# Patient Record
Sex: FEMALE | Race: BLACK OR AFRICAN AMERICAN | NOT HISPANIC OR LATINO | Employment: FULL TIME | URBAN - METROPOLITAN AREA
[De-identification: names, ages, dates, MRNs, and addresses within clinical notes are randomized per-mention and may not be internally consistent; named-entity substitution may affect disease eponyms.]

---

## 2017-01-18 ENCOUNTER — OFFICE VISIT (OUTPATIENT)
Dept: INTERNAL MEDICINE | Facility: CLINIC | Age: 67
End: 2017-01-18
Attending: FAMILY MEDICINE
Payer: COMMERCIAL

## 2017-01-18 VITALS
BODY MASS INDEX: 42.48 KG/M2 | HEART RATE: 70 BPM | WEIGHT: 264.31 LBS | SYSTOLIC BLOOD PRESSURE: 128 MMHG | HEIGHT: 66 IN | DIASTOLIC BLOOD PRESSURE: 84 MMHG

## 2017-01-18 DIAGNOSIS — I42.0 DILATED CARDIOMYOPATHY: ICD-10-CM

## 2017-01-18 DIAGNOSIS — Z13.820 SCREENING FOR OSTEOPOROSIS: ICD-10-CM

## 2017-01-18 DIAGNOSIS — Z00.00 ANNUAL PHYSICAL EXAM: Primary | ICD-10-CM

## 2017-01-18 DIAGNOSIS — I10 ESSENTIAL HYPERTENSION: ICD-10-CM

## 2017-01-18 PROCEDURE — 99999 PR PBB SHADOW E&M-EST. PATIENT-LVL III: CPT | Mod: PBBFAC,,, | Performed by: FAMILY MEDICINE

## 2017-01-18 PROCEDURE — 99397 PER PM REEVAL EST PAT 65+ YR: CPT | Mod: 25,S$GLB,, | Performed by: FAMILY MEDICINE

## 2017-01-18 PROCEDURE — 3079F DIAST BP 80-89 MM HG: CPT | Mod: S$GLB,,, | Performed by: FAMILY MEDICINE

## 2017-01-18 PROCEDURE — 90471 IMMUNIZATION ADMIN: CPT | Mod: S$GLB,,, | Performed by: FAMILY MEDICINE

## 2017-01-18 PROCEDURE — 3074F SYST BP LT 130 MM HG: CPT | Mod: S$GLB,,, | Performed by: FAMILY MEDICINE

## 2017-01-18 PROCEDURE — 90662 IIV NO PRSV INCREASED AG IM: CPT | Mod: S$GLB,,, | Performed by: FAMILY MEDICINE

## 2017-01-18 RX ORDER — LISINOPRIL AND HYDROCHLOROTHIAZIDE 12.5; 2 MG/1; MG/1
TABLET ORAL
Qty: 180 TABLET | Refills: 3 | Status: SHIPPED | OUTPATIENT
Start: 2017-01-18 | End: 2017-12-19 | Stop reason: DRUGHIGH

## 2017-01-18 NOTE — MR AVS SNAPSHOT
[facility]  [address]  [place] [address]  Phone: [phone]  Fax: [phone]                  Charlotte Crowder   2017 9:40 AM   Office Visit    Description:  Female : 1950   Provider:  Wendy Thompson MD   Department:  [facility]           Reason for Visit     Medication Problem     Hypertension     Immunizations           Diagnoses this Visit        Comments    Annual physical exam    -  Primary     Essential hypertension         Screening for osteoporosis         Dilated cardiomyopathy                To Do List           Future Appointments        Provider Department Dept Phone    2017 3:20 PM Yokasta Mathias NP [facility] [phone]    2017 10:00 AM LAB, [facility] [phone]    2017 10:40 AM [facility] DEXA1 [facility] [phone]      Goals (5 Years of Data)     None       These Medications        Disp Refills Start End    lisinopril-hydrochlorothiazide (PRINZIDE,ZESTORETIC) 20-12.5 mg per tablet 180 tablet 3 2017     Take two tabs po daily    Pharmacy: [facility]/pharmacy #[id number] - [place], [place] - [address]  #: [phone]         [facility]     [facility] Nurse Care Line -  Assistance  Registered nurses in the [facility] provide clinical advisement, health education, appointment booking, and other advisory services.  Call for this free service at [phone].             Medications           Message regarding Medications     Verify the changes and/or additions to your medication regime listed below are the same as discussed with your clinician today.  If any of these changes or additions are incorrect, please notify your healthcare provider.        STOP taking these medications     metoprolol succinate (TOPROL-XL) 50 MG 24 hr tablet TAKE 1 TABLET BY MOUTH TWICE A DAY    diclofenac sodium (VOLTAREN) 1 % Gel Apply 2 g  "topically 2 (two) times daily as needed. Apply to affected areas as needed           Verify that the below list of medications is an accurate representation of the medications you are currently taking.  If none reported, the list may be blank. If incorrect, please contact your healthcare provider. Carry this list with you in case of emergency.           Current Medications     aspirin 81 MG Chew Take 81 mg by mouth once daily.    atorvastatin (LIPITOR) 20 MG tablet TAKE 1 TABLET BY MOUTH EVERY DAY    carvedilol (COREG) 25 MG tablet Take 1 tablet (25 mg total) by mouth 2 (two) times daily with meals.    cyclobenzaprine (FLEXERIL) 5 MG tablet Take 5 mg by mouth 3 (three) times daily as needed for Muscle spasms.    fluticasone (FLONASE) 50 mcg/actuation nasal spray 2 sprays by Each Nare route once daily.    ibuprofen (ADVIL,MOTRIN) 800 MG tablet TAKE 1 TABLET (800 MG TOTAL) BY MOUTH AFTER MEALS AS NEEDED FOR PAIN.    lisinopril-hydrochlorothiazide (PRINZIDE,ZESTORETIC) 20-12.5 mg per tablet Take two tabs po daily    MULTIVIT-IRON-MIN-FOLIC ACID 3,500-18-0.4 UNIT-MG-MG ORAL CHEW Take 1 tablet by mouth once daily.           Clinical Reference Information           Vital Signs - Last Recorded  Most recent update: 1/18/2017 10:03 AM by Juanita Graff MA    BP Pulse Ht Wt BMI    128/84 (BP Location: Left arm, Patient Position: Sitting, BP Method: Manual) 70 5' 6" (1.676 m) 119.9 kg (264 lb 5.3 oz) 42.66 kg/m2      Blood Pressure          Most Recent Value    BP  128/84      Allergies as of 1/18/2017     No Known Allergies      Immunizations Administered on Date of Encounter - 1/18/2017     Name Date Dose VIS Date Route    Influenza - High Dose 1/18/2017 0.5 mL 8/7/2015 Intramuscular      Orders Placed During Today's Visit      Normal Orders This Visit    Influenza - High Dose (65+) (PF) (IM)     Future Labs/Procedures Expected by Expires    CBC auto differential  1/18/2017 1/18/2018    Comprehensive metabolic panel  " 1/18/2017 1/18/2018    DXA Bone Density Spine And Hip  1/18/2017 1/18/2018    Hemoglobin A1c  1/18/2017 1/18/2018    Lipid panel  1/18/2017 1/18/2018    TSH  1/18/2017 1/18/2018      [facility] Sign-Up     Activating your [facility] account is as easy as 1-2-3!     1) Visit [email or web address], select Sign Up Now, enter this activation code and your date of birth, then select Next.  [email or web address]  Expires: 3/4/2017 10:36 AM      2) Create a username and password to use when you visit [facility] in the future and select a security question in case you lose your password and select Next.    3) Enter your e-mail address and click Sign Up!    Additional Information  If you have questions, please e-mail [email or web address] or call [phone] to talk to our [facility] staff. Remember, [facility] is NOT to be used for urgent needs. For medical emergencies, dial 911.

## 2017-01-18 NOTE — PROGRESS NOTES
Patient given HD Influenza IM in the RD. Patient tolerated well and Band-Aid was applied. Lot#[id number] Exp:04/25/2017. Patient advised to wait in the lobby for 15 min to make sure no adverse reactions occur.Patient given VIS information sheet.Patient states verbal understanding and has no further questions.

## 2017-01-19 ENCOUNTER — OFFICE VISIT (OUTPATIENT)
Dept: SLEEP MEDICINE | Facility: CLINIC | Age: 67
End: 2017-01-19
Payer: COMMERCIAL

## 2017-01-19 VITALS
WEIGHT: 264.13 LBS | HEART RATE: 82 BPM | HEIGHT: 66 IN | SYSTOLIC BLOOD PRESSURE: 132 MMHG | DIASTOLIC BLOOD PRESSURE: 94 MMHG | BODY MASS INDEX: 42.45 KG/M2

## 2017-01-19 DIAGNOSIS — G47.33 OBSTRUCTIVE SLEEP APNEA: Primary | ICD-10-CM

## 2017-01-19 PROCEDURE — 1159F MED LIST DOCD IN RCRD: CPT | Mod: S$GLB,,, | Performed by: NURSE PRACTITIONER

## 2017-01-19 PROCEDURE — 3075F SYST BP GE 130 - 139MM HG: CPT | Mod: S$GLB,,, | Performed by: NURSE PRACTITIONER

## 2017-01-19 PROCEDURE — 99999 PR PBB SHADOW E&M-EST. PATIENT-LVL III: CPT | Mod: PBBFAC,,, | Performed by: NURSE PRACTITIONER

## 2017-01-19 PROCEDURE — 99213 OFFICE O/P EST LOW 20 MIN: CPT | Mod: S$GLB,,, | Performed by: NURSE PRACTITIONER

## 2017-01-19 PROCEDURE — 3080F DIAST BP >= 90 MM HG: CPT | Mod: S$GLB,,, | Performed by: NURSE PRACTITIONER

## 2017-01-19 NOTE — PROGRESS NOTES
"Charlotte Crowder returns today for mgt of KIMMIE. Last seen 9/23/16.     Since last seen, she resumed using apap. Doesn't sleep w/o it.  makes sure. Loves stringer fx Massiel mask. Denies mouth drying. No chin strap use. Mask does not fall off. Continued resolution of snoring, apneic pauses and un-refreshing sleep. Needs new supplies. ESS=2    Interrogation- 30davg 5.8h/n. 7d 6.6hrs/n. 27/30d>4h. 90% tile 10.3-10.5cm. AHI 3.9, heat at 2. % mask fit.     HISTORY:  4/8/16:  HISTORY OF PRESENT ILLNESS: Charlotte Crowedr is a 66 y.o. female returning today for the management of KIMMIE. She last saw Dr. Khan 1/19/16, this is my initial visit with her. She has since undergone a sleep study, which we reviewed together in detail today. She continues to report loud, disruptive snoring, un-refreshing sleep, and excessive daytime fatigue. She is a sitter. She feels less short of breath. Sleep is disrupted. +witnessed apneic pauses. She does not drive. No cataplexy. ESS=4    +progressive sob since 6/2015.  Patient presented to cardiologist and ett revealed ef 40%.  Holter revealed nightime AV block.      9/23/16: Her titration study was denied in April '16 so she was setup with apap 6-20cm. She used it the first 3-mos ~ 4-6h/night but then stopped because she was told only to use it for 3mos. She reports resolution of snoring and apneic pauses when using therapy. She found her mask uncomfortable. Using chin strap. She did feel more rested when adherent also, just stopped ~ 30d ago. She slept soundly. Denies pressure intolerance. Kept heat at 2 and denies nasal drying. ESS=2. "I don't see why  I have to keep using it, I feel fine and my doctor told me my heart was fine". States she will resume using it tonight.     Interrogation: machine fairly new condition, 1/30d>4h. 0.2h/n 90% tile 7.3cm AHI 11.6, 79% fit. reviewed ENCORE data 4+hr/ first 3-mos with ahi 10-13cm. GHOSH 0.2    PSG (262#) 3/16/116: AHI was 40.3 with an oxygen " "yenny of 85.0%. The supine AHI was 60.7 and the REM AHI was 77.6. Due to poor sleep efficiency, the patient did not qualify for a split night study in the first half of the study.       REVIEW OF SYSTEMS: Sleep related symptoms as per HPI. 6# gain.  Chronic back pain, occasional acid reflex. Denies am headaches, otherwise a balance review of 10-systems is negative.     Stress Echo:  1 - Technically difficult study.   2 - Mildly to moderately depressed left ventricular systolic function (EF 40-45%).   3 - Low normal to mildly depressed right ventricular systolic function .   4 - Left ventricular diastolic dysfunction.      [facility] July normal        PHYSICAL EXAM:   Visit Vitals    BP (!) 132/94    Pulse 82    Ht 5' 6" (1.676 m)    Wt 119.8 kg (264 lb 1.8 oz)    BMI 42.63 kg/m2   GENERAL: morbid obese body habitus, well groomed     ASSESSMENT:   1. Obstructive sleep apnea, severe significant, adherent with PAP therapy after setup with reported improvement of symptoms (residual mild elevated AHI upon encore review). Used it 3-mos then stopped because she didn't know otherwise, reports "they" told her to use if 2-3mos then could stop, use it 4h /night. 1/19/17: She resumed APAP therapy after last seen. Continued nightly use. Symptoms are improved. AHI <5 indicating effective therapy. Needs new supplies .  Medical comorbidities include: morbid obesity, dilated cardiomyopathy, HTN    PLAN:   1. continue apap, adjust today 6-14cm. Continue nightly use. [facility] supplies  Discussed purpose of PAP therapy, health benefits of CPAP, as well as the potential ramifications of untreated sleep apnea, which could include daytime sleepiness, hypertension, heart disease and/or stroke. An AHI of 15 is associated with increased risk CVD.   2. She does not drive  3. RTC 1 yrs, SOONER if needed  "

## 2017-01-24 NOTE — PROGRESS NOTES
Subjective:       Patient ID: Charlotte Crowder is a 66 y.o. female.    Chief Complaint: Medication Problem (cough , sob); Hypertension; and Immunizations    HPI Comments: Pt presents today for HTN f/u and annual exam. BP well controlled today. No complaints needs med refilled.  Also wants flu shot, labs ordered and bone density    Hypertension   Pertinent negatives include no chest pain, headaches, neck pain, palpitations or shortness of breath.     Review of Systems   Constitutional: Negative.  Negative for activity change, appetite change, chills, fatigue and fever.   HENT: Negative for congestion, ear pain, sinus pressure, sore throat and trouble swallowing.    Eyes: Negative.  Negative for photophobia, pain and visual disturbance.   Respiratory: Negative for apnea, cough, chest tightness, shortness of breath and wheezing.    Cardiovascular: Negative for chest pain, palpitations and leg swelling.   Gastrointestinal: Negative.  Negative for abdominal distention, abdominal pain, constipation, diarrhea, nausea and vomiting.   Genitourinary: Negative.    Musculoskeletal: Negative.  Negative for back pain, joint swelling and neck pain.   Skin: Negative.    Neurological: Negative for dizziness, tremors, weakness, light-headedness, numbness and headaches.   Psychiatric/Behavioral: Negative for behavioral problems, decreased concentration and sleep disturbance. The patient is not nervous/anxious.        Objective:      Physical Exam   Constitutional: She is oriented to person, place, and time. She appears well-developed and well-nourished.   HENT:   Head: Normocephalic and atraumatic.   Right Ear: External ear normal.   Left Ear: External ear normal.   Nose: Nose normal.   Mouth/Throat: Oropharynx is clear and moist. No oropharyngeal exudate.   Eyes: Conjunctivae and EOM are normal. Pupils are equal, round, and reactive to light.   Neck: Normal range of motion. Neck supple. No thyromegaly present.   Cardiovascular:  Normal rate, regular rhythm, normal heart sounds and intact distal pulses.    No murmur heard.  Pulmonary/Chest: Effort normal and breath sounds normal. No respiratory distress. She has no wheezes. She has no rales. She exhibits no tenderness.   Abdominal: Soft. Bowel sounds are normal. She exhibits no distension and no mass. There is no tenderness. There is no rebound and no guarding.   Musculoskeletal: Normal range of motion. She exhibits no edema or tenderness.   Lymphadenopathy:     She has no cervical adenopathy.   Neurological: She is alert and oriented to person, place, and time. She has normal reflexes. She displays normal reflexes. No cranial nerve deficit. She exhibits normal muscle tone. Coordination normal.   Skin: Skin is warm and dry. No erythema.   Psychiatric: She has a normal mood and affect. Her behavior is normal. Judgment and thought content normal.       Assessment:       1. Annual physical exam    2. Essential hypertension    3. Screening for osteoporosis    4. Dilated cardiomyopathy        Plan:       Annual physical exam  -     CBC auto differential; Future; Expected date: 1/18/17  -     Comprehensive metabolic panel; Future; Expected date: 1/18/17  -     Hemoglobin A1c; Future; Expected date: 1/18/17  -     TSH; Future; Expected date: 1/18/17  -     Lipid panel; Future; Expected date: 1/18/17    Essential hypertension  -     lisinopril-hydrochlorothiazide (PRINZIDE,ZESTORETIC) 20-12.5 mg per tablet; Take two tabs po daily (Patient taking differently: as needed. Take two tabs po daily)  Dispense: 180 tablet; Refill: 3    Screening for osteoporosis  -     DXA Bone Density Spine And Hip; Future; Expected date: 1/18/17    Dilated cardiomyopathy    Other orders  -     Influenza - High Dose (65+) (PF) (IM)      PE wnl  Labs ordered  meds refilled  HTN controlled  Flu shot

## 2017-01-26 ENCOUNTER — HOSPITAL ENCOUNTER (OUTPATIENT)
Dept: RADIOLOGY | Facility: OTHER | Age: 67
Discharge: HOME | End: 2017-01-26
Attending: FAMILY MEDICINE
Payer: COMMERCIAL

## 2017-01-26 ENCOUNTER — LAB VISIT (OUTPATIENT)
Dept: LAB | Facility: OTHER | Age: 67
End: 2017-01-26
Attending: FAMILY MEDICINE
Payer: COMMERCIAL

## 2017-01-26 DIAGNOSIS — Z13.820 SCREENING FOR OSTEOPOROSIS: ICD-10-CM

## 2017-01-26 DIAGNOSIS — Z00.00 ANNUAL PHYSICAL EXAM: ICD-10-CM

## 2017-01-26 DIAGNOSIS — D72.819 LEUKOPENIA, UNSPECIFIED TYPE: Primary | ICD-10-CM

## 2017-01-26 DIAGNOSIS — Z11.59 NEED FOR HEPATITIS C SCREENING TEST: ICD-10-CM

## 2017-01-26 LAB
ALBUMIN SERPL BCP-MCNC: 4 G/DL
ALP SERPL-CCNC: 97 U/L
ALT SERPL W/O P-5'-P-CCNC: 16 U/L
ANION GAP SERPL CALC-SCNC: 8 MMOL/L
AST SERPL-CCNC: 20 U/L
BASOPHILS # BLD AUTO: 0.01 K/UL
BASOPHILS NFR BLD: 0.4 %
BILIRUB SERPL-MCNC: 0.7 MG/DL
BUN SERPL-MCNC: 17 MG/DL
CALCIUM SERPL-MCNC: 10.9 MG/DL
CHLORIDE SERPL-SCNC: 107 MMOL/L
CHOLEST/HDLC SERPL: 4.3 {RATIO}
CO2 SERPL-SCNC: 27 MMOL/L
CREAT SERPL-MCNC: 1 MG/DL
DIFFERENTIAL METHOD: ABNORMAL
EOSINOPHIL # BLD AUTO: 0.1 K/UL
EOSINOPHIL NFR BLD: 4.7 %
ERYTHROCYTE [DISTWIDTH] IN BLOOD BY AUTOMATED COUNT: 13.9 %
EST. GFR  (AFRICAN AMERICAN): >60 ML/MIN/1.73 M^2
EST. GFR  (NON AFRICAN AMERICAN): 59.3 ML/MIN/1.73 M^2
GLUCOSE SERPL-MCNC: 106 MG/DL
HCT VFR BLD AUTO: 36 %
HDL/CHOLESTEROL RATIO: 23.5 %
HDLC SERPL-MCNC: 166 MG/DL
HDLC SERPL-MCNC: 39 MG/DL
HGB BLD-MCNC: 12.1 G/DL
LDLC SERPL CALC-MCNC: 93.6 MG/DL
LYMPHOCYTES # BLD AUTO: 1.3 K/UL
LYMPHOCYTES NFR BLD: 47 %
MCH RBC QN AUTO: 28.4 PG
MCHC RBC AUTO-ENTMCNC: 33.6 %
MCV RBC AUTO: 85 FL
MONOCYTES # BLD AUTO: 0.2 K/UL
MONOCYTES NFR BLD: 6.1 %
NEUTROPHILS # BLD AUTO: 1.2 K/UL
NEUTROPHILS NFR BLD: 41.8 %
NONHDLC SERPL-MCNC: 127 MG/DL
PLATELET # BLD AUTO: 191 K/UL
PMV BLD AUTO: 10.2 FL
POTASSIUM SERPL-SCNC: 4 MMOL/L
PROT SERPL-MCNC: 7.5 G/DL
RBC # BLD AUTO: 4.26 M/UL
SODIUM SERPL-SCNC: 142 MMOL/L
TRIGL SERPL-MCNC: 167 MG/DL
TSH SERPL DL<=0.005 MIU/L-ACNC: 1.09 UIU/ML
WBC # BLD AUTO: 2.79 K/UL

## 2017-01-26 PROCEDURE — 84443 ASSAY THYROID STIM HORMONE: CPT

## 2017-01-26 PROCEDURE — 85025 COMPLETE CBC W/AUTO DIFF WBC: CPT

## 2017-01-26 PROCEDURE — 80053 COMPREHEN METABOLIC PANEL: CPT

## 2017-01-26 PROCEDURE — 80061 LIPID PANEL: CPT

## 2017-01-26 PROCEDURE — 77080 DXA BONE DENSITY AXIAL: CPT | Mod: 26,,, | Performed by: RADIOLOGY

## 2017-01-26 PROCEDURE — 86803 HEPATITIS C AB TEST: CPT

## 2017-01-26 PROCEDURE — 36415 COLL VENOUS BLD VENIPUNCTURE: CPT

## 2017-01-26 PROCEDURE — 83036 HEMOGLOBIN GLYCOSYLATED A1C: CPT

## 2017-01-26 PROCEDURE — 77080 DXA BONE DENSITY AXIAL: CPT | Mod: TC

## 2017-01-27 LAB
ESTIMATED AVG GLUCOSE: 100 MG/DL
HBA1C MFR BLD HPLC: 5.1 %
HCV AB SERPL QL IA: NEGATIVE

## 2017-01-31 ENCOUNTER — TELEPHONE (OUTPATIENT)
Dept: INTERNAL MEDICINE | Facility: CLINIC | Age: 67
End: 2017-01-31

## 2017-01-31 NOTE — TELEPHONE ENCOUNTER
----- Message from Mckayla Callahan sent at 1/31/2017  9:54 AM CST -----  Contact: MADELEINE RAMSAY [[id number]]  _  1st Request  _  2nd Request  _  3rd Request        Who: MADELEINE RAMSAY [[id number]]    Why: Patient would like doctor to explain her results to her. Please give patient a call back at your earliest convenience. Thanks!    What Number to Call Back: [phone]     When to Expect a call back: (Before the end of the day)   -- if the call is after 12:00, the call back will be tomorrow.

## 2017-01-31 NOTE — PROGRESS NOTES
In depth voicemail left for pt regarding her calcium levels and ongoing leukopenia. Will suggest referral to heme at this time.  PV

## 2017-01-31 NOTE — TELEPHONE ENCOUNTER
Let a detailed message for the pt in regards to setting up an appointment with heme for further evaluate.

## 2017-02-09 ENCOUNTER — INITIAL CONSULT (OUTPATIENT)
Dept: HEMATOLOGY/ONCOLOGY | Facility: CLINIC | Age: 67
End: 2017-02-09
Attending: FAMILY MEDICINE
Payer: COMMERCIAL

## 2017-02-09 VITALS
RESPIRATION RATE: 16 BRPM | SYSTOLIC BLOOD PRESSURE: 135 MMHG | HEIGHT: 65 IN | HEART RATE: 77 BPM | DIASTOLIC BLOOD PRESSURE: 71 MMHG | BODY MASS INDEX: 43.79 KG/M2 | TEMPERATURE: 99 F | WEIGHT: 262.81 LBS

## 2017-02-09 DIAGNOSIS — D70.0 CONGENITAL NEUTROPENIA: ICD-10-CM

## 2017-02-09 PROCEDURE — 3075F SYST BP GE 130 - 139MM HG: CPT | Mod: S$GLB,,, | Performed by: INTERNAL MEDICINE

## 2017-02-09 PROCEDURE — 3078F DIAST BP <80 MM HG: CPT | Mod: S$GLB,,, | Performed by: INTERNAL MEDICINE

## 2017-02-09 PROCEDURE — 99999 PR PBB SHADOW E&M-EST. PATIENT-LVL III: CPT | Mod: PBBFAC,,, | Performed by: INTERNAL MEDICINE

## 2017-02-09 PROCEDURE — 99203 OFFICE O/P NEW LOW 30 MIN: CPT | Mod: S$GLB,,, | Performed by: INTERNAL MEDICINE

## 2017-02-09 NOTE — LETTER
February 9, 2017      Wendy Thompson MD  [address]  [facility] [address]           [facility]  [address]  [address]  [facility] [address]  Phone: [phone]          Patient: Charlotte Crowder   MR Number: [id number]   YOB: 1950   Date of Visit: 2/9/2017       Dear Dr. Wendy Thompson:    Thank you for referring Charlotte Crowder to me for evaluation. Attached you will find relevant portions of my assessment and plan of care.    If you have questions, please do not hesitate to call me. I look forward to following Charlotte Crowder along with you.    Sincerely,    Leonard Leo MD    Enclosure  CC:  No Recipients    If you would like to receive this communication electronically, please contact [email or web address] or [phone] to request more information on [employer] access.    For providers and/or their staff who would like to refer a patient to [facility], please contact us through our one-stop-shop provider referral line, [facility], at [phone].    If you feel you have received this communication in error or would no longer like to receive these types of communications, please e-mail [email or web address]

## 2017-02-09 NOTE — PROGRESS NOTES
Subjective:       Patient ID: Charlotte Crowder is a 66 y.o. female.    Chief Complaint: Advice Only    HPI Ms Crowder is a 66-year-old female referred for evaluation of leukopenia.    Review of her blood work shows that she's had mild leukopenia dating back at least 2013 with her neutrophil count typically ranging between 3040 500. neutrophil count has ranged between 1119 100.  Hemoglobin and platelet counts have been normal.  Vitamin B12 level was noted to be slightly decreased in 2013 at 199.  On recheck in 2014 it was 365.      She reports she was told many years ago that her white blood cell count was slightly low.  There is no history of recurrent infections.   Her weight has been stable.  She has no skin rashes.  She has some back and knee pain consistent with DJD.    Review of Systems   Constitutional: Negative for activity change, fever and unexpected weight change.   Respiratory: Negative for cough.    Gastrointestinal: Negative for abdominal pain.   Genitourinary: Negative for dysuria.   Musculoskeletal: Positive for arthralgias (knees) and back pain.   Neurological: Negative for headaches.   Psychiatric/Behavioral: Negative for dysphoric mood. The patient is not nervous/anxious.        Objective:      Physical Exam   Constitutional: She is oriented to person, place, and time. She appears well-developed and well-nourished. No distress.   Obese   HENT:   Mouth/Throat: No oropharyngeal exudate.   Eyes: No scleral icterus.   Cardiovascular: Regular rhythm, normal heart sounds and intact distal pulses.    Pulmonary/Chest: Effort normal and breath sounds normal. She has no wheezes. She has no rales.   Abdominal: Soft. She exhibits no mass. There is no tenderness.   Lymphadenopathy:     She has no cervical adenopathy.     She has no axillary adenopathy.        Right: No supraclavicular adenopathy present.        Left: No supraclavicular adenopathy present.   Neurological: She is alert and oriented to person,  place, and time.   Psychiatric: She has a normal mood and affect. Her behavior is normal. Thought content normal.   Vitals reviewed.      Assessment:       1. Congenital neutropenia      benign neutropenia seen in  Americans, no evidence of significant underlying bone marrow dysfunction or significant hematologic disorder.  Plan:       I told the patient that she needs no further evaluation for this finding.  It is unlikely that she will have any consequences of her mild chronic leukopenia.

## 2017-03-03 RX ORDER — IBUPROFEN 800 MG/1
TABLET ORAL
Qty: 90 TABLET | Refills: 0 | Status: SHIPPED | OUTPATIENT
Start: 2017-03-03 | End: 2017-07-11 | Stop reason: SDUPTHER

## 2017-03-03 NOTE — TELEPHONE ENCOUNTER
----- Message from Any Caba sent at 3/3/2017 10:52 AM CST -----  Contact: Franco with [facility]   X  1st Request  _  2nd Request  _  3rd Request    Please refill the medication(s) listed below. Please call the patient when the prescription(s) is ready for  at the phone number ([phone]) .    Medication #1 ibuprofen (ADVIL,MOTRIN) 800 MG tablet      Preferred Pharmacy:  [facility] #[id number] - [place] - [facility] [address]

## 2017-03-31 RX ORDER — ATORVASTATIN CALCIUM 20 MG/1
20 TABLET, FILM COATED ORAL DAILY
Qty: 90 TABLET | Refills: 1 | Status: SHIPPED | OUTPATIENT
Start: 2017-03-31 | End: 2017-09-27 | Stop reason: SDUPTHER

## 2017-06-22 ENCOUNTER — OFFICE VISIT (OUTPATIENT)
Dept: OPTOMETRY | Facility: CLINIC | Age: 67
End: 2017-06-22
Payer: COMMERCIAL

## 2017-06-22 DIAGNOSIS — I10 ESSENTIAL HYPERTENSION: ICD-10-CM

## 2017-06-22 DIAGNOSIS — H25.13 CATARACT, NUCLEAR SCLEROTIC SENILE, BILATERAL: ICD-10-CM

## 2017-06-22 DIAGNOSIS — H04.123 DRY EYE SYNDROME, BILATERAL: Primary | ICD-10-CM

## 2017-06-22 DIAGNOSIS — H02.889 MEIBOMIAN GLAND DYSFUNCTION: ICD-10-CM

## 2017-06-22 DIAGNOSIS — H52.4 PRESBYOPIA OU: ICD-10-CM

## 2017-06-22 PROCEDURE — 99999 PR PBB SHADOW E&M-EST. PATIENT-LVL I: CPT | Mod: PBBFAC,,, | Performed by: OPTOMETRIST

## 2017-06-22 PROCEDURE — 92014 COMPRE OPH EXAM EST PT 1/>: CPT | Mod: S$GLB,,, | Performed by: OPTOMETRIST

## 2017-06-22 NOTE — PROGRESS NOTES
HPI     Patient here for annual check. States eyes are dry, does not use any ATs.    Uses OTC readers +1.75-2.00 but says they do not work as well as they   used to   REGINA Allred Resident 2015    (-) Pain  (+) dryness, watering   (-) itching, irritation   (-) Flashes or Floaters     gtts None     Last edited by Mauricio Amador, OD on 6/22/2017  2:17 PM. (History)            Assessment /Plan     For exam results, see Encounter Report.    Dry eye syndrome, bilateral    Cataract, nuclear sclerotic senile, bilateral    Meibomian gland dysfunction    Essential hypertension    Presbyopia OU    1) Educated pt on presence of cataracts and effects on vision. No surgery at this time. Recheck in one year.    2-3) Recommend Systane Balance 3-4 times daily and warm compresses with digital massage, Monitor yearly     4) (-) hemes/CWS, (-) NVD/NVE, (-) H/B/T, No HTN Retinopathy, cont compliance with PCP fu and meds, Monitor yearly     5) Cont OTC readers +2.50,    RTC 1 year annual Exam

## 2017-07-11 ENCOUNTER — OFFICE VISIT (OUTPATIENT)
Dept: INTERNAL MEDICINE | Facility: CLINIC | Age: 67
End: 2017-07-11
Attending: FAMILY MEDICINE
Payer: COMMERCIAL

## 2017-07-11 VITALS
WEIGHT: 266.56 LBS | BODY MASS INDEX: 42.84 KG/M2 | HEART RATE: 79 BPM | DIASTOLIC BLOOD PRESSURE: 78 MMHG | SYSTOLIC BLOOD PRESSURE: 124 MMHG | HEIGHT: 66 IN

## 2017-07-11 DIAGNOSIS — R30.0 DYSURIA: ICD-10-CM

## 2017-07-11 DIAGNOSIS — G89.29 CHRONIC FLANK PAIN: Primary | ICD-10-CM

## 2017-07-11 DIAGNOSIS — R10.9 CHRONIC FLANK PAIN: Primary | ICD-10-CM

## 2017-07-11 LAB
BACTERIA #/AREA URNS HPF: NORMAL /HPF
BILIRUB UR QL STRIP: NEGATIVE
CLARITY UR: CLEAR
COLOR UR: YELLOW
GLUCOSE UR QL STRIP: NEGATIVE
HGB UR QL STRIP: NEGATIVE
KETONES UR QL STRIP: NEGATIVE
LEUKOCYTE ESTERASE UR QL STRIP: ABNORMAL
MICROSCOPIC COMMENT: NORMAL
NITRITE UR QL STRIP: NEGATIVE
PH UR STRIP: 6 [PH] (ref 5–8)
PROT UR QL STRIP: NEGATIVE
SP GR UR STRIP: <=1.005 (ref 1–1.03)
SQUAMOUS #/AREA URNS HPF: 2 /HPF
URN SPEC COLLECT METH UR: ABNORMAL
UROBILINOGEN UR STRIP-ACNC: 1 EU/DL
WBC #/AREA URNS HPF: 3 /HPF (ref 0–5)

## 2017-07-11 PROCEDURE — 81000 URINALYSIS NONAUTO W/SCOPE: CPT

## 2017-07-11 PROCEDURE — 1159F MED LIST DOCD IN RCRD: CPT | Mod: S$GLB,,, | Performed by: FAMILY MEDICINE

## 2017-07-11 PROCEDURE — 99999 PR PBB SHADOW E&M-EST. PATIENT-LVL III: CPT | Mod: PBBFAC,,, | Performed by: FAMILY MEDICINE

## 2017-07-11 PROCEDURE — 99213 OFFICE O/P EST LOW 20 MIN: CPT | Mod: S$GLB,,, | Performed by: FAMILY MEDICINE

## 2017-07-11 PROCEDURE — 1125F AMNT PAIN NOTED PAIN PRSNT: CPT | Mod: S$GLB,,, | Performed by: FAMILY MEDICINE

## 2017-07-11 PROCEDURE — 87086 URINE CULTURE/COLONY COUNT: CPT

## 2017-07-11 RX ORDER — SULFAMETHOXAZOLE AND TRIMETHOPRIM 800; 160 MG/1; MG/1
1 TABLET ORAL 2 TIMES DAILY
Qty: 6 TABLET | Refills: 0 | Status: SHIPPED | OUTPATIENT
Start: 2017-07-11 | End: 2017-07-14

## 2017-07-11 RX ORDER — CYCLOBENZAPRINE HCL 10 MG
10 TABLET ORAL 2 TIMES DAILY PRN
Qty: 30 TABLET | Refills: 1 | Status: SHIPPED | OUTPATIENT
Start: 2017-07-11 | End: 2017-09-18 | Stop reason: SDUPTHER

## 2017-07-11 RX ORDER — IBUPROFEN 800 MG/1
TABLET ORAL
Qty: 90 TABLET | Refills: 0 | Status: SHIPPED | OUTPATIENT
Start: 2017-07-11 | End: 2018-02-09 | Stop reason: SDUPTHER

## 2017-07-11 NOTE — MEDICAL/APP STUDENT
Subjective:       Patient ID: Charlotte Crowder is a 67 y.o. female.    Chief Complaint: Urinary Tract Infection (possible, burning, pressure x 4 days); Urine Odor; and Urinary Frequency    Patient presents to clinic today with complaints of chronic L sided lower back pain that is typically controlled on Ibuprofen 800 mg that she takes every couple of days. Patient reserves Ibuprofen for days when her pain is 7/10. Describes pain as sharp and it can present in her L lateral lower flank or L lower back above the posterior pelvis. Patient has had Xrays that showed DJD to lumbar and sacral area. Pain is aggravated by ADLs such as ironing or reaching.     Patient also notes that she has been having pressure in her bladder and burning with urination x 4 days. She also notes that her urine has changed in odor. No flank pain. Pos+ for urinary frequency x 2 days. Has increased amounts of water and cranberry juice since the symptoms began. Neg- for fever, chills, diaphoresis, confusion.       Review of Systems   Constitutional: Negative for activity change, chills, diaphoresis, fatigue and fever.   Respiratory: Negative for cough, shortness of breath and wheezing.    Cardiovascular: Negative for chest pain and palpitations.   Gastrointestinal: Negative for constipation, diarrhea, nausea and vomiting.   Genitourinary: Positive for difficulty urinating, dysuria and frequency. Negative for flank pain, hematuria and urgency.   Musculoskeletal: Positive for arthralgias (L hip and lower back) and back pain. Negative for joint swelling.   Neurological: Negative for dizziness and light-headedness.       Objective:      Physical Exam   Constitutional: She is oriented to person, place, and time. She appears well-developed and well-nourished.   Cardiovascular: Normal rate, regular rhythm, normal heart sounds and intact distal pulses.    Pulmonary/Chest: Effort normal and breath sounds normal.   Abdominal: Soft. Bowel sounds are normal.  She exhibits no distension. There is no tenderness.   Musculoskeletal: Normal range of motion. She exhibits tenderness (L lateral low flank).        Arms:  Neurological: She is alert and oriented to person, place, and time.   Skin: Skin is warm and dry. Capillary refill takes less than 2 seconds. No rash noted.   Psychiatric: She has a normal mood and affect. Her behavior is normal. Thought content normal.       Assessment:       1. Chronic flank pain    2. Dysuria           Plan:     Charlotte was seen today for urinary tract infection, urine odor and urinary frequency.    Diagnoses and all orders for this visit:    Chronic flank pain  -     cyclobenzaprine (FLEXERIL) 10 MG tablet; Take 1 tablet (10 mg total) by mouth 2 (two) times daily as needed for Muscle spasms.  -     ibuprofen (ADVIL,MOTRIN) 800 MG tablet; TAKE 1 TABLET (800 MG TOTAL) BY MOUTH AFTER MEALS AS NEEDED FOR PAIN.    Dysuria  -     Urinalysis  -     CULTURE, URINE  -     sulfamethoxazole-trimethoprim 800-160mg (BACTRIM DS) 800-160 mg Tab; Take 1 tablet by mouth 2 (two) times daily.      Urine in office was positive for leukocytes and patient started on 3 day course of bactrim. Urine sent for culture and UA. RTC as needed or if symptoms worsen. Notify PCP if develop fever, chills, or signs of infection.

## 2017-07-12 NOTE — PROGRESS NOTES
Patient ID: Charlotte Crowder is a 67 y.o. female.     Chief Complaint: Urinary Tract Infection (possible, burning, pressure x 4 days); Urine Odor; and Urinary Frequency     Patient presents to clinic today with complaints of chronic L sided lower back pain that is typically controlled on Ibuprofen 800 mg that she takes every couple of days. Patient reserves Ibuprofen for days when her pain is 7/10. Describes pain as sharp and it can present in her L lateral lower flank or L lower back above the posterior pelvis. Patient has had Xrays that showed DJD to lumbar and sacral area. Pain is aggravated by ADLs such as ironing or reaching.      Patient also notes that she has been having pressure in her bladder and burning with urination x 4 days. She also notes that her urine has changed in odor. No flank pain. Pos+ for urinary frequency x 2 days. Has increased amounts of water and cranberry juice since the symptoms began. Neg- for fever, chills, diaphoresis, confusion.      Review of Systems   Constitutional: Negative for activity change, chills, diaphoresis, fatigue and fever.   Respiratory: Negative for cough, shortness of breath and wheezing.    Cardiovascular: Negative for chest pain and palpitations.   Gastrointestinal: Negative for constipation, diarrhea, nausea and vomiting.   Genitourinary: Positive for difficulty urinating, dysuria and frequency. Negative for flank pain, hematuria and urgency.   Musculoskeletal: Positive for arthralgias (L hip and lower back) and back pain. Negative for joint swelling.   Neurological: Negative for dizziness and light-headedness.       Objective:      Physical Exam   Constitutional: She is oriented to person, place, and time. She appears well-developed and well-nourished.   Cardiovascular: Normal rate, regular rhythm, normal heart sounds and intact distal pulses.    Pulmonary/Chest: Effort normal and breath sounds normal.   Abdominal: Soft. Bowel sounds are normal. She exhibits no  distension. There is no tenderness.   Musculoskeletal: Normal range of motion. She exhibits tenderness (L lateral low flank).        Arms:  Neurological: She is alert and oriented to person, place, and time.   Skin: Skin is warm and dry. Capillary refill takes less than 2 seconds. No rash noted.   Psychiatric: She has a normal mood and affect. Her behavior is normal. Thought content normal.       Assessment:       1. Chronic flank pain    2. Dysuria              Plan:     Charlotte was seen today for urinary tract infection, urine odor and urinary frequency.     Diagnoses and all orders for this visit:     Chronic flank pain  -     cyclobenzaprine (FLEXERIL) 10 MG tablet; Take 1 tablet (10 mg total) by mouth 2 (two) times daily as needed for Muscle spasms.  -     ibuprofen (ADVIL,MOTRIN) 800 MG tablet; TAKE 1 TABLET (800 MG TOTAL) BY MOUTH AFTER MEALS AS NEEDED FOR PAIN.     Dysuria  -     Urinalysis  -     CULTURE, URINE  -     sulfamethoxazole-trimethoprim 800-160mg (BACTRIM DS) 800-160 mg Tab; Take 1 tablet by mouth 2 (two) times daily.        Urine in office was positive for leukocytes and patient started on 3 day course of bactrim. Urine sent for culture and UA. RTC as needed or if symptoms worsen. Notify PCP if develop fever, chills, or signs of infection.             Electronically signed by Sony Craft at 7/11/2017 12:23 PM      Pt seen today in clinic and examined by myself. Plan derived with pt in room

## 2017-07-13 LAB — BACTERIA UR CULT: NORMAL

## 2017-07-21 DIAGNOSIS — I10 ESSENTIAL HYPERTENSION: ICD-10-CM

## 2017-07-21 DIAGNOSIS — I42.0 DILATED CARDIOMYOPATHY: ICD-10-CM

## 2017-07-24 RX ORDER — CARVEDILOL 25 MG/1
TABLET ORAL
Qty: 180 TABLET | Refills: 0 | Status: SHIPPED | OUTPATIENT
Start: 2017-07-24 | End: 2017-10-25 | Stop reason: SDUPTHER

## 2017-09-18 DIAGNOSIS — G89.29 CHRONIC FLANK PAIN: ICD-10-CM

## 2017-09-18 DIAGNOSIS — R10.9 CHRONIC FLANK PAIN: ICD-10-CM

## 2017-09-18 RX ORDER — CYCLOBENZAPRINE HCL 10 MG
TABLET ORAL
Qty: 30 TABLET | Refills: 0 | Status: SHIPPED | OUTPATIENT
Start: 2017-09-18 | End: 2018-02-05 | Stop reason: SDUPTHER

## 2017-09-27 RX ORDER — ATORVASTATIN CALCIUM 20 MG/1
20 TABLET, FILM COATED ORAL DAILY
Qty: 90 TABLET | Refills: 1 | Status: SHIPPED | OUTPATIENT
Start: 2017-09-27 | End: 2017-12-19 | Stop reason: SDUPTHER

## 2017-10-12 ENCOUNTER — TELEPHONE (OUTPATIENT)
Dept: INTERNAL MEDICINE | Facility: CLINIC | Age: 67
End: 2017-10-12

## 2017-10-12 NOTE — TELEPHONE ENCOUNTER
Left a detailed informing the pt that Dr. Thompson isn't in the office on Thursday or Friday . Upon her return on Monday will this matter be addressed .

## 2017-10-12 NOTE — TELEPHONE ENCOUNTER
----- Message from tSar Block sent at 10/12/2017 10:57 AM CDT -----  Contact: Charlotte Crowder  X_  1st Request  _  2nd Request  _  3rd Request        Who: Charlotte Crowder    Why: Patient called needing an order for her mammogram and to schedule    What Number to Call Back: [phone]    When to Expect a call back: (Within 24 hours)    Please return the call at earliest convenience. Thanks!

## 2017-10-16 ENCOUNTER — TELEPHONE (OUTPATIENT)
Dept: INTERNAL MEDICINE | Facility: CLINIC | Age: 67
End: 2017-10-16

## 2017-10-16 DIAGNOSIS — Z12.31 VISIT FOR SCREENING MAMMOGRAM: Primary | ICD-10-CM

## 2017-10-16 NOTE — TELEPHONE ENCOUNTER
----- Message from Star Block sent at 10/12/2017 10:57 AM CDT -----  Contact: Charlotte Crowder  X_  1st Request  _  2nd Request  _  3rd Request        Who: Charlotte Crowder    Why: Patient called needing an order for her mammogram and to schedule    What Number to Call Back: [phone]    When to Expect a call back: (Within 24 hours)    Please return the call at earliest convenience. Thanks!

## 2017-10-19 ENCOUNTER — TELEPHONE (OUTPATIENT)
Dept: INTERNAL MEDICINE | Facility: CLINIC | Age: 67
End: 2017-10-19

## 2017-10-19 NOTE — TELEPHONE ENCOUNTER
Left a detailed message informing the pa the her mammogram order has been ordered and that she can call and spoke with any of the phone staff to scheduled or the number was given for imaging so that Ms Crowder can call there to schedule.

## 2017-10-25 DIAGNOSIS — I42.0 DILATED CARDIOMYOPATHY: ICD-10-CM

## 2017-10-25 DIAGNOSIS — I10 ESSENTIAL HYPERTENSION: ICD-10-CM

## 2017-10-25 RX ORDER — CARVEDILOL 25 MG/1
TABLET ORAL
Qty: 180 TABLET | Refills: 0 | Status: SHIPPED | OUTPATIENT
Start: 2017-10-25 | End: 2018-01-30 | Stop reason: SDUPTHER

## 2017-10-26 ENCOUNTER — HOSPITAL ENCOUNTER (OUTPATIENT)
Dept: RADIOLOGY | Facility: OTHER | Age: 67
Discharge: HOME | End: 2017-10-26
Attending: FAMILY MEDICINE
Payer: COMMERCIAL

## 2017-10-26 DIAGNOSIS — Z12.31 VISIT FOR SCREENING MAMMOGRAM: ICD-10-CM

## 2017-10-26 PROCEDURE — 77067 SCR MAMMO BI INCL CAD: CPT | Mod: 26,,, | Performed by: INTERNAL MEDICINE

## 2017-10-26 PROCEDURE — 77067 SCR MAMMO BI INCL CAD: CPT | Mod: TC

## 2017-11-02 ENCOUNTER — CLINICAL SUPPORT (OUTPATIENT)
Dept: INTERNAL MEDICINE | Facility: CLINIC | Age: 67
End: 2017-11-02
Payer: COMMERCIAL

## 2017-11-02 ENCOUNTER — TELEPHONE (OUTPATIENT)
Dept: INTERNAL MEDICINE | Facility: CLINIC | Age: 67
End: 2017-11-02

## 2017-11-02 PROCEDURE — 90662 IIV NO PRSV INCREASED AG IM: CPT | Mod: S$GLB,,, | Performed by: FAMILY MEDICINE

## 2017-11-02 PROCEDURE — 90732 PPSV23 VACC 2 YRS+ SUBQ/IM: CPT | Mod: S$GLB,,, | Performed by: FAMILY MEDICINE

## 2017-11-02 PROCEDURE — 90471 IMMUNIZATION ADMIN: CPT | Mod: S$GLB,,, | Performed by: FAMILY MEDICINE

## 2017-11-02 NOTE — PROGRESS NOTES
Patient given Pneumovax 23 Im in the LD. Patient tolerated well and Band-Aid was applied. Lot#[id number] Exp:3/1/2019. Patient advised to wait in the lobby for 15 min to make sure no adverse reactions occur. Patient states verbal understanding and has no further questions. Patient given vaccine information sheet.  Patient given HD Influenza IM in the RD. Patient tolerated well and Band-Aid was applied. Lot#[id number] Exp:5/17/2018. Patient advised to wait in the lobby for 15 min to make sure no adverse reactions occur. Patient states verbal understanding and has no further questions.

## 2017-11-02 NOTE — TELEPHONE ENCOUNTER
Pt on the nurse schedule for Pneumovax. No orders in place. Please advise/authorize?  Prevnar given 8/17/2016  No records of Pneumovax in [facility].

## 2017-12-19 ENCOUNTER — OFFICE VISIT (OUTPATIENT)
Dept: CARDIOLOGY | Facility: CLINIC | Age: 67
End: 2017-12-19
Payer: COMMERCIAL

## 2017-12-19 VITALS
SYSTOLIC BLOOD PRESSURE: 137 MMHG | DIASTOLIC BLOOD PRESSURE: 73 MMHG | HEART RATE: 72 BPM | HEIGHT: 67 IN | BODY MASS INDEX: 40.93 KG/M2 | WEIGHT: 260.81 LBS

## 2017-12-19 DIAGNOSIS — I42.0 DILATED CARDIOMYOPATHY: Primary | ICD-10-CM

## 2017-12-19 DIAGNOSIS — E66.01 MORBID OBESITY: ICD-10-CM

## 2017-12-19 DIAGNOSIS — I10 ESSENTIAL HYPERTENSION: ICD-10-CM

## 2017-12-19 DIAGNOSIS — E78.5 DYSLIPIDEMIA: Chronic | ICD-10-CM

## 2017-12-19 PROCEDURE — 99214 OFFICE O/P EST MOD 30 MIN: CPT | Mod: S$GLB,,, | Performed by: INTERNAL MEDICINE

## 2017-12-19 PROCEDURE — 99999 PR PBB SHADOW E&M-EST. PATIENT-LVL III: CPT | Mod: PBBFAC,,, | Performed by: INTERNAL MEDICINE

## 2017-12-19 RX ORDER — ATORVASTATIN CALCIUM 20 MG/1
20 TABLET, FILM COATED ORAL DAILY
Qty: 90 TABLET | Refills: 1 | Status: SHIPPED | OUTPATIENT
Start: 2017-12-19 | End: 2018-06-25 | Stop reason: SDUPTHER

## 2017-12-19 RX ORDER — LISINOPRIL AND HYDROCHLOROTHIAZIDE 12.5; 2 MG/1; MG/1
TABLET ORAL
Qty: 180 TABLET | Refills: 3 | Status: SHIPPED | OUTPATIENT
Start: 2017-12-19 | End: 2018-11-14

## 2017-12-19 NOTE — PROGRESS NOTES
Subjective:   Patient ID:  Cahrlotte Crowder is a 67 y.o. female who presents for follow-up of Cardiomyopathy (1 yr fu) and Knee Pain (x 4 days)      HPI:   Charlotte Crowder presents for follow up of non-ischemic cardiomyopathy.  Charlotte Crowder is not exercising.Charlotte Crowder denies chest pain, shortness of breath, palpitations, presyncope , or syncope. Charlotte Crowder has hypertension with adequate control. BP at home 120-130s/. Charlotte Crowder has dyslipidemia  on moderate intensity statin therapy with LDL less than 100..  Review of Systems   Constitution: Negative for weakness, malaise/fatigue, weight gain and weight loss.   Eyes: Negative for blurred vision.   Cardiovascular: Negative for chest pain, claudication, cyanosis, dyspnea on exertion, irregular heartbeat, leg swelling, near-syncope, orthopnea, palpitations, paroxysmal nocturnal dyspnea and syncope.   Respiratory: Negative for cough, shortness of breath and wheezing.    Musculoskeletal: Positive for joint pain. Negative for falls and myalgias.   Gastrointestinal: Negative for abdominal pain, heartburn, nausea and vomiting.   Genitourinary: Negative for nocturia.   Neurological: Negative for brief paralysis, dizziness, focal weakness, headaches, numbness and paresthesias.   Psychiatric/Behavioral: Negative for altered mental status.       Current Outpatient Prescriptions   Medication Sig    atorvastatin (LIPITOR) 20 MG tablet Take 1 tablet (20 mg total) by mouth once daily.    carvedilol (COREG) 25 MG tablet TAKE 1 TABLET BY MOUTH TWICE A DAY WITH A MEAL    cyclobenzaprine (FLEXERIL) 10 MG tablet TAKE 1 TABLET BY MOUTH 2 TIMES DAILY AS NEEDED FOR MUSCLE SPASMS.    fluticasone (FLONASE) 50 mcg/actuation nasal spray 2 sprays by Each Nare route once daily.    ibuprofen (ADVIL,MOTRIN) 800 MG tablet TAKE 1 TABLET (800 MG TOTAL) BY MOUTH AFTER MEALS AS NEEDED FOR PAIN.    lisinopril-hydrochlorothiazide (PRINZIDE,ZESTORETIC) 20-12.5 mg per  "tablet Take two tabs po daily    MULTIVIT-IRON-MIN-FOLIC ACID 3,500-18-0.4 UNIT-MG-MG ORAL CHEW Take 1 tablet by mouth once daily.     No current facility-administered medications for this visit.      Objective:   Physical Exam   Constitutional: She is oriented to person, place, and time. She appears well-developed. No distress.   /73 (BP Location: Left arm, Patient Position: Sitting, BP Method: X-Large (Automatic))   Pulse 72   Ht 5' 7" (1.702 m)   Wt 118.3 kg (260 lb 12.9 oz)   BMI 40.85 kg/m²    HENT:   Head: Normocephalic.   Eyes: Conjunctivae are normal. Pupils are equal, round, and reactive to light. No scleral icterus.   Neck: Neck supple. No JVD present. No thyromegaly present.   Cardiovascular: Normal rate, regular rhythm, normal heart sounds and intact distal pulses.  PMI is not displaced.  Exam reveals no gallop and no friction rub.    No murmur heard.  Pulmonary/Chest: Effort normal and breath sounds normal. No respiratory distress. She has no wheezes. She has no rales.   Abdominal: Soft. She exhibits no distension. There is no splenomegaly or hepatomegaly. There is no tenderness.   Musculoskeletal: She exhibits no edema or tenderness.   Gait normal   Neurological: She is alert and oriented to person, place, and time.   Skin: Skin is warm and dry. She is not diaphoretic.   Psychiatric: She has a normal mood and affect. Her behavior is normal.       Lab Results   Component Value Date     01/26/2017    K 4.0 01/26/2017     01/26/2017    CO2 27 01/26/2017    BUN 17 01/26/2017    CREATININE 1.0 01/26/2017     01/26/2017    HGBA1C 5.1 01/26/2017    AST 20 01/26/2017    ALT 16 01/26/2017    ALBUMIN 4.0 01/26/2017    PROT 7.5 01/26/2017    BILITOT 0.7 01/26/2017    WBC 2.79 (L) 01/26/2017    HGB 12.1 01/26/2017    HCT 36.0 (L) 01/26/2017    MCV 85 01/26/2017     01/26/2017    TSH 1.092 01/26/2017    CHOL 166 01/26/2017    HDL 39 (L) 01/26/2017    LDLCALC 93.6 01/26/2017    " TRIG 167 (H) 01/26/2017       Assessment:     1. Dilated cardiomyopathy : EF 40-45%   2. Essential hypertension : Adequate control   3. Morbid obesity : Slight worsening   4.      Dyslipidemia:  on moderate intensity statin therapy.    Plan:     Charlotte was seen today for cardiomyopathy and knee pain.    Diagnoses and all orders for this visit:    Dilated cardiomyopathy  -     2D Echo w/ Color Flow Doppler; Future to see effect of GBT    Essential hypertension  -     lisinopril-hydrochlorothiazide (PRINZIDE,ZESTORETIC) 20-12.5 mg per tablet; Take two tabs po daily    Morbid obesity  Discussed weight reduction.  Dyslipidemia  -     atorvastatin (LIPITOR) 20 MG tablet; Take 1 tablet (20 mg total) by mouth once daily.

## 2018-01-30 ENCOUNTER — TELEPHONE (OUTPATIENT)
Dept: INTERNAL MEDICINE | Facility: CLINIC | Age: 68
End: 2018-01-30

## 2018-01-30 DIAGNOSIS — I10 ESSENTIAL HYPERTENSION: ICD-10-CM

## 2018-01-30 DIAGNOSIS — I42.0 DILATED CARDIOMYOPATHY: ICD-10-CM

## 2018-01-30 RX ORDER — CARVEDILOL 25 MG/1
TABLET ORAL
Qty: 60 TABLET | Refills: 0 | Status: SHIPPED | OUTPATIENT
Start: 2018-01-30 | End: 2018-02-05 | Stop reason: SDUPTHER

## 2018-01-30 NOTE — TELEPHONE ENCOUNTER
Spoke with Ms Crowder and informed her that her rx will be faxed into her pharm. Also advised per Dr. Thompson that she needs to scheduled her annual exam to continue to received any additional refills last seen 2017. Scheduled annual 2/5/18 will mail reminder.

## 2018-02-05 ENCOUNTER — OFFICE VISIT (OUTPATIENT)
Dept: INTERNAL MEDICINE | Facility: CLINIC | Age: 68
End: 2018-02-05
Attending: FAMILY MEDICINE
Payer: COMMERCIAL

## 2018-02-05 VITALS
SYSTOLIC BLOOD PRESSURE: 102 MMHG | BODY MASS INDEX: 41.34 KG/M2 | WEIGHT: 257.25 LBS | HEART RATE: 78 BPM | DIASTOLIC BLOOD PRESSURE: 70 MMHG | HEIGHT: 66 IN

## 2018-02-05 DIAGNOSIS — R10.9 CHRONIC FLANK PAIN: ICD-10-CM

## 2018-02-05 DIAGNOSIS — I42.0 DILATED CARDIOMYOPATHY: ICD-10-CM

## 2018-02-05 DIAGNOSIS — M19.90 OSTEOARTHRITIS, UNSPECIFIED OSTEOARTHRITIS TYPE, UNSPECIFIED SITE: ICD-10-CM

## 2018-02-05 DIAGNOSIS — Z00.00 ANNUAL PHYSICAL EXAM: Primary | ICD-10-CM

## 2018-02-05 DIAGNOSIS — E55.9 VITAMIN D DEFICIENCY DISEASE: ICD-10-CM

## 2018-02-05 DIAGNOSIS — G89.29 CHRONIC FLANK PAIN: ICD-10-CM

## 2018-02-05 DIAGNOSIS — I10 ESSENTIAL HYPERTENSION: ICD-10-CM

## 2018-02-05 PROCEDURE — 99999 PR PBB SHADOW E&M-EST. PATIENT-LVL III: CPT | Mod: PBBFAC,,, | Performed by: FAMILY MEDICINE

## 2018-02-05 PROCEDURE — 99397 PER PM REEVAL EST PAT 65+ YR: CPT | Mod: S$GLB,,, | Performed by: FAMILY MEDICINE

## 2018-02-05 RX ORDER — CARVEDILOL 25 MG/1
TABLET ORAL
Qty: 180 TABLET | Refills: 1 | Status: SHIPPED | OUTPATIENT
Start: 2018-02-05 | End: 2018-08-14 | Stop reason: SDUPTHER

## 2018-02-05 RX ORDER — CARVEDILOL 25 MG/1
TABLET ORAL
Qty: 60 TABLET | Refills: 0 | Status: SHIPPED | OUTPATIENT
Start: 2018-02-05 | End: 2018-02-05 | Stop reason: SDUPTHER

## 2018-02-05 RX ORDER — CYCLOBENZAPRINE HCL 10 MG
TABLET ORAL
Qty: 90 TABLET | Refills: 0 | Status: SHIPPED | OUTPATIENT
Start: 2018-02-05 | End: 2018-03-26 | Stop reason: SDUPTHER

## 2018-02-08 ENCOUNTER — LAB VISIT (OUTPATIENT)
Dept: LAB | Facility: OTHER | Age: 68
End: 2018-02-08
Attending: FAMILY MEDICINE
Payer: COMMERCIAL

## 2018-02-08 DIAGNOSIS — Z00.00 ANNUAL PHYSICAL EXAM: ICD-10-CM

## 2018-02-08 DIAGNOSIS — R79.89 ELEVATED SERUM CREATININE: Primary | ICD-10-CM

## 2018-02-08 LAB
25(OH)D3+25(OH)D2 SERPL-MCNC: 28 NG/ML
ALBUMIN SERPL BCP-MCNC: 4 G/DL
ALP SERPL-CCNC: 93 U/L
ALT SERPL W/O P-5'-P-CCNC: 15 U/L
ANION GAP SERPL CALC-SCNC: 8 MMOL/L
AST SERPL-CCNC: 13 U/L
BASOPHILS # BLD AUTO: 0.02 K/UL
BASOPHILS NFR BLD: 0.6 %
BILIRUB SERPL-MCNC: 0.6 MG/DL
BUN SERPL-MCNC: 24 MG/DL
CALCIUM SERPL-MCNC: 11 MG/DL
CHLORIDE SERPL-SCNC: 107 MMOL/L
CHOLEST SERPL-MCNC: 260 MG/DL
CHOLEST/HDLC SERPL: 6.8 {RATIO}
CO2 SERPL-SCNC: 27 MMOL/L
CREAT SERPL-MCNC: 1.4 MG/DL
DIFFERENTIAL METHOD: ABNORMAL
EOSINOPHIL # BLD AUTO: 0.1 K/UL
EOSINOPHIL NFR BLD: 3.9 %
ERYTHROCYTE [DISTWIDTH] IN BLOOD BY AUTOMATED COUNT: 13.3 %
EST. GFR  (AFRICAN AMERICAN): 45 ML/MIN/1.73 M^2
EST. GFR  (NON AFRICAN AMERICAN): 39 ML/MIN/1.73 M^2
ESTIMATED AVG GLUCOSE: 100 MG/DL
GLUCOSE SERPL-MCNC: 103 MG/DL
HBA1C MFR BLD HPLC: 5.1 %
HCT VFR BLD AUTO: 36.2 %
HDLC SERPL-MCNC: 38 MG/DL
HDLC SERPL: 14.6 %
HGB BLD-MCNC: 11.7 G/DL
LDLC SERPL CALC-MCNC: 182.6 MG/DL
LYMPHOCYTES # BLD AUTO: 1.6 K/UL
LYMPHOCYTES NFR BLD: 44.5 %
MCH RBC QN AUTO: 28.4 PG
MCHC RBC AUTO-ENTMCNC: 32.3 G/DL
MCV RBC AUTO: 88 FL
MONOCYTES # BLD AUTO: 0.3 K/UL
MONOCYTES NFR BLD: 8.3 %
NEUTROPHILS # BLD AUTO: 1.5 K/UL
NEUTROPHILS NFR BLD: 42.4 %
NONHDLC SERPL-MCNC: 222 MG/DL
PLATELET # BLD AUTO: 219 K/UL
PMV BLD AUTO: 9.9 FL
POTASSIUM SERPL-SCNC: 3.8 MMOL/L
PROT SERPL-MCNC: 7.3 G/DL
RBC # BLD AUTO: 4.12 M/UL
SODIUM SERPL-SCNC: 142 MMOL/L
TRIGL SERPL-MCNC: 197 MG/DL
TSH SERPL DL<=0.005 MIU/L-ACNC: 1 UIU/ML
WBC # BLD AUTO: 3.62 K/UL

## 2018-02-08 PROCEDURE — 85025 COMPLETE CBC W/AUTO DIFF WBC: CPT

## 2018-02-08 PROCEDURE — 36415 COLL VENOUS BLD VENIPUNCTURE: CPT

## 2018-02-08 PROCEDURE — 83036 HEMOGLOBIN GLYCOSYLATED A1C: CPT

## 2018-02-08 PROCEDURE — 80053 COMPREHEN METABOLIC PANEL: CPT

## 2018-02-08 PROCEDURE — 82306 VITAMIN D 25 HYDROXY: CPT

## 2018-02-08 PROCEDURE — 84443 ASSAY THYROID STIM HORMONE: CPT

## 2018-02-08 PROCEDURE — 80061 LIPID PANEL: CPT

## 2018-02-09 RX ORDER — IBUPROFEN 800 MG/1
TABLET ORAL
Qty: 90 TABLET | Refills: 0 | Status: SHIPPED | OUTPATIENT
Start: 2018-02-09 | End: 2018-02-20 | Stop reason: SDUPTHER

## 2018-02-13 RX ORDER — IBUPROFEN 800 MG/1
TABLET ORAL
Qty: 90 TABLET | Refills: 0 | Status: SHIPPED | OUTPATIENT
Start: 2018-02-13 | End: 2019-03-30 | Stop reason: SDUPTHER

## 2018-02-14 NOTE — PROGRESS NOTES
Subjective:       Patient ID: Charlotte Crowder is a 67 y.o. female.    Chief Complaint: No chief complaint on file.    Pt presents today for HTN f/u and annual exam. BP well controlled today. No complaints needs med refilled.  Also wants flu shot, labs ordered \UTD GYN    Pt also c/o right flank pain. But pain only after she works all day and has been lifitng per pt. Most noted when she moves around      Hypertension   Pertinent negatives include no chest pain, headaches, neck pain, palpitations or shortness of breath.     Review of Systems   Constitutional: Negative.  Negative for activity change, appetite change, chills, fatigue and fever.   HENT: Negative for congestion, ear pain, sinus pressure, sore throat and trouble swallowing.    Eyes: Negative.  Negative for photophobia, pain and visual disturbance.   Respiratory: Negative for apnea, cough, chest tightness, shortness of breath and wheezing.    Cardiovascular: Negative for chest pain, palpitations and leg swelling.   Gastrointestinal: Negative.  Negative for abdominal distention, abdominal pain, constipation, diarrhea, nausea and vomiting.   Genitourinary: Negative.    Musculoskeletal: Negative.  Negative for back pain, joint swelling and neck pain.   Skin: Negative.    Neurological: Negative for dizziness, tremors, weakness, light-headedness, numbness and headaches.   Psychiatric/Behavioral: Negative for behavioral problems, decreased concentration and sleep disturbance. The patient is not nervous/anxious.        Objective:      Physical Exam   Constitutional: She is oriented to person, place, and time. She appears well-developed and well-nourished.   HENT:   Head: Normocephalic and atraumatic.   Right Ear: External ear normal.   Left Ear: External ear normal.   Nose: Nose normal.   Mouth/Throat: Oropharynx is clear and moist. No oropharyngeal exudate.   Eyes: Conjunctivae and EOM are normal. Pupils are equal, round, and reactive to light.   Neck: Normal  range of motion. Neck supple. No thyromegaly present.   Cardiovascular: Normal rate, regular rhythm, normal heart sounds and intact distal pulses.    No murmur heard.  Pulmonary/Chest: Effort normal and breath sounds normal. No respiratory distress. She has no wheezes. She has no rales. She exhibits no tenderness.   Abdominal: Soft. Bowel sounds are normal. She exhibits no distension and no mass. There is no tenderness. There is no rebound and no guarding.   Musculoskeletal: Normal range of motion. She exhibits tenderness (pos TTP woth flexion of spine torso to 45 degrees as well as ext rotation ). She exhibits no edema.   Lymphadenopathy:     She has no cervical adenopathy.   Neurological: She is alert and oriented to person, place, and time. She has normal reflexes. She displays normal reflexes. No cranial nerve deficit. She exhibits normal muscle tone. Coordination normal.   Skin: Skin is warm and dry. No erythema.   Psychiatric: She has a normal mood and affect. Her behavior is normal. Judgment and thought content normal.       Assessment:       1. Osteoarthritis, unspecified osteoarthritis type, unspecified site    2. Annual physical exam    3. Dilated cardiomyopathy    4. Essential hypertension    5. Chronic flank pain    6. Vitamin D deficiency disease        Plan:       Osteoarthritis, unspecified osteoarthritis type, unspecified site  -     ibuprofen (ADVIL,MOTRIN) 800 MG tablet; TAKE 1 TABLET (800 MG TOTAL) BY MOUTH AFTER MEALS AS NEEDED FOR PAIN.  Dispense: 90 tablet; Refill: 0    Annual physical exam  -     CBC auto differential; Future; Expected date: 02/05/2018  -     Comprehensive metabolic panel; Future; Expected date: 02/05/2018  -     Hemoglobin A1c; Future; Expected date: 02/05/2018  -     Lipid panel; Future; Expected date: 02/05/2018  -     TSH; Future; Expected date: 02/05/2018  -     VITAMIN D; Future; Expected date: 02/05/2018    Dilated cardiomyopathy  -     carvedilol (COREG) 25 MG tablet;  TAKE 1 TABLET BY MOUTH TWICE A DAY WITH A MEAL  Dispense: 180 tablet; Refill: 1    Essential hypertension  -     carvedilol (COREG) 25 MG tablet; TAKE 1 TABLET BY MOUTH TWICE A DAY WITH A MEAL  Dispense: 180 tablet; Refill: 1    Chronic flank pain  -     cyclobenzaprine (FLEXERIL) 10 MG tablet; TAKE 1 TABLET BY MOUTH 2 TIMES DAILY AS NEEDED FOR MUSCLE SPASMS.  Dispense: 90 tablet; Refill: 0    Vitamin D deficiency disease      PE wnl  Chronic flank pain most likely musculoskeletal. Ice, heat rest, NSAIDS and muscle relaxants sparingly    Labs ordered  meds refilled  HTN controlled q 6 mos prn HTN  Flu shot

## 2018-02-19 ENCOUNTER — TELEPHONE (OUTPATIENT)
Dept: INTERNAL MEDICINE | Facility: CLINIC | Age: 68
End: 2018-02-19

## 2018-02-19 NOTE — TELEPHONE ENCOUNTER
Spoke with Ms. Crowder and scheduled repeat lab 2/20 states that she have a gyn appointment tomorrow for 10:30. Pt was also advised of her test results per Dr. Thompson's notes below:  Please call the patient regarding her abnormal result.  Patient's creatinine slightly elevated compared with her last.  Could be related to her blood pressure medications.  Please see if she is taking in an adequate amount of fluids.  If not, would encourage gentle hydration and repeat the labs next week.  Thank you.     Pt needs to be taking her lipitor daily. Please ensure that she is since chol panel is higher than before. Needs to watch diet. Increase cardio and drink more fluids.  Please inform.  Thanks,  PV  Conversation was understood and it ended.

## 2018-02-19 NOTE — TELEPHONE ENCOUNTER
----- Message from Ky Metcalf MD sent at 2/8/2018  1:31 PM CST -----  Please call the patient regarding her abnormal result.  Patient's creatinine slightly elevated compared with her last.  Could be related to her blood pressure medications.  Please see if she is taking in an adequate amount of fluids.  If not, would encourage gentle hydration and repeat the labs next week.  Thank you.

## 2018-02-20 ENCOUNTER — LAB VISIT (OUTPATIENT)
Dept: LAB | Facility: OTHER | Age: 68
End: 2018-02-20
Attending: INTERNAL MEDICINE
Payer: COMMERCIAL

## 2018-02-20 ENCOUNTER — OFFICE VISIT (OUTPATIENT)
Dept: OBSTETRICS AND GYNECOLOGY | Facility: CLINIC | Age: 68
End: 2018-02-20
Attending: OBSTETRICS & GYNECOLOGY
Payer: COMMERCIAL

## 2018-02-20 VITALS
WEIGHT: 255.06 LBS | BODY MASS INDEX: 40.99 KG/M2 | DIASTOLIC BLOOD PRESSURE: 76 MMHG | HEIGHT: 66 IN | SYSTOLIC BLOOD PRESSURE: 106 MMHG

## 2018-02-20 DIAGNOSIS — R79.89 ELEVATED SERUM CREATININE: ICD-10-CM

## 2018-02-20 DIAGNOSIS — Z01.419 WELL WOMAN EXAM: Primary | ICD-10-CM

## 2018-02-20 LAB
ANION GAP SERPL CALC-SCNC: 9 MMOL/L
BUN SERPL-MCNC: 28 MG/DL
CALCIUM SERPL-MCNC: 10.8 MG/DL
CHLORIDE SERPL-SCNC: 106 MMOL/L
CO2 SERPL-SCNC: 27 MMOL/L
CREAT SERPL-MCNC: 1.8 MG/DL
EST. GFR  (AFRICAN AMERICAN): 33 ML/MIN/1.73 M^2
EST. GFR  (NON AFRICAN AMERICAN): 29 ML/MIN/1.73 M^2
GLUCOSE SERPL-MCNC: 105 MG/DL
POTASSIUM SERPL-SCNC: 3.8 MMOL/L
SODIUM SERPL-SCNC: 142 MMOL/L

## 2018-02-20 PROCEDURE — 99999 PR PBB SHADOW E&M-EST. PATIENT-LVL III: CPT | Mod: PBBFAC,,, | Performed by: OBSTETRICS & GYNECOLOGY

## 2018-02-20 PROCEDURE — 88175 CYTOPATH C/V AUTO FLUID REDO: CPT

## 2018-02-20 PROCEDURE — 36415 COLL VENOUS BLD VENIPUNCTURE: CPT

## 2018-02-20 PROCEDURE — 80048 BASIC METABOLIC PNL TOTAL CA: CPT

## 2018-02-20 PROCEDURE — 99387 INIT PM E/M NEW PAT 65+ YRS: CPT | Mod: S$GLB,,, | Performed by: OBSTETRICS & GYNECOLOGY

## 2018-02-20 NOTE — LETTER
February 20, 2018      Wendy Thompson MD  [address]  [facility] [address]           [facility] [address]  [address]  [facility] [address]  Phone: [phone]  Fax: [phone]          Patient: Charlotte Crowder   MR Number: [id number]   YOB: 1950   Date of Visit: 2/20/2018       Dear Dr. Wendy Thompson:    Thank you for referring Charlotte Crowder to me for evaluation. Attached you will find relevant portions of my assessment and plan of care.    If you have questions, please do not hesitate to call me. I look forward to following Charlotte Crowder along with you.    Sincerely,    Zoey Dillon MD    Enclosure  CC:  No Recipients    If you would like to receive this communication electronically, please contact [email or web address] or [phone] to request more information on [email or web address] Link access.    For providers and/or their staff who would like to refer a patient to [facility], please contact us through our one-stop-shop provider referral line, [facility], at [phone].    If you feel you have received this communication in error or would no longer like to receive these types of communications, please e-mail [email or web address]

## 2018-02-20 NOTE — PROGRESS NOTES
"CC: Well woman exam    Charlotte Crowder is a 67 y.o. female  presents for a well woman exam.  LMP: No LMP recorded. Patient is postmenopausal..  No issues, problems, or complaints.  Last gyn exam about 5 years ago.  Abnormal pap about 15 years ago;  Normal since per patient.      Past Medical History:   Diagnosis Date    Abnormal Pap smear of cervix     Allergy     Hyperlipidemia     Hypertension     Morbid obesity     OA (osteoarthritis)      Past Surgical History:   Procedure Laterality Date    none      SKIN BIOPSY       Social History     Social History    Marital status:      Spouse name: N/A    Number of children: N/A    Years of education: N/A     Occupational History    care giver [employer]     Social History Main Topics    Smoking status: Never Smoker    Smokeless tobacco: Never Used    Alcohol use 0.6 oz/week     1 Glasses of wine per week      Comment: rarely     Drug use: No    Sexual activity: Yes     Partners: Male     Other Topics Concern    None     Social History Narrative    None     Family History   Problem Relation Age of Onset    Glaucoma Father     No Known Problems      Heart attack Neg Hx     Heart disease Neg Hx     Hypertension Neg Hx     Breast cancer Neg Hx     Colon cancer Neg Hx     Ovarian cancer Neg Hx      OB History      Para Term  AB Living    5 5 5          SAB TAB Ectopic Multiple Live Births                       /76 (BP Location: Right arm, Patient Position: Sitting, BP Method: Large (Manual))   Ht 5' 6" (1.676 m)   Wt 115.7 kg (255 lb 1.2 oz)   BMI 41.17 kg/m²       ROS:  GENERAL: Denies weight gain or weight loss. Feeling well overall.   SKIN: Denies rash or lesions.   HEAD: Denies head injury or headache.   NODES: Denies enlarged lymph nodes.   CHEST: Denies chest pain or shortness of breath.   CARDIOVASCULAR: Denies palpitations or left sided chest pain.   ABDOMEN: No abdominal pain, constipation, " diarrhea, nausea, vomiting or rectal bleeding.   URINARY: No frequency, dysuria, hematuria, or burning on urination.  REPRODUCTIVE: See HPI.   BREASTS: The patient performs breast self-examination and denies pain, lumps, or nipple discharge.   HEMATOLOGIC: No easy bruisability or excessive bleeding.   MUSCULOSKELETAL: Denies joint pain or swelling.   NEUROLOGIC: Denies syncope or weakness.   PSYCHIATRIC: Denies depression, anxiety or mood swings.    PHYSICAL EXAM:    APPEARANCE: Well nourished, well developed, in no acute distress.  AFFECT: WNL, alert and oriented x 3  SKIN: No acne or hirsutism  NECK: Neck symmetric without masses or thyromegaly  NODES: No inguinal, cervical, axillary, or femoral lymph node enlargement  CHEST: Good respiratory effect  ABDOMEN: Soft.  No tenderness or masses.  No hepatosplenomegaly.  No hernias.  BREASTS: Symmetrical, no skin changes or visible lesions.  No palpable masses, nipple discharge bilaterally.  PELVIC: Normal external genitalia without lesions.  Normal hair distribution.  Adequate perineal body, normal urethral meatus.  Vagina moist and well rugated without lesions or discharge.  Cervix pink, without lesions, discharge or tenderness.  No significant cystocele or rectocele.  Bimanual exam shows uterus to be normal size, regular, mobile and nontender.  Adnexa without masses or tenderness.    EXTREMITIES: No edema.    ASSESSMENT & PLAN      ICD-10-CM ICD-9-CM    1. Well woman exam Z01.419 V72.31 Liquid-based pap smear, screening       Patient was counseled today on A.C.S. Pap guidelines and recommendations for yearly pelvic exams, mammograms and monthly self breast exams; to see her PCP for other health maintenance.

## 2018-02-21 ENCOUNTER — TELEPHONE (OUTPATIENT)
Dept: INTERNAL MEDICINE | Facility: CLINIC | Age: 68
End: 2018-02-21

## 2018-02-21 DIAGNOSIS — N17.9 AKI (ACUTE KIDNEY INJURY): Primary | ICD-10-CM

## 2018-02-21 NOTE — TELEPHONE ENCOUNTER
"Message in regards to I received lab results notification with message "This is for Jay's patient" with attached lab results. Had to route as pt calls and lab results are not attached. Please advise   "

## 2018-02-21 NOTE — TELEPHONE ENCOUNTER
Patient with acute kidney injury, Patient's creatinine slightly elevated compared with her last.  Could be related to her blood pressure medications.  Please see if she is taking in an adequate amount of fluids.   encourage gentle hydration    Please schedule for repeat this week

## 2018-02-22 NOTE — TELEPHONE ENCOUNTER
was informed of her test results per the physician note below:  Patient with acute kidney injury, Patient's creatinine slightly elevated compared with her last.  Could be related to her blood pressure medications.  Please see if she is taking in an adequate amount of fluids.   encourage gentle hydration     Asked if she been hydrating pt states that she haven't been but will stated   Scheduled lab appointment will mail reminder.

## 2018-02-22 NOTE — TELEPHONE ENCOUNTER
----- Message from Jayleen Dalal sent at 2/22/2018  2:59 PM CST -----  Contact: pt  _x  1st Request  _  2nd Request  _  3rd Request      Who:pt    Why: returning call back in regards to her results     What Number to Call Back: [phone]    When to Expect a call back: (Before the end of the day)   -- if call after 3:00 call back will be tomorrow.

## 2018-02-27 ENCOUNTER — OFFICE VISIT (OUTPATIENT)
Dept: SLEEP MEDICINE | Facility: CLINIC | Age: 68
End: 2018-02-27
Payer: COMMERCIAL

## 2018-02-27 VITALS
HEART RATE: 78 BPM | WEIGHT: 253.75 LBS | DIASTOLIC BLOOD PRESSURE: 80 MMHG | SYSTOLIC BLOOD PRESSURE: 110 MMHG | HEIGHT: 66 IN | BODY MASS INDEX: 40.78 KG/M2

## 2018-02-27 DIAGNOSIS — E66.01 MORBID OBESITY: ICD-10-CM

## 2018-02-27 DIAGNOSIS — I10 ESSENTIAL HYPERTENSION: ICD-10-CM

## 2018-02-27 DIAGNOSIS — I42.0 DILATED CARDIOMYOPATHY: ICD-10-CM

## 2018-02-27 DIAGNOSIS — G47.33 OBSTRUCTIVE SLEEP APNEA: Primary | ICD-10-CM

## 2018-02-27 PROCEDURE — 3008F BODY MASS INDEX DOCD: CPT | Mod: S$GLB,,, | Performed by: NURSE PRACTITIONER

## 2018-02-27 PROCEDURE — 1159F MED LIST DOCD IN RCRD: CPT | Mod: S$GLB,,, | Performed by: NURSE PRACTITIONER

## 2018-02-27 PROCEDURE — 1126F AMNT PAIN NOTED NONE PRSNT: CPT | Mod: S$GLB,,, | Performed by: NURSE PRACTITIONER

## 2018-02-27 PROCEDURE — 99999 PR PBB SHADOW E&M-EST. PATIENT-LVL III: CPT | Mod: PBBFAC,,, | Performed by: NURSE PRACTITIONER

## 2018-02-27 PROCEDURE — 99214 OFFICE O/P EST MOD 30 MIN: CPT | Mod: SA,S$GLB,, | Performed by: NURSE PRACTITIONER

## 2018-02-27 NOTE — PROGRESS NOTES
"Charlotte Crowder returns today for mgt of KIMMIE. Last seen 1/19/17    Since last seen continued use, but not nightly. About every other night. ESS=1. Not sleepy. BP is good. Saw cards in Dec '17/not had repeat echo yet. Needs new filters. When using, sleep is better consolidated and no breakthrough snoring. Has lost 11#  Interrogation/encore- 30dav 2.1h/n. Ahi 3.5, encore 7+hrs=ahi 3.8=90% tile 10cm.     HISTORY:  4/8/16:  HISTORY OF PRESENT ILLNESS: Charlotte Crowder is a 68 y.o. female returning today for the management of KIMMIE. She last saw Dr. Khan 1/19/16, this is my initial visit with her. She has since undergone a sleep study, which we reviewed together in detail today. She continues to report loud, disruptive snoring, un-refreshing sleep, and excessive daytime fatigue. She is a sitter. She feels less short of breath. Sleep is disrupted. +witnessed apneic pauses. She does not drive. No cataplexy. ESS=4    +progressive sob since 6/2015.  Patient presented to cardiologist and ett revealed ef 40%.  Holter revealed nightime AV block.      9/23/16: Her titration study was denied in April '16 so she was setup with apap 6-20cm. She used it the first 3-mos ~ 4-6h/night but then stopped because she was told only to use it for 3mos. She reports resolution of snoring and apneic pauses when using therapy. She found her mask uncomfortable. Using chin strap. She did feel more rested when adherent also, just stopped ~ 30d ago. She slept soundly. Denies pressure intolerance. Kept heat at 2 and denies nasal drying. ESS=2. "I don't see why  I have to keep using it, I feel fine and my doctor told me my heart was fine". States she will resume using it tonight.     Interrogation: machine fairly new condition, 1/30d>4h. 0.2h/n 90% tile 7.3cm AHI 11.6, 79% fit. reviewed ENCORE data 4+hr/ first 3-mos with ahi 10-13cm. GHOSH 0.2    1/19/17:  Since last seen, she resumed using apap. Doesn't sleep w/o it.  makes sure. Loves stringer fx " "Massiel mask. Denies mouth drying. No chin strap use. Mask does not fall off. Continued resolution of snoring, apneic pauses and un-refreshing sleep. Needs new supplies. ESS=2    Interrogation- 30davg 5.8h/n. 7d 6.6hrs/n. 27/30d>4h. 90% tile 10.3-10.5cm. AHI 3.9, heat at 2. % mask fit.       PSG (262#) 3/16/116: AHI was 40.3 with an oxygen yenny of 85.0%. The supine AHI was 60.7 and the REM AHI was 77.6. Due to poor sleep efficiency, the patient did not qualify for a split night study in the first half of the study.       REVIEW OF SYSTEMS: Sleep related symptoms as per HPI.  Chronic back pain, otherwise a balance review of 10-systems is negative.     Stress Echo '15:  1 - Technically difficult study.   2 - Mildly to moderately depressed left ventricular systolic function (EF 40-45%).   3 - Low normal to mildly depressed right ventricular systolic function .   4 - Left ventricular diastolic dysfunction.      [facility] July normal        PHYSICAL EXAM:   /80   Pulse 78   Ht 5' 6" (1.676 m)   Wt 115.1 kg (253 lb 12 oz)   BMI 40.96 kg/m² GENERAL: morbid obese body habitus, well groomed     ASSESSMENT:   1. Obstructive sleep apnea, severe significant, adherent with PAP therapy after setup with reported improvement of symptoms (residual mild elevated AHI upon encore review). Used it 3-mos then stopped because she didn't know otherwise, reports "they" told her to use if 2-3mos then could stop, use it 4h /night. 1/19/17: She resumed APAP therapy after last seen. Continued nightly use. Symptoms are improved. AHI <5 indicating effective therapy. Needs new supplies . 2/27/18: continued adherence, symptoms improved when adherent, and AHI<5  Medical comorbidities include: morbid obesity, dilated cardiomyopathy, HTN    PLAN:   1. continue apap 6-14cm. Continue nightly use. [facility] DME supplies  Discussed purpose of PAP therapy, health benefits of CPAP, as well as the potential ramifications of untreated sleep apnea, which " could include daytime sleepiness, hypertension, heart disease and/or stroke. An AHI of 15 is associated with increased risk CVD. NIGHLTY use recommended. Schedule echo and see cards as advisedre: cardiomyopathy.Encouraged weight loss efforts for potential improvement of KIMMIE and overall health benefits  Plan requal study maybe next year with wgt loss  2. She does not drive  3. RTC 1 yr, SOONER if needed

## 2018-02-28 ENCOUNTER — TELEPHONE (OUTPATIENT)
Dept: INTERNAL MEDICINE | Facility: CLINIC | Age: 68
End: 2018-02-28

## 2018-02-28 DIAGNOSIS — N18.30 CHRONIC KIDNEY DISEASE, STAGE 3, MOD DECREASED GFR: Primary | ICD-10-CM

## 2018-02-28 NOTE — TELEPHONE ENCOUNTER
----- Message from Fabiola Abdi sent at 2/28/2018  3:59 PM CST -----  Contact: self  _x  1st Request  _  2nd Request  _  3rd Request    Who: pt    Why: pt returning call..please advise..    What Number to Call Back: [phone]    When to Expect a call back: (Before the end of the day)   -- if call after 3:00 call back will be tomorrow.

## 2018-02-28 NOTE — TELEPHONE ENCOUNTER
Ref to nephro. Will call and discuss with pt plan. Please help schedule this appt for her.  Thanks,  PV

## 2018-03-01 ENCOUNTER — LAB VISIT (OUTPATIENT)
Dept: LAB | Facility: OTHER | Age: 68
End: 2018-03-01
Attending: FAMILY MEDICINE
Payer: COMMERCIAL

## 2018-03-01 DIAGNOSIS — N17.9 AKI (ACUTE KIDNEY INJURY): ICD-10-CM

## 2018-03-01 LAB
ANION GAP SERPL CALC-SCNC: 9 MMOL/L
BUN SERPL-MCNC: 26 MG/DL
CALCIUM SERPL-MCNC: 10.7 MG/DL
CHLORIDE SERPL-SCNC: 106 MMOL/L
CO2 SERPL-SCNC: 26 MMOL/L
CREAT SERPL-MCNC: 1.4 MG/DL
EST. GFR  (AFRICAN AMERICAN): 45 ML/MIN/1.73 M^2
EST. GFR  (NON AFRICAN AMERICAN): 39 ML/MIN/1.73 M^2
GLUCOSE SERPL-MCNC: 106 MG/DL
POTASSIUM SERPL-SCNC: 3.6 MMOL/L
SODIUM SERPL-SCNC: 141 MMOL/L

## 2018-03-01 PROCEDURE — 36415 COLL VENOUS BLD VENIPUNCTURE: CPT

## 2018-03-01 PROCEDURE — 80048 BASIC METABOLIC PNL TOTAL CA: CPT

## 2018-03-08 ENCOUNTER — TELEPHONE (OUTPATIENT)
Dept: INTERNAL MEDICINE | Facility: CLINIC | Age: 68
End: 2018-03-08

## 2018-03-08 NOTE — TELEPHONE ENCOUNTER
----- Message from Wendy Thompson MD sent at 3/5/2018  1:55 PM CST -----  Please let pt know that her kidney functions have improved. Encourage gentle hydration. May wish to still follow up with kidney doctors.  Thanks,  PV

## 2018-03-08 NOTE — TELEPHONE ENCOUNTER
Spoke with Ms. Crowder in regards to her test results per Dr. Thompson's note below:   MD CASANDRA Thompson Staff             Please let pt know that her kidney functions have improved. Encourage gentle hydration. May wish to still follow up with kidney doctors.   Thanks,   PV      Also scheduled appointment with nephrologist will mail reminder.

## 2018-03-26 DIAGNOSIS — R10.9 CHRONIC FLANK PAIN: ICD-10-CM

## 2018-03-26 DIAGNOSIS — G89.29 CHRONIC FLANK PAIN: ICD-10-CM

## 2018-03-26 RX ORDER — CYCLOBENZAPRINE HCL 10 MG
TABLET ORAL
Qty: 90 TABLET | Refills: 0 | Status: SHIPPED | OUTPATIENT
Start: 2018-03-26 | End: 2018-05-14 | Stop reason: SDUPTHER

## 2018-04-11 ENCOUNTER — OFFICE VISIT (OUTPATIENT)
Dept: NEPHROLOGY | Facility: CLINIC | Age: 68
End: 2018-04-11
Payer: COMMERCIAL

## 2018-04-11 VITALS
SYSTOLIC BLOOD PRESSURE: 110 MMHG | BODY MASS INDEX: 41.06 KG/M2 | WEIGHT: 255.5 LBS | HEIGHT: 66 IN | DIASTOLIC BLOOD PRESSURE: 70 MMHG | OXYGEN SATURATION: 100 % | HEART RATE: 83 BPM

## 2018-04-11 DIAGNOSIS — E66.01 MORBID OBESITY: ICD-10-CM

## 2018-04-11 DIAGNOSIS — N18.30 CHRONIC KIDNEY DISEASE (CKD), STAGE III (MODERATE): Primary | ICD-10-CM

## 2018-04-11 DIAGNOSIS — G89.4 CHRONIC PAIN SYNDROME: ICD-10-CM

## 2018-04-11 DIAGNOSIS — I42.0 DILATED CARDIOMYOPATHY: ICD-10-CM

## 2018-04-11 DIAGNOSIS — I10 ESSENTIAL HYPERTENSION: ICD-10-CM

## 2018-04-11 PROCEDURE — 99203 OFFICE O/P NEW LOW 30 MIN: CPT | Mod: S$GLB,,, | Performed by: INTERNAL MEDICINE

## 2018-04-11 PROCEDURE — 3074F SYST BP LT 130 MM HG: CPT | Mod: CPTII,S$GLB,, | Performed by: INTERNAL MEDICINE

## 2018-04-11 PROCEDURE — 99999 PR PBB SHADOW E&M-EST. PATIENT-LVL III: CPT | Mod: PBBFAC,,, | Performed by: INTERNAL MEDICINE

## 2018-04-11 PROCEDURE — 3078F DIAST BP <80 MM HG: CPT | Mod: CPTII,S$GLB,, | Performed by: INTERNAL MEDICINE

## 2018-04-11 NOTE — LETTER
April 11, 2018      Wendy Thompson MD  [address]  [facility] [address]           [facility]  [address]  [facility] [address]  Phone: [phone]  Fax: [phone]          Patient: Charlotte Crowder   MR Number: [id number]   YOB: 1950   Date of Visit: 4/11/2018       Dear Dr. Wendy Thompson:    Thank you for referring Charlotte Crowder to me for evaluation. Attached you will find relevant portions of my assessment and plan of care.    If you have questions, please do not hesitate to call me. I look forward to following Charlotte Crowder along with you.    Sincerely,    Epifanio Calhoun MD    Enclosure  CC:  No Recipients    If you would like to receive this communication electronically, please contact [email or web address] or [phone] to request more information on [employer] access.    For providers and/or their staff who would like to refer a patient to [facility], please contact us through our one-stop-shop provider referral line, [facility] [place], at [phone].    If you feel you have received this communication in error or would no longer like to receive these types of communications, please e-mail [email or web address]

## 2018-04-29 DIAGNOSIS — J30.2 OTHER SEASONAL ALLERGIC RHINITIS: ICD-10-CM

## 2018-04-30 RX ORDER — FLUTICASONE PROPIONATE 50 MCG
SPRAY, SUSPENSION (ML) NASAL
Qty: 1 BOTTLE | Refills: 5 | Status: SHIPPED | OUTPATIENT
Start: 2018-04-30 | End: 2020-03-23

## 2018-05-10 DIAGNOSIS — R10.9 CHRONIC FLANK PAIN: ICD-10-CM

## 2018-05-10 DIAGNOSIS — G89.29 CHRONIC FLANK PAIN: ICD-10-CM

## 2018-05-10 RX ORDER — CYCLOBENZAPRINE HCL 10 MG
TABLET ORAL
Qty: 90 TABLET | Refills: 0 | Status: CANCELLED | OUTPATIENT
Start: 2018-05-10

## 2018-05-14 DIAGNOSIS — G89.29 CHRONIC FLANK PAIN: ICD-10-CM

## 2018-05-14 DIAGNOSIS — R10.9 CHRONIC FLANK PAIN: ICD-10-CM

## 2018-05-14 RX ORDER — CYCLOBENZAPRINE HCL 10 MG
TABLET ORAL
Qty: 90 TABLET | Refills: 0 | Status: SHIPPED | OUTPATIENT
Start: 2018-05-14 | End: 2018-10-16 | Stop reason: SDUPTHER

## 2018-05-14 NOTE — TELEPHONE ENCOUNTER
Good Afternoon,  Ms. Crowder Your prescription was approved and sent or called into your pharm.  Approved Medications   cyclobenzaprine (FLEXERIL) 10 MG tablet  TAKE 1 TABLET BY MOUTH 2 TIMES DAILY AS NEEDED FOR MUSCLE SPASMS.  Disp: 90 tablet Refills: 0    Class: Normal Start: 5/14/2018   For: Chronic flank pain  Approved by: Wendy Thompson MD  To be filled at: [facility]/pharmacy #[id number] [place], [place] - [address]Phone: [phone]  Conversation was understood and it ended.

## 2018-05-14 NOTE — TELEPHONE ENCOUNTER
----- Message from Arie Rivero sent at 5/14/2018 11:39 AM CDT -----  Contact: [facility]      Can the clinic reply in [facility]: no      Please refill the medication(s) listed below. Please call the patient when the prescription(s) is ready for  at this phone number   [phone]      Medication #1 cyclobenzaprine (FLEXERIL) 10 MG tablet     Medication #2 none      Preferred Pharmacy: [id number] [place] [facility] [address] [phone]

## 2018-06-19 ENCOUNTER — TELEPHONE (OUTPATIENT)
Dept: INTERNAL MEDICINE | Facility: CLINIC | Age: 68
End: 2018-06-19

## 2018-06-19 DIAGNOSIS — E78.5 HYPERLIPIDEMIA, UNSPECIFIED HYPERLIPIDEMIA TYPE: Primary | ICD-10-CM

## 2018-06-19 NOTE — TELEPHONE ENCOUNTER
----- Message from Fabiola Abdi sent at 6/19/2018  2:12 PM CDT -----  Contact: pt            Name of Who is Calling: MADELEINE RAMSAY [[id number]]      What is the request in detail: pt needs blood work ordered and scheduled.. Please afvise      Can the clinic reply by [facility]: no      What Number [facility] Back [facility]: [phone]

## 2018-06-25 ENCOUNTER — LAB VISIT (OUTPATIENT)
Dept: LAB | Facility: OTHER | Age: 68
End: 2018-06-25
Attending: FAMILY MEDICINE
Payer: COMMERCIAL

## 2018-06-25 ENCOUNTER — OFFICE VISIT (OUTPATIENT)
Dept: INTERNAL MEDICINE | Facility: CLINIC | Age: 68
End: 2018-06-25
Attending: FAMILY MEDICINE
Payer: COMMERCIAL

## 2018-06-25 VITALS
SYSTOLIC BLOOD PRESSURE: 108 MMHG | HEART RATE: 93 BPM | DIASTOLIC BLOOD PRESSURE: 78 MMHG | HEIGHT: 66 IN | BODY MASS INDEX: 40.64 KG/M2 | WEIGHT: 252.88 LBS

## 2018-06-25 DIAGNOSIS — E78.5 HYPERLIPIDEMIA, UNSPECIFIED HYPERLIPIDEMIA TYPE: ICD-10-CM

## 2018-06-25 DIAGNOSIS — I10 ESSENTIAL HYPERTENSION: Primary | ICD-10-CM

## 2018-06-25 LAB
ALBUMIN SERPL BCP-MCNC: 4.1 G/DL
ALP SERPL-CCNC: 95 U/L
ALT SERPL W/O P-5'-P-CCNC: 14 U/L
ANION GAP SERPL CALC-SCNC: 8 MMOL/L
AST SERPL-CCNC: 15 U/L
BILIRUB SERPL-MCNC: 0.7 MG/DL
BUN SERPL-MCNC: 19 MG/DL
CALCIUM SERPL-MCNC: 11 MG/DL
CHLORIDE SERPL-SCNC: 108 MMOL/L
CHOLEST SERPL-MCNC: 207 MG/DL
CHOLEST/HDLC SERPL: 5.8 {RATIO}
CO2 SERPL-SCNC: 27 MMOL/L
CREAT SERPL-MCNC: 1.3 MG/DL
EST. GFR  (AFRICAN AMERICAN): 49 ML/MIN/1.73 M^2
EST. GFR  (NON AFRICAN AMERICAN): 43 ML/MIN/1.73 M^2
GLUCOSE SERPL-MCNC: 107 MG/DL
HDLC SERPL-MCNC: 36 MG/DL
HDLC SERPL: 17.4 %
LDLC SERPL CALC-MCNC: 135.2 MG/DL
NONHDLC SERPL-MCNC: 171 MG/DL
POTASSIUM SERPL-SCNC: 3.9 MMOL/L
PROT SERPL-MCNC: 7.4 G/DL
SODIUM SERPL-SCNC: 143 MMOL/L
TRIGL SERPL-MCNC: 179 MG/DL

## 2018-06-25 PROCEDURE — 80053 COMPREHEN METABOLIC PANEL: CPT

## 2018-06-25 PROCEDURE — 36415 COLL VENOUS BLD VENIPUNCTURE: CPT

## 2018-06-25 PROCEDURE — 3074F SYST BP LT 130 MM HG: CPT | Mod: CPTII,S$GLB,, | Performed by: FAMILY MEDICINE

## 2018-06-25 PROCEDURE — 99213 OFFICE O/P EST LOW 20 MIN: CPT | Mod: S$GLB,,, | Performed by: FAMILY MEDICINE

## 2018-06-25 PROCEDURE — 99999 PR PBB SHADOW E&M-EST. PATIENT-LVL III: CPT | Mod: PBBFAC,,, | Performed by: FAMILY MEDICINE

## 2018-06-25 PROCEDURE — 80061 LIPID PANEL: CPT

## 2018-06-25 PROCEDURE — 3078F DIAST BP <80 MM HG: CPT | Mod: CPTII,S$GLB,, | Performed by: FAMILY MEDICINE

## 2018-06-25 RX ORDER — ATORVASTATIN CALCIUM 20 MG/1
20 TABLET, FILM COATED ORAL DAILY
Qty: 90 TABLET | Refills: 1 | Status: SHIPPED | OUTPATIENT
Start: 2018-06-25 | End: 2018-10-16 | Stop reason: SDUPTHER

## 2018-06-25 NOTE — PROGRESS NOTES
Subjective:       Patient ID: Charlotte Crowder is a 68 y.o. female.    Chief Complaint: Hypertension and Hyperlipidemia    Pt presents today for HTN f/u. BP well controlled today. No complaints needs med refilled.  Doing well otherwise    Does need repeat lab to follow lipids. Just did them prior to this visit      Hypertension   Pertinent negatives include no chest pain, headaches, neck pain, palpitations or shortness of breath.   Hyperlipidemia   Pertinent negatives include no chest pain or shortness of breath.     Review of Systems   Constitutional: Negative.  Negative for activity change, appetite change, chills, fatigue and fever.   HENT: Negative for congestion, ear pain, sinus pressure, sore throat and trouble swallowing.    Eyes: Negative.  Negative for photophobia, pain and visual disturbance.   Respiratory: Negative for apnea, cough, chest tightness, shortness of breath and wheezing.    Cardiovascular: Negative for chest pain, palpitations and leg swelling.   Gastrointestinal: Negative.  Negative for abdominal distention, abdominal pain, constipation, diarrhea, nausea and vomiting.   Genitourinary: Negative.    Musculoskeletal: Negative.  Negative for back pain, joint swelling and neck pain.   Skin: Negative.    Neurological: Negative for dizziness, tremors, weakness, light-headedness, numbness and headaches.   Psychiatric/Behavioral: Negative for behavioral problems, decreased concentration and sleep disturbance. The patient is not nervous/anxious.    All other systems reviewed and are negative.      Objective:      Physical Exam   Constitutional: She is oriented to person, place, and time. She appears well-developed and well-nourished.   HENT:   Head: Normocephalic and atraumatic.   Right Ear: External ear normal.   Left Ear: External ear normal.   Nose: Nose normal.   Mouth/Throat: Oropharynx is clear and moist. No oropharyngeal exudate.   Eyes: Conjunctivae and EOM are normal. Pupils are equal, round,  and reactive to light.   Neck: Normal range of motion. Neck supple. No thyromegaly present.   Cardiovascular: Normal rate, regular rhythm, normal heart sounds and intact distal pulses.    No murmur heard.  Pulmonary/Chest: Effort normal and breath sounds normal. No respiratory distress. She has no wheezes. She has no rales. She exhibits no tenderness.   Abdominal: Soft. Bowel sounds are normal. She exhibits no distension and no mass. There is no tenderness. There is no rebound and no guarding.   Musculoskeletal: Normal range of motion. She exhibits no edema or tenderness.   Lymphadenopathy:     She has no cervical adenopathy.   Neurological: She is alert and oriented to person, place, and time. She has normal reflexes. She displays normal reflexes. No cranial nerve deficit. She exhibits normal muscle tone. Coordination normal.   Skin: Skin is warm and dry. No erythema.   Psychiatric: She has a normal mood and affect. Her behavior is normal. Judgment and thought content normal.       Assessment:       1. Essential hypertension        Plan:       Essential hypertension    Other orders  -     atorvastatin (LIPITOR) 20 MG tablet; Take 1 tablet (20 mg total) by mouth once daily.  Dispense: 90 tablet; Refill: 1      PE wnl    HTN controlled q 6 mos prn HTN

## 2018-08-14 DIAGNOSIS — I42.0 DILATED CARDIOMYOPATHY: ICD-10-CM

## 2018-08-14 DIAGNOSIS — I10 ESSENTIAL HYPERTENSION: ICD-10-CM

## 2018-08-14 RX ORDER — CARVEDILOL 25 MG/1
TABLET ORAL
Qty: 180 TABLET | Refills: 1 | Status: SHIPPED | OUTPATIENT
Start: 2018-08-14 | End: 2019-03-10 | Stop reason: SDUPTHER

## 2018-10-16 ENCOUNTER — OFFICE VISIT (OUTPATIENT)
Dept: PRIMARY CARE CLINIC | Facility: CLINIC | Age: 68
End: 2018-10-16
Attending: FAMILY MEDICINE
Payer: COMMERCIAL

## 2018-10-16 VITALS
HEIGHT: 66 IN | HEART RATE: 84 BPM | BODY MASS INDEX: 40.37 KG/M2 | WEIGHT: 251.19 LBS | SYSTOLIC BLOOD PRESSURE: 118 MMHG | DIASTOLIC BLOOD PRESSURE: 86 MMHG

## 2018-10-16 DIAGNOSIS — J30.2 SEASONAL ALLERGIES: ICD-10-CM

## 2018-10-16 DIAGNOSIS — E78.5 DYSLIPIDEMIA: Chronic | ICD-10-CM

## 2018-10-16 DIAGNOSIS — R10.9 CHRONIC FLANK PAIN: ICD-10-CM

## 2018-10-16 DIAGNOSIS — G89.29 CHRONIC FLANK PAIN: ICD-10-CM

## 2018-10-16 DIAGNOSIS — I10 ESSENTIAL HYPERTENSION: Primary | ICD-10-CM

## 2018-10-16 PROCEDURE — 90471 IMMUNIZATION ADMIN: CPT | Mod: S$GLB,,, | Performed by: FAMILY MEDICINE

## 2018-10-16 PROCEDURE — 99214 OFFICE O/P EST MOD 30 MIN: CPT | Mod: 25,S$GLB,, | Performed by: FAMILY MEDICINE

## 2018-10-16 PROCEDURE — 3074F SYST BP LT 130 MM HG: CPT | Mod: CPTII,S$GLB,, | Performed by: FAMILY MEDICINE

## 2018-10-16 PROCEDURE — 99999 PR PBB SHADOW E&M-EST. PATIENT-LVL III: CPT | Mod: PBBFAC,,, | Performed by: FAMILY MEDICINE

## 2018-10-16 PROCEDURE — 90662 IIV NO PRSV INCREASED AG IM: CPT | Mod: S$GLB,,, | Performed by: FAMILY MEDICINE

## 2018-10-16 PROCEDURE — 3079F DIAST BP 80-89 MM HG: CPT | Mod: CPTII,S$GLB,, | Performed by: FAMILY MEDICINE

## 2018-10-16 PROCEDURE — 1101F PT FALLS ASSESS-DOCD LE1/YR: CPT | Mod: CPTII,S$GLB,, | Performed by: FAMILY MEDICINE

## 2018-10-16 RX ORDER — ATORVASTATIN CALCIUM 20 MG/1
20 TABLET, FILM COATED ORAL DAILY
Qty: 90 TABLET | Refills: 1 | Status: SHIPPED | OUTPATIENT
Start: 2018-10-16 | End: 2019-09-17 | Stop reason: SDUPTHER

## 2018-10-16 RX ORDER — CYCLOBENZAPRINE HCL 10 MG
TABLET ORAL
Qty: 90 TABLET | Refills: 0 | Status: SHIPPED | OUTPATIENT
Start: 2018-10-16 | End: 2019-07-15 | Stop reason: SDUPTHER

## 2018-10-16 NOTE — PROGRESS NOTES
"Patient was given vaccine information sheet for the Flu Vaccine. The area of injection was palpated using the acromion process as a landmark. This area was cleaned with alcohol. Using a 25g 1" safety needle, 0.5mL of the vaccine was placed into the Right Deltoid muscle. The injection site was dressed with a bandage. Patient experienced no complications and was discharged in stable condition. Fluzone High Dose vaccine Lot: [id number] Exp: 3/18/19    "

## 2018-10-16 NOTE — PROGRESS NOTES
Subjective:       Patient ID: Charlotte Crowder is a 68 y.o. female.    Chief Complaint: Cough and Immunizations    Pt presents today for HTN f/u. BP well controlled today. No complaints needs med refilled.  Doing well otherwise    UTD on labs    States that she is having some congestion and allergies. Not new. Denies any fevers, chills, n,v d,sob      Hypertension   Pertinent negatives include no chest pain, headaches, neck pain, palpitations or shortness of breath.   Hyperlipidemia   Pertinent negatives include no chest pain or shortness of breath.   Cough   Pertinent negatives include no chest pain, chills, ear pain, fever, headaches, sore throat, shortness of breath or wheezing.     Review of Systems   Constitutional: Negative.  Negative for activity change, appetite change, chills, fatigue and fever.   HENT: Negative for congestion, ear pain, sinus pressure, sore throat and trouble swallowing.    Eyes: Negative.  Negative for photophobia, pain and visual disturbance.   Respiratory: Positive for cough. Negative for apnea, chest tightness, shortness of breath and wheezing.    Cardiovascular: Negative for chest pain, palpitations and leg swelling.   Gastrointestinal: Negative.  Negative for abdominal distention, abdominal pain, constipation, diarrhea, nausea and vomiting.   Genitourinary: Negative.    Musculoskeletal: Negative.  Negative for back pain, joint swelling and neck pain.   Skin: Negative.    Neurological: Negative for dizziness, tremors, weakness, light-headedness, numbness and headaches.   Psychiatric/Behavioral: Negative for behavioral problems, decreased concentration and sleep disturbance. The patient is not nervous/anxious.    All other systems reviewed and are negative.      Objective:      Physical Exam   Constitutional: She is oriented to person, place, and time. She appears well-developed and well-nourished.   HENT:   Head: Normocephalic and atraumatic.   Right Ear: External ear normal.   Left  Ear: External ear normal.   Nose: Nose normal.   Mouth/Throat: Oropharynx is clear and moist. No oropharyngeal exudate.   Eyes: Conjunctivae and EOM are normal. Pupils are equal, round, and reactive to light.   Neck: Normal range of motion. Neck supple. No thyromegaly present.   Cardiovascular: Normal rate, regular rhythm, normal heart sounds and intact distal pulses.   No murmur heard.  Pulmonary/Chest: Effort normal and breath sounds normal. No respiratory distress. She has no wheezes. She has no rales. She exhibits no tenderness.   Abdominal: Soft. Bowel sounds are normal. She exhibits no distension and no mass. There is no tenderness. There is no rebound and no guarding.   Musculoskeletal: Normal range of motion. She exhibits no edema or tenderness.   Lymphadenopathy:     She has no cervical adenopathy.   Neurological: She is alert and oriented to person, place, and time. She has normal reflexes. She displays normal reflexes. No cranial nerve deficit. She exhibits normal muscle tone. Coordination normal.   Skin: Skin is warm and dry. No erythema.   Psychiatric: She has a normal mood and affect. Her behavior is normal. Judgment and thought content normal.       Assessment:     HTN controlled  Hyperlipidemia: stable      Plan:       Chronic flank pain  -     cyclobenzaprine (FLEXERIL) 10 MG tablet; TAKE 1 TABLET BY MOUTH 2 TIMES DAILY AS NEEDED FOR MUSCLE SPASMS.  Dispense: 90 tablet; Refill: 0    Other orders  -     Influenza - High Dose (65+) (PF) (IM)  -     atorvastatin (LIPITOR) 20 MG tablet; Take 1 tablet (20 mg total) by mouth once daily.  Dispense: 90 tablet; Refill: 1      PE wnl   Hyperlipidemia  HTN controlled q 6 mos prn HTN

## 2018-10-18 ENCOUNTER — TELEPHONE (OUTPATIENT)
Dept: OPTOMETRY | Facility: CLINIC | Age: 68
End: 2018-10-18

## 2018-10-18 ENCOUNTER — OFFICE VISIT (OUTPATIENT)
Dept: OPTOMETRY | Facility: CLINIC | Age: 68
End: 2018-10-18
Payer: COMMERCIAL

## 2018-10-18 DIAGNOSIS — H04.123 DRY EYE SYNDROME OF BOTH EYES: ICD-10-CM

## 2018-10-18 DIAGNOSIS — H52.02 HYPEROPIA OF LEFT EYE WITH ASTIGMATISM AND PRESBYOPIA: ICD-10-CM

## 2018-10-18 DIAGNOSIS — H52.4 MYOPIA OF RIGHT EYE WITH ASTIGMATISM AND PRESBYOPIA: ICD-10-CM

## 2018-10-18 DIAGNOSIS — H52.202 HYPEROPIA OF LEFT EYE WITH ASTIGMATISM AND PRESBYOPIA: ICD-10-CM

## 2018-10-18 DIAGNOSIS — H52.4 HYPEROPIA OF LEFT EYE WITH ASTIGMATISM AND PRESBYOPIA: ICD-10-CM

## 2018-10-18 DIAGNOSIS — H40.013 OAG (OPEN ANGLE GLAUCOMA) SUSPECT, LOW RISK, BILATERAL: Primary | ICD-10-CM

## 2018-10-18 DIAGNOSIS — H25.13 NUCLEAR SCLEROSIS OF BOTH EYES: ICD-10-CM

## 2018-10-18 DIAGNOSIS — H52.11 MYOPIA OF RIGHT EYE WITH ASTIGMATISM AND PRESBYOPIA: ICD-10-CM

## 2018-10-18 DIAGNOSIS — H52.201 MYOPIA OF RIGHT EYE WITH ASTIGMATISM AND PRESBYOPIA: ICD-10-CM

## 2018-10-18 PROCEDURE — 92014 COMPRE OPH EXAM EST PT 1/>: CPT | Mod: S$GLB,,, | Performed by: OPTOMETRIST

## 2018-10-18 PROCEDURE — 92015 DETERMINE REFRACTIVE STATE: CPT | Mod: S$GLB,,, | Performed by: OPTOMETRIST

## 2018-10-18 PROCEDURE — 99999 PR PBB SHADOW E&M-EST. PATIENT-LVL II: CPT | Mod: PBBFAC,,, | Performed by: OPTOMETRIST

## 2018-10-18 NOTE — PROGRESS NOTES
HPI     Pt states that she doesn't have any issues with overall va. Has to use   readers for near. Denies flashes of light/floaters OU. Pt uses OTC PRN. No   pain or irritation but OU is water but mainly OD>     Last edited by Linda Kraft on 10/18/2018  2:45 PM. (History)        ROS     Negative for: Constitutional, Gastrointestinal, Neurological, Skin,   Genitourinary, Musculoskeletal, HENT, Endocrine, Cardiovascular, Eyes,   Respiratory, Psychiatric, Allergic/Imm, Heme/Lymph    Last edited by Mac Rockwell, OD on 10/18/2018  3:04 PM. (History)        Assessment /Plan     For exam results, see Encounter Report.    OAG (open angle glaucoma) suspect, low risk, bilateral  -     Cancel: Posterior Segment OCT Optic Nerve- Both eyes; Future; Expected date: 11/01/2018  -     Cancel: Guzman Visual Field - OU - Extended - Both Eyes; Future; Expected date: 11/01/2018  -     Posterior Segment OCT Optic Nerve- Both eyes; Future; Expected date: 01/18/2019  -     Guzman Visual Field - OU - Extended - Both Eyes; Future; Expected date: 01/18/2019    Dry eye syndrome of both eyes    Nuclear sclerosis of both eyes    Myopia of right eye with astigmatism and presbyopia    Hyperopia of left eye with astigmatism and presbyopia      1. Pt reports prev testing being negative about 4 years ago and originally declined testing. Pt educated on potential for results to change. Pt has Positive fHx- dad.  Pt wants testing pushed out 2-3 mo. RTC 3 mo IOP/pachy/OCT/HVF.  2. Rec Systane Ultra BId-tid OU.   3. Mildly visually significant.   4-5. SRx updated. Pt wants to cont w/OTC readers of +3.00D OU.

## 2018-10-18 NOTE — PATIENT INSTRUCTIONS
What Are Dry Eyes?    Do your eyes ever sting, burn, or feel scratchy? To be comfortable, your eyes need to be bathed, or lubricated, with tears. Normally, there is always a film of tears on the surface of your eyes. But if your eyes dont produce enough tears, the surface gets irritated. This is known as dry eyes.  Not enough lubricating tears  When you cry, or get something in your eye, or have an infection, your eyes make reflex tears. Each time you blink, another kind of tears, called lubricating tears, spread over the surface of your eyes. These tears keep the eyes moist and comfortable. You arent aware of these tears because they stay on the surface of your eyes.  Without lubricating tears, your eyes get dry. Then they burn or sting and feel scratchy. They may also water. But this doesnt relieve the dryness. That's because the eyes water with reflex tears, not lubricating tears.  What causes dry eyes?  · Aging  · Heaters and air conditioners  · Wind, smoke, or dry weather  · Allergies such as hay fever  · Medicines  · Eyelid problems, injuries to the eye, or diseases like rheumatoid arthritis  How lubricating tears flow  Lubricating tears flow from glands in your upper eyelid over the surface of your eye. From your eye, the tears drain into puncta, which connect to drainage canals that lead to your nose.  Date Last Reviewed: 6/6/2015  © 4957-1921 [employer]. [address] [place] [address]. All rights reserved. This information is not intended as a substitute for professional medical care. Always follow your healthcare professional's instructions.

## 2018-11-14 ENCOUNTER — OFFICE VISIT (OUTPATIENT)
Dept: CARDIOLOGY | Facility: CLINIC | Age: 68
End: 2018-11-14
Payer: COMMERCIAL

## 2018-11-14 VITALS
HEIGHT: 67 IN | DIASTOLIC BLOOD PRESSURE: 86 MMHG | BODY MASS INDEX: 39.48 KG/M2 | SYSTOLIC BLOOD PRESSURE: 120 MMHG | WEIGHT: 251.56 LBS | HEART RATE: 89 BPM | OXYGEN SATURATION: 99 %

## 2018-11-14 DIAGNOSIS — E66.9 OBESITY (BMI 35.0-39.9 WITHOUT COMORBIDITY): ICD-10-CM

## 2018-11-14 DIAGNOSIS — E78.5 DYSLIPIDEMIA: Chronic | ICD-10-CM

## 2018-11-14 DIAGNOSIS — I10 ESSENTIAL HYPERTENSION: ICD-10-CM

## 2018-11-14 DIAGNOSIS — I42.0 DILATED CARDIOMYOPATHY: Primary | ICD-10-CM

## 2018-11-14 PROCEDURE — 99214 OFFICE O/P EST MOD 30 MIN: CPT | Mod: S$GLB,,, | Performed by: INTERNAL MEDICINE

## 2018-11-14 PROCEDURE — 99999 PR PBB SHADOW E&M-EST. PATIENT-LVL III: CPT | Mod: PBBFAC,,, | Performed by: INTERNAL MEDICINE

## 2018-11-14 PROCEDURE — 1101F PT FALLS ASSESS-DOCD LE1/YR: CPT | Mod: CPTII,S$GLB,, | Performed by: INTERNAL MEDICINE

## 2018-11-14 PROCEDURE — 3079F DIAST BP 80-89 MM HG: CPT | Mod: CPTII,S$GLB,, | Performed by: INTERNAL MEDICINE

## 2018-11-14 PROCEDURE — 3074F SYST BP LT 130 MM HG: CPT | Mod: CPTII,S$GLB,, | Performed by: INTERNAL MEDICINE

## 2018-11-14 RX ORDER — OLMESARTAN MEDOXOMIL AND HYDROCHLOROTHIAZIDE 40/25 40; 25 MG/1; MG/1
1 TABLET ORAL DAILY
Qty: 90 TABLET | Refills: 3 | Status: SHIPPED | OUTPATIENT
Start: 2018-11-14 | End: 2018-12-10 | Stop reason: ALTCHOICE

## 2018-11-14 NOTE — PROGRESS NOTES
Subjective:   Patient ID:  Charlotte Crowder is a 68 y.o. female who presents for evaluation of Cardiomyopathy      HPI:     ROS    Current Outpatient Medications   Medication Sig    atorvastatin (LIPITOR) 20 MG tablet Take 1 tablet (20 mg total) by mouth once daily.    carvedilol (COREG) 25 MG tablet TAKE 1 TABLET BY MOUTH TWICE A DAY WITH A MEAL    cyclobenzaprine (FLEXERIL) 10 MG tablet TAKE 1 TABLET BY MOUTH 2 TIMES DAILY AS NEEDED FOR MUSCLE SPASMS.    fluticasone (FLONASE) 50 mcg/actuation nasal spray INSTILL 2 SPRAYS INTO EACH NOSTRIL EVERY DAY    ibuprofen (ADVIL,MOTRIN) 800 MG tablet TAKE 1 TABLET (800 MG TOTAL) BY MOUTH AFTER MEALS AS NEEDED FOR PAIN.    lisinopril-hydrochlorothiazide (PRINZIDE,ZESTORETIC) 20-12.5 mg per tablet Take two tabs po daily    MULTIVIT-IRON-MIN-FOLIC ACID 3,500-18-0.4 UNIT-MG-MG ORAL CHEW Take 1 tablet by mouth once daily.     No current facility-administered medications for this visit.      Objective:   Physical Exam    Lab Results   Component Value Date     06/25/2018    K 3.9 06/25/2018     06/25/2018    CO2 27 06/25/2018    BUN 19 06/25/2018    CREATININE 1.3 06/25/2018     06/25/2018    HGBA1C 5.1 02/08/2018    AST 15 06/25/2018    ALT 14 06/25/2018    ALBUMIN 4.1 06/25/2018    PROT 7.4 06/25/2018    BILITOT 0.7 06/25/2018    WBC 3.62 (L) 02/08/2018    HGB 11.7 (L) 02/08/2018    HCT 36.2 (L) 02/08/2018    MCV 88 02/08/2018     02/08/2018    TSH 0.998 02/08/2018    CHOL 207 (H) 06/25/2018    HDL 36 (L) 06/25/2018    LDLCALC 135.2 06/25/2018    TRIG 179 (H) 06/25/2018       Assessment:     No diagnosis found.    Plan:     There are no diagnoses linked to this encounter.

## 2018-11-14 NOTE — PROGRESS NOTES
Subjective:   Patient ID:  Charlotte Crowder is a 68 y.o. female who presents for follow-up of Cardiomyopathy      HPI:   Charlotte Crowder presents for follow up of non-ischemic cardiomyopathy.She was unable to obtain the repeat echo but can now. Charlotte Crowder is not exercising due to knee pain but she has lost weight. Charlotte Crowder denies chest pain, shortness of breath, palpitations, presyncope , or syncope. Charlotte Crowder has hypertension with good control  but is on an ACE. Charlotte Crowder has dyslipidemia  on moderate intensity statin therapy with LDL above 100 but she has had normal coronary arteries in the recent past...    Review of Systems   Constitution: Negative for weakness, malaise/fatigue, weight gain and weight loss.   Eyes: Negative for blurred vision.   Cardiovascular: Negative for chest pain, claudication, cyanosis, dyspnea on exertion, irregular heartbeat, leg swelling, near-syncope, orthopnea, palpitations, paroxysmal nocturnal dyspnea and syncope.   Respiratory: Negative for cough, shortness of breath and wheezing.    Musculoskeletal: Positive for joint pain. Negative for falls and myalgias.   Gastrointestinal: Negative for abdominal pain, heartburn, nausea and vomiting.   Genitourinary: Negative for nocturia.   Neurological: Negative for brief paralysis, dizziness, focal weakness, headaches, numbness and paresthesias.   Psychiatric/Behavioral: Negative for altered mental status.       Current Outpatient Medications   Medication Sig    atorvastatin (LIPITOR) 20 MG tablet Take 1 tablet (20 mg total) by mouth once daily.    carvedilol (COREG) 25 MG tablet TAKE 1 TABLET BY MOUTH TWICE A DAY WITH A MEAL    cyclobenzaprine (FLEXERIL) 10 MG tablet TAKE 1 TABLET BY MOUTH 2 TIMES DAILY AS NEEDED FOR MUSCLE SPASMS.    fluticasone (FLONASE) 50 mcg/actuation nasal spray INSTILL 2 SPRAYS INTO EACH NOSTRIL EVERY DAY    ibuprofen (ADVIL,MOTRIN) 800 MG tablet TAKE 1 TABLET (800 MG TOTAL) BY  "MOUTH AFTER MEALS AS NEEDED FOR PAIN.    lisinopril-hydrochlorothiazide (PRINZIDE,ZESTORETIC) 20-12.5 mg per tablet Take two tabs po daily    MULTIVIT-IRON-MIN-FOLIC ACID 3,500-18-0.4 UNIT-MG-MG ORAL CHEW Take 1 tablet by mouth once daily.     No current facility-administered medications for this visit.      Objective:   Physical Exam   Constitutional: She is oriented to person, place, and time. She appears well-developed. No distress.   /86 (BP Location: Left arm, Patient Position: Sitting, BP Method: Large (Manual))   Pulse 89   Ht 5' 7" (1.702 m)   Wt 114.1 kg (251 lb 8.7 oz)   SpO2 99%   BMI 39.40 kg/m²    HENT:   Head: Normocephalic.   Eyes: Conjunctivae are normal. Pupils are equal, round, and reactive to light. No scleral icterus.   Neck: Neck supple. No JVD present. No thyromegaly present.   Cardiovascular: Normal rate, regular rhythm, normal heart sounds and intact distal pulses. PMI is not displaced. Exam reveals no gallop and no friction rub.   No murmur heard.  Pulmonary/Chest: Effort normal and breath sounds normal. No respiratory distress. She has no wheezes. She has no rales.   Abdominal: Soft. She exhibits no distension. There is no splenomegaly or hepatomegaly. There is no tenderness.   Musculoskeletal: She exhibits no edema or tenderness.   Gait normal   Neurological: She is alert and oriented to person, place, and time.   Skin: Skin is warm and dry. She is not diaphoretic.   Psychiatric: She has a normal mood and affect. Her behavior is normal.       Lab Results   Component Value Date     06/25/2018    K 3.9 06/25/2018     06/25/2018    CO2 27 06/25/2018    BUN 19 06/25/2018    CREATININE 1.3 06/25/2018     06/25/2018    HGBA1C 5.1 02/08/2018    AST 15 06/25/2018    ALT 14 06/25/2018    ALBUMIN 4.1 06/25/2018    PROT 7.4 06/25/2018    BILITOT 0.7 06/25/2018    WBC 3.62 (L) 02/08/2018    HGB 11.7 (L) 02/08/2018    HCT 36.2 (L) 02/08/2018    MCV 88 02/08/2018    PLT " "219 02/08/2018    TSH 0.998 02/08/2018    CHOL 207 (H) 06/25/2018    HDL 36 (L) 06/25/2018    LDLCALC 135.2 06/25/2018    TRIG 179 (H) 06/25/2018       Assessment:     1. Dilated cardiomyopathy : Stable with last EF 40-45%   2. Essential hypertension ; Good control.   3. Dyslipidemia :  on moderate intensity statin therapy but LDL > 100   4. Obesity (BMI 35.0-39.9 without comorbidity): improved        Plan:     Charlotte was seen today for cardiomyopathy.    Diagnoses and all orders for this visit:    Dilated cardiomyopathy  -     olmesartan-hydrochlorothiazide (BENICAR HCT) 40-25 mg per tablet; Take 1 tablet by mouth once daily ( in place of Lisinopril/HCT.  -     Transthoracic echo (TTE) complete (Cupid Only); Future    Essential hypertension  -     olmesartan-hydrochlorothiazide (BENICAR HCT) 40-25 mg per tablet; Take 1 tablet by mouth once daily ( "  "  " " ).  -     Basic metabolic panel; Future; Expected date: 12/26/2018    Dyslipidemia  Continue current regimen for the time being    Obesity (BMI 35.0-39.9 without comorbidity)  Encouraged continued weight loss.            "

## 2018-12-10 ENCOUNTER — HOSPITAL ENCOUNTER (OUTPATIENT)
Dept: CARDIOLOGY | Facility: CLINIC | Age: 68
Discharge: HOME | End: 2018-12-10
Attending: INTERNAL MEDICINE
Payer: COMMERCIAL

## 2018-12-10 ENCOUNTER — LAB VISIT (OUTPATIENT)
Dept: LAB | Facility: HOSPITAL | Age: 68
End: 2018-12-10
Attending: INTERNAL MEDICINE
Payer: COMMERCIAL

## 2018-12-10 VITALS
BODY MASS INDEX: 39.39 KG/M2 | DIASTOLIC BLOOD PRESSURE: 84 MMHG | HEIGHT: 67 IN | WEIGHT: 251 LBS | HEART RATE: 79 BPM | SYSTOLIC BLOOD PRESSURE: 126 MMHG

## 2018-12-10 DIAGNOSIS — I42.0 DILATED CARDIOMYOPATHY: ICD-10-CM

## 2018-12-10 DIAGNOSIS — I10 ESSENTIAL HYPERTENSION: Primary | ICD-10-CM

## 2018-12-10 DIAGNOSIS — I10 ESSENTIAL HYPERTENSION: ICD-10-CM

## 2018-12-10 LAB
ANION GAP SERPL CALC-SCNC: 7 MMOL/L
ASCENDING AORTA: 3.17 CM
AV INDEX (PROSTH): 0.79
AV MEAN GRADIENT: 2.06 MMHG
AV PEAK GRADIENT: 3.46 MMHG
AV VALVE AREA: 2.9 CM2
BSA FOR ECHO PROCEDURE: 2.32 M2
BUN SERPL-MCNC: 24 MG/DL
CALCIUM SERPL-MCNC: 10.8 MG/DL
CHLORIDE SERPL-SCNC: 106 MMOL/L
CO2 SERPL-SCNC: 29 MMOL/L
CREAT SERPL-MCNC: 1.5 MG/DL
CV ECHO LV RWT: 0.36 CM
DOP CALC AO PEAK VEL: 0.93 M/S
DOP CALC AO VTI: 17.58 CM
DOP CALC LVOT AREA: 3.66 CM2
DOP CALC LVOT DIAMETER: 2.16 CM
DOP CALC LVOT STROKE VOLUME: 50.91 CM3
DOP CALCLVOT PEAK VEL VTI: 13.9 CM
E WAVE DECELERATION TIME: 227.7 MSEC
E/A RATIO: 0.72
E/E' RATIO: 6
ECHO LV POSTERIOR WALL: 0.77 CM (ref 0.6–1.1)
EST. GFR  (AFRICAN AMERICAN): 41.3 ML/MIN/1.73 M^2
EST. GFR  (NON AFRICAN AMERICAN): 35.8 ML/MIN/1.73 M^2
FRACTIONAL SHORTENING: 19 % (ref 28–44)
GLUCOSE SERPL-MCNC: 86 MG/DL
INTERVENTRICULAR SEPTUM: 0.88 CM (ref 0.6–1.1)
LA MAJOR: 5.3 CM
LA MINOR: 5.4 CM
LA WIDTH: 4.2 CM
LEFT ATRIUM SIZE: 3.59 CM
LEFT ATRIUM VOLUME INDEX: 30.8 ML/M2
LEFT ATRIUM VOLUME: 68.56 CM3
LEFT INTERNAL DIMENSION IN SYSTOLE: 3.45 CM (ref 2.1–4)
LEFT VENTRICLE DIASTOLIC VOLUME INDEX: 36.63 ML/M2
LEFT VENTRICLE DIASTOLIC VOLUME: 81.57 ML
LEFT VENTRICLE MASS INDEX: 48.7 G/M2
LEFT VENTRICLE SYSTOLIC VOLUME INDEX: 22.1 ML/M2
LEFT VENTRICLE SYSTOLIC VOLUME: 49.19 ML
LEFT VENTRICULAR INTERNAL DIMENSION IN DIASTOLE: 4.27 CM (ref 3.5–6)
LEFT VENTRICULAR MASS: 108.44 G
LV LATERAL E/E' RATIO: 5.14
LV SEPTAL E/E' RATIO: 7.2
MV PEAK A VEL: 0.5 M/S
MV PEAK E VEL: 0.36 M/S
PISA TR MAX VEL: 1.87 M/S
POTASSIUM SERPL-SCNC: 3.8 MMOL/L
PULM VEIN S/D RATIO: 1.92
PV PEAK D VEL: 0.24 M/S
PV PEAK S VEL: 0.46 M/S
RA MAJOR: 4.68 CM
RA PRESSURE: 3 MMHG
RA WIDTH: 2.85 CM
RIGHT VENTRICULAR END-DIASTOLIC DIMENSION: 3.2 CM
RV TISSUE DOPPLER FREE WALL SYSTOLIC VELOCITY 1 (APICAL 4 CHAMBER VIEW): 7.19 M/S
SINUS: 3.21 CM
SODIUM SERPL-SCNC: 142 MMOL/L
STJ: 3.11 CM
TDI LATERAL: 0.07
TDI SEPTAL: 0.05
TDI: 0.06
TR MAX PG: 13.99 MMHG
TRICUSPID ANNULAR PLANE SYSTOLIC EXCURSION: 2.06 CM
TV REST PULMONARY ARTERY PRESSURE: 16.99 MMHG

## 2018-12-10 PROCEDURE — 93306 TTE W/DOPPLER COMPLETE: CPT | Mod: S$GLB,,, | Performed by: INTERNAL MEDICINE

## 2018-12-10 PROCEDURE — 36415 COLL VENOUS BLD VENIPUNCTURE: CPT

## 2018-12-10 PROCEDURE — 80048 BASIC METABOLIC PNL TOTAL CA: CPT

## 2018-12-10 RX ORDER — OLMESARTAN MEDOXOMIL 40 MG/1
40 TABLET ORAL DAILY
Qty: 90 TABLET | Refills: 3 | Status: SHIPPED | OUTPATIENT
Start: 2018-12-10 | End: 2020-03-20

## 2019-01-02 ENCOUNTER — LAB VISIT (OUTPATIENT)
Dept: LAB | Facility: HOSPITAL | Age: 69
End: 2019-01-02
Payer: COMMERCIAL

## 2019-01-02 ENCOUNTER — TELEPHONE (OUTPATIENT)
Dept: PRIMARY CARE CLINIC | Facility: CLINIC | Age: 69
End: 2019-01-02

## 2019-01-02 ENCOUNTER — OFFICE VISIT (OUTPATIENT)
Dept: PRIMARY CARE CLINIC | Facility: CLINIC | Age: 69
End: 2019-01-02
Attending: FAMILY MEDICINE
Payer: COMMERCIAL

## 2019-01-02 VITALS — HEIGHT: 67 IN | WEIGHT: 250.75 LBS | BODY MASS INDEX: 39.36 KG/M2

## 2019-01-02 DIAGNOSIS — Z00.00 ANNUAL PHYSICAL EXAM: ICD-10-CM

## 2019-01-02 DIAGNOSIS — J30.2 SEASONAL ALLERGIES: ICD-10-CM

## 2019-01-02 DIAGNOSIS — Z12.31 SCREENING MAMMOGRAM, ENCOUNTER FOR: ICD-10-CM

## 2019-01-02 DIAGNOSIS — I10 ESSENTIAL HYPERTENSION: Primary | ICD-10-CM

## 2019-01-02 DIAGNOSIS — M19.90 OSTEOARTHRITIS, UNSPECIFIED OSTEOARTHRITIS TYPE, UNSPECIFIED SITE: ICD-10-CM

## 2019-01-02 LAB
ALBUMIN SERPL BCP-MCNC: 3.8 G/DL
ALP SERPL-CCNC: 103 U/L
ALT SERPL W/O P-5'-P-CCNC: 17 U/L
ANION GAP SERPL CALC-SCNC: 7 MMOL/L
AST SERPL-CCNC: 17 U/L
BILIRUB SERPL-MCNC: 0.5 MG/DL
BUN SERPL-MCNC: 19 MG/DL
CALCIUM SERPL-MCNC: 11 MG/DL
CHLORIDE SERPL-SCNC: 104 MMOL/L
CHOLEST SERPL-MCNC: 193 MG/DL
CHOLEST/HDLC SERPL: 5.1 {RATIO}
CO2 SERPL-SCNC: 29 MMOL/L
CREAT SERPL-MCNC: 1.5 MG/DL
EST. GFR  (AFRICAN AMERICAN): 41.3 ML/MIN/1.73 M^2
EST. GFR  (NON AFRICAN AMERICAN): 35.8 ML/MIN/1.73 M^2
ESTIMATED AVG GLUCOSE: 105 MG/DL
GLUCOSE SERPL-MCNC: 101 MG/DL
HBA1C MFR BLD HPLC: 5.3 %
HDLC SERPL-MCNC: 38 MG/DL
HDLC SERPL: 19.7 %
LDLC SERPL CALC-MCNC: 107 MG/DL
NONHDLC SERPL-MCNC: 155 MG/DL
POTASSIUM SERPL-SCNC: 3.6 MMOL/L
PROT SERPL-MCNC: 7.5 G/DL
SODIUM SERPL-SCNC: 140 MMOL/L
TRIGL SERPL-MCNC: 240 MG/DL
TSH SERPL DL<=0.005 MIU/L-ACNC: 1.04 UIU/ML

## 2019-01-02 PROCEDURE — 83036 HEMOGLOBIN GLYCOSYLATED A1C: CPT

## 2019-01-02 PROCEDURE — 80061 LIPID PANEL: CPT

## 2019-01-02 PROCEDURE — 99397 PR PREVENTIVE VISIT,EST,65 & OVER: ICD-10-PCS | Mod: S$GLB,,, | Performed by: FAMILY MEDICINE

## 2019-01-02 PROCEDURE — 36415 COLL VENOUS BLD VENIPUNCTURE: CPT | Mod: PN

## 2019-01-02 PROCEDURE — 99397 PER PM REEVAL EST PAT 65+ YR: CPT | Mod: S$GLB,,, | Performed by: FAMILY MEDICINE

## 2019-01-02 PROCEDURE — 84443 ASSAY THYROID STIM HORMONE: CPT

## 2019-01-02 PROCEDURE — 99999 PR PBB SHADOW E&M-EST. PATIENT-LVL III: CPT | Mod: PBBFAC,,, | Performed by: FAMILY MEDICINE

## 2019-01-02 PROCEDURE — 80053 COMPREHEN METABOLIC PANEL: CPT

## 2019-01-02 PROCEDURE — 99999 PR PBB SHADOW E&M-EST. PATIENT-LVL III: ICD-10-PCS | Mod: PBBFAC,,, | Performed by: FAMILY MEDICINE

## 2019-01-02 NOTE — PROGRESS NOTES
Subjective:       Patient ID: Charlotte Crowder is a 68 y.o. female.    Chief Complaint: Hypertension (6 mth f/u)    Pt presents today for HTN f/u and annual exam.  BP well controlled today. No complaints needs med refilled.  Doing well otherwise    Needs labs    States that she is having some congestion and allergies. Not new. Denies any fevers, chills, n,v d,sob    Newly retired. Is bored. Thinking of returning to work part time    Needs mammo. DEXA UTD. cscope uTD      Cough   Pertinent negatives include no chest pain, chills, ear pain, fever, headaches, sore throat, shortness of breath or wheezing.   Hypertension   Pertinent negatives include no chest pain, headaches, neck pain, palpitations or shortness of breath.   Hyperlipidemia   Pertinent negatives include no chest pain or shortness of breath.     Review of Systems   Constitutional: Negative.  Negative for activity change, appetite change, chills, fatigue and fever.   HENT: Negative for congestion, ear pain, sinus pressure, sore throat and trouble swallowing.    Eyes: Negative.  Negative for photophobia, pain and visual disturbance.   Respiratory: Positive for cough. Negative for apnea, chest tightness, shortness of breath and wheezing.    Cardiovascular: Negative for chest pain, palpitations and leg swelling.   Gastrointestinal: Negative.  Negative for abdominal distention, abdominal pain, constipation, diarrhea, nausea and vomiting.   Genitourinary: Negative.    Musculoskeletal: Negative.  Negative for back pain, joint swelling and neck pain.   Skin: Negative.    Neurological: Negative for dizziness, tremors, weakness, light-headedness, numbness and headaches.   Psychiatric/Behavioral: Negative for behavioral problems, decreased concentration and sleep disturbance. The patient is not nervous/anxious.    All other systems reviewed and are negative.      Objective:      Physical Exam   Constitutional: She is oriented to person, place, and time. She appears  well-developed and well-nourished.   HENT:   Head: Normocephalic and atraumatic.   Right Ear: External ear normal.   Left Ear: External ear normal.   Nose: Nose normal.   Mouth/Throat: Oropharynx is clear and moist. No oropharyngeal exudate.   Eyes: Conjunctivae and EOM are normal. Pupils are equal, round, and reactive to light.   Neck: Normal range of motion. Neck supple. No thyromegaly present.   Cardiovascular: Normal rate, regular rhythm, normal heart sounds and intact distal pulses.   No murmur heard.  Pulmonary/Chest: Effort normal and breath sounds normal. No respiratory distress. She has no wheezes. She has no rales. She exhibits no tenderness.   Abdominal: Soft. Bowel sounds are normal. She exhibits no distension and no mass. There is no tenderness. There is no rebound and no guarding.   Musculoskeletal: Normal range of motion. She exhibits no edema or tenderness.   Lymphadenopathy:     She has no cervical adenopathy.   Neurological: She is alert and oriented to person, place, and time. She has normal reflexes. She displays normal reflexes. No cranial nerve deficit. She exhibits normal muscle tone. Coordination normal.   Skin: Skin is warm and dry. No erythema.   Psychiatric: She has a normal mood and affect. Her behavior is normal. Judgment and thought content normal.       Assessment:     HTN controlled  Hyperlipidemia: stable      Plan:       Annual physical exam  -     CBC auto differential; Future; Expected date: 01/02/2019  -     Comprehensive metabolic panel; Future; Expected date: 01/02/2019  -     Lipid panel; Future; Expected date: 01/02/2019  -     TSH; Future; Expected date: 01/02/2019  -     Hemoglobin A1c; Future; Expected date: 01/02/2019    Screening mammogram, encounter for  -     Mammo Digital Screening Bilat; Future; Expected date: 01/02/2019        PE wnl   Hyperlipidemia  HTN controlled q 6 mos prn HTN

## 2019-01-02 NOTE — TELEPHONE ENCOUNTER
----- Message from Wendy Thompson MD sent at 1/2/2019  2:43 PM CST -----  Please let pt know that her labs look good overall and are at her baseline, but that she really needs to watch her diet bc her trigs are elevated now at 240 and are quite high(fatty component of her chol). Please let her know we will repeat this fasting in 3 mos. I will order for her and pl help her to schedule.  Thanks,  PV

## 2019-01-03 NOTE — TELEPHONE ENCOUNTER
Left a detailed message for the patient to call back in regards to test or lab results per Dr. Thompson's note below:  MD CASANDRA Thompson Staff   Pt was advised to speak with anyone with the office . I will not be back until Monday.          Please let pt know that her labs look good overall and are at her baseline, but that she really needs to watch her diet bc her trigs are elevated now at 240 and are quite high(fatty component of her chol). Please let her know we will repeat this fasting in 3 mos. I will order for her and pl help her to schedule.   Thanks,   PV

## 2019-01-04 DIAGNOSIS — I10 ESSENTIAL HYPERTENSION: ICD-10-CM

## 2019-01-04 RX ORDER — LISINOPRIL AND HYDROCHLOROTHIAZIDE 12.5; 2 MG/1; MG/1
TABLET ORAL
Qty: 180 TABLET | Refills: 3 | OUTPATIENT
Start: 2019-01-04

## 2019-01-17 ENCOUNTER — HOSPITAL ENCOUNTER (OUTPATIENT)
Dept: RADIOLOGY | Facility: OTHER | Age: 69
Discharge: HOME | End: 2019-01-17
Attending: FAMILY MEDICINE
Payer: COMMERCIAL

## 2019-01-17 DIAGNOSIS — Z12.31 SCREENING MAMMOGRAM, ENCOUNTER FOR: ICD-10-CM

## 2019-01-17 PROCEDURE — 77067 SCR MAMMO BI INCL CAD: CPT | Mod: 26,,, | Performed by: RADIOLOGY

## 2019-01-17 PROCEDURE — 77067 MAMMO DIGITAL SCREENING BILAT WITH TOMOSYNTHESIS_CAD: ICD-10-PCS | Mod: 26,,, | Performed by: RADIOLOGY

## 2019-01-17 PROCEDURE — 77063 BREAST TOMOSYNTHESIS BI: CPT | Mod: 26,,, | Performed by: RADIOLOGY

## 2019-01-17 PROCEDURE — 77067 SCR MAMMO BI INCL CAD: CPT | Mod: TC

## 2019-01-17 PROCEDURE — 77063 MAMMO DIGITAL SCREENING BILAT WITH TOMOSYNTHESIS_CAD: ICD-10-PCS | Mod: 26,,, | Performed by: RADIOLOGY

## 2019-03-10 DIAGNOSIS — I10 ESSENTIAL HYPERTENSION: ICD-10-CM

## 2019-03-10 DIAGNOSIS — I42.0 DILATED CARDIOMYOPATHY: ICD-10-CM

## 2019-03-10 RX ORDER — CARVEDILOL 25 MG/1
TABLET ORAL
Qty: 180 TABLET | Refills: 1 | Status: SHIPPED | OUTPATIENT
Start: 2019-03-10 | End: 2019-09-17

## 2019-03-19 ENCOUNTER — CLINICAL SUPPORT (OUTPATIENT)
Dept: OPHTHALMOLOGY | Facility: CLINIC | Age: 69
End: 2019-03-19
Payer: COMMERCIAL

## 2019-03-19 ENCOUNTER — OFFICE VISIT (OUTPATIENT)
Dept: OPTOMETRY | Facility: CLINIC | Age: 69
End: 2019-03-19
Payer: COMMERCIAL

## 2019-03-19 DIAGNOSIS — H40.013 OAG (OPEN ANGLE GLAUCOMA) SUSPECT, LOW RISK, BILATERAL: Primary | ICD-10-CM

## 2019-03-19 DIAGNOSIS — H40.013 OAG (OPEN ANGLE GLAUCOMA) SUSPECT, LOW RISK, BILATERAL: ICD-10-CM

## 2019-03-19 DIAGNOSIS — H10.521 ANGULAR BLEPHAROCONJUNCTIVITIS OF RIGHT EYE: ICD-10-CM

## 2019-03-19 PROCEDURE — 92133 CPTRZD OPH DX IMG PST SGM ON: CPT | Mod: S$GLB,,, | Performed by: OPTOMETRIST

## 2019-03-19 PROCEDURE — 92133 POSTERIOR SEGMENT OCT OPTIC NERVE(OCULAR COHERENCE TOMOGRAPHY) - OU - BOTH EYES: ICD-10-PCS | Mod: S$GLB,,, | Performed by: OPTOMETRIST

## 2019-03-19 PROCEDURE — 92083 EXTENDED VISUAL FIELD XM: CPT | Mod: S$GLB,,, | Performed by: OPTOMETRIST

## 2019-03-19 PROCEDURE — 92083 HUMPHREY VISUAL FIELD - OU - BOTH EYES: ICD-10-PCS | Mod: S$GLB,,, | Performed by: OPTOMETRIST

## 2019-03-19 PROCEDURE — 76514 ECHO EXAM OF EYE THICKNESS: CPT | Mod: S$GLB,,, | Performed by: OPTOMETRIST

## 2019-03-19 PROCEDURE — 92012 INTRM OPH EXAM EST PATIENT: CPT | Mod: S$GLB,,, | Performed by: OPTOMETRIST

## 2019-03-19 PROCEDURE — 92012 PR EYE EXAM, EST PATIENT,INTERMED: ICD-10-PCS | Mod: S$GLB,,, | Performed by: OPTOMETRIST

## 2019-03-19 PROCEDURE — 76514 PR  US, EYE, FOR CORNEAL THICKNESS: ICD-10-PCS | Mod: S$GLB,,, | Performed by: OPTOMETRIST

## 2019-03-19 PROCEDURE — 99999 PR PBB SHADOW E&M-EST. PATIENT-LVL III: ICD-10-PCS | Mod: PBBFAC,,, | Performed by: OPTOMETRIST

## 2019-03-19 PROCEDURE — 99999 PR PBB SHADOW E&M-EST. PATIENT-LVL III: CPT | Mod: PBBFAC,,, | Performed by: OPTOMETRIST

## 2019-03-19 RX ORDER — TOBRAMYCIN 3 MG/ML
1 SOLUTION/ DROPS OPHTHALMIC 4 TIMES DAILY
Qty: 5 ML | Refills: 0 | Status: SHIPPED | OUTPATIENT
Start: 2019-03-19 | End: 2019-03-29

## 2019-03-19 NOTE — PATIENT INSTRUCTIONS
Treating Blepharitis: Self-Care    To treat the problem, keep your eyelids clean. To use an eyelid scrub:  1. Wash your hands with soap and warm water.  2. Use a ready-made eyelid scrub. Or mix 3 drops of baby shampoo in 1/4 cup of warm water.  3. Dip a lint-free pad, cotton swab, or clean washcloth in the scrub.  4. Close one eye and gently scrub the base of the eyelid.  5. Rinse the lid in cool water and dry with a clean towel.  6. Repeat on your other eye.  Date Last Reviewed: 6/6/2015  © 9202-0294 [employer]. [address] [place] [address], [place] [address]. All rights reserved. This information is not intended as a substitute for professional medical care. Always follow your healthcare professional's instructions.

## 2019-03-19 NOTE — PROGRESS NOTES
HPI     Pt here for a glaucoma work up. Pt states right eye cain more now since   last visit, no other changes. Right eye has been crusting.     Gtt: otc tears every other day    Last edited by Mac Rockwell, OD on 3/19/2019 10:22 AM. (History)        ROS     Negative for: Constitutional, Gastrointestinal, Neurological, Skin,   Genitourinary, Musculoskeletal, HENT, Endocrine, Cardiovascular, Eyes,   Respiratory, Psychiatric, Allergic/Imm, Heme/Lymph    Last edited by Mac Rockwell, OD on 3/19/2019 10:22 AM. (History)        Assessment /Plan     For exam results, see Encounter Report.    OAG (open angle glaucoma) suspect, low risk, bilateral    Angular blepharoconjunctivitis of right eye  -     tobramycin sulfate 0.3% (TOBREX) 0.3 % ophthalmic solution; Place 1 drop into the right eye 4 (four) times daily. for 10 days  Dispense: 5 mL; Refill: 0      1. (+) FHx- dad. IOP 13 OD, OS. Last IOP 17 OD, OS. C/d 0.65/0.7 OD, 0.6/0.65 OS. Pt reports history of previous testing for glaucoma. Pachy 566 OD, 574 OS.   3/19/19 OCT WNL OU  3/19/19 HVF  OD low reliability due to fixation losses, borderline w/sup defect which is likely from the lid    OS WNL  Educated the pt on findings. Pt shows understanding. Cont to monitor. Repeat 1-2 years.     2. Rx Tobrex QId OD x 10 days for crusting of eye. Rec lid scrubs BID OU. Will call to check on the pt next week    RTC 7 mo Routine

## 2019-03-27 ENCOUNTER — TELEPHONE (OUTPATIENT)
Dept: OPHTHALMOLOGY | Facility: CLINIC | Age: 69
End: 2019-03-27

## 2019-03-30 DIAGNOSIS — M19.90 OSTEOARTHRITIS, UNSPECIFIED OSTEOARTHRITIS TYPE, UNSPECIFIED SITE: ICD-10-CM

## 2019-04-01 RX ORDER — IBUPROFEN 800 MG/1
TABLET ORAL
Qty: 90 TABLET | Refills: 0 | Status: SHIPPED | OUTPATIENT
Start: 2019-04-01 | End: 2019-04-01 | Stop reason: SDUPTHER

## 2019-04-01 RX ORDER — IBUPROFEN 800 MG/1
TABLET ORAL
Qty: 90 TABLET | Refills: 0 | Status: SHIPPED | OUTPATIENT
Start: 2019-04-01 | End: 2019-10-15

## 2019-07-15 DIAGNOSIS — G89.29 CHRONIC FLANK PAIN: ICD-10-CM

## 2019-07-15 DIAGNOSIS — R10.9 CHRONIC FLANK PAIN: ICD-10-CM

## 2019-07-15 RX ORDER — CYCLOBENZAPRINE HCL 10 MG
TABLET ORAL
Qty: 90 TABLET | Refills: 0 | Status: SHIPPED | OUTPATIENT
Start: 2019-07-15 | End: 2019-10-15 | Stop reason: SDUPTHER

## 2019-09-12 DIAGNOSIS — G89.29 CHRONIC FLANK PAIN: ICD-10-CM

## 2019-09-12 DIAGNOSIS — R10.9 CHRONIC FLANK PAIN: ICD-10-CM

## 2019-09-13 RX ORDER — CYCLOBENZAPRINE HCL 10 MG
TABLET ORAL
Qty: 90 TABLET | Refills: 0 | OUTPATIENT
Start: 2019-09-13

## 2019-09-17 ENCOUNTER — OFFICE VISIT (OUTPATIENT)
Dept: PRIMARY CARE CLINIC | Facility: CLINIC | Age: 69
End: 2019-09-17
Attending: FAMILY MEDICINE
Payer: COMMERCIAL

## 2019-09-17 VITALS
DIASTOLIC BLOOD PRESSURE: 68 MMHG | WEIGHT: 252.13 LBS | BODY MASS INDEX: 39.57 KG/M2 | HEART RATE: 84 BPM | HEIGHT: 67 IN | OXYGEN SATURATION: 99 % | SYSTOLIC BLOOD PRESSURE: 90 MMHG

## 2019-09-17 DIAGNOSIS — I10 ESSENTIAL HYPERTENSION: ICD-10-CM

## 2019-09-17 DIAGNOSIS — M54.42 LEFT-SIDED LOW BACK PAIN WITH SCIATICA, SCIATICA LATERALITY UNSPECIFIED, UNSPECIFIED CHRONICITY: Primary | ICD-10-CM

## 2019-09-17 DIAGNOSIS — M15.9 PRIMARY OSTEOARTHRITIS INVOLVING MULTIPLE JOINTS: ICD-10-CM

## 2019-09-17 DIAGNOSIS — I42.0 DILATED CARDIOMYOPATHY: ICD-10-CM

## 2019-09-17 PROCEDURE — 90471 IMMUNIZATION ADMIN: CPT | Mod: S$GLB,,, | Performed by: FAMILY MEDICINE

## 2019-09-17 PROCEDURE — 90662 FLU VACCINE - HIGH DOSE (65+) PRESERVATIVE FREE IM: ICD-10-PCS | Mod: S$GLB,,, | Performed by: FAMILY MEDICINE

## 2019-09-17 PROCEDURE — 3078F DIAST BP <80 MM HG: CPT | Mod: CPTII,S$GLB,, | Performed by: FAMILY MEDICINE

## 2019-09-17 PROCEDURE — 3078F PR MOST RECENT DIASTOLIC BLOOD PRESSURE < 80 MM HG: ICD-10-PCS | Mod: CPTII,S$GLB,, | Performed by: FAMILY MEDICINE

## 2019-09-17 PROCEDURE — 1101F PR PT FALLS ASSESS DOC 0-1 FALLS W/OUT INJ PAST YR: ICD-10-PCS | Mod: CPTII,S$GLB,, | Performed by: FAMILY MEDICINE

## 2019-09-17 PROCEDURE — 90471 FLU VACCINE - HIGH DOSE (65+) PRESERVATIVE FREE IM: ICD-10-PCS | Mod: S$GLB,,, | Performed by: FAMILY MEDICINE

## 2019-09-17 PROCEDURE — 90662 IIV NO PRSV INCREASED AG IM: CPT | Mod: S$GLB,,, | Performed by: FAMILY MEDICINE

## 2019-09-17 PROCEDURE — 99999 PR PBB SHADOW E&M-EST. PATIENT-LVL IV: CPT | Mod: PBBFAC,,, | Performed by: FAMILY MEDICINE

## 2019-09-17 PROCEDURE — 99214 OFFICE O/P EST MOD 30 MIN: CPT | Mod: 25,S$GLB,, | Performed by: FAMILY MEDICINE

## 2019-09-17 PROCEDURE — 3074F PR MOST RECENT SYSTOLIC BLOOD PRESSURE < 130 MM HG: ICD-10-PCS | Mod: CPTII,S$GLB,, | Performed by: FAMILY MEDICINE

## 2019-09-17 PROCEDURE — 1101F PT FALLS ASSESS-DOCD LE1/YR: CPT | Mod: CPTII,S$GLB,, | Performed by: FAMILY MEDICINE

## 2019-09-17 PROCEDURE — 99999 PR PBB SHADOW E&M-EST. PATIENT-LVL IV: ICD-10-PCS | Mod: PBBFAC,,, | Performed by: FAMILY MEDICINE

## 2019-09-17 PROCEDURE — 99214 PR OFFICE/OUTPT VISIT, EST, LEVL IV, 30-39 MIN: ICD-10-PCS | Mod: 25,S$GLB,, | Performed by: FAMILY MEDICINE

## 2019-09-17 PROCEDURE — 3074F SYST BP LT 130 MM HG: CPT | Mod: CPTII,S$GLB,, | Performed by: FAMILY MEDICINE

## 2019-09-17 RX ORDER — ATORVASTATIN CALCIUM 20 MG/1
20 TABLET, FILM COATED ORAL DAILY
Qty: 90 TABLET | Refills: 1 | Status: SHIPPED | OUTPATIENT
Start: 2019-09-17 | End: 2020-03-20 | Stop reason: SDUPTHER

## 2019-09-17 RX ORDER — CARVEDILOL 12.5 MG/1
12.5 TABLET ORAL 2 TIMES DAILY WITH MEALS
Qty: 180 TABLET | Refills: 1 | Status: SHIPPED | OUTPATIENT
Start: 2019-09-17 | End: 2020-03-20 | Stop reason: SDUPTHER

## 2019-09-17 RX ORDER — CARVEDILOL 25 MG/1
TABLET ORAL
Qty: 180 TABLET | Refills: 3 | Status: CANCELLED | OUTPATIENT
Start: 2019-09-17

## 2019-09-17 NOTE — PROGRESS NOTES
Subjective:       Patient ID: Charlotte Crowder is a 69 y.o. female.    Chief Complaint: Hypertension; Spasms (left hip ); and Medication Problem (not helping  muscle relaxer )      Patient presents to clinic today with complaints of chronic L sided lower back pain that is typically controlled on Ibuprofen 800 mg that she takes every couple of days. Patient reserves Ibuprofen for days when her pain is 7/10. Describes pain as sharp and it can present in her L lateral lower flank or L lower back above the posterior pelvis. Patient has had Xrays that showed DJD to lumbar and sacral area. Pain is aggravated by ADLs such as ironing or reaching. Pt states that her flexeril and ibu is not working and that she does not WANT narcotics    Pt also with HTN f/u-doing well but does feel lightheaded at times and thinks that her pressures are too low    Review of Systems   Constitutional: Negative for activity change, appetite change, chills, fatigue and fever.   HENT: Negative for congestion, ear pain, sinus pressure, sore throat and trouble swallowing.    Eyes: Negative for photophobia, pain and visual disturbance.   Respiratory: Negative for apnea, cough, chest tightness, shortness of breath and wheezing.    Cardiovascular: Negative for chest pain, palpitations and leg swelling.   Gastrointestinal: Negative for abdominal distention, abdominal pain, constipation, diarrhea, nausea and vomiting.   Genitourinary: Negative.    Musculoskeletal: Positive for arthralgias and back pain. Negative for gait problem, joint swelling, myalgias, neck pain and neck stiffness.   Skin: Negative.    Neurological: Negative for dizziness, tremors, weakness, numbness and headaches.   Psychiatric/Behavioral: Negative for behavioral problems, decreased concentration and sleep disturbance. The patient is not nervous/anxious.    All other systems reviewed and are negative.      Objective:      Physical Exam   Constitutional: She is oriented to person,  place, and time. She appears well-developed and well-nourished.   HENT:   Head: Normocephalic and atraumatic.   Eyes: Pupils are equal, round, and reactive to light. Conjunctivae and EOM are normal.   Neck: Normal range of motion. Neck supple. No thyromegaly present.   Cardiovascular: Normal rate, regular rhythm, normal heart sounds and intact distal pulses.   No murmur heard.  Pulmonary/Chest: Effort normal and breath sounds normal. No respiratory distress. She has no wheezes. She has no rales. She exhibits no tenderness.   Abdominal: Tenderness: neg cva TTP.   Musculoskeletal: Normal range of motion. She exhibits no edema.   Pain with rotation of upper torso left and to right. Flexion to 45 degrees with pain. Neg SLR     Lymphadenopathy:     She has no cervical adenopathy.   Neurological: She is alert and oriented to person, place, and time.   Skin: Skin is warm. No erythema.   Psychiatric: She has a normal mood and affect. Her behavior is normal. Judgment and thought content normal.       Assessment:       1. Left-sided low back pain with sciatica, sciatica laterality unspecified, unspecified chronicity    2. Dilated cardiomyopathy    3. Essential hypertension        Plan:       Left-sided low back pain with sciatica, sciatica laterality unspecified, unspecified chronicity  -     Ambulatory Referral to Pain Clinic    Dilated cardiomyopathy    Essential hypertension    Other orders  -     atorvastatin (LIPITOR) 20 MG tablet; Take 1 tablet (20 mg total) by mouth once daily.  Dispense: 90 tablet; Refill: 1  -     carvedilol (COREG) 12.5 MG tablet; Take 1 tablet (12.5 mg total) by mouth 2 (two) times daily with meals.  Dispense: 180 tablet; Refill: 1  -     Influenza - High Dose (65+) (PF) (IM)      Due to low BP's and pt's symptoms will drop coreg to 12.5 mg BID. Pt will also d/w her cards doc re: this plan.  Should RTC in 2 weeks for BP check due to med decrease  LBP refer to pain to see if an injection is  warranted. Pt amenable

## 2019-09-17 NOTE — PROGRESS NOTES
"Patient was given vaccine information sheet for the Flu Vaccine. The area of injection was palpated using the acromion process as a landmark. This area was cleaned with alcohol. Using a 25g 1" safety needle, 0.5mL of the vaccine was placed into the left muscle. The injection site was dressed with a bandage. Patient experienced no complications and was discharged in stable condition. Fluzone High Dose vaccine Lot: [id number] Exp: 04/18/2020.  Any Oquendo LPN      "

## 2019-09-17 NOTE — PATIENT INSTRUCTIONS
Your test results will be communicated to you via: [facility], Telephone or Letter.  If you have not received your test results within one week. Please contact the clinic.    [facility] policy,requires the patient to take your blood pressure medication two hours prior to coming to your scheduled appointment.  If at that time your blood pressure is elevated you will have to return within 2 -4 weeks for a nurse blood pressure follow up until blood pressure is control. And the provider clear you.    Thanks for choosing [facility].

## 2019-10-01 ENCOUNTER — OFFICE VISIT (OUTPATIENT)
Dept: SPINE | Facility: CLINIC | Age: 69
End: 2019-10-01
Attending: FAMILY MEDICINE
Payer: COMMERCIAL

## 2019-10-01 VITALS
RESPIRATION RATE: 18 BRPM | WEIGHT: 254 LBS | TEMPERATURE: 99 F | SYSTOLIC BLOOD PRESSURE: 134 MMHG | DIASTOLIC BLOOD PRESSURE: 87 MMHG | HEIGHT: 67 IN | HEART RATE: 93 BPM | BODY MASS INDEX: 39.87 KG/M2

## 2019-10-01 DIAGNOSIS — M54.16 LUMBAR RADICULOPATHY: ICD-10-CM

## 2019-10-01 DIAGNOSIS — M47.899 FACET SYNDROME: ICD-10-CM

## 2019-10-01 DIAGNOSIS — M53.3 SACROILIAC JOINT PAIN: ICD-10-CM

## 2019-10-01 PROCEDURE — 20552 PR INJECT TRIGGER POINT, 1 OR 2: ICD-10-PCS | Mod: S$GLB,,, | Performed by: ANESTHESIOLOGY

## 2019-10-01 PROCEDURE — 20552 NJX 1/MLT TRIGGER POINT 1/2: CPT | Mod: S$GLB,,, | Performed by: ANESTHESIOLOGY

## 2019-10-01 PROCEDURE — 99999 PR PBB SHADOW E&M-EST. PATIENT-LVL IV: ICD-10-PCS | Mod: PBBFAC,,, | Performed by: ANESTHESIOLOGY

## 2019-10-01 PROCEDURE — 99999 PR PBB SHADOW E&M-EST. PATIENT-LVL IV: CPT | Mod: PBBFAC,,, | Performed by: ANESTHESIOLOGY

## 2019-10-01 PROCEDURE — 99204 OFFICE O/P NEW MOD 45 MIN: CPT | Mod: 25,S$GLB,, | Performed by: ANESTHESIOLOGY

## 2019-10-01 PROCEDURE — 99204 PR OFFICE/OUTPT VISIT, NEW, LEVL IV, 45-59 MIN: ICD-10-PCS | Mod: 25,S$GLB,, | Performed by: ANESTHESIOLOGY

## 2019-10-01 RX ORDER — KETOROLAC TROMETHAMINE 30 MG/ML
60 INJECTION, SOLUTION INTRAMUSCULAR; INTRAVENOUS
Status: COMPLETED | OUTPATIENT
Start: 2019-10-01 | End: 2019-10-01

## 2019-10-01 RX ORDER — LIDOCAINE HYDROCHLORIDE 10 MG/ML
1 INJECTION INFILTRATION; PERINEURAL
Status: COMPLETED | OUTPATIENT
Start: 2019-10-01 | End: 2019-10-01

## 2019-10-01 RX ORDER — METHYLPREDNISOLONE ACETATE 40 MG/ML
40 INJECTION, SUSPENSION INTRA-ARTICULAR; INTRALESIONAL; INTRAMUSCULAR; SOFT TISSUE
Status: COMPLETED | OUTPATIENT
Start: 2019-10-01 | End: 2019-10-01

## 2019-10-01 RX ADMIN — KETOROLAC TROMETHAMINE 60 MG: 30 INJECTION, SOLUTION INTRAMUSCULAR; INTRAVENOUS at 05:10

## 2019-10-01 RX ADMIN — METHYLPREDNISOLONE ACETATE 40 MG: 40 INJECTION, SUSPENSION INTRA-ARTICULAR; INTRALESIONAL; INTRAMUSCULAR; SOFT TISSUE at 05:10

## 2019-10-01 RX ADMIN — LIDOCAINE HYDROCHLORIDE 1 ML: 10 INJECTION INFILTRATION; PERINEURAL at 05:10

## 2019-10-01 NOTE — LETTER
October 1, 2019      Wendy Thompson MD  [address]   [address]  [facility] [address]           [facility] [place] [address] [facility] [address]  [address], [address]  [facility] [address]  Phone: [phone]  Fax: [phone]          Patient: Charlotte Crowder   MR Number: [id number]   YOB: 1950   Date of Visit: 10/1/2019       Dear Dr. Wendy Thompson:    Thank you for referring Charlotte Crowder to me for evaluation. Attached you will find relevant portions of my assessment and plan of care.    If you have questions, please do not hesitate to call me. I look forward to following Charlotte Crowder along with you.    Sincerely,    Sameera Lovell MD    Enclosure  CC:  No Recipients    If you would like to receive this communication electronically, please contact [email or web address] or [phone] to request more information on [employer] Link access.    For providers and/or their staff who would like to refer a patient to [facility], please contact us through our one-stop-shop provider referral line, [facility], at [phone].    If you feel you have received this communication in error or would no longer like to receive these types of communications, please e-mail [email or web address]

## 2019-10-01 NOTE — PROGRESS NOTES
Chronic Pain - New Consult    Referring Physician: Wendy Thompson MD    Chief Complaint: No chief complaint on file.       SUBJECTIVE: Disclaimer: This note has been generated using voice-recognition software. There may be typographical errors that have been missed during proof-reading    Initial encounter:    Charlotte Crowder presents to the clinic for the evaluation of lower back pain. The pain started 8 months ago insidiously and symptoms have been worsening.    Brief history:    Pain Description:    The pain is located in the lower back L>R  area and radiates to the left lower extremity in the L3/4 distribution.      At BEST  3/10     At WORST  7/10 on the WORST day.      On average pain is rated as 7/10.     Today the pain is rated as 5/10    The pain is described as sharp      Symptoms interfere with daily activity and sleeping.     Exacerbating factors: Standing, Laying, Morning and Getting out of bed/chair.      Mitigating factors medications.     Patient denies urinary incontinence and bowel incontinence.  Patient denies any suicidal or homicidal ideations    Pain Medications:  Current:  Ibuprofen 800mg    Tried in Past:  NSAIDs -Never  TCA -Never  SNRI -Never  Anti-convulsants -Never  Muscle Relaxants -Never  Opioids-Never    Physical Therapy/Home Exercise: no       report:  Reviewed and consistent with medication use as prescribed.    Pain Procedures: none    Chiropractor -never  Acupuncture - never  TENS unit -never  Spinal decompression -never  Joint replacement -never    Imaging:   DXA scan 2017 -osteopenia    TTE -12/10/18  · Normal left ventricular systolic function. The estimated ejection fraction is 60%  · Normal right ventricular systolic function.  · Normal LV diastolic function.  · The estimated PA systolic pressure is 17 mm Hg  · Normal central venous pressure (3 mm Hg).    Xray lumbar spine 8/16/2016  Technique: AP, bilateral oblique, and lateral views of the lumbar spine.    Findings:  Degenerative disc disease is present, mild at L5-S1. There is also degenerative disc disease at L1-2 with endplate sclerosis, particularly anteriorly. Mild facet hypertrophy at L4-5 and L5-S1. Vertebral body heights are maintained. Paravertebral soft tissues are unremarkable.Large calcified gallstone.      Impression      Mild degenerative disc disease and facet arthropathy as above..         Past Medical History:   Diagnosis Date    Abnormal Pap smear of cervix     Allergy     Hyperlipidemia     Hypertension     Morbid obesity     Nuclear sclerosis of both eyes 10/18/2018    OA (osteoarthritis)      Past Surgical History:   Procedure Laterality Date    none      SKIN BIOPSY       Social History     Socioeconomic History    Marital status:      Spouse name: Not on file    Number of children: Not on file    Years of education: Not on file    Highest education level: Not on file   Occupational History    Occupation: care giver     Employer: [employer]   Social Needs    Financial resource strain: Not on file    Food insecurity:     Worry: Not on file     Inability: Not on file    Transportation needs:     Medical: Not on file     Non-medical: Not on file   Tobacco Use    Smoking status: Never Smoker    Smokeless tobacco: Never Used   Substance and Sexual Activity    Alcohol use: Yes     Alcohol/week: 1.0 standard drinks     Types: 1 Glasses of wine per week     Comment: rarely     Drug use: No    Sexual activity: Yes     Partners: Male   Lifestyle    Physical activity:     Days per week: Not on file     Minutes per session: Not on file    Stress: Not on file   Relationships    Social connections:     Talks on phone: Not on file     Gets together: Not on file     Attends Confucianism service: Not on file     Active member of club or organization: Not on file     Attends meetings of clubs or organizations: Not on file     Relationship status: Not on file   Other Topics Concern    Not  on file   Social History Narrative    Not on file     Family History   Problem Relation Age of Onset    Glaucoma Father     No Known Problems Unknown     Heart attack Neg Hx     Heart disease Neg Hx     Hypertension Neg Hx     Breast cancer Neg Hx     Colon cancer Neg Hx     Ovarian cancer Neg Hx        Review of patient's allergies indicates:  No Known Allergies    Current Outpatient Medications   Medication Sig    atorvastatin (LIPITOR) 20 MG tablet Take 1 tablet (20 mg total) by mouth once daily.    carvedilol (COREG) 12.5 MG tablet Take 1 tablet (12.5 mg total) by mouth 2 (two) times daily with meals.    cyclobenzaprine (FLEXERIL) 10 MG tablet TAKE 1 TABLET BY MOUTH 2 TIMES DAILY AS NEEDED FOR MUSCLE SPASMS.    fluticasone (FLONASE) 50 mcg/actuation nasal spray INSTILL 2 SPRAYS INTO EACH NOSTRIL EVERY DAY    ibuprofen (IBU) 800 MG tablet TAKE 1 TABLET (800 MG TOTAL) BY MOUTH AFTER MEALS AS NEEDED FOR PAIN.    MULTIVIT-IRON-MIN-FOLIC ACID 3,500-18-0.4 UNIT-MG-MG ORAL CHEW Take 1 tablet by mouth once daily.    olmesartan (BENICAR) 40 MG tablet Take 1 tablet (40 mg total) by mouth once daily.     No current facility-administered medications for this visit.        REVIEW OF SYSTEMS:    GENERAL:  No weight loss, malaise or fevers.  HEENT:   No recent changes in vision or hearing  NECK:  Negative for lumps, no difficulty with swallowing.  RESPIRATORY:  Negative for cough, wheezing or shortness of breath, patient denies any recent URI.  CARDIOVASCULAR:  Negative for chest pain, leg swelling or palpitations.  GI:  Negative for abdominal discomfort, blood in stools or black stools or change in bowel habits.  MUSCULOSKELETAL:  See HPI.  SKIN:  Negative for lesions, rash, and itching.  PSYCH:  No mood disorder or recent psychosocial stressors.  Patients sleep is disturbed secondary to pain.  HEMATOLOGY/LYMPHOLOGY:  Negative for prolonged bleeding, bruising easily or swollen nodes.  Patient is not currently  "taking any anti-coagulants  ENDO: No history of diabetes or thyroid dysfunction  NEURO:   No history of headaches, syncope, paralysis, seizures or tremors.  All other reviewed and negative other than HPI.    OBJECTIVE:    /87   Pulse 93   Temp 99.1 °F (37.3 °C)   Resp 18   Ht 5' 7" (1.702 m)   Wt 115.2 kg (253 lb 15.5 oz)   BMI 39.78 kg/m²     PHYSICAL EXAMINATION:    GENERAL: Well appearing, in no acute distress, alert and oriented x3.  PSYCH:  Mood and affect appropriate.  SKIN: Skin color, texture, turgor normal, no rashes or lesions.  HEAD/FACE:  Normocephalic, atraumatic. Cranial nerves grossly intact.  CV: RRR with palpation of the radial artery.  PULM: No evidence of respiratory difficulty, symmetric chest rise.  BACK: Straight leg raising in the supine position is negative to radicular pain. There is pain with palpation over the facet joints of the lumbar spine bilaterally left > right over the L5/S1 area. There is decreased range of motion with extension to 15 degrees, and facet loading maneuvers cause reproducible pain L>R. Normal flexion of the lumbar spine    EXTREMITIES: Peripheral joint ROM is full and pain free without obvious instability or laxity in all four extremities. No deformities, edema, or skin discoloration. Good capillary refill.  MUSCULOSKELETAL: Hip, and knee provocative maneuvers are negative.  There is  pain with palpation over the sacroiliac joint on the left.  There is no pain to palpation over the greater trochanteric bursa bilaterally.  FABERs test is positive on the left.  FADIRs test is negative.  EHL on Right 4/5, EHL on Left EHL 4/5.  5/5 strength in right ankle with plantar and dorsiflexion, 5/5 strength in left ankle with plantar and dorsiflexion, 5/5 strength with right knee flexion extension, 5/5 strength with knee flexion extension on the left. 5/5 Hip flexion on right, 5/5 hip flexion on the left. No atrophy or tone abnormalities are noted.  NEURO: Bilateral " lower extremity coordination and muscle stretch reflexes are physiologic and symmetric.  Plantar response are downgoing. No clonus.  No loss of sensation is noted.  GAIT: Antalgic, ambulates without assistance         Lab Results   Component Value Date    WBC 3.62 (L) 02/08/2018    HGB 11.7 (L) 02/08/2018    HCT 36.2 (L) 02/08/2018    MCV 88 02/08/2018     02/08/2018     BMP  Lab Results   Component Value Date     01/02/2019    K 3.6 01/02/2019     01/02/2019    CO2 29 01/02/2019    BUN 19 01/02/2019    CREATININE 1.5 (H) 01/02/2019    CALCIUM 11.0 (H) 01/02/2019    ANIONGAP 7 (L) 01/02/2019    ESTGFRAFRICA 41.3 (A) 01/02/2019    EGFRNONAA 35.8 (A) 01/02/2019     Lab Results   Component Value Date    HGBA1C 5.3 01/02/2019       ASSESSMENT: 69 y.o. year old female with pain, consistent with     Encounter Diagnoses   Name Primary?    Facet syndrome     Sacroiliac joint pain     Lumbar radiculopathy        PLAN:   Flex/ext xray lumbar spine  MRI lumbar spine - bilateral EHL weakness    CMP - last creatinine was 1.5    Trigger Point Injection:   The procedure was discussed with the patient including complications of nerve damage,  bleeding, infection, and failure of pain relief.   Trigger points were identified by palpation and marked. Chlorhexidine prep of sites done. A mixture of 7mL 0.25% bupivacaine +40mg Depo-Medrol and 60mg toradol was prepared (10 mL total).   A 27-gauge needle was advanced to the point of maximal tenderness, and  1.5 mL  was injected after negative aspiration. All sites done in the same manner. Patient tolerated the procedure well and without complications. Sites injected included: paraspinal muscles on the left side at the L5/S1 level and over the gluteal muscles overlying the SI joint    F/u in 2 weeks to determine response to injection and to review imaging.    In the future the patient may benefit from SI joint injection vs epidural steroid injection    The above plan  and management options were discussed at length with patient. Patient is in agreement with the above and verbalized understanding. It will be communicated with the referring physician via electronic record, fax, or mail.    Sameera Lovell  10/01/2019

## 2019-10-09 ENCOUNTER — HOSPITAL ENCOUNTER (OUTPATIENT)
Dept: RADIOLOGY | Facility: OTHER | Age: 69
Discharge: HOME | End: 2019-10-09
Attending: ANESTHESIOLOGY
Payer: COMMERCIAL

## 2019-10-09 ENCOUNTER — TELEPHONE (OUTPATIENT)
Dept: PAIN MEDICINE | Facility: CLINIC | Age: 69
End: 2019-10-09

## 2019-10-09 DIAGNOSIS — M54.16 LUMBAR RADICULOPATHY: ICD-10-CM

## 2019-10-09 DIAGNOSIS — M53.3 SACROILIAC JOINT PAIN: ICD-10-CM

## 2019-10-09 DIAGNOSIS — M47.899 FACET SYNDROME: ICD-10-CM

## 2019-10-09 PROCEDURE — 72148 MRI LUMBAR SPINE W/O DYE: CPT | Mod: 26,,, | Performed by: RADIOLOGY

## 2019-10-09 PROCEDURE — 73521 XR HIPS BILATERAL 2 VIEW INCL AP PELVIS: ICD-10-PCS | Mod: 26,,, | Performed by: RADIOLOGY

## 2019-10-09 PROCEDURE — 73521 X-RAY EXAM HIPS BI 2 VIEWS: CPT | Mod: TC,FY

## 2019-10-09 PROCEDURE — 73521 X-RAY EXAM HIPS BI 2 VIEWS: CPT | Mod: 26,,, | Performed by: RADIOLOGY

## 2019-10-09 PROCEDURE — 72110 XR LUMBAR SPINE 5 VIEW WITH FLEX AND EXT: ICD-10-PCS | Mod: 26,,, | Performed by: RADIOLOGY

## 2019-10-09 PROCEDURE — 72148 MRI LUMBAR SPINE W/O DYE: CPT | Mod: TC

## 2019-10-09 PROCEDURE — 72110 X-RAY EXAM L-2 SPINE 4/>VWS: CPT | Mod: TC,FY

## 2019-10-09 PROCEDURE — 72148 MRI LUMBAR SPINE WITHOUT CONTRAST: ICD-10-PCS | Mod: 26,,, | Performed by: RADIOLOGY

## 2019-10-09 PROCEDURE — 72110 X-RAY EXAM L-2 SPINE 4/>VWS: CPT | Mod: 26,,, | Performed by: RADIOLOGY

## 2019-10-09 NOTE — TELEPHONE ENCOUNTER
"Pt has a liability and/or residual balance due.           Is this procedure medically urgent or non-medically?     Medical Urgency Definition( below)     If you can answer Yes to one of the following questions, the   Case will be considered "Medically Urgent" and will be done as   Scheduled irrespective of whether [facility] will receive payment.     *If this particular case is not done as scheduled, would the patient   Experience loss of life?   *Loss of Limb?   *Irreversible loss of function?   *Would their condition significantly worsen?           Please review and advise.  Thanks  "

## 2019-10-15 ENCOUNTER — OFFICE VISIT (OUTPATIENT)
Dept: PAIN MEDICINE | Facility: CLINIC | Age: 69
End: 2019-10-15
Payer: COMMERCIAL

## 2019-10-15 VITALS
BODY MASS INDEX: 39.69 KG/M2 | TEMPERATURE: 98 F | SYSTOLIC BLOOD PRESSURE: 114 MMHG | DIASTOLIC BLOOD PRESSURE: 75 MMHG | WEIGHT: 252.88 LBS | RESPIRATION RATE: 18 BRPM | HEART RATE: 101 BPM | HEIGHT: 67 IN

## 2019-10-15 DIAGNOSIS — G89.4 CHRONIC PAIN DISORDER: ICD-10-CM

## 2019-10-15 DIAGNOSIS — M47.816 LUMBAR SPONDYLOSIS: Primary | ICD-10-CM

## 2019-10-15 DIAGNOSIS — R10.9 CHRONIC FLANK PAIN: ICD-10-CM

## 2019-10-15 DIAGNOSIS — M48.061 SPINAL STENOSIS OF LUMBAR REGION, UNSPECIFIED WHETHER NEUROGENIC CLAUDICATION PRESENT: ICD-10-CM

## 2019-10-15 DIAGNOSIS — G89.29 CHRONIC FLANK PAIN: ICD-10-CM

## 2019-10-15 PROCEDURE — 3078F DIAST BP <80 MM HG: CPT | Mod: CPTII,S$GLB,, | Performed by: NURSE PRACTITIONER

## 2019-10-15 PROCEDURE — 1101F PT FALLS ASSESS-DOCD LE1/YR: CPT | Mod: CPTII,S$GLB,, | Performed by: NURSE PRACTITIONER

## 2019-10-15 PROCEDURE — 3078F PR MOST RECENT DIASTOLIC BLOOD PRESSURE < 80 MM HG: ICD-10-PCS | Mod: CPTII,S$GLB,, | Performed by: NURSE PRACTITIONER

## 2019-10-15 PROCEDURE — 99999 PR PBB SHADOW E&M-EST. PATIENT-LVL III: CPT | Mod: PBBFAC,,, | Performed by: NURSE PRACTITIONER

## 2019-10-15 PROCEDURE — 3074F PR MOST RECENT SYSTOLIC BLOOD PRESSURE < 130 MM HG: ICD-10-PCS | Mod: CPTII,S$GLB,, | Performed by: NURSE PRACTITIONER

## 2019-10-15 PROCEDURE — 99214 OFFICE O/P EST MOD 30 MIN: CPT | Mod: S$GLB,,, | Performed by: NURSE PRACTITIONER

## 2019-10-15 PROCEDURE — 99214 PR OFFICE/OUTPT VISIT, EST, LEVL IV, 30-39 MIN: ICD-10-PCS | Mod: S$GLB,,, | Performed by: NURSE PRACTITIONER

## 2019-10-15 PROCEDURE — 3074F SYST BP LT 130 MM HG: CPT | Mod: CPTII,S$GLB,, | Performed by: NURSE PRACTITIONER

## 2019-10-15 PROCEDURE — 99999 PR PBB SHADOW E&M-EST. PATIENT-LVL III: ICD-10-PCS | Mod: PBBFAC,,, | Performed by: NURSE PRACTITIONER

## 2019-10-15 PROCEDURE — 1101F PR PT FALLS ASSESS DOC 0-1 FALLS W/OUT INJ PAST YR: ICD-10-PCS | Mod: CPTII,S$GLB,, | Performed by: NURSE PRACTITIONER

## 2019-10-15 RX ORDER — CYCLOBENZAPRINE HCL 10 MG
10 TABLET ORAL 3 TIMES DAILY PRN
Qty: 90 TABLET | Refills: 2 | Status: SHIPPED | OUTPATIENT
Start: 2019-10-15 | End: 2020-01-13

## 2019-10-15 NOTE — PROGRESS NOTES
Chronic Pain - Established Visit    Referring Physician: No ref. provider found    Chief Complaint:   Chief Complaint   Patient presents with    Follow-up     2 weeks        SUBJECTIVE: Disclaimer: This note has been generated using voice-recognition software. There may be typographical errors that have been missed during proof-reading    Interval History 10/15/2019:  The patient returns follow up of back and leg pain.  She had TPIs at last OV with significant relief for one week then mild benefit.  She also feels like Flexeril is giving her a lot of benefit.  She has stopped Ibuprofen due to limited relief.  She has been stretching also which helps.  She did have a lumbar MRI since previous encounter which shows facet arthropathy with some fluid as well as disc bulges with stenosis.  Her pain starts across her back with intermittent radiation into the front of the legs, usually stopping at the knees.  Her back pain is currently greater than her leg pain.  Her pain today is 2/10.    Initial encounter:    Charlotte Crowder presents to the clinic for the evaluation of lower back pain. The pain started 8 months ago insidiously and symptoms have been worsening.    Brief history:    Pain Description:    The pain is located in the lower back L>R  area and radiates to the left lower extremity in the L3/4 distribution.      At BEST  3/10     At WORST  7/10 on the WORST day.      On average pain is rated as 7/10.     Today the pain is rated as 5/10    The pain is described as sharp      Symptoms interfere with daily activity and sleeping.     Exacerbating factors: Standing, Laying, Morning and Getting out of bed/chair.      Mitigating factors medications.     Patient denies urinary incontinence and bowel incontinence.  Patient denies any suicidal or homicidal ideations    Pain Medications:  Current:  Flexeril 10 mg 2-3 times daily    Tried in Past:  NSAIDs - Ibuprofen- limited benefit  TCA -Never  SNRI  -Never  Anti-convulsants -Never  Muscle Relaxants - Flexeril- helpful  Opioids-Never    Physical Therapy/Home Exercise: no       report:  Reviewed and consistent with medication use as prescribed.    Pain Procedures:   10/10/19 TPIs- helpful     Chiropractor -never  Acupuncture - never  TENS unit -never  Spinal decompression -never  Joint replacement -never    Imaging:   DXA scan 2017 -osteopenia    Narrative     EXAMINATION:  MRI LUMBAR SPINE WITHOUT CONTRAST    CLINICAL HISTORY:  left lower extremity radiculopathy and bilateral EHL weakness 4/5; Spondylosis without myelopathy or radiculopathy, site unspecified    TECHNIQUE:  Multiplanar, multisequence MR images were acquired from the thoracolumbar junction to the sacrum without the administration of contrast.    COMPARISON:  Lumbar spine radiograph series 10/09/2019    FINDINGS:  Lumbar spine alignment is within normal limits.  The vertebral body heights are well maintained, with no fracture.  There is no marrow signal abnormality suspicious for infiltrative process.  A small osseous hemangioma is incidentally noted within the S2 vertebral body.  The conus is normal in appearance and terminates at the L2 level.  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    L1-L2: There is mild broad-based disc bulge, ligamentum flavum thickening, and bilateral facet arthropathy.  No significant spinal canal or neural foraminal narrowing.    L2-L3: There is a circumferential disc bulge, ligamentum flavum thickening, and mild bilateral facet arthropathy.  No significant spinal canal or neural foraminal narrowing.    L3-L4: There is a circumferential disc bulge, ligamentum flavum thickening, and mild to moderate bilateral facet arthropathy.  There is at most mild right-sided neural foraminal narrowing.  No significant spinal canal stenosis.    L4-L5: There is a circumferential disc bulge, ligamentum flavum thickening, and advanced bilateral facet  arthropathy, right greater than left.  There is prominent fluid noted within the right facet articulation with mild adjacent soft tissue edema.  There is moderate spinal canal stenosis.  There is mild right-sided neural foraminal narrowing.    L5-S1: There is a circumferential disc bulge and advanced bilateral facet arthropathy, right greater than left.  Prominent fluid is noted within the right facet articulation with mild adjacent soft tissue edema.  There is mild spinal canal stenosis.  There is mild bilateral neural foraminal narrowing.    Limited views of the posterior abdomen demonstrate several right renal T2 hyperintensities which are incompletely characterized but suggestive of cysts.      Impression       1.  Multilevel degenerative changes of the lumbar spine, most pronounced at the levels of L4-L5 and L5-S1, predominantly secondary to significant facet arthropathy, right more so than left.  At the level of L4-L5, there is moderate spinal canal stenosis and mild right-sided neural foraminal narrowing.  Please see above for additional level by level details.    2.  Probable simple right renal cysts.     Narrative     EXAMINATION:  XR HIPS BILATERAL 2 VIEW INCL AP PELVIS    CLINICAL HISTORY:  Spondylosis without myelopathy or radiculopathy, site unspecified    TECHNIQUE:  AP view of the pelvis and frogleg lateral views of both hips were performed.    COMPARISON:  None.    FINDINGS:  The skeletal structures are intact good alignment.  Hip joint spaces are satisfactory.  No erosions are detected.  SI joints are unremarkable.      Impression       No acute abnormality or significant arthritis at the hips.         TTE -12/10/18  · Normal left ventricular systolic function. The estimated ejection fraction is 60%  · Normal right ventricular systolic function.  · Normal LV diastolic function.  · The estimated PA systolic pressure is 17 mm Hg  · Normal central venous pressure (3 mm Hg).    Xray lumbar spine  8/16/2016  Technique: AP, bilateral oblique, and lateral views of the lumbar spine.    Findings: Degenerative disc disease is present, mild at L5-S1. There is also degenerative disc disease at L1-2 with endplate sclerosis, particularly anteriorly. Mild facet hypertrophy at L4-5 and L5-S1. Vertebral body heights are maintained. Paravertebral soft tissues are unremarkable.Large calcified gallstone.      Impression      Mild degenerative disc disease and facet arthropathy as above..         Past Medical History:   Diagnosis Date    Abnormal Pap smear of cervix     Allergy     Hyperlipidemia     Hypertension     Morbid obesity     Nuclear sclerosis of both eyes 10/18/2018    OA (osteoarthritis)      Past Surgical History:   Procedure Laterality Date    none      SKIN BIOPSY       Social History     Socioeconomic History    Marital status:      Spouse name: Not on file    Number of children: Not on file    Years of education: Not on file    Highest education level: Not on file   Occupational History    Occupation: care giver     Employer: [employer]   Social Needs    Financial resource strain: Not on file    Food insecurity:     Worry: Not on file     Inability: Not on file    Transportation needs:     Medical: Not on file     Non-medical: Not on file   Tobacco Use    Smoking status: Never Smoker    Smokeless tobacco: Never Used   Substance and Sexual Activity    Alcohol use: Yes     Alcohol/week: 1.0 standard drinks     Types: 1 Glasses of wine per week     Comment: rarely     Drug use: No    Sexual activity: Yes     Partners: Male   Lifestyle    Physical activity:     Days per week: Not on file     Minutes per session: Not on file    Stress: Not on file   Relationships    Social connections:     Talks on phone: Not on file     Gets together: Not on file     Attends Zoroastrian service: Not on file     Active member of club or organization: Not on file     Attends meetings of clubs or  organizations: Not on file     Relationship status: Not on file   Other Topics Concern    Not on file   Social History Narrative    Not on file     Family History   Problem Relation Age of Onset    Glaucoma Father     No Known Problems Unknown     Heart attack Neg Hx     Heart disease Neg Hx     Hypertension Neg Hx     Breast cancer Neg Hx     Colon cancer Neg Hx     Ovarian cancer Neg Hx        Review of patient's allergies indicates:  No Known Allergies    Current Outpatient Medications   Medication Sig    atorvastatin (LIPITOR) 20 MG tablet Take 1 tablet (20 mg total) by mouth once daily.    carvedilol (COREG) 12.5 MG tablet Take 1 tablet (12.5 mg total) by mouth 2 (two) times daily with meals.    cyclobenzaprine (FLEXERIL) 10 MG tablet TAKE 1 TABLET BY MOUTH 2 TIMES DAILY AS NEEDED FOR MUSCLE SPASMS.    fluticasone (FLONASE) 50 mcg/actuation nasal spray INSTILL 2 SPRAYS INTO EACH NOSTRIL EVERY DAY    ibuprofen (IBU) 800 MG tablet TAKE 1 TABLET (800 MG TOTAL) BY MOUTH AFTER MEALS AS NEEDED FOR PAIN.    MULTIVIT-IRON-MIN-FOLIC ACID 3,500-18-0.4 UNIT-MG-MG ORAL CHEW Take 1 tablet by mouth once daily.    olmesartan (BENICAR) 40 MG tablet Take 1 tablet (40 mg total) by mouth once daily.     No current facility-administered medications for this visit.        REVIEW OF SYSTEMS:    GENERAL:  No weight loss, malaise or fevers.  HEENT:   No recent changes in vision or hearing  NECK:  Negative for lumps, no difficulty with swallowing.  RESPIRATORY:  Negative for cough, wheezing or shortness of breath, patient denies any recent URI.  CARDIOVASCULAR:  Negative for chest pain, leg swelling or palpitations.  GI:  Negative for abdominal discomfort, blood in stools or black stools or change in bowel habits.  MUSCULOSKELETAL:  See HPI.  SKIN:  Negative for lesions, rash, and itching.  PSYCH:  No mood disorder or recent psychosocial stressors.  Patient's sleep is disturbed secondary to  "pain.  HEMATOLOGY/LYMPHOLOGY:  Negative for prolonged bleeding, bruising easily or swollen nodes.  Patient is not currently taking any anti-coagulants  ENDO: No history of diabetes or thyroid dysfunction  NEURO:   No history of headaches, syncope, paralysis, seizures or tremors.  All other reviewed and negative other than HPI.    OBJECTIVE:    /75   Pulse 101   Temp 98.2 °F (36.8 °C)   Resp 18   Ht 5' 7" (1.702 m)   Wt 114.7 kg (252 lb 13.9 oz)   BMI 39.60 kg/m²     PHYSICAL EXAMINATION:    GENERAL: Well appearing, in no acute distress, alert and oriented x3.  PSYCH:  Mood and affect appropriate.  SKIN: Skin color, texture, turgor normal, no rashes or lesions.  HEAD/FACE:  Normocephalic, atraumatic. Cranial nerves grossly intact.  CV: RRR with palpation of the radial artery.  PULM: No evidence of respiratory difficulty, symmetric chest rise.  BACK: Straight leg raising in the supine position is negative to radicular pain. There is pain with palpation over the facet joints of the lumbar spine bilaterally.  There is decreased range of motion with extension to 15 degrees, and facet loading maneuvers cause reproducible pain.  Positive facet loading bilaterally.  EXTREMITIES: Peripheral joint ROM is full and pain free without obvious instability or laxity in all four extremities. No deformities, edema, or skin discoloration. Good capillary refill.  MUSCULOSKELETAL: Hip, and knee provocative maneuvers are negative.  There is pain with palpation over the sacroiliac joints bilaterally.  There is no pain to palpation over the greater trochanteric bursa bilaterally.  FABERs test is positive on the left.  FADIRs test is negative.  EHL on Right 4/5, EHL on Left EHL 4/5.  5/5 strength in right ankle with plantar and dorsiflexion, 5/5 strength in left ankle with plantar and dorsiflexion, 5/5 strength with right knee flexion extension, 5/5 strength with knee flexion extension on the left. 5/5 Hip flexion on right, 5/5 " hip flexion on the left. No atrophy or tone abnormalities are noted.  NEURO: Bilateral lower extremity coordination and muscle stretch reflexes are physiologic and symmetric.  Plantar response are downgoing. No clonus.  No loss of sensation is noted.  GAIT: Antalgic.      Lab Results   Component Value Date    WBC 3.62 (L) 02/08/2018    HGB 11.7 (L) 02/08/2018    HCT 36.2 (L) 02/08/2018    MCV 88 02/08/2018     02/08/2018     BMP  Lab Results   Component Value Date     01/02/2019    K 3.6 01/02/2019     01/02/2019    CO2 29 01/02/2019    BUN 19 01/02/2019    CREATININE 1.5 (H) 01/02/2019    CALCIUM 11.0 (H) 01/02/2019    ANIONGAP 7 (L) 01/02/2019    ESTGFRAFRICA 41.3 (A) 01/02/2019    EGFRNONAA 35.8 (A) 01/02/2019     Lab Results   Component Value Date    HGBA1C 5.3 01/02/2019       ASSESSMENT: 69 y.o. year old female with back and leg pain, consistent with     Encounter Diagnoses   Name Primary?    Chronic flank pain     Lumbar spondylosis Yes    Spinal stenosis of lumbar region, unspecified whether neurogenic claudication present     Chronic pain disorder        PLAN:     - Recent imaging reviewed with patient today.    - She had some benefit with recent TPIs.    - She will have previously scheduled CMP.    - Consider bilateral L4-5 and 5-S1 facet injections as well as L4/5 IL NICOLASA.  She declined at this time as her pain is tolerable.    - Refill Flexeril 10 mg TID PRN.    - F/u in 3 months or sooner if needed.        The above plan and management options were discussed at length with patient. Patient is in agreement with the above and verbalized understanding.     Janis Carolina  10/15/2019

## 2019-12-10 DIAGNOSIS — I10 ESSENTIAL HYPERTENSION: ICD-10-CM

## 2019-12-10 DIAGNOSIS — I42.0 DILATED CARDIOMYOPATHY: ICD-10-CM

## 2019-12-10 RX ORDER — OLMESARTAN MEDOXOMIL AND HYDROCHLOROTHIAZIDE 40/25 40; 25 MG/1; MG/1
TABLET ORAL
Qty: 90 TABLET | Refills: 0 | OUTPATIENT
Start: 2019-12-10

## 2019-12-11 ENCOUNTER — PATIENT OUTREACH (OUTPATIENT)
Dept: ADMINISTRATIVE | Facility: OTHER | Age: 69
End: 2019-12-11

## 2019-12-13 ENCOUNTER — TELEPHONE (OUTPATIENT)
Dept: PAIN MEDICINE | Facility: CLINIC | Age: 69
End: 2019-12-13

## 2019-12-13 NOTE — TELEPHONE ENCOUNTER
My name is Staff, I am contacting you from [facility] regarding your appointment scheduled for 12.16.19, with CLAUS, just confirming you will be able to make it.    If you feel you need to reschedule or canceled please give our office a call so we can better assist you.      Staff requesting patient to arrive 15 mins ahead of schedule appointment time.    Staff left a voicemail reminding pt of their schedule appt

## 2019-12-17 ENCOUNTER — PATIENT OUTREACH (OUTPATIENT)
Dept: ADMINISTRATIVE | Facility: OTHER | Age: 69
End: 2019-12-17

## 2019-12-17 ENCOUNTER — OFFICE VISIT (OUTPATIENT)
Dept: SLEEP MEDICINE | Facility: CLINIC | Age: 69
End: 2019-12-17
Payer: COMMERCIAL

## 2019-12-17 VITALS
SYSTOLIC BLOOD PRESSURE: 121 MMHG | DIASTOLIC BLOOD PRESSURE: 79 MMHG | WEIGHT: 255.31 LBS | HEART RATE: 92 BPM | BODY MASS INDEX: 40.07 KG/M2 | HEIGHT: 67 IN

## 2019-12-17 DIAGNOSIS — G47.33 OBSTRUCTIVE SLEEP APNEA: Primary | ICD-10-CM

## 2019-12-17 DIAGNOSIS — I10 ESSENTIAL HYPERTENSION: ICD-10-CM

## 2019-12-17 DIAGNOSIS — E66.9 OBESITY (BMI 35.0-39.9 WITHOUT COMORBIDITY): ICD-10-CM

## 2019-12-17 PROCEDURE — 99999 PR PBB SHADOW E&M-EST. PATIENT-LVL III: CPT | Mod: PBBFAC,,, | Performed by: NURSE PRACTITIONER

## 2019-12-17 PROCEDURE — 99213 OFFICE O/P EST LOW 20 MIN: CPT | Mod: S$GLB,,, | Performed by: NURSE PRACTITIONER

## 2019-12-17 PROCEDURE — 99999 PR PBB SHADOW E&M-EST. PATIENT-LVL III: ICD-10-PCS | Mod: PBBFAC,,, | Performed by: NURSE PRACTITIONER

## 2019-12-17 PROCEDURE — 3078F PR MOST RECENT DIASTOLIC BLOOD PRESSURE < 80 MM HG: ICD-10-PCS | Mod: CPTII,S$GLB,, | Performed by: NURSE PRACTITIONER

## 2019-12-17 PROCEDURE — 1159F MED LIST DOCD IN RCRD: CPT | Mod: S$GLB,,, | Performed by: NURSE PRACTITIONER

## 2019-12-17 PROCEDURE — 99213 PR OFFICE/OUTPT VISIT, EST, LEVL III, 20-29 MIN: ICD-10-PCS | Mod: S$GLB,,, | Performed by: NURSE PRACTITIONER

## 2019-12-17 PROCEDURE — 1159F PR MEDICATION LIST DOCUMENTED IN MEDICAL RECORD: ICD-10-PCS | Mod: S$GLB,,, | Performed by: NURSE PRACTITIONER

## 2019-12-17 PROCEDURE — 1126F PR PAIN SEVERITY QUANTIFIED, NO PAIN PRESENT: ICD-10-PCS | Mod: S$GLB,,, | Performed by: NURSE PRACTITIONER

## 2019-12-17 PROCEDURE — 1101F PT FALLS ASSESS-DOCD LE1/YR: CPT | Mod: CPTII,S$GLB,, | Performed by: NURSE PRACTITIONER

## 2019-12-17 PROCEDURE — 1126F AMNT PAIN NOTED NONE PRSNT: CPT | Mod: S$GLB,,, | Performed by: NURSE PRACTITIONER

## 2019-12-17 PROCEDURE — 3074F SYST BP LT 130 MM HG: CPT | Mod: CPTII,S$GLB,, | Performed by: NURSE PRACTITIONER

## 2019-12-17 PROCEDURE — 1101F PR PT FALLS ASSESS DOC 0-1 FALLS W/OUT INJ PAST YR: ICD-10-PCS | Mod: CPTII,S$GLB,, | Performed by: NURSE PRACTITIONER

## 2019-12-17 PROCEDURE — 3078F DIAST BP <80 MM HG: CPT | Mod: CPTII,S$GLB,, | Performed by: NURSE PRACTITIONER

## 2019-12-17 PROCEDURE — 3074F PR MOST RECENT SYSTOLIC BLOOD PRESSURE < 130 MM HG: ICD-10-PCS | Mod: CPTII,S$GLB,, | Performed by: NURSE PRACTITIONER

## 2019-12-17 NOTE — PROGRESS NOTES
"Charlotte Crowder returns today for mgt of KIMMIE. Last seen 2/27/18    Continued use but limited past 6-mos due to ongoing left back pain. Pain x 2 yrs, finally had injections and pain gone !!  12/2018 EF 60%  HTN stable, bp good today  wgt stable    Sleep consolidated with use, denies pressure intolerance. No chin strap but denies oral drying  Encore:  DATE RANGE: 6/12/2019 - 12/8/2019   2% leak  90% tile 9.2cm    Compliance Summary  Days with Device Usage: 45 days  Percentage of Days >=4 Hours: 19.4%  Average Usage (Days Used): 4 hrs. 43 mins. 46 secs.  Average Usage (All Days): 1 hrs. 10 mins. 56 secs.  Apnea Indices  Average AHI: 3.8      HISTORY:  4/8/16:  HISTORY OF PRESENT ILLNESS: Charlotte Crowder is a 69 y.o. female returning today for the management of KIMMIE. She last saw Dr. Khan 1/19/16, this is my initial visit with her. She has since undergone a sleep study, which we reviewed together in detail today. She continues to report loud, disruptive snoring, un-refreshing sleep, and excessive daytime fatigue. She is a sitter. She feels less short of breath. Sleep is disrupted. +witnessed apneic pauses. She does not drive. No cataplexy. ESS=4    +progressive sob since 6/2015.  Patient presented to cardiologist and ett revealed ef 40%.  Holter revealed nightime AV block.      9/23/16: Her titration study was denied in April '16 so she was setup with apap 6-20cm. She used it the first 3-mos ~ 4-6h/night but then stopped because she was told only to use it for 3mos. She reports resolution of snoring and apneic pauses when using therapy. She found her mask uncomfortable. Using chin strap. She did feel more rested when adherent also, just stopped ~ 30d ago. She slept soundly. Denies pressure intolerance. Kept heat at 2 and denies nasal drying. ESS=2. "I don't see why  I have to keep using it, I feel fine and my doctor told me my heart was fine". States she will resume using it tonight.     Interrogation: machine fairly " "new condition, 1/30d>4h. 0.2h/n 90% tile 7.3cm AHI 11.6, 79% fit. reviewed ENCORE data 4+hr/ first 3-mos with ahi 10-13cm. GHOSH 0.2    1/19/17:  Since last seen, she resumed using apap. Doesn't sleep w/o it.  makes sure. Loves stringer fx Massiel mask. Denies mouth drying. No chin strap use. Mask does not fall off. Continued resolution of snoring, apneic pauses and un-refreshing sleep. Needs new supplies. ESS=2    Interrogation- 30davg 5.8h/n. 7d 6.6hrs/n. 27/30d>4h. 90% tile 10.3-10.5cm. AHI 3.9, heat at 2. % mask fit.       PSG (262#) 3/16/116: AHI was 40.3 with an oxygen yenny of 85.0%. The supine AHI was 60.7 and the REM AHI was 77.6. Due to poor sleep efficiency, the patient did not qualify for a split night study in the first half of the study.       REVIEW OF SYSTEMS: Sleep related symptoms as per HPI, otherwise a balance review of 10-systems is negative.     PHYSICAL EXAM:   /79   Pulse 92   Ht 5' 7" (1.702 m)   Wt 115.8 kg (255 lb 4.7 oz)   BMI 39.98 kg/m²    GENERAL: morbid obese body habitus, well groomed     ASSESSMENT:   1. Obstructive sleep apnea, severe significant, adherent with PAP therapy after setup with reported improvement of symptoms (residual mild elevated AHI upon encore review). Used it 3-mos then stopped because she didn't know otherwise, reports "they" told her to use if 2-3mos then could stop, use it 4h /night. 1/19/17: She resumed APAP therapy after last seen. Continued nightly use. Symptoms are improved. AHI <5 indicating effective therapy. Needs new supplies . 2/27/18: continued adherence, symptoms improved when adherent, and AHI<5  Medical comorbidities include:obesity, dilated cardiomyopathy, HTN    PLAN:   1. continue apap 6-14cm. Continue nightly use, improve mask on time. S DME supplies  Discussed purpose of PAP therapy, health benefits of CPAP, as well as the potential ramifications of untreated sleep apnea, which could include daytime sleepiness, hypertension, " heart disease and/or stroke. An AHI of 15 is associated with increased risk CVD. NIGHLTY use recommended. Schedule echo and see cards as advisedre: cardiomyopathy.Encouraged weight loss efforts for potential improvement of KIMMIE and overall health benefits  2. She does not drive. See pcp re: HTN mgt/continue meds and encouraged weight loss efforts for potential improvement of KIMMIE and overall health benefits    3. RTC 1 yr, SOONER if needed

## 2019-12-20 ENCOUNTER — TELEPHONE (OUTPATIENT)
Dept: PRIMARY CARE CLINIC | Facility: CLINIC | Age: 69
End: 2019-12-20

## 2019-12-20 DIAGNOSIS — I10 ESSENTIAL HYPERTENSION: ICD-10-CM

## 2019-12-20 DIAGNOSIS — I42.0 DILATED CARDIOMYOPATHY: ICD-10-CM

## 2019-12-20 RX ORDER — OLMESARTAN MEDOXOMIL AND HYDROCHLOROTHIAZIDE 40/25 40; 25 MG/1; MG/1
TABLET ORAL
Qty: 90 TABLET | Refills: 0 | Status: SHIPPED | OUTPATIENT
Start: 2019-12-20 | End: 2020-03-20 | Stop reason: SDUPTHER

## 2019-12-20 NOTE — TELEPHONE ENCOUNTER
lov 09/17/19 next f/u due in march   Requesting refill olmesartan 40 mg  But a cardiologist rx olmesartan-hctz 40-25 mg today 12/20

## 2019-12-20 NOTE — TELEPHONE ENCOUNTER
----- Message from Albert Austin sent at 12/20/2019  3:20 PM CST -----  Contact: MADELEINE RAMSAY [[id number]]  [facility] [phone]    Please refill the medication(s) listed below. The patient can be reached at this phone number once it is called into the pharmacy.    Medication #1  olmesartan (BENICAR)  HTZ      Medication #2    Preferred Pharmacy: [id number] - [place] - [address]

## 2020-02-20 ENCOUNTER — HOSPITAL ENCOUNTER (EMERGENCY)
Facility: OTHER | Age: 70
Discharge: HOME | End: 2020-02-20
Attending: EMERGENCY MEDICINE
Payer: COMMERCIAL

## 2020-02-20 VITALS
HEART RATE: 94 BPM | TEMPERATURE: 98 F | OXYGEN SATURATION: 100 % | WEIGHT: 220 LBS | RESPIRATION RATE: 18 BRPM | DIASTOLIC BLOOD PRESSURE: 83 MMHG | BODY MASS INDEX: 35.36 KG/M2 | SYSTOLIC BLOOD PRESSURE: 125 MMHG | HEIGHT: 66 IN

## 2020-02-20 DIAGNOSIS — R00.0 TACHYCARDIA: ICD-10-CM

## 2020-02-20 LAB
ANION GAP SERPL CALC-SCNC: 14 MMOL/L (ref 8–16)
BASOPHILS # BLD AUTO: 0.03 K/UL (ref 0–0.2)
BASOPHILS NFR BLD: 0.5 % (ref 0–1.9)
BUN SERPL-MCNC: 16 MG/DL (ref 8–23)
CALCIUM SERPL-MCNC: 11.4 MG/DL (ref 8.7–10.5)
CHLORIDE SERPL-SCNC: 105 MMOL/L (ref 95–110)
CO2 SERPL-SCNC: 24 MMOL/L (ref 23–29)
CREAT SERPL-MCNC: 1.5 MG/DL (ref 0.5–1.4)
DIFFERENTIAL METHOD: NORMAL
EOSINOPHIL # BLD AUTO: 0.1 K/UL (ref 0–0.5)
EOSINOPHIL NFR BLD: 2.4 % (ref 0–8)
ERYTHROCYTE [DISTWIDTH] IN BLOOD BY AUTOMATED COUNT: 12.8 % (ref 11.5–14.5)
EST. GFR  (AFRICAN AMERICAN): 41 ML/MIN/1.73 M^2
EST. GFR  (NON AFRICAN AMERICAN): 36 ML/MIN/1.73 M^2
GLUCOSE SERPL-MCNC: 128 MG/DL (ref 70–110)
HCT VFR BLD AUTO: 38 % (ref 37–48.5)
HGB BLD-MCNC: 12.6 G/DL (ref 12–16)
IMM GRANULOCYTES # BLD AUTO: 0.02 K/UL (ref 0–0.04)
IMM GRANULOCYTES NFR BLD AUTO: 0.3 % (ref 0–0.5)
LYMPHOCYTES # BLD AUTO: 2.8 K/UL (ref 1–4.8)
LYMPHOCYTES NFR BLD: 47.5 % (ref 18–48)
MAGNESIUM SERPL-MCNC: 1.9 MG/DL (ref 1.6–2.6)
MCH RBC QN AUTO: 28.2 PG (ref 27–31)
MCHC RBC AUTO-ENTMCNC: 33.2 G/DL (ref 32–36)
MCV RBC AUTO: 85 FL (ref 82–98)
MONOCYTES # BLD AUTO: 0.4 K/UL (ref 0.3–1)
MONOCYTES NFR BLD: 6.7 % (ref 4–15)
NEUTROPHILS # BLD AUTO: 2.5 K/UL (ref 1.8–7.7)
NEUTROPHILS NFR BLD: 42.6 % (ref 38–73)
NRBC BLD-RTO: 0 /100 WBC
PLATELET # BLD AUTO: 249 K/UL (ref 150–350)
PMV BLD AUTO: 9.2 FL (ref 9.2–12.9)
POTASSIUM SERPL-SCNC: 3.6 MMOL/L (ref 3.5–5.1)
RBC # BLD AUTO: 4.47 M/UL (ref 4–5.4)
SODIUM SERPL-SCNC: 143 MMOL/L (ref 136–145)
WBC # BLD AUTO: 5.81 K/UL (ref 3.9–12.7)

## 2020-02-20 PROCEDURE — 96361 HYDRATE IV INFUSION ADD-ON: CPT

## 2020-02-20 PROCEDURE — 93010 ELECTROCARDIOGRAM REPORT: CPT | Mod: ,,, | Performed by: INTERNAL MEDICINE

## 2020-02-20 PROCEDURE — 63600175 PHARM REV CODE 636 W HCPCS: Performed by: EMERGENCY MEDICINE

## 2020-02-20 PROCEDURE — 83735 ASSAY OF MAGNESIUM: CPT

## 2020-02-20 PROCEDURE — 36415 COLL VENOUS BLD VENIPUNCTURE: CPT

## 2020-02-20 PROCEDURE — 93005 ELECTROCARDIOGRAM TRACING: CPT

## 2020-02-20 PROCEDURE — 80048 BASIC METABOLIC PNL TOTAL CA: CPT

## 2020-02-20 PROCEDURE — 99284 EMERGENCY DEPT VISIT MOD MDM: CPT | Mod: 25

## 2020-02-20 PROCEDURE — 85025 COMPLETE CBC W/AUTO DIFF WBC: CPT

## 2020-02-20 PROCEDURE — 93010 EKG 12-LEAD: ICD-10-PCS | Mod: ,,, | Performed by: INTERNAL MEDICINE

## 2020-02-20 PROCEDURE — 96360 HYDRATION IV INFUSION INIT: CPT

## 2020-02-20 RX ORDER — SODIUM CHLORIDE 9 MG/ML
1000 INJECTION, SOLUTION INTRAVENOUS
Status: COMPLETED | OUTPATIENT
Start: 2020-02-20 | End: 2020-02-20

## 2020-02-20 RX ADMIN — SODIUM CHLORIDE 1000 ML: 0.9 INJECTION, SOLUTION INTRAVENOUS at 03:02

## 2020-02-20 NOTE — ED NOTES
Hourly rounding on patient completed.  Pain 0/10.  Patient lying on stretcher with HOB elevated.  AAOx4 and appropriate at this time. Restroom and comfort needs addressed.  Pt updated on POC. Pt second bowel prep mixed at bedside.  RR even and unlabored, NAD noted. No acute distress noted.  SRx2 up, bed in lowest position, call light within reach.  at bedside. Will continue to monitor.

## 2020-02-20 NOTE — ED PROVIDER NOTES
Encounter Date: 2/20/2020       History     Chief Complaint   Patient presents with    Palpitations     doing bowel prep for colonoscopy and felt heart racing     69-year-old female presents complaining of palpitations which began approximately 30 min ago.  Patient states her heart rate was in the 130s.  She denies any chest pain, shortness of breath, lightheadedness or dizziness.  Patient states she was in her usual state of health and in the process of completing her bowel prep for a colonoscopy this morning.  The patient does report having a diagnosis of a rapid heart rate but is unsure of what she has been diagnosed with.  She states she does take a pill for heart rate but does not know the name.  She reports taking the medication this evening approximately 6 hr ago.        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    OA (osteoarthritis)      Past Surgical History:   Procedure Laterality Date    none      SKIN BIOPSY       Family History   Problem Relation Age of Onset    Glaucoma Father     No Known Problems Unknown     Heart attack Neg Hx     Heart disease Neg Hx     Hypertension Neg Hx     Breast cancer Neg Hx     Colon cancer Neg Hx     Ovarian cancer Neg Hx      Social History     Tobacco Use    Smoking status: Never Smoker    Smokeless tobacco: Never Used   Substance Use Topics    Alcohol use: Yes     Alcohol/week: 1.0 standard drinks     Types: 1 Glasses of wine per week     Comment: rarely     Drug use: No     Review of Systems   Constitutional: Negative for chills, diaphoresis, fatigue and fever.   Respiratory: Negative for cough, chest tightness, shortness of breath, wheezing and stridor.    Cardiovascular: Positive for palpitations. Negative  for chest pain and leg swelling.   Gastrointestinal: Negative for abdominal pain, diarrhea, nausea and vomiting.   Genitourinary: Negative for frequency.   Musculoskeletal: Negative for back pain, myalgias and neck pain.   Neurological: Negative for dizziness, syncope, weakness, light-headedness and headaches.       Physical Exam     Initial Vitals [02/20/20 0309]   BP Pulse Resp Temp SpO2   121/82 (!) 115 (!) 24 97.7 °F (36.5 °C) 98 %      MAP       --         Physical Exam    Vitals reviewed.  Constitutional: She appears well-developed and well-nourished. She is not diaphoretic. No distress.   HENT:   Head: Normocephalic and atraumatic.   Right Ear: External ear normal.   Left Ear: External ear normal.   Nose: Nose normal.   Eyes: Conjunctivae and EOM are normal. Right eye exhibits no discharge. No scleral icterus.   Neck: Normal range of motion. Neck supple. No tracheal deviation present. No JVD present.   Cardiovascular: Regular rhythm and normal heart sounds. Exam reveals no friction rub.    No murmur heard.  Tachycardic   Pulmonary/Chest: Breath sounds normal. No stridor. No respiratory distress. She has no wheezes. She has no rhonchi. She has no rales.   Abdominal: Soft. She exhibits no distension. There is no tenderness. There is no rebound.   Musculoskeletal: She exhibits no tenderness.   Neurological: She is alert and oriented to person, place, and time. She has normal strength. GCS score is 15. GCS eye subscore is 4. GCS verbal subscore is 5. GCS motor subscore is 6.   Psychiatric: She has a normal mood and affect. Her behavior is normal. Thought content normal.         ED Course   Procedures  Labs Reviewed   CBC W/ AUTO DIFFERENTIAL   BASIC METABOLIC PANEL   MAGNESIUM          Imaging Results    None          Medical Decision Making:   Initial Assessment:   69-year-old female presents complaining of palpitations for approximately 30 min.  She reports otherwise being asymptomatic.  Upon arrival patient  was tachycardic with a heart rate in the 130s.  Physical exam otherwise unremarkable.  ED Management:  Will obtain EKG and labs.  Will give IV fluids and reassess.    4:59 AM  Labs showed no significant abnormalities.  She is hypercalcemic but not significantly different compared to her most recent labs.  Tachycardia did resolve without any medications except for IV fluids.  Patient observed on a cardiac monitor without any further events.  Patient will be discharged home at this time stable condition to follow up with GI for her colonoscopy as scheduled this morning.                                 Clinical Impression:     1. Tachycardia                                Pat Boucher MD  02/20/20 0459

## 2020-02-20 NOTE — ED TRIAGE NOTES
Pt came to ED c/o heart palpations, taking bowel prep for colonoscopy at 6 AM, took first dose last night was suppose to take next dose at 3am but did not because she felt the medication caused the palpations.

## 2020-03-20 DIAGNOSIS — E78.5 DYSLIPIDEMIA: Primary | ICD-10-CM

## 2020-03-20 DIAGNOSIS — I10 ESSENTIAL HYPERTENSION: ICD-10-CM

## 2020-03-20 DIAGNOSIS — I42.0 DILATED CARDIOMYOPATHY: ICD-10-CM

## 2020-03-20 RX ORDER — OLMESARTAN MEDOXOMIL AND HYDROCHLOROTHIAZIDE 40/25 40; 25 MG/1; MG/1
1 TABLET ORAL DAILY
Qty: 90 TABLET | Refills: 0 | Status: SHIPPED | OUTPATIENT
Start: 2020-03-20 | End: 2020-04-09

## 2020-03-20 RX ORDER — ATORVASTATIN CALCIUM 20 MG/1
20 TABLET, FILM COATED ORAL DAILY
Qty: 90 TABLET | Refills: 0 | Status: SHIPPED | OUTPATIENT
Start: 2020-03-20 | End: 2020-04-23 | Stop reason: ALTCHOICE

## 2020-03-20 RX ORDER — CARVEDILOL 12.5 MG/1
12.5 TABLET ORAL 2 TIMES DAILY WITH MEALS
Qty: 180 TABLET | Refills: 0 | Status: SHIPPED | OUTPATIENT
Start: 2020-03-20 | End: 2020-06-11

## 2020-03-20 NOTE — TELEPHONE ENCOUNTER
----- Message from Candis Laureano sent at 3/20/2020  2:05 PM CDT -----  Contact: pt  Name of Who is Calling: Charlotte Crowder      What is the request in detail: pt states that she need a refill on all her medications. Please contact to further discuss and advise.     Can the clinic reply by [facility]: n      What Number to Call Back if not in [facility]: [phone]

## 2020-03-22 DIAGNOSIS — J30.2 OTHER SEASONAL ALLERGIC RHINITIS: ICD-10-CM

## 2020-03-23 RX ORDER — FLUTICASONE PROPIONATE 50 MCG
SPRAY, SUSPENSION (ML) NASAL
Qty: 48 ML | Refills: 0 | Status: SHIPPED | OUTPATIENT
Start: 2020-03-23 | End: 2020-06-16

## 2020-04-08 ENCOUNTER — TELEPHONE (OUTPATIENT)
Dept: PRIMARY CARE CLINIC | Facility: CLINIC | Age: 70
End: 2020-04-08

## 2020-04-08 ENCOUNTER — OFFICE VISIT (OUTPATIENT)
Dept: PRIMARY CARE CLINIC | Facility: CLINIC | Age: 70
End: 2020-04-08
Attending: FAMILY MEDICINE
Payer: COMMERCIAL

## 2020-04-08 DIAGNOSIS — J06.9 UPPER RESPIRATORY TRACT INFECTION, UNSPECIFIED TYPE: ICD-10-CM

## 2020-04-08 DIAGNOSIS — R00.2 PALPITATIONS: Primary | ICD-10-CM

## 2020-04-08 DIAGNOSIS — I10 ESSENTIAL HYPERTENSION: ICD-10-CM

## 2020-04-08 DIAGNOSIS — J30.2 SEASONAL ALLERGIES: ICD-10-CM

## 2020-04-08 PROCEDURE — 1101F PR PT FALLS ASSESS DOC 0-1 FALLS W/OUT INJ PAST YR: ICD-10-PCS | Mod: CPTII,,, | Performed by: FAMILY MEDICINE

## 2020-04-08 PROCEDURE — 99214 PR OFFICE/OUTPT VISIT, EST, LEVL IV, 30-39 MIN: ICD-10-PCS | Mod: GT,,, | Performed by: FAMILY MEDICINE

## 2020-04-08 PROCEDURE — 1101F PT FALLS ASSESS-DOCD LE1/YR: CPT | Mod: CPTII,,, | Performed by: FAMILY MEDICINE

## 2020-04-08 PROCEDURE — 1159F PR MEDICATION LIST DOCUMENTED IN MEDICAL RECORD: ICD-10-PCS | Mod: ,,, | Performed by: FAMILY MEDICINE

## 2020-04-08 PROCEDURE — 99214 OFFICE O/P EST MOD 30 MIN: CPT | Mod: GT,,, | Performed by: FAMILY MEDICINE

## 2020-04-08 PROCEDURE — 1159F MED LIST DOCD IN RCRD: CPT | Mod: ,,, | Performed by: FAMILY MEDICINE

## 2020-04-08 RX ORDER — AZITHROMYCIN 250 MG/1
TABLET, FILM COATED ORAL
Qty: 6 TABLET | Refills: 0 | Status: SHIPPED | OUTPATIENT
Start: 2020-04-08 | End: 2020-04-13

## 2020-04-08 NOTE — PROGRESS NOTES
Subjective:       Patient ID: Charlotte Crowder is a 70 y.o. female.    Chief Complaint: No chief complaint on file.    Pt presents today for acute concerns over telemed audio. Pt states that she cannot access her video due to poor connection of WIFI at home    Pt today c/o 2 things:  1. Upper resp infection which she attributes to her sinus congestion that is seasonal always around this time of year. Pt has had symptoms for one week. Denies any n/v/d/sob/cp/ha    Pt has been at home and not in contact with anyone with COVID. Has not used decongestants and is using only allergy med OTC and flonase as of now. No improvement    2. Pt also with palpitations. Has been drinking lots of caffeine and states and has not been sleeping at all. Thinks that this could be the cause.  Pt was seen by gavin in 2018, and has not gone back to see Dr Burciaga since then. Pt was in ED at the end of Feb for palpitations and was told that her work up was normal.   Pt denies any SOB assoc with the palpitations. Feels well otherwise      Review of Systems   Constitutional: Negative.  Negative for activity change, appetite change, chills, diaphoresis, fatigue and fever.   HENT: Positive for congestion, postnasal drip, rhinorrhea, sinus pressure and sore throat. Negative for ear pain and facial swelling.    Eyes: Negative.  Negative for photophobia and visual disturbance.   Respiratory: Negative for cough, chest tightness and shortness of breath.    Cardiovascular: Positive for palpitations. Negative for chest pain and leg swelling.   Gastrointestinal: Negative.  Negative for abdominal distention, abdominal pain, constipation, diarrhea, nausea and vomiting.   Genitourinary: Negative.    Musculoskeletal: Negative.  Negative for arthralgias, gait problem, joint swelling, neck pain and neck stiffness.   Skin: Negative.  Negative for rash.   Neurological: Negative for dizziness, tremors, weakness, light-headedness and headaches.    Psychiatric/Behavioral: Negative.    All other systems reviewed and are negative.      Objective:      Physical Exam   Constitutional: She is oriented to person, place, and time. She appears well-developed and well-nourished. No distress.   HENT:   Head: Normocephalic and atraumatic.   Eyes: Conjunctivae and EOM are normal.   Neck: Normal range of motion. Neck supple.   Pulmonary/Chest: Effort normal and breath sounds normal. No respiratory distress.   Musculoskeletal: Normal range of motion.   Neurological: She is alert and oriented to person, place, and time.   Skin: She is not diaphoretic.   Psychiatric: She has a normal mood and affect. Her behavior is normal. Judgment and thought content normal.       Assessment:       Acute sinusitis recurrent  palpitations  Plan:     palpitations: pt appears stable. Advised to check in cards for virtual appt to assess if she needs adjustment in meds. ED prompts d/w pt in depth    Sinus congestion: not new for pt at this time of year.   Pt is reassured and told that she can do trial of zpak since symptoms have been present for one week  Explained for her to avoid decongestants to prevent palps as well as elev BP's. Pt amenable  Flonase, saline rinses and antihistamines    The patient location is: HOME  The chief complaint leading to consultation is: palpitation and sinusitis  Visit type: Virtual visit with synchronous audio. VIDEO NOT WORKING FOR PT  Total time spent with patient: 15 mins  Each patient to whom he or she provides medical services by telemedicine is:  (1) informed of the relationship between the physician and patient and the respective role of any other health care provider with respect to management of the patient; and (2) notified that he or she may decline to receive medical services by telemedicine and may withdraw from such care at any time.    Notes: above    Upper respiratory tract infection, unspecified type  -     azithromycin (Z-JONNATHAN) 250 MG tablet;  Take 2 tablets by mouth on day 1; Take 1 tablet by mouth on days 2-5  Dispense: 6 tablet; Refill: 0

## 2020-04-08 NOTE — TELEPHONE ENCOUNTER
Spoke with pt, she has been having palpations. They come and go, this has been for 2 days this time. She previously went to ER on 2/2020 for this. Is there something she can do for this? Patient would like something sent in for sinus, she has a stuffy nose. It is not draining. Her eyes and nose were runny at first but now nose is clogged. She states her allergies always get bad at this time. Provided patient [facility] tech number to get signed up for portal to set up virtual visit.      ----- Message from Ynes Estevez sent at 4/8/2020 11:51 AM CDT -----  Contact: pt    Name of Who is Calling:MADELEINE RAMSAY [[id number]]      What is the request in detail: Patient states she's have been having some heart Palpitation , pt states she was diagnose with Tachycardia.  Pt asked for another medication for sinus Please contact to further discuss and advise       Can the clinic reply by [facility]: no     What Number to Call Back if not in [facility]: [phone]

## 2020-04-09 ENCOUNTER — PATIENT OUTREACH (OUTPATIENT)
Dept: ADMINISTRATIVE | Facility: OTHER | Age: 70
End: 2020-04-09

## 2020-04-09 ENCOUNTER — OFFICE VISIT (OUTPATIENT)
Dept: CARDIOLOGY | Facility: CLINIC | Age: 70
End: 2020-04-09
Payer: COMMERCIAL

## 2020-04-09 DIAGNOSIS — E55.9 VITAMIN D DEFICIENCY DISEASE: ICD-10-CM

## 2020-04-09 DIAGNOSIS — R00.2 PALPITATIONS: Primary | ICD-10-CM

## 2020-04-09 DIAGNOSIS — T50.905A HYPERCALCEMIA DUE TO A DRUG: ICD-10-CM

## 2020-04-09 DIAGNOSIS — E83.52 HYPERCALCEMIA DUE TO A DRUG: ICD-10-CM

## 2020-04-09 DIAGNOSIS — I42.0 DILATED CARDIOMYOPATHY: ICD-10-CM

## 2020-04-09 DIAGNOSIS — E78.5 DYSLIPIDEMIA: Chronic | ICD-10-CM

## 2020-04-09 DIAGNOSIS — I10 ESSENTIAL HYPERTENSION: ICD-10-CM

## 2020-04-09 PROCEDURE — 1159F PR MEDICATION LIST DOCUMENTED IN MEDICAL RECORD: ICD-10-PCS | Mod: 95,,, | Performed by: INTERNAL MEDICINE

## 2020-04-09 PROCEDURE — 1101F PR PT FALLS ASSESS DOC 0-1 FALLS W/OUT INJ PAST YR: ICD-10-PCS | Mod: 95,CPTII,, | Performed by: INTERNAL MEDICINE

## 2020-04-09 PROCEDURE — 99213 PR OFFICE/OUTPT VISIT, EST, LEVL III, 20-29 MIN: ICD-10-PCS | Mod: 95,,, | Performed by: INTERNAL MEDICINE

## 2020-04-09 PROCEDURE — 1159F MED LIST DOCD IN RCRD: CPT | Mod: 95,,, | Performed by: INTERNAL MEDICINE

## 2020-04-09 PROCEDURE — 1101F PT FALLS ASSESS-DOCD LE1/YR: CPT | Mod: 95,CPTII,, | Performed by: INTERNAL MEDICINE

## 2020-04-09 PROCEDURE — 99213 OFFICE O/P EST LOW 20 MIN: CPT | Mod: 95,,, | Performed by: INTERNAL MEDICINE

## 2020-04-09 RX ORDER — AMLODIPINE BESYLATE 5 MG/1
5 TABLET ORAL DAILY
Qty: 30 TABLET | Refills: 11 | Status: SHIPPED | OUTPATIENT
Start: 2020-04-09 | End: 2020-09-15

## 2020-04-09 RX ORDER — OLMESARTAN MEDOXOMIL 40 MG/1
TABLET ORAL
Qty: 90 TABLET | Refills: 3 | Status: SHIPPED | OUTPATIENT
Start: 2020-04-09 | End: 2020-10-06

## 2020-04-09 NOTE — PROGRESS NOTES
Subjective:   Patient ID:  Charlotte Crowder is a 70 y.o. female who presents for evaluation of Palpitations      HPI:   Charlotte Crowder presents for Telephone evaluation of palpitations. She had gone to the ED 6 weeks ago for a speeding up of her heart. ECG during symptoms demonstrated sinus tachycardia. . She has had a couple of more episodes more recently. Charlotte Crowder has a history of non-ischemic cardiomyopathy with her last EF 60%. Charlotte Crowder denies chest pain, shortness of breath,  presyncope , or syncope. Charlotte Crowder has hypertension with BP controlled on recent Clinic visits and 120s/ at home. Calcium has been consistently elevated with the patient on a thiazide diuretic.Charlotte Crowder has dyslipidemia with last LDL above 100..   with l  Review of Systems   Constitution: Negative for malaise/fatigue, weight gain and weight loss.   HENT: Positive for congestion.    Eyes: Negative for blurred vision.   Cardiovascular: Negative for chest pain, claudication, cyanosis, dyspnea on exertion, irregular heartbeat, leg swelling, near-syncope, orthopnea, palpitations, paroxysmal nocturnal dyspnea and syncope.   Respiratory: Negative for cough, shortness of breath and wheezing.         KIMMIE erratically wearing CPAP   Musculoskeletal: Positive for joint pain. Negative for falls and myalgias.   Gastrointestinal: Negative for abdominal pain, heartburn, nausea and vomiting.   Genitourinary: Negative for nocturia.   Neurological: Negative for brief paralysis, dizziness, focal weakness, headaches, numbness, paresthesias and weakness.   Psychiatric/Behavioral: Negative for altered mental status.       Current Outpatient Medications   Medication Sig    amLODIPine (NORVASC) 5 MG tablet Take 1 tablet (5 mg total) by mouth once daily.    atorvastatin (LIPITOR) 20 MG tablet Take 1 tablet (20 mg total) by mouth once daily.    azithromycin (Z-JONNATHAN) 250 MG tablet Take 2 tablets by mouth on day 1; Take 1 tablet  by mouth on days 2-5    carvediloL (COREG) 12.5 MG tablet Take 1 tablet (12.5 mg total) by mouth 2 (two) times daily with meals.    fluticasone propionate (FLONASE) 50 mcg/actuation nasal spray INSTILL 2 SPRAYS INTO EACH NOSTRIL EVERY DAY    MULTIVIT-IRON-MIN-FOLIC ACID 3,500-18-0.4 UNIT-MG-MG ORAL CHEW Take 1 tablet by mouth once daily.    olmesartan (BENICAR) 40 MG tablet Take one each night in place of Olmasartan/hydrochlorothiazide     No current facility-administered medications for this visit.      Objective:   Physical Exam    Lab Results   Component Value Date     02/20/2020    K 3.6 02/20/2020     02/20/2020    CO2 24 02/20/2020    BUN 16 02/20/2020    CREATININE 1.5 (H) 02/20/2020     (H) 02/20/2020    HGBA1C 5.3 01/02/2019    MG 1.9 02/20/2020    AST 17 01/02/2019    ALT 17 01/02/2019    ALBUMIN 3.8 01/02/2019    PROT 7.5 01/02/2019    BILITOT 0.5 01/02/2019    WBC 5.81 02/20/2020    HGB 12.6 02/20/2020    HCT 38.0 02/20/2020    MCV 85 02/20/2020     02/20/2020    TSH 1.040 01/02/2019    CHOL 193 01/02/2019    HDL 38 (L) 01/02/2019    LDLCALC 107.0 01/02/2019    TRIG 240 (H) 01/02/2019       Assessment:     1. Palpitations : Sx sinus tachycardia   2. Dilated cardiomyopathy : Last EF 60%   3. Essential hypertension : Controlled   4. Dyslipidemia :  on moderate intensity statin therapy needing recheck.   5. Hypercalcemia due to a drug : Suspect thiazide induced   6. Vitamin D deficiency disease        Plan:     Charlotte was seen today for palpitations.    Diagnoses and all orders for this visit:    Palpitations    Dilated cardiomyopathy  -     olmesartan (BENICAR) 40 MG tablet; Take one each night in place of Olmasartan/hydrochlorothiazide    Essential hypertension  -     olmesartan (BENICAR) 40 MG tablet; Take one each night in place of Olmasartan/hydrochlorothiazide  -     amLODIPine (NORVASC) 5 MG tablet; Take 1 tablet (5 mg total) by mouth once daily.    Dyslipidemia  -      Lipid panel; Future; Expected date: 04/23/2020  Continue current regimen    Hypercalcemia due to a drug  -     Basic metabolic panel; Future; Expected date: 04/23/2020    Vitamin D deficiency disease  -     VITAMIN D; Future; Expected date: 04/23/2020 and if low and calcium back in normal range begin supplementation.    The patient location is: home  The chief complaint leading to consultation is Palpitations  Visit type: Virtual visit with audio .  Total time spent with patient: 30 minutes  Including chart review, conversation, and note  Each patient to whom he or she provides medical services by telemedicine is:  (1) informed of the relationship between the physician and patient and the respective role of any other health care provider with respect to management of the patient; and (2) notified that he or she may decline to receive medical services by telemedicine and may withdraw from such care at any time.

## 2020-04-09 NOTE — PATIENT INSTRUCTIONS
Change Olmasartan/hydrochlorothiazide to Olmasartan 40 mg each night and Amlodipine 5 mg each night  Continue all other medications

## 2020-04-23 ENCOUNTER — LAB VISIT (OUTPATIENT)
Dept: LAB | Facility: OTHER | Age: 70
End: 2020-04-23
Payer: COMMERCIAL

## 2020-04-23 ENCOUNTER — TELEPHONE (OUTPATIENT)
Dept: CARDIOLOGY | Facility: CLINIC | Age: 70
End: 2020-04-23

## 2020-04-23 DIAGNOSIS — E78.5 DYSLIPIDEMIA: Chronic | ICD-10-CM

## 2020-04-23 DIAGNOSIS — E55.9 VITAMIN D DEFICIENCY DISEASE: ICD-10-CM

## 2020-04-23 DIAGNOSIS — E83.52 HYPERCALCEMIA DUE TO A DRUG: ICD-10-CM

## 2020-04-23 DIAGNOSIS — E78.5 DYSLIPIDEMIA: Primary | Chronic | ICD-10-CM

## 2020-04-23 DIAGNOSIS — T50.905A HYPERCALCEMIA DUE TO A DRUG: ICD-10-CM

## 2020-04-23 LAB
25(OH)D3+25(OH)D2 SERPL-MCNC: 31 NG/ML (ref 30–96)
ANION GAP SERPL CALC-SCNC: 8 MMOL/L (ref 8–16)
BUN SERPL-MCNC: 10 MG/DL (ref 8–23)
CALCIUM SERPL-MCNC: 10.6 MG/DL (ref 8.7–10.5)
CHLORIDE SERPL-SCNC: 111 MMOL/L (ref 95–110)
CHOLEST SERPL-MCNC: 199 MG/DL (ref 120–199)
CHOLEST/HDLC SERPL: 4.7 {RATIO} (ref 2–5)
CO2 SERPL-SCNC: 25 MMOL/L (ref 23–29)
CREAT SERPL-MCNC: 1.2 MG/DL (ref 0.5–1.4)
EST. GFR  (AFRICAN AMERICAN): 53 ML/MIN/1.73 M^2
EST. GFR  (NON AFRICAN AMERICAN): 46 ML/MIN/1.73 M^2
GLUCOSE SERPL-MCNC: 106 MG/DL (ref 70–110)
HDLC SERPL-MCNC: 42 MG/DL (ref 40–75)
HDLC SERPL: 21.1 % (ref 20–50)
LDLC SERPL CALC-MCNC: 107.4 MG/DL (ref 63–159)
NONHDLC SERPL-MCNC: 157 MG/DL
POTASSIUM SERPL-SCNC: 4 MMOL/L (ref 3.5–5.1)
SODIUM SERPL-SCNC: 144 MMOL/L (ref 136–145)
TRIGL SERPL-MCNC: 248 MG/DL (ref 30–150)

## 2020-04-23 PROCEDURE — 36415 COLL VENOUS BLD VENIPUNCTURE: CPT

## 2020-04-23 PROCEDURE — 80048 BASIC METABOLIC PNL TOTAL CA: CPT

## 2020-04-23 PROCEDURE — 80061 LIPID PANEL: CPT

## 2020-04-23 PROCEDURE — 82306 VITAMIN D 25 HYDROXY: CPT

## 2020-04-23 RX ORDER — ROSUVASTATIN CALCIUM 20 MG/1
TABLET, COATED ORAL
Qty: 90 TABLET | Refills: 3 | Status: SHIPPED | OUTPATIENT
Start: 2020-04-23 | End: 2020-10-06

## 2020-04-30 ENCOUNTER — TELEPHONE (OUTPATIENT)
Dept: CARDIOLOGY | Facility: CLINIC | Age: 70
End: 2020-04-30

## 2020-04-30 NOTE — TELEPHONE ENCOUNTER
"Reports un resolving R ear ringing that is louder at night, states happening since 4/10. Patient unsure if related to last med change. Patient says also has sinus congestion and says last time also had ear ringing at that time. Says antibiotic from PCP helped but feels is congested. States washed hair and stuck something in R ear so may have "hit something", but that was a month ago. Reports BP has been doing well, last /70 HR 82. Routed to Dr. Thompson for review. Routed to Dr. Burciaga to make aware.   "

## 2020-04-30 NOTE — TELEPHONE ENCOUNTER
----- Message from Jennifer Guevara MA sent at 4/30/2020 10:04 AM CDT -----  Contact: self  Please call pt. She would like to talk to you about olmesartan medication pt. Of [facility].Thank you [phone]

## 2020-05-12 ENCOUNTER — OFFICE VISIT (OUTPATIENT)
Dept: PRIMARY CARE CLINIC | Facility: CLINIC | Age: 70
End: 2020-05-12
Attending: FAMILY MEDICINE
Payer: COMMERCIAL

## 2020-05-12 VITALS
HEIGHT: 66 IN | OXYGEN SATURATION: 99 % | SYSTOLIC BLOOD PRESSURE: 126 MMHG | WEIGHT: 257.69 LBS | HEART RATE: 93 BPM | BODY MASS INDEX: 41.42 KG/M2 | DIASTOLIC BLOOD PRESSURE: 84 MMHG

## 2020-05-12 DIAGNOSIS — I10 ESSENTIAL HYPERTENSION: ICD-10-CM

## 2020-05-12 DIAGNOSIS — H93.11 TINNITUS OF RIGHT EAR: Primary | ICD-10-CM

## 2020-05-12 DIAGNOSIS — J30.2 SEASONAL ALLERGIES: ICD-10-CM

## 2020-05-12 PROCEDURE — 3079F DIAST BP 80-89 MM HG: CPT | Mod: CPTII,S$GLB,, | Performed by: FAMILY MEDICINE

## 2020-05-12 PROCEDURE — 1125F PR PAIN SEVERITY QUANTIFIED, PAIN PRESENT: ICD-10-PCS | Mod: S$GLB,,, | Performed by: FAMILY MEDICINE

## 2020-05-12 PROCEDURE — 1101F PT FALLS ASSESS-DOCD LE1/YR: CPT | Mod: CPTII,S$GLB,, | Performed by: FAMILY MEDICINE

## 2020-05-12 PROCEDURE — 1125F AMNT PAIN NOTED PAIN PRSNT: CPT | Mod: S$GLB,,, | Performed by: FAMILY MEDICINE

## 2020-05-12 PROCEDURE — 99999 PR PBB SHADOW E&M-EST. PATIENT-LVL IV: ICD-10-PCS | Mod: PBBFAC,,, | Performed by: FAMILY MEDICINE

## 2020-05-12 PROCEDURE — 3074F PR MOST RECENT SYSTOLIC BLOOD PRESSURE < 130 MM HG: ICD-10-PCS | Mod: CPTII,S$GLB,, | Performed by: FAMILY MEDICINE

## 2020-05-12 PROCEDURE — 1159F PR MEDICATION LIST DOCUMENTED IN MEDICAL RECORD: ICD-10-PCS | Mod: S$GLB,,, | Performed by: FAMILY MEDICINE

## 2020-05-12 PROCEDURE — 99999 PR PBB SHADOW E&M-EST. PATIENT-LVL IV: CPT | Mod: PBBFAC,,, | Performed by: FAMILY MEDICINE

## 2020-05-12 PROCEDURE — 99214 PR OFFICE/OUTPT VISIT, EST, LEVL IV, 30-39 MIN: ICD-10-PCS | Mod: 25,S$GLB,, | Performed by: FAMILY MEDICINE

## 2020-05-12 PROCEDURE — 99214 OFFICE O/P EST MOD 30 MIN: CPT | Mod: 25,S$GLB,, | Performed by: FAMILY MEDICINE

## 2020-05-12 PROCEDURE — 1159F MED LIST DOCD IN RCRD: CPT | Mod: S$GLB,,, | Performed by: FAMILY MEDICINE

## 2020-05-12 PROCEDURE — 3079F PR MOST RECENT DIASTOLIC BLOOD PRESSURE 80-89 MM HG: ICD-10-PCS | Mod: CPTII,S$GLB,, | Performed by: FAMILY MEDICINE

## 2020-05-12 PROCEDURE — 3074F SYST BP LT 130 MM HG: CPT | Mod: CPTII,S$GLB,, | Performed by: FAMILY MEDICINE

## 2020-05-12 PROCEDURE — 96372 PR INJECTION,THERAP/PROPH/DIAG2ST, IM OR SUBCUT: ICD-10-PCS | Mod: S$GLB,,, | Performed by: FAMILY MEDICINE

## 2020-05-12 PROCEDURE — 1101F PR PT FALLS ASSESS DOC 0-1 FALLS W/OUT INJ PAST YR: ICD-10-PCS | Mod: CPTII,S$GLB,, | Performed by: FAMILY MEDICINE

## 2020-05-12 PROCEDURE — 96372 THER/PROPH/DIAG INJ SC/IM: CPT | Mod: S$GLB,,, | Performed by: FAMILY MEDICINE

## 2020-05-12 RX ORDER — TRIAMCINOLONE ACETONIDE 40 MG/ML
40 INJECTION, SUSPENSION INTRA-ARTICULAR; INTRAMUSCULAR
Status: COMPLETED | OUTPATIENT
Start: 2020-05-12 | End: 2020-05-12

## 2020-05-12 RX ADMIN — TRIAMCINOLONE ACETONIDE 40 MG: 40 INJECTION, SUSPENSION INTRA-ARTICULAR; INTRAMUSCULAR at 11:05

## 2020-05-12 NOTE — PROGRESS NOTES
Subjective:       Patient ID: Charlotte Crowder is a 70 y.o. female.    Chief Complaint: Tinnitus    Pt presents today for acute concerns over telemed audio. Pt states that she cannot access her video due to poor connection of WIFI at home    Pt today c/o 2 things:  1. Upper resp infection which she attributes to her sinus congestion that is seasonal always around this time of year. Pt has had symptoms for one week. Denies any n/v/d/sob/cp/ha  2. Tinnitus which she thinks is caused by her new cards meds. Per Dr Burciaga's note: he does not suspect this as cause. Denies any blurry vision/n/v/sob/cp/HA/dizziness    Pt has been at home and not in contact with anyone with COVID. Has not used decongestants and is using only allergy med OTC and flonase as of now. No improvement    2. Pt also with palpitations. Has been drinking lots of caffeine and states and has not been sleeping at all. Thinks that this could be the cause.  Pt was seen by gavin in 2018, and has not gone back to see Dr Burciaga since then. Pt was in ED at the end of Feb for palpitations and was told that her work up was normal. Did see him recently via telemed  Pt denies any SOB assoc with the palpitations. Feels well otherwise      Ringing in Ears:    Associated symptoms: tinnitus and rhinorrhea.  No dizziness, no ear pain, no fever and no headaches.No dizziness.    Review of Systems   Constitutional: Negative.  Negative for activity change, appetite change, chills, diaphoresis, fatigue and fever.   HENT: Positive for congestion, postnasal drip, rhinorrhea, sinus pressure, sore throat and tinnitus. Negative for ear pain and facial swelling.    Eyes: Negative.  Negative for photophobia and visual disturbance.   Respiratory: Negative for cough, chest tightness and shortness of breath.    Cardiovascular: Positive for palpitations. Negative for chest pain and leg swelling.   Gastrointestinal: Negative.  Negative for abdominal distention, abdominal pain,  constipation, diarrhea, nausea and vomiting.   Genitourinary: Negative.    Musculoskeletal: Negative.  Negative for arthralgias, gait problem, joint swelling, neck pain and neck stiffness.   Skin: Negative.  Negative for rash.   Neurological: Negative for dizziness, tremors, weakness, light-headedness and headaches.   Psychiatric/Behavioral: Negative.    All other systems reviewed and are negative.      Objective:      Physical Exam   Constitutional: She is oriented to person, place, and time. She appears well-developed and well-nourished. No distress.   HENT:   Head: Normocephalic and atraumatic.   Right Ear: External ear normal.   Left Ear: External ear normal.   Nose: Nose normal.   Mouth/Throat: Oropharynx is clear and moist. No oropharyngeal exudate.   Eyes: Pupils are equal, round, and reactive to light. Conjunctivae and EOM are normal.   Neck: Normal range of motion. Neck supple. No JVD present. No thyromegaly present.   Cardiovascular: Normal rate, regular rhythm, normal heart sounds and intact distal pulses.   No murmur heard.  Pulmonary/Chest: Effort normal and breath sounds normal. No respiratory distress.   Abdominal: Soft. Bowel sounds are normal. She exhibits no distension and no mass. There is no tenderness. There is no rebound and no guarding.   Musculoskeletal: Normal range of motion. She exhibits no edema or tenderness.   Lymphadenopathy:     She has no cervical adenopathy.   Neurological: She is alert and oriented to person, place, and time. She has normal reflexes. She displays normal reflexes. No cranial nerve deficit or sensory deficit. She exhibits normal muscle tone. Coordination normal.   Skin: Skin is warm and dry. She is not diaphoretic. No erythema.   Psychiatric: She has a normal mood and affect. Her behavior is normal. Judgment and thought content normal.       Assessment:       Acute sinusitis recurrent  palpitations  Plan:     palpitations: pt appears stable. Advised to check in cards  if issues arise.. ED prompts d/w pt in depth    Sinus congestion: not new for pt at this time of year. And, pt should continue with her present regiment. Did finish course of abx few backs.   Tinnitus: there does appear to be some congestion in her right ear. Perhaps this is related to middle ear and will do trial of steroid shot to see if this helps with tinnitus. If not, pt will need to see ENT. Referral placed today. Will also obtain carotid dopplers to r/u stenosis. Pt amenable      Explained for her to avoid decongestants to prevent palps as well as elev BP's. Pt amenable  Flonase, saline rinses and antihistamines    ED prompts d/ w pt    Essential hypertension    Tinnitus of right ear  -     triamcinolone acetonide injection 40 mg  -     Ambulatory referral/consult to ENT; Future; Expected date: 05/19/2020  -      Carotid Right; Future; Expected date: 05/12/2020      Greater than 45 mins spent with pt; 50 % face to face

## 2020-05-12 NOTE — PROGRESS NOTES
After obtaining consent, and per orders of Dr. Thompson, injection of kenalog Lot [id number] 4/21 given in the [facility] by DANIAL CIFUENTES. Patient tolerated well and band aid applied. Patient instructed to remain in clinic for 15 minutes afterwards, and to report any adverse reaction to me immediately.

## 2020-05-15 ENCOUNTER — TELEPHONE (OUTPATIENT)
Dept: PRIMARY CARE CLINIC | Facility: CLINIC | Age: 70
End: 2020-05-15

## 2020-05-18 ENCOUNTER — HOSPITAL ENCOUNTER (OUTPATIENT)
Dept: RADIOLOGY | Facility: OTHER | Age: 70
Discharge: HOME | End: 2020-05-18
Attending: FAMILY MEDICINE
Payer: COMMERCIAL

## 2020-05-18 DIAGNOSIS — H93.11 TINNITUS OF RIGHT EAR: ICD-10-CM

## 2020-05-18 PROCEDURE — 93880 EXTRACRANIAL BILAT STUDY: CPT | Mod: TC

## 2020-05-18 PROCEDURE — 93880 EXTRACRANIAL BILAT STUDY: CPT | Mod: 26,,, | Performed by: RADIOLOGY

## 2020-05-18 PROCEDURE — 93880 US CAROTID BILATERAL: ICD-10-PCS | Mod: 26,,, | Performed by: RADIOLOGY

## 2020-06-17 ENCOUNTER — PATIENT OUTREACH (OUTPATIENT)
Dept: ADMINISTRATIVE | Facility: OTHER | Age: 70
End: 2020-06-17

## 2020-06-17 NOTE — PROGRESS NOTES
Chart reviewed.   Immunizations: Triggered Imm Registry     Orders placed: n/a  Upcoming appts to satisfy ALEXANDRO topics: n/a

## 2020-06-18 ENCOUNTER — CLINICAL SUPPORT (OUTPATIENT)
Dept: OTOLARYNGOLOGY | Facility: CLINIC | Age: 70
End: 2020-06-18
Attending: FAMILY MEDICINE
Payer: COMMERCIAL

## 2020-06-18 ENCOUNTER — LAB VISIT (OUTPATIENT)
Dept: LAB | Facility: OTHER | Age: 70
End: 2020-06-18
Attending: SPECIALIST
Payer: COMMERCIAL

## 2020-06-18 VITALS
WEIGHT: 256.31 LBS | HEIGHT: 66 IN | BODY MASS INDEX: 41.19 KG/M2 | TEMPERATURE: 97 F | HEART RATE: 94 BPM | SYSTOLIC BLOOD PRESSURE: 135 MMHG | DIASTOLIC BLOOD PRESSURE: 96 MMHG

## 2020-06-18 DIAGNOSIS — H69.93 DYSFUNCTION OF BOTH EUSTACHIAN TUBES: ICD-10-CM

## 2020-06-18 DIAGNOSIS — H93.11 TINNITUS OF RIGHT EAR: Primary | ICD-10-CM

## 2020-06-18 DIAGNOSIS — J34.2 NASAL SEPTAL DEVIATION: ICD-10-CM

## 2020-06-18 DIAGNOSIS — Z13.89 SCREENING FOR GENITOURINARY CONDITION: ICD-10-CM

## 2020-06-18 DIAGNOSIS — J30.9 ALLERGIC RHINITIS, UNSPECIFIED SEASONALITY, UNSPECIFIED TRIGGER: ICD-10-CM

## 2020-06-18 DIAGNOSIS — H93.11 TINNITUS OF RIGHT EAR: ICD-10-CM

## 2020-06-18 LAB
BUN SERPL-MCNC: 11 MG/DL (ref 8–23)
CREAT SERPL-MCNC: 1.2 MG/DL (ref 0.5–1.4)
EST. GFR  (AFRICAN AMERICAN): 53 ML/MIN/1.73 M^2
EST. GFR  (NON AFRICAN AMERICAN): 46 ML/MIN/1.73 M^2

## 2020-06-18 PROCEDURE — 3075F SYST BP GE 130 - 139MM HG: CPT | Mod: CPTII,S$GLB,, | Performed by: SPECIALIST

## 2020-06-18 PROCEDURE — 1126F PR PAIN SEVERITY QUANTIFIED, NO PAIN PRESENT: ICD-10-PCS | Mod: S$GLB,,, | Performed by: SPECIALIST

## 2020-06-18 PROCEDURE — 92557 PR COMPREHENSIVE HEARING TEST: ICD-10-PCS | Mod: S$GLB,,, | Performed by: AUDIOLOGIST

## 2020-06-18 PROCEDURE — 92557 COMPREHENSIVE HEARING TEST: CPT | Mod: S$GLB,,, | Performed by: AUDIOLOGIST

## 2020-06-18 PROCEDURE — 1101F PT FALLS ASSESS-DOCD LE1/YR: CPT | Mod: CPTII,S$GLB,, | Performed by: SPECIALIST

## 2020-06-18 PROCEDURE — 3080F DIAST BP >= 90 MM HG: CPT | Mod: CPTII,S$GLB,, | Performed by: SPECIALIST

## 2020-06-18 PROCEDURE — 92567 PR TYMPA2METRY: ICD-10-PCS | Mod: S$GLB,,, | Performed by: AUDIOLOGIST

## 2020-06-18 PROCEDURE — 3080F PR MOST RECENT DIASTOLIC BLOOD PRESSURE >= 90 MM HG: ICD-10-PCS | Mod: CPTII,S$GLB,, | Performed by: SPECIALIST

## 2020-06-18 PROCEDURE — 1101F PR PT FALLS ASSESS DOC 0-1 FALLS W/OUT INJ PAST YR: ICD-10-PCS | Mod: CPTII,S$GLB,, | Performed by: SPECIALIST

## 2020-06-18 PROCEDURE — 1159F PR MEDICATION LIST DOCUMENTED IN MEDICAL RECORD: ICD-10-PCS | Mod: S$GLB,,, | Performed by: SPECIALIST

## 2020-06-18 PROCEDURE — 99204 PR OFFICE/OUTPT VISIT, NEW, LEVL IV, 45-59 MIN: ICD-10-PCS | Mod: S$GLB,,, | Performed by: SPECIALIST

## 2020-06-18 PROCEDURE — 1126F AMNT PAIN NOTED NONE PRSNT: CPT | Mod: S$GLB,,, | Performed by: SPECIALIST

## 2020-06-18 PROCEDURE — 1159F MED LIST DOCD IN RCRD: CPT | Mod: S$GLB,,, | Performed by: SPECIALIST

## 2020-06-18 PROCEDURE — 92567 TYMPANOMETRY: CPT | Mod: S$GLB,,, | Performed by: AUDIOLOGIST

## 2020-06-18 PROCEDURE — 99204 OFFICE O/P NEW MOD 45 MIN: CPT | Mod: S$GLB,,, | Performed by: SPECIALIST

## 2020-06-18 PROCEDURE — 84520 ASSAY OF UREA NITROGEN: CPT

## 2020-06-18 PROCEDURE — 3075F PR MOST RECENT SYSTOLIC BLOOD PRESS GE 130-139MM HG: ICD-10-PCS | Mod: CPTII,S$GLB,, | Performed by: SPECIALIST

## 2020-06-18 PROCEDURE — 36415 COLL VENOUS BLD VENIPUNCTURE: CPT

## 2020-06-18 PROCEDURE — 82565 ASSAY OF CREATININE: CPT

## 2020-06-18 RX ORDER — MONTELUKAST SODIUM 10 MG/1
TABLET ORAL
Qty: 30 TABLET | Refills: 11 | Status: SHIPPED | OUTPATIENT
Start: 2020-06-18 | End: 2021-05-28

## 2020-06-18 RX ORDER — MONTELUKAST SODIUM 10 MG/1
TABLET ORAL
Qty: 30 TABLET | Refills: 11 | Status: SHIPPED | OUTPATIENT
Start: 2020-06-18 | End: 2020-06-18 | Stop reason: CLARIF

## 2020-06-18 RX ORDER — LORATADINE 10 MG/1
10 TABLET ORAL DAILY
Qty: 30 TABLET | Refills: 11
Start: 2020-06-18 | End: 2023-02-08 | Stop reason: SDUPTHER

## 2020-06-18 NOTE — LETTER
June 18, 2020      Wendy Thompson MD  [address]   [address]  [facility] [address]           [facility] [address]  [address]  [facility] [address]  Phone: [phone]  Fax: [phone]          Patient: Charlotte Crowder   MR Number: [id number]   YOB: 1950   Date of Visit: 6/18/2020       Dear Dr. Wendy Thompson:    Thank you for referring Charlotte Crowder to me for evaluation. Attached you will find relevant portions of my assessment and plan of care.    If you have questions, please do not hesitate to call me. I look forward to following Charlotte Crowder along with you.    Sincerely,    JAMIN Pulido MD    Enclosure  CC:  No Recipients    If you would like to receive this communication electronically, please contact [email or web address] or [phone] to request more information on [employer] access.    For providers and/or their staff who would like to refer a patient to [facility], please contact us through our one-stop-shop provider referral line, [facility], at [phone].    If you feel you have received this communication in error or would no longer like to receive these types of communications, please e-mail [email or web address]

## 2020-06-18 NOTE — PROGRESS NOTES
Charlotte Aue was seen today for a hearing evaluation.     Pure tone audiometry revealed a mild - moderate SNHL for the right ear; mild - sever SNHL for the left ear  SRT and PTA are in good agreement bilaterally.  Good speech discrimination for the right ear: Poor for the left ear   Tympanometry revealed Type A, bilaterally    Could not maintain a proper seal for acoustic reflexes, AU    Recommendations:  1. Otologic Evaluation due to left ear asymmetry   2. Annual Audiogram or repeat audiogram as needed  3. Hearing Protection  4. Hearing Aid Consult

## 2020-06-18 NOTE — PROGRESS NOTES
Subjective:       Patient ID: Charlotte Crowder is a 70 y.o. female.    Chief Complaint: Tinnitus (right ear )    The patient is coming in for evaluation of tinnitus in the right ear only.  It has been present for about a month.  It is a high-pitched sound that is present constantly.  It does very in intensity.  She did have 1 3 day.  Where resolved but has since returned.  At the onset she saw her primary care physician new placed on antibiotics for a sinusitis.  The tinnitus improved after taking the antibiotics.  Nasal secretions are clear.  She does not have fever.  Interestingly, she had a similar episode 25 years ago that was associated with a sinus infection and resolved with treatment of her sinus infection.  She does not feel that she has allergy/sinus problems in general.  She does not have associated hearing loss or vertigo.  Family history is negative for hearing loss, tinnitus and vertigo.    Review of Systems   Constitutional: Positive for fatigue. Negative for activity change, appetite change, chills, fever and unexpected weight change.   HENT: Positive for nasal congestion, postnasal drip, rhinorrhea, sore throat and tinnitus. Negative for ear discharge, ear pain, facial swelling, hearing loss, mouth sores, sinus pressure/congestion, sneezing, trouble swallowing and voice change.    Eyes: Negative for photophobia, pain, discharge, redness, itching and visual disturbance.   Respiratory: Positive for cough. Negative for apnea, choking, shortness of breath and wheezing.    Cardiovascular: Negative for chest pain and palpitations.   Gastrointestinal: Negative for abdominal distention, abdominal pain, nausea and vomiting.   Musculoskeletal: Negative for arthralgias, myalgias, neck pain and neck stiffness.   Integumentary:  Negative for color change, pallor and rash. Negative.   Allergic/Immunologic: Negative for environmental allergies, food allergies and immunocompromised state.   Neurological: Positive  for headaches. Negative for dizziness, facial asymmetry, speech difficulty, weakness, light-headedness and numbness.   Hematological: Negative for adenopathy. Does not bruise/bleed easily.   Psychiatric/Behavioral: Negative for confusion, decreased concentration and sleep disturbance.         Objective:      Physical Exam  Constitutional:       Appearance: She is well-developed.   HENT:      Head: Normocephalic.      Right Ear: Ear canal and external ear normal. Tympanic membrane is retracted. Tympanic membrane has decreased mobility (Minimal mobility on pneumatic otoscopy).      Left Ear: Ear canal and external ear normal. Tympanic membrane is retracted. Tympanic membrane has decreased mobility ( decreased mobility on pneumatic otoscopy).      Nose: Septal deviation ( to the left), mucosal edema (cyanotic, boggy inferior turbinates bilaterally) and rhinorrhea (clear mucus bilaterally) present.      Mouth/Throat:      Mouth: Mucous membranes are moist. No oral lesions.      Dentition: Has dentures ( complete upper and lower dentures).      Tongue: No lesions.      Palate: No lesions.      Pharynx: Uvula midline.      Tonsils: No tonsillar exudate. 1+ on the right. 1+ on the left.   Eyes:      General: Lids are normal.         Right eye: No discharge.         Left eye: No discharge.      Conjunctiva/sclera:      Right eye: Right conjunctiva is injected. No exudate.     Left eye: Left conjunctiva is injected. No exudate.     Pupils: Pupils are equal, round, and reactive to light.   Neck:      Musculoskeletal: Normal range of motion. No muscular tenderness.      Thyroid: No thyroid mass or thyromegaly.      Trachea: Trachea normal. No tracheal deviation.   Cardiovascular:      Rate and Rhythm: Normal rate and regular rhythm.      Pulses: Normal pulses.      Heart sounds: Normal heart sounds.   Pulmonary:      Effort: Pulmonary effort is normal.      Breath sounds: Normal breath sounds. No stridor. No decreased breath  sounds, wheezing, rhonchi or rales.   Abdominal:      General: Bowel sounds are normal.      Palpations: Abdomen is soft.      Tenderness: There is no abdominal tenderness.   Musculoskeletal: Normal range of motion.   Lymphadenopathy:      Head:      Right side of head: No submental, submandibular, preauricular, posterior auricular or occipital adenopathy.      Left side of head: No submental, submandibular, preauricular, posterior auricular or occipital adenopathy.      Cervical: No cervical adenopathy.   Skin:     General: Skin is warm and dry.      Findings: No petechiae or rash.      Nails: There is no clubbing.     Neurological:      Mental Status: She is alert and oriented to person, place, and time.      Cranial Nerves: No cranial nerve deficit.      Sensory: No sensory deficit.      Gait: Gait normal.   Psychiatric:         Speech: Speech normal.         Behavior: Behavior normal. Behavior is cooperative.         Thought Content: Thought content normal.         Judgment: Judgment normal.           I personally reviewed the results of the above audiogram and have discussed some with the patient at the end of the office visit.      Assessment:       1. Tinnitus of right ear    2. Allergic rhinitis, unspecified seasonality, unspecified trigger    3. Dysfunction of both eustachian tubes    4. Nasal septal deviation        Plan:       I will have the patient take either Allegra or Claritin on a daily basis.   I am giving her written prescription for Singulair to start using in 2 weeks if they antihistamine alone has not stop the tinnitus.  I will recheck her in 4 weeks.  Because of the asymmetry in her higher frequencies (non tinnitus ear is 15-25 decibels worse than the ear with the tinnitus) I will schedule her for an MRI of the internal auditory canals and temporal bones.  I will have her obtain current renal function tests to determine if it will be safe for her to did have the MRI with or without IV  contrast.  I will recheck her in 4 weeks.

## 2020-07-20 ENCOUNTER — PATIENT OUTREACH (OUTPATIENT)
Dept: ADMINISTRATIVE | Facility: OTHER | Age: 70
End: 2020-07-20

## 2020-07-21 ENCOUNTER — OFFICE VISIT (OUTPATIENT)
Dept: OTOLARYNGOLOGY | Facility: CLINIC | Age: 70
End: 2020-07-21
Payer: COMMERCIAL

## 2020-07-21 VITALS
BODY MASS INDEX: 41.08 KG/M2 | WEIGHT: 255.63 LBS | HEIGHT: 66 IN | TEMPERATURE: 98 F | HEART RATE: 82 BPM | DIASTOLIC BLOOD PRESSURE: 95 MMHG | SYSTOLIC BLOOD PRESSURE: 140 MMHG

## 2020-07-21 DIAGNOSIS — J30.9 ALLERGIC RHINITIS, UNSPECIFIED SEASONALITY, UNSPECIFIED TRIGGER: Primary | ICD-10-CM

## 2020-07-21 DIAGNOSIS — J34.2 NASAL SEPTAL DEVIATION: ICD-10-CM

## 2020-07-21 DIAGNOSIS — H69.93 DYSFUNCTION OF BOTH EUSTACHIAN TUBES: ICD-10-CM

## 2020-07-21 DIAGNOSIS — H93.11 TINNITUS OF RIGHT EAR: ICD-10-CM

## 2020-07-21 DIAGNOSIS — H90.A22 SENSORINEURAL HEARING LOSS (SNHL) OF LEFT EAR WITH RESTRICTED HEARING OF RIGHT EAR: ICD-10-CM

## 2020-07-21 PROCEDURE — 1159F PR MEDICATION LIST DOCUMENTED IN MEDICAL RECORD: ICD-10-PCS | Mod: S$GLB,,, | Performed by: SPECIALIST

## 2020-07-21 PROCEDURE — 99213 PR OFFICE/OUTPT VISIT, EST, LEVL III, 20-29 MIN: ICD-10-PCS | Mod: S$GLB,,, | Performed by: SPECIALIST

## 2020-07-21 PROCEDURE — 3008F PR BODY MASS INDEX (BMI) DOCUMENTED: ICD-10-PCS | Mod: CPTII,S$GLB,, | Performed by: SPECIALIST

## 2020-07-21 PROCEDURE — 3077F SYST BP >= 140 MM HG: CPT | Mod: CPTII,S$GLB,, | Performed by: SPECIALIST

## 2020-07-21 PROCEDURE — 1101F PR PT FALLS ASSESS DOC 0-1 FALLS W/OUT INJ PAST YR: ICD-10-PCS | Mod: CPTII,S$GLB,, | Performed by: SPECIALIST

## 2020-07-21 PROCEDURE — 3080F PR MOST RECENT DIASTOLIC BLOOD PRESSURE >= 90 MM HG: ICD-10-PCS | Mod: CPTII,S$GLB,, | Performed by: SPECIALIST

## 2020-07-21 PROCEDURE — 3008F BODY MASS INDEX DOCD: CPT | Mod: CPTII,S$GLB,, | Performed by: SPECIALIST

## 2020-07-21 PROCEDURE — 1126F AMNT PAIN NOTED NONE PRSNT: CPT | Mod: S$GLB,,, | Performed by: SPECIALIST

## 2020-07-21 PROCEDURE — 1159F MED LIST DOCD IN RCRD: CPT | Mod: S$GLB,,, | Performed by: SPECIALIST

## 2020-07-21 PROCEDURE — 3080F DIAST BP >= 90 MM HG: CPT | Mod: CPTII,S$GLB,, | Performed by: SPECIALIST

## 2020-07-21 PROCEDURE — 1101F PT FALLS ASSESS-DOCD LE1/YR: CPT | Mod: CPTII,S$GLB,, | Performed by: SPECIALIST

## 2020-07-21 PROCEDURE — 99213 OFFICE O/P EST LOW 20 MIN: CPT | Mod: S$GLB,,, | Performed by: SPECIALIST

## 2020-07-21 PROCEDURE — 3077F PR MOST RECENT SYSTOLIC BLOOD PRESSURE >= 140 MM HG: ICD-10-PCS | Mod: CPTII,S$GLB,, | Performed by: SPECIALIST

## 2020-07-21 PROCEDURE — 1126F PR PAIN SEVERITY QUANTIFIED, NO PAIN PRESENT: ICD-10-PCS | Mod: S$GLB,,, | Performed by: SPECIALIST

## 2020-07-21 NOTE — PROGRESS NOTES
Subjective:       Patient ID: Charlotte Crowder is a 70 y.o. female.    Chief Complaint: Follow-up    Follow-up     The patient is returning for follow-up visit.  She has been taking Claritin daily.  Her tinnitus has subsided.  She does have occasional popping of her ears.  She is breathing better through her nose and sleeping better at night.  She is not having vertigo.  She did not have her MRI performed yet.       Review of Systems   Constitutional: Negative for activity change, appetite change and unexpected weight change.   HENT: Positive for postnasal drip, rhinorrhea and tinnitus. Negative for ear discharge, ear pain, facial swelling, hearing loss, mouth sores, sinus pressure/congestion, sneezing, trouble swallowing and voice change.    Eyes: Negative for photophobia, pain, discharge, redness, itching and visual disturbance.   Respiratory: Negative for apnea, choking, shortness of breath and wheezing.    Cardiovascular: Negative for palpitations.   Gastrointestinal: Negative for abdominal distention.   Musculoskeletal: Negative for neck stiffness.   Integumentary:  Negative for color change and pallor. Negative.   Allergic/Immunologic: Negative for environmental allergies, food allergies and immunocompromised state.   Neurological: Negative for dizziness, facial asymmetry, speech difficulty and light-headedness.   Hematological: Negative for adenopathy. Does not bruise/bleed easily.   Psychiatric/Behavioral: Negative for confusion, decreased concentration and sleep disturbance.         Objective:      Physical Exam  Constitutional:       Appearance: She is well-developed.   HENT:      Head: Normocephalic.      Right Ear: Ear canal and external ear normal. Tympanic membrane is retracted. Tympanic membrane has decreased mobility (Decreased mobility on pneumatic otoscopy ).      Left Ear: Ear canal and external ear normal. Tympanic membrane is retracted. Tympanic membrane has decreased mobility (Decreased  mobility on pneumatic otoscopy ).      Nose: Septal deviation ( to the left), mucosal edema (cyanotic, boggy inferior turbinates bilaterally) and rhinorrhea (clear mucus bilaterally) present.      Mouth/Throat:      Mouth: Mucous membranes are moist. No oral lesions.      Dentition: Has dentures ( complete upper and lower dentures).      Tongue: No lesions.      Palate: No lesions.      Pharynx: Uvula midline.      Tonsils: No tonsillar exudate. 1+ on the right. 1+ on the left.   Eyes:      General: Lids are normal.         Right eye: No discharge.         Left eye: No discharge.      Conjunctiva/sclera:      Right eye: Right conjunctiva is injected. No exudate.     Left eye: Left conjunctiva is injected. No exudate.     Pupils: Pupils are equal, round, and reactive to light.   Neck:      Musculoskeletal: Normal range of motion. No muscular tenderness.      Thyroid: No thyroid mass or thyromegaly.      Trachea: Trachea normal. No tracheal deviation.   Cardiovascular:      Rate and Rhythm: Normal rate and regular rhythm.      Pulses: Normal pulses.      Heart sounds: Normal heart sounds.   Pulmonary:      Effort: Pulmonary effort is normal.      Breath sounds: Normal breath sounds. No stridor. No decreased breath sounds, wheezing, rhonchi or rales.   Abdominal:      General: Bowel sounds are normal.      Palpations: Abdomen is soft.      Tenderness: There is no abdominal tenderness.   Musculoskeletal: Normal range of motion.   Lymphadenopathy:      Head:      Right side of head: No submental, submandibular, preauricular, posterior auricular or occipital adenopathy.      Left side of head: No submental, submandibular, preauricular, posterior auricular or occipital adenopathy.      Cervical: No cervical adenopathy.   Skin:     General: Skin is warm and dry.      Findings: No petechiae or rash.      Nails: There is no clubbing.     Neurological:      Mental Status: She is alert and oriented to person, place, and time.       Cranial Nerves: No cranial nerve deficit.      Sensory: No sensory deficit.      Gait: Gait normal.   Psychiatric:         Speech: Speech normal.         Behavior: Behavior normal. Behavior is cooperative.         Thought Content: Thought content normal.         Judgment: Judgment normal.          Assessment:       1. Allergic rhinitis, unspecified seasonality, unspecified trigger    2. Dysfunction of both eustachian tubes    3. Tinnitus of right ear    4. Nasal septal deviation    5. Sensorineural hearing loss (SNHL) of left ear with restricted hearing of right ear        Plan:       I will have the patient continue with the daily use of Claritin.  I will telephone her the results of the MRI and less there are significant abnormalities.  She needs to be recheck in 1 year with a repeat audiologic evaluation.

## 2020-07-23 ENCOUNTER — TELEPHONE (OUTPATIENT)
Dept: PRIMARY CARE CLINIC | Facility: CLINIC | Age: 70
End: 2020-07-23

## 2020-07-23 NOTE — TELEPHONE ENCOUNTER
----- Message from Mckayla Callahan sent at 7/23/2020 11:25 AM CDT -----  Contact: MADELEINE RAMSAY [[id number]]  Name of Who is Calling: MADELEINE RAMSAY [[id number]]      What is the request in detail: patient would like medication for bladder infection. Please call       Can the clinic reply by [facility]: no      [facility] Back [facility]: [phone]

## 2020-07-23 NOTE — TELEPHONE ENCOUNTER
c/o urgency, frequency;lwr back pain. Offered an appointment today ,  declined wanted something Wednesday . I asked if she was going to wait that long? That I can get her in tomorrow. Scheduled tomorrow 4:30pm primary [facility] clinic. Conversation was understood and it ended.

## 2020-07-24 ENCOUNTER — OFFICE VISIT (OUTPATIENT)
Dept: INTERNAL MEDICINE | Facility: CLINIC | Age: 70
End: 2020-07-24
Payer: COMMERCIAL

## 2020-07-24 VITALS
DIASTOLIC BLOOD PRESSURE: 80 MMHG | HEIGHT: 66 IN | SYSTOLIC BLOOD PRESSURE: 110 MMHG | TEMPERATURE: 98 F | OXYGEN SATURATION: 99 % | BODY MASS INDEX: 40.99 KG/M2 | HEART RATE: 87 BPM | WEIGHT: 255.06 LBS

## 2020-07-24 DIAGNOSIS — R30.0 DYSURIA: Primary | ICD-10-CM

## 2020-07-24 LAB
BILIRUB SERPL-MCNC: NORMAL MG/DL
BLOOD URINE, POC: NORMAL
CLARITY, POC UA: NORMAL
COLOR, POC UA: YELLOW
GLUCOSE UR QL STRIP: NORMAL
KETONES UR QL STRIP: NORMAL
LEUKOCYTE ESTERASE URINE, POC: NORMAL
NITRITE, POC UA: NORMAL
PH, POC UA: 5
PROTEIN, POC: NORMAL
SPECIFIC GRAVITY, POC UA: 1
UROBILINOGEN, POC UA: NORMAL

## 2020-07-24 PROCEDURE — 81002 POCT URINE DIPSTICK WITHOUT MICROSCOPE: ICD-10-PCS | Mod: S$GLB,,, | Performed by: FAMILY MEDICINE

## 2020-07-24 PROCEDURE — 99999 PR PBB SHADOW E&M-EST. PATIENT-LVL IV: ICD-10-PCS | Mod: PBBFAC,,, | Performed by: FAMILY MEDICINE

## 2020-07-24 PROCEDURE — 99999 PR PBB SHADOW E&M-EST. PATIENT-LVL IV: CPT | Mod: PBBFAC,,, | Performed by: FAMILY MEDICINE

## 2020-07-24 PROCEDURE — 99213 OFFICE O/P EST LOW 20 MIN: CPT | Mod: 25,S$GLB,, | Performed by: FAMILY MEDICINE

## 2020-07-24 PROCEDURE — 1125F AMNT PAIN NOTED PAIN PRSNT: CPT | Mod: S$GLB,,, | Performed by: FAMILY MEDICINE

## 2020-07-24 PROCEDURE — 1101F PR PT FALLS ASSESS DOC 0-1 FALLS W/OUT INJ PAST YR: ICD-10-PCS | Mod: CPTII,S$GLB,, | Performed by: FAMILY MEDICINE

## 2020-07-24 PROCEDURE — 99213 PR OFFICE/OUTPT VISIT, EST, LEVL III, 20-29 MIN: ICD-10-PCS | Mod: 25,S$GLB,, | Performed by: FAMILY MEDICINE

## 2020-07-24 PROCEDURE — 1159F PR MEDICATION LIST DOCUMENTED IN MEDICAL RECORD: ICD-10-PCS | Mod: S$GLB,,, | Performed by: FAMILY MEDICINE

## 2020-07-24 PROCEDURE — 3074F SYST BP LT 130 MM HG: CPT | Mod: CPTII,S$GLB,, | Performed by: FAMILY MEDICINE

## 2020-07-24 PROCEDURE — 1125F PR PAIN SEVERITY QUANTIFIED, PAIN PRESENT: ICD-10-PCS | Mod: S$GLB,,, | Performed by: FAMILY MEDICINE

## 2020-07-24 PROCEDURE — 3008F PR BODY MASS INDEX (BMI) DOCUMENTED: ICD-10-PCS | Mod: CPTII,S$GLB,, | Performed by: FAMILY MEDICINE

## 2020-07-24 PROCEDURE — 3079F DIAST BP 80-89 MM HG: CPT | Mod: CPTII,S$GLB,, | Performed by: FAMILY MEDICINE

## 2020-07-24 PROCEDURE — 81002 URINALYSIS NONAUTO W/O SCOPE: CPT | Mod: S$GLB,,, | Performed by: FAMILY MEDICINE

## 2020-07-24 PROCEDURE — 3074F PR MOST RECENT SYSTOLIC BLOOD PRESSURE < 130 MM HG: ICD-10-PCS | Mod: CPTII,S$GLB,, | Performed by: FAMILY MEDICINE

## 2020-07-24 PROCEDURE — 3008F BODY MASS INDEX DOCD: CPT | Mod: CPTII,S$GLB,, | Performed by: FAMILY MEDICINE

## 2020-07-24 PROCEDURE — 1101F PT FALLS ASSESS-DOCD LE1/YR: CPT | Mod: CPTII,S$GLB,, | Performed by: FAMILY MEDICINE

## 2020-07-24 PROCEDURE — 87086 URINE CULTURE/COLONY COUNT: CPT

## 2020-07-24 PROCEDURE — 3079F PR MOST RECENT DIASTOLIC BLOOD PRESSURE 80-89 MM HG: ICD-10-PCS | Mod: CPTII,S$GLB,, | Performed by: FAMILY MEDICINE

## 2020-07-24 PROCEDURE — 1159F MED LIST DOCD IN RCRD: CPT | Mod: S$GLB,,, | Performed by: FAMILY MEDICINE

## 2020-07-24 RX ORDER — AMOXICILLIN AND CLAVULANATE POTASSIUM 500; 125 MG/1; MG/1
1 TABLET, FILM COATED ORAL 2 TIMES DAILY
Qty: 10 TABLET | Refills: 0 | Status: SHIPPED | OUTPATIENT
Start: 2020-07-24 | End: 2020-07-29

## 2020-07-24 NOTE — PROGRESS NOTES
Subjective:      Patient ID: Charlotte Crowder is a 70 y.o. female.    Chief Complaint: Urinary Tract Infection (x3 days )      HPI:  Charlotte Crowder is a 70 year old female with allergic rhinitis, congenital neutropenia, dilated cardiomyopathy, dyslipidemia, hypertension, lumbar radiculopathy, osteoarthritis, vitamin B12 deficiency, and vitamin D deficiency who presents to clinic today with a chief complaint of possible UTI.    Patient new to me; PCP is Dr. Wendy Thompson.    Endorses suprapubic pressure, low back pain, and urinary frequency for 3 days.  Denies associated fevers, chills, hematuria, urinary odor, urinary turbidity, difficulty urinating.  Denies Rx allergies.    POCT Urine today shows yellow urine, slightly hazy, SG 1.005, pH 5, 1+ leukocytes, negative nitrite, trace protein, normal glucose, negative ketones, normal urobilinogen, 1+ bilirubin, negative blood.      Past Medical History:   Diagnosis Date    Abnormal Pap smear of cervix     Allergy     Hx of colonic polyps 08/17/2016    Hyperlipidemia     Hypertension     Morbid obesity     Nuclear sclerosis of both eyes 10/18/2018    OA (osteoarthritis)        Past Surgical History:   Procedure Laterality Date    none      SKIN BIOPSY         Family History   Problem Relation Age of Onset    Glaucoma Father     No Known Problems Unknown     Heart attack Neg Hx     Heart disease Neg Hx     Hypertension Neg Hx     Breast cancer Neg Hx     Colon cancer Neg Hx     Ovarian cancer Neg Hx        Social History     Socioeconomic History    Marital status:      Spouse name: Not on file    Number of children: Not on file    Years of education: Not on file    Highest education level: Not on file   Occupational History    Occupation: care giver     Employer: [employer]   Social Needs    Financial resource strain: Not on file    Food insecurity     Worry: Not on file     Inability: Not on file    Transportation needs     Medical:  Not on file     Non-medical: Not on file   Tobacco Use    Smoking status: Never Smoker    Smokeless tobacco: Never Used   Substance and Sexual Activity    Alcohol use: Yes     Alcohol/week: 1.0 standard drinks     Types: 1 Glasses of wine per week     Comment: rarely     Drug use: No    Sexual activity: Yes     Partners: Male   Lifestyle    Physical activity     Days per week: Not on file     Minutes per session: Not on file    Stress: Not on file   Relationships    Social connections     Talks on phone: Not on file     Gets together: Not on file     Attends [employer] service: Not on file     Active member of club or organization: Not on file     Attends meetings of clubs or organizations: Not on file     Relationship status: Not on file   Other Topics Concern    Not on file   Social History Narrative    Not on file       Review of Systems   Constitutional: Negative for chills, fatigue and fever.   HENT: Negative for congestion, hearing loss, nosebleeds, rhinorrhea, sore throat and trouble swallowing.    Eyes: Negative for pain and visual disturbance.   Respiratory: Negative for cough, shortness of breath and wheezing.    Cardiovascular: Negative for chest pain and palpitations.   Gastrointestinal: Negative for abdominal distention, abdominal pain, constipation, diarrhea, nausea and vomiting.   Genitourinary: Positive for dysuria and frequency. Negative for decreased urine volume, difficulty urinating and hematuria.   Musculoskeletal: Positive for back pain. Negative for arthralgias and myalgias.   Skin: Negative for color change and rash.   Neurological: Negative for dizziness, tremors, weakness, light-headedness, numbness and headaches.   Psychiatric/Behavioral: Negative for agitation, behavioral problems and confusion. The patient is not nervous/anxious.      Objective:     Vitals:    07/24/20 1531   BP: 110/80   BP Location: Left arm   Patient Position: Sitting   BP Method: Large (Manual)   Pulse: 87  "  Temp: 98.4 °F (36.9 °C)   TempSrc: Oral   SpO2: 99%   Weight: 115.7 kg (255 lb 1.2 oz)   Height: 5' 6" (1.676 m)       Physical Exam  Vitals signs and nursing note reviewed.   Constitutional:       Appearance: She is well-developed.   HENT:      Head: Normocephalic and atraumatic.      Right Ear: External ear normal.      Left Ear: External ear normal.      Nose: Nose normal.   Eyes:      Conjunctiva/sclera: Conjunctivae normal.   Neck:      Musculoskeletal: Neck supple.      Trachea: No tracheal deviation.   Cardiovascular:      Rate and Rhythm: Normal rate and regular rhythm.      Heart sounds: Normal heart sounds. No murmur. No friction rub. No gallop.    Pulmonary:      Effort: Pulmonary effort is normal. No respiratory distress.      Breath sounds: Normal breath sounds. No wheezing or rales.   Abdominal:      General: Bowel sounds are normal. There is no distension.      Palpations: Abdomen is soft.      Tenderness: There is abdominal tenderness in the suprapubic area. There is no guarding or rebound.   Musculoskeletal:         General: No deformity.   Skin:     General: Skin is warm and dry.   Neurological:      Mental Status: She is alert.   Psychiatric:         Behavior: Behavior normal.        Assessment:      1. Dysuria      Plan:   Charlotte was seen today for urinary tract infection.    Diagnoses and all orders for this visit:    Dysuria  -     POCT URINE DIPSTICK WITHOUT MICROSCOPE  -     Urine culture  -     Start amoxicillin-clavulanate 500-125mg (AUGMENTIN) 500-125 mg Tab; Take 1 tablet (500 mg total) by mouth 2 (two) times daily. for 5 days to treat UTI empirically; tailor as indicated per urine culture results.  Recommended increased hydration, RTC if no improvement or for any worsening.         "

## 2020-07-26 LAB
BACTERIA UR CULT: NORMAL
BACTERIA UR CULT: NORMAL

## 2020-07-29 ENCOUNTER — TELEPHONE (OUTPATIENT)
Dept: INTERNAL MEDICINE | Facility: CLINIC | Age: 70
End: 2020-07-29

## 2020-07-29 NOTE — TELEPHONE ENCOUNTER
----- Message from Bimal Escamilla MD sent at 7/28/2020  2:56 PM CDT -----  Please notify patient of the following:    Culture grew out multiple organisms none in predominance likely reflective of contamination from the skin during sample collection process.  Recommend patient complete full course of the Augmentin she was prescribed and if her symptoms do not improve then follow up with PCP.

## 2020-07-30 ENCOUNTER — HOSPITAL ENCOUNTER (OUTPATIENT)
Dept: RADIOLOGY | Facility: OTHER | Age: 70
Discharge: HOME | End: 2020-07-30
Attending: SPECIALIST
Payer: COMMERCIAL

## 2020-07-30 DIAGNOSIS — Z13.9 SCREENING FOR CONDITION: Primary | ICD-10-CM

## 2020-07-30 DIAGNOSIS — H90.A22 SENSORINEURAL HEARING LOSS (SNHL) OF LEFT EAR WITH RESTRICTED HEARING OF RIGHT EAR: ICD-10-CM

## 2020-08-05 ENCOUNTER — TELEPHONE (OUTPATIENT)
Dept: PRIMARY CARE CLINIC | Facility: CLINIC | Age: 70
End: 2020-08-05

## 2020-08-05 DIAGNOSIS — M79.605 PAIN OF BACK AND LEFT LOWER EXTREMITY: ICD-10-CM

## 2020-08-05 DIAGNOSIS — M54.9 PAIN OF BACK AND LEFT LOWER EXTREMITY: ICD-10-CM

## 2020-08-05 DIAGNOSIS — M62.838 MUSCLE SPASM: Primary | ICD-10-CM

## 2020-08-05 RX ORDER — CYCLOBENZAPRINE HCL 5 MG
5 TABLET ORAL 2 TIMES DAILY
Qty: 60 TABLET | Refills: 0 | Status: SHIPPED | OUTPATIENT
Start: 2020-08-05 | End: 2020-08-31

## 2020-08-05 NOTE — TELEPHONE ENCOUNTER
----- Message from Mckayla Callahan sent at 8/5/2020 11:43 AM CDT -----  Contact: MADELEINE RAMSAY [[id number]]      Can the clinic reply in [facility]: no      Please refill the medication(s) listed below. Please call the patient when the prescription(s) is ready for  at this phone number  [phone] (home)           Medication #1 cyclobenzaprine (FLEXERIL) 5 MG tablet      Preferred Pharmacy:     [id number] [place] [place] [facility] [address]  [address]  [facility] [place] [address]  Phone: [phone] Fax: [phone]

## 2020-08-05 NOTE — TELEPHONE ENCOUNTER
----- Message from Claude Singh sent at 8/5/2020  2:43 PM CDT -----  Regarding: Refill  Contact: MADELEINE RAMSAY [[id number]]      Can the clinic reply in [facility]: no      Please refill the medication(s) listed below. Please call the patient when the prescription(s) is ready for  at this phone number   [phone]      Medication #1 ibuprofen (ADVIL,MOTRIN) 800 MG tablet     Preferred Pharmacy: [facility]/PHARMACY #[id number] - [place], [place] - [address]

## 2020-08-06 ENCOUNTER — HOSPITAL ENCOUNTER (OUTPATIENT)
Dept: RADIOLOGY | Facility: OTHER | Age: 70
Discharge: HOME | End: 2020-08-06
Attending: SPECIALIST
Payer: COMMERCIAL

## 2020-08-06 PROCEDURE — 70553 MRI IAC/TEMPORAL BONES W W/O CONTRAST: ICD-10-PCS | Mod: 26,,, | Performed by: RADIOLOGY

## 2020-08-06 PROCEDURE — 70553 MRI BRAIN STEM W/O & W/DYE: CPT | Mod: 26,,, | Performed by: RADIOLOGY

## 2020-08-06 PROCEDURE — 25500020 PHARM REV CODE 255: Performed by: SPECIALIST

## 2020-08-06 PROCEDURE — 70553 MRI BRAIN STEM W/O & W/DYE: CPT | Mod: TC

## 2020-08-06 PROCEDURE — A9585 GADOBUTROL INJECTION: HCPCS | Performed by: SPECIALIST

## 2020-08-06 RX ORDER — GADOBUTROL 604.72 MG/ML
10 INJECTION INTRAVENOUS
Status: COMPLETED | OUTPATIENT
Start: 2020-08-06 | End: 2020-08-06

## 2020-08-06 RX ADMIN — GADOBUTROL 10 ML: 604.72 INJECTION INTRAVENOUS at 03:08

## 2020-08-07 ENCOUNTER — TELEPHONE (OUTPATIENT)
Dept: OTOLARYNGOLOGY | Facility: CLINIC | Age: 70
End: 2020-08-07

## 2020-08-07 NOTE — TELEPHONE ENCOUNTER
Called pt per Dr. Pulido. Pt understood the nature of the phone call and plans to follow up as scheduled.     ----- Message from JAMIN Pulido MD sent at 8/6/2020  5:45 PM CDT -----  Please inform patient that her MRI results are normal. Please have them follow up as scheduled in 1 year, or sooner on an as-needed basis.

## 2020-09-15 DIAGNOSIS — I10 ESSENTIAL HYPERTENSION: ICD-10-CM

## 2020-09-16 RX ORDER — AMLODIPINE BESYLATE 5 MG/1
TABLET ORAL
Qty: 90 TABLET | Refills: 3 | Status: SHIPPED | OUTPATIENT
Start: 2020-09-16 | End: 2021-09-20

## 2020-09-17 ENCOUNTER — TELEPHONE (OUTPATIENT)
Dept: PRIMARY CARE CLINIC | Facility: CLINIC | Age: 70
End: 2020-09-17

## 2020-09-17 NOTE — TELEPHONE ENCOUNTER
----- Message from Mis Leon sent at 9/17/2020  2:43 PM CDT -----  Contact: MADELEINE RAMSAY [[id number]]  Type: Patient Call Back    Who called:MADELEINE RAMSAY [[id number]]    What is the request in detail: Patient is requesting a call back in regards to annual vaccines. She states that she is not sure when she had her last vaccinations.   Please advise.    Can the clinic reply by [facility]? No    Best call back number: [phone]    Additional Information: N/A

## 2020-09-18 NOTE — TELEPHONE ENCOUNTER
Informed pt that she is due for a shingles shot and a flu shot. Pt states that she will go to the pharmacy to get both shots today. Pt has no further questions or concerns.

## 2020-10-02 ENCOUNTER — TELEPHONE (OUTPATIENT)
Dept: CARDIOLOGY | Facility: CLINIC | Age: 70
End: 2020-10-02

## 2020-10-02 ENCOUNTER — TELEPHONE (OUTPATIENT)
Dept: PHARMACY | Facility: CLINIC | Age: 70
End: 2020-10-02

## 2020-10-02 NOTE — TELEPHONE ENCOUNTER
Patient is going to contact her Insurance to see if she has a deductible that she has to meet.  The medication should be covered under her insurance.  There are no programs with Pharmacy Patient Assistance to assist with those medications.

## 2020-10-02 NOTE — TELEPHONE ENCOUNTER
----- Message from Sharla Madrigal sent at 10/2/2020  3:40 PM CDT -----  Regarding: medication  Pt states that her olmesartan (BENICAR) 40 MG tablet and rosuvastatin (CRESTOR) 20 MG tablet are too expensive and would like to be put on something more affordable.  She can be reached at [phone] or [phone].    Thank you

## 2020-10-05 ENCOUNTER — TELEPHONE (OUTPATIENT)
Dept: CARDIOLOGY | Facility: CLINIC | Age: 70
End: 2020-10-05

## 2020-10-05 NOTE — TELEPHONE ENCOUNTER
----- Message from Ivonne Diogo sent at 10/5/2020 12:03 PM CDT -----  Regarding: Medications  Danay [phone] with [facility] says the pt Benicar 40 mg and Crestor 20 mg are currently teir 2 scripts and they are too expensive. She says if the doctor changes them to teir 1 they will be much cheaper for the pt.  LOV 4/9/20 Dr. Burciaga  [facility]/pharmacy #[id number] - [place], [place] - [address] [phone] (Phone)  [phone] (Fax)    Thanks

## 2020-10-06 ENCOUNTER — OFFICE VISIT (OUTPATIENT)
Dept: PRIMARY CARE CLINIC | Facility: CLINIC | Age: 70
End: 2020-10-06
Attending: FAMILY MEDICINE
Payer: COMMERCIAL

## 2020-10-06 VITALS
SYSTOLIC BLOOD PRESSURE: 132 MMHG | BODY MASS INDEX: 41.59 KG/M2 | WEIGHT: 258.81 LBS | HEIGHT: 66 IN | HEART RATE: 94 BPM | DIASTOLIC BLOOD PRESSURE: 84 MMHG | OXYGEN SATURATION: 99 %

## 2020-10-06 DIAGNOSIS — Z78.0 POST-MENOPAUSAL: Primary | ICD-10-CM

## 2020-10-06 DIAGNOSIS — E78.5 DYSLIPIDEMIA: Chronic | ICD-10-CM

## 2020-10-06 DIAGNOSIS — I10 ESSENTIAL HYPERTENSION: ICD-10-CM

## 2020-10-06 DIAGNOSIS — E66.9 OBESITY (BMI 35.0-39.9 WITHOUT COMORBIDITY): ICD-10-CM

## 2020-10-06 DIAGNOSIS — Z00.00 ANNUAL PHYSICAL EXAM: ICD-10-CM

## 2020-10-06 PROCEDURE — 99999 PR PBB SHADOW E&M-EST. PATIENT-LVL IV: CPT | Mod: PBBFAC,,, | Performed by: FAMILY MEDICINE

## 2020-10-06 PROCEDURE — 99397 PER PM REEVAL EST PAT 65+ YR: CPT | Mod: S$GLB,,, | Performed by: FAMILY MEDICINE

## 2020-10-06 PROCEDURE — 3075F SYST BP GE 130 - 139MM HG: CPT | Mod: CPTII,S$GLB,, | Performed by: FAMILY MEDICINE

## 2020-10-06 PROCEDURE — 3079F PR MOST RECENT DIASTOLIC BLOOD PRESSURE 80-89 MM HG: ICD-10-PCS | Mod: CPTII,S$GLB,, | Performed by: FAMILY MEDICINE

## 2020-10-06 PROCEDURE — 3075F PR MOST RECENT SYSTOLIC BLOOD PRESS GE 130-139MM HG: ICD-10-PCS | Mod: CPTII,S$GLB,, | Performed by: FAMILY MEDICINE

## 2020-10-06 PROCEDURE — 99999 PR PBB SHADOW E&M-EST. PATIENT-LVL IV: ICD-10-PCS | Mod: PBBFAC,,, | Performed by: FAMILY MEDICINE

## 2020-10-06 PROCEDURE — 3079F DIAST BP 80-89 MM HG: CPT | Mod: CPTII,S$GLB,, | Performed by: FAMILY MEDICINE

## 2020-10-06 PROCEDURE — 99397 PR PREVENTIVE VISIT,EST,65 & OVER: ICD-10-PCS | Mod: S$GLB,,, | Performed by: FAMILY MEDICINE

## 2020-10-06 RX ORDER — CYCLOBENZAPRINE HCL 10 MG
10 TABLET ORAL 3 TIMES DAILY PRN
Qty: 90 TABLET | Refills: 0 | Status: SHIPPED | OUTPATIENT
Start: 2020-10-06 | End: 2020-12-28

## 2020-10-06 NOTE — TELEPHONE ENCOUNTER
Pt made aware as per Dr. Burciaga: Change from Benicar 40 mg a day to irbesartan 300 mg each night.  Change rosuvastatin back to atorvastatin but 40 mg a day.

## 2020-10-06 NOTE — TELEPHONE ENCOUNTER
Tier exceptions denied for Olmesartan 40mg and Crestor 20mg. Per insurance tier cost reduction not available per pt benefit plan. Routed to Dr. Burciaga, pt would like cheaper alternative. States cannot afford  olmesartan $90/90 day, rosuvastatin $75/90day

## 2020-10-06 NOTE — PROGRESS NOTES
Subjective:       Patient ID: Charlotte Crowder is a 70 y.o. female.    Chief Complaint: Annual Exam    Pt presents today for HTN f/u and annual exam.  BP well controlled today. No complaints needs med refilled.  Doing well otherwise    Needs labs    States that she is having some congestion and allergies. Not new. Denies any fevers, chills, n,v d,sob        mammo UTD. Needs DEXA  cscope uTD    Hypertension  Pertinent negatives include no chest pain, headaches, neck pain, palpitations or shortness of breath.   Cough  Pertinent negatives include no chest pain, chills, ear pain, fever, headaches, myalgias, sore throat, shortness of breath or wheezing.   Hyperlipidemia  Pertinent negatives include no chest pain, myalgias or shortness of breath.     Review of Systems   Constitutional: Negative.  Negative for activity change, appetite change, chills, fatigue and fever.   HENT: Negative for congestion, ear pain, sinus pressure, sore throat and trouble swallowing.    Eyes: Negative.  Negative for photophobia, pain and visual disturbance.   Respiratory: Negative for apnea, cough, chest tightness, shortness of breath and wheezing.    Cardiovascular: Negative for chest pain, palpitations and leg swelling.   Gastrointestinal: Negative.  Negative for abdominal distention, abdominal pain, constipation, diarrhea, nausea and vomiting.   Genitourinary: Negative.    Musculoskeletal: Positive for arthralgias, back pain and joint swelling. Negative for myalgias and neck pain.   Skin: Negative.    Neurological: Negative for dizziness, tremors, weakness, light-headedness, numbness and headaches.   Psychiatric/Behavioral: Negative for behavioral problems, decreased concentration and sleep disturbance. The patient is not nervous/anxious.    All other systems reviewed and are negative.      Objective:      Physical Exam  Vitals signs reviewed.   Constitutional:       General: She is not in acute distress.     Appearance: Normal appearance.  She is well-developed. She is obese. She is not ill-appearing, toxic-appearing or diaphoretic.   HENT:      Head: Normocephalic and atraumatic.      Right Ear: External ear normal.      Left Ear: External ear normal.      Nose: Nose normal.      Mouth/Throat:      Pharynx: No oropharyngeal exudate.   Eyes:      Conjunctiva/sclera: Conjunctivae normal.      Pupils: Pupils are equal, round, and reactive to light.   Neck:      Musculoskeletal: Normal range of motion and neck supple.      Thyroid: No thyromegaly.   Cardiovascular:      Rate and Rhythm: Normal rate and regular rhythm.      Heart sounds: Normal heart sounds. No murmur.   Pulmonary:      Effort: Pulmonary effort is normal. No respiratory distress.      Breath sounds: Normal breath sounds. No wheezing or rales.   Chest:      Chest wall: No tenderness.   Abdominal:      General: Bowel sounds are normal. There is no distension.      Palpations: Abdomen is soft. There is no mass.      Tenderness: There is no abdominal tenderness. There is no guarding or rebound.   Musculoskeletal: Normal range of motion.         General: No tenderness.   Lymphadenopathy:      Cervical: No cervical adenopathy.   Skin:     General: Skin is warm and dry.      Findings: No erythema.   Neurological:      Mental Status: She is alert and oriented to person, place, and time.      Cranial Nerves: No cranial nerve deficit.      Motor: No abnormal muscle tone.      Coordination: Coordination normal.      Deep Tendon Reflexes: Reflexes are normal and symmetric. Reflexes normal.   Psychiatric:         Behavior: Behavior normal.         Thought Content: Thought content normal.         Judgment: Judgment normal.         Assessment:     HTN controlled  Hyperlipidemia: stable      Plan:       Post-menopausal  -     DXA Bone Density Spine And Hip; Future; Expected date: 10/06/2020    Other orders  -     cyclobenzaprine (FLEXERIL) 10 MG tablet; Take 1 tablet (10 mg total) by mouth 3 (three)  times daily as needed for Muscle spasms.  Dispense: 90 tablet; Refill: 0        PE wnl   Hyperlipidemia  HTN controlled q 6 mos prn HTN

## 2020-10-07 RX ORDER — IRBESARTAN 300 MG/1
300 TABLET ORAL NIGHTLY
Qty: 90 TABLET | Refills: 3 | Status: SHIPPED | OUTPATIENT
Start: 2020-10-07 | End: 2021-06-23 | Stop reason: SDUPTHER

## 2020-10-07 RX ORDER — ATORVASTATIN CALCIUM 40 MG/1
40 TABLET, FILM COATED ORAL DAILY
Qty: 90 TABLET | Refills: 3 | Status: SHIPPED | OUTPATIENT
Start: 2020-10-07 | End: 2021-10-15 | Stop reason: SDUPTHER

## 2020-10-15 ENCOUNTER — HOSPITAL ENCOUNTER (OUTPATIENT)
Dept: RADIOLOGY | Facility: OTHER | Age: 70
Discharge: HOME | End: 2020-10-15
Attending: FAMILY MEDICINE
Payer: COMMERCIAL

## 2020-10-15 DIAGNOSIS — Z78.0 POST-MENOPAUSAL: ICD-10-CM

## 2020-10-15 PROCEDURE — 77080 DXA BONE DENSITY AXIAL: CPT | Mod: 26,,, | Performed by: RADIOLOGY

## 2020-10-15 PROCEDURE — 77080 DEXA BONE DENSITY SPINE HIP: ICD-10-PCS | Mod: 26,,, | Performed by: RADIOLOGY

## 2020-10-15 PROCEDURE — 77080 DXA BONE DENSITY AXIAL: CPT | Mod: TC

## 2020-10-19 NOTE — PROGRESS NOTES
Patient is here today for evaluation of CKD III. Baseline Cr; 1.2-1.4 mg/dl. Her most recent lab (3/1/18) Cr 1.4 mg/dl; eGFR; 45 ml/min; potassium 3.6 mmol/L She is hypertensive and morbidly obese Admit to NSAID use for chronic pain syndrome; denies gross hematuria; proteinnuria; diabetes;kidney stone or UTI.  Current meds include ACEI. Today she has no new complaints    ROS;  Morbidly obese woman; no acute distress; oriented x 3  Mood and Affect; Appropriate  Otherwise non-contributory  Exam  HEENT; Grossly Intact  CHEST; Clear P&A; no rales or rhonchi  HEART; RR; S1&S2 no murmur rub gallop  ABD; BS(+); non-tender; (-)CVAT  EXT; (-)Edema    Impression  CKD III  At baseline  Cautioned re;NSAID use;     Hypertension Satisfactory control    Plan  Return Visit; 6 mo  
23

## 2020-12-16 ENCOUNTER — PATIENT OUTREACH (OUTPATIENT)
Dept: ADMINISTRATIVE | Facility: HOSPITAL | Age: 70
End: 2020-12-16

## 2021-02-05 DIAGNOSIS — Z12.31 OTHER SCREENING MAMMOGRAM: ICD-10-CM

## 2021-04-05 ENCOUNTER — PATIENT MESSAGE (OUTPATIENT)
Dept: ADMINISTRATIVE | Facility: HOSPITAL | Age: 71
End: 2021-04-05

## 2021-05-25 ENCOUNTER — TELEPHONE (OUTPATIENT)
Dept: CARDIOLOGY | Facility: CLINIC | Age: 71
End: 2021-05-25

## 2021-05-25 ENCOUNTER — PATIENT OUTREACH (OUTPATIENT)
Dept: ADMINISTRATIVE | Facility: OTHER | Age: 71
End: 2021-05-25

## 2021-05-28 ENCOUNTER — OFFICE VISIT (OUTPATIENT)
Dept: CARDIOLOGY | Facility: CLINIC | Age: 71
End: 2021-05-28
Payer: COMMERCIAL

## 2021-05-28 VITALS
BODY MASS INDEX: 42.1 KG/M2 | HEART RATE: 101 BPM | HEIGHT: 66 IN | SYSTOLIC BLOOD PRESSURE: 140 MMHG | DIASTOLIC BLOOD PRESSURE: 94 MMHG | WEIGHT: 261.94 LBS

## 2021-05-28 DIAGNOSIS — I10 ESSENTIAL HYPERTENSION: Primary | ICD-10-CM

## 2021-05-28 DIAGNOSIS — E78.5 DYSLIPIDEMIA: Chronic | ICD-10-CM

## 2021-05-28 DIAGNOSIS — I42.0 DILATED CARDIOMYOPATHY: ICD-10-CM

## 2021-05-28 DIAGNOSIS — E66.01 MORBID OBESITY: ICD-10-CM

## 2021-05-28 PROCEDURE — 99214 PR OFFICE/OUTPT VISIT, EST, LEVL IV, 30-39 MIN: ICD-10-PCS | Mod: S$GLB,,, | Performed by: INTERNAL MEDICINE

## 2021-05-28 PROCEDURE — 99214 OFFICE O/P EST MOD 30 MIN: CPT | Mod: S$GLB,,, | Performed by: INTERNAL MEDICINE

## 2021-05-28 PROCEDURE — 1159F PR MEDICATION LIST DOCUMENTED IN MEDICAL RECORD: ICD-10-PCS | Mod: S$GLB,,, | Performed by: INTERNAL MEDICINE

## 2021-05-28 PROCEDURE — 3008F BODY MASS INDEX DOCD: CPT | Mod: CPTII,S$GLB,, | Performed by: INTERNAL MEDICINE

## 2021-05-28 PROCEDURE — 99999 PR PBB SHADOW E&M-EST. PATIENT-LVL IV: ICD-10-PCS | Mod: PBBFAC,,, | Performed by: INTERNAL MEDICINE

## 2021-05-28 PROCEDURE — 3008F PR BODY MASS INDEX (BMI) DOCUMENTED: ICD-10-PCS | Mod: CPTII,S$GLB,, | Performed by: INTERNAL MEDICINE

## 2021-05-28 PROCEDURE — 1125F AMNT PAIN NOTED PAIN PRSNT: CPT | Mod: S$GLB,,, | Performed by: INTERNAL MEDICINE

## 2021-05-28 PROCEDURE — 3077F PR MOST RECENT SYSTOLIC BLOOD PRESSURE >= 140 MM HG: ICD-10-PCS | Mod: CPTII,S$GLB,, | Performed by: INTERNAL MEDICINE

## 2021-05-28 PROCEDURE — 3080F PR MOST RECENT DIASTOLIC BLOOD PRESSURE >= 90 MM HG: ICD-10-PCS | Mod: CPTII,S$GLB,, | Performed by: INTERNAL MEDICINE

## 2021-05-28 PROCEDURE — 99999 PR PBB SHADOW E&M-EST. PATIENT-LVL IV: CPT | Mod: PBBFAC,,, | Performed by: INTERNAL MEDICINE

## 2021-05-28 PROCEDURE — 3080F DIAST BP >= 90 MM HG: CPT | Mod: CPTII,S$GLB,, | Performed by: INTERNAL MEDICINE

## 2021-05-28 PROCEDURE — 1125F PR PAIN SEVERITY QUANTIFIED, PAIN PRESENT: ICD-10-PCS | Mod: S$GLB,,, | Performed by: INTERNAL MEDICINE

## 2021-05-28 PROCEDURE — 1159F MED LIST DOCD IN RCRD: CPT | Mod: S$GLB,,, | Performed by: INTERNAL MEDICINE

## 2021-05-28 PROCEDURE — 3077F SYST BP >= 140 MM HG: CPT | Mod: CPTII,S$GLB,, | Performed by: INTERNAL MEDICINE

## 2021-05-28 RX ORDER — CARVEDILOL 25 MG/1
25 TABLET ORAL 2 TIMES DAILY WITH MEALS
Qty: 180 TABLET | Refills: 3 | Status: SHIPPED | OUTPATIENT
Start: 2021-05-28 | End: 2021-10-15 | Stop reason: SDUPTHER

## 2021-06-07 ENCOUNTER — PATIENT OUTREACH (OUTPATIENT)
Dept: ADMINISTRATIVE | Facility: HOSPITAL | Age: 71
End: 2021-06-07

## 2021-06-15 DIAGNOSIS — M62.838 MUSCLE SPASM: ICD-10-CM

## 2021-06-15 RX ORDER — CYCLOBENZAPRINE HCL 10 MG
TABLET ORAL
Qty: 90 TABLET | Refills: 0 | Status: SHIPPED | OUTPATIENT
Start: 2021-06-15 | End: 2021-11-16

## 2021-06-23 RX ORDER — IRBESARTAN 300 MG/1
300 TABLET ORAL NIGHTLY
Qty: 90 TABLET | Refills: 3 | Status: SHIPPED | OUTPATIENT
Start: 2021-06-23 | End: 2021-10-11 | Stop reason: SDUPTHER

## 2021-07-15 ENCOUNTER — TELEPHONE (OUTPATIENT)
Dept: SLEEP MEDICINE | Facility: CLINIC | Age: 71
End: 2021-07-15

## 2021-07-19 ENCOUNTER — PATIENT OUTREACH (OUTPATIENT)
Dept: ADMINISTRATIVE | Facility: OTHER | Age: 71
End: 2021-07-19

## 2021-07-20 ENCOUNTER — OFFICE VISIT (OUTPATIENT)
Dept: SLEEP MEDICINE | Facility: CLINIC | Age: 71
End: 2021-07-20
Payer: COMMERCIAL

## 2021-07-20 VITALS
DIASTOLIC BLOOD PRESSURE: 92 MMHG | HEART RATE: 76 BPM | WEIGHT: 260.13 LBS | SYSTOLIC BLOOD PRESSURE: 141 MMHG | BODY MASS INDEX: 41.99 KG/M2

## 2021-07-20 DIAGNOSIS — I42.0 DILATED CARDIOMYOPATHY: ICD-10-CM

## 2021-07-20 DIAGNOSIS — G47.33 OBSTRUCTIVE SLEEP APNEA: Primary | ICD-10-CM

## 2021-07-20 DIAGNOSIS — I10 ESSENTIAL HYPERTENSION: ICD-10-CM

## 2021-07-20 PROCEDURE — 3288F PR FALLS RISK ASSESSMENT DOCUMENTED: ICD-10-PCS | Mod: CPTII,S$GLB,, | Performed by: NURSE PRACTITIONER

## 2021-07-20 PROCEDURE — 3080F DIAST BP >= 90 MM HG: CPT | Mod: CPTII,S$GLB,, | Performed by: NURSE PRACTITIONER

## 2021-07-20 PROCEDURE — 1126F AMNT PAIN NOTED NONE PRSNT: CPT | Mod: CPTII,S$GLB,, | Performed by: NURSE PRACTITIONER

## 2021-07-20 PROCEDURE — 1159F PR MEDICATION LIST DOCUMENTED IN MEDICAL RECORD: ICD-10-PCS | Mod: CPTII,S$GLB,, | Performed by: NURSE PRACTITIONER

## 2021-07-20 PROCEDURE — 3008F BODY MASS INDEX DOCD: CPT | Mod: CPTII,S$GLB,, | Performed by: NURSE PRACTITIONER

## 2021-07-20 PROCEDURE — 99999 PR PBB SHADOW E&M-EST. PATIENT-LVL II: CPT | Mod: PBBFAC,,, | Performed by: NURSE PRACTITIONER

## 2021-07-20 PROCEDURE — 3288F FALL RISK ASSESSMENT DOCD: CPT | Mod: CPTII,S$GLB,, | Performed by: NURSE PRACTITIONER

## 2021-07-20 PROCEDURE — 1101F PR PT FALLS ASSESS DOC 0-1 FALLS W/OUT INJ PAST YR: ICD-10-PCS | Mod: CPTII,S$GLB,, | Performed by: NURSE PRACTITIONER

## 2021-07-20 PROCEDURE — 1126F PR PAIN SEVERITY QUANTIFIED, NO PAIN PRESENT: ICD-10-PCS | Mod: CPTII,S$GLB,, | Performed by: NURSE PRACTITIONER

## 2021-07-20 PROCEDURE — 1101F PT FALLS ASSESS-DOCD LE1/YR: CPT | Mod: CPTII,S$GLB,, | Performed by: NURSE PRACTITIONER

## 2021-07-20 PROCEDURE — 99214 OFFICE O/P EST MOD 30 MIN: CPT | Mod: S$GLB,,, | Performed by: NURSE PRACTITIONER

## 2021-07-20 PROCEDURE — 3080F PR MOST RECENT DIASTOLIC BLOOD PRESSURE >= 90 MM HG: ICD-10-PCS | Mod: CPTII,S$GLB,, | Performed by: NURSE PRACTITIONER

## 2021-07-20 PROCEDURE — 99999 PR PBB SHADOW E&M-EST. PATIENT-LVL II: ICD-10-PCS | Mod: PBBFAC,,, | Performed by: NURSE PRACTITIONER

## 2021-07-20 PROCEDURE — 3077F PR MOST RECENT SYSTOLIC BLOOD PRESSURE >= 140 MM HG: ICD-10-PCS | Mod: CPTII,S$GLB,, | Performed by: NURSE PRACTITIONER

## 2021-07-20 PROCEDURE — 3077F SYST BP >= 140 MM HG: CPT | Mod: CPTII,S$GLB,, | Performed by: NURSE PRACTITIONER

## 2021-07-20 PROCEDURE — 1159F MED LIST DOCD IN RCRD: CPT | Mod: CPTII,S$GLB,, | Performed by: NURSE PRACTITIONER

## 2021-07-20 PROCEDURE — 99214 PR OFFICE/OUTPT VISIT, EST, LEVL IV, 30-39 MIN: ICD-10-PCS | Mod: S$GLB,,, | Performed by: NURSE PRACTITIONER

## 2021-07-20 PROCEDURE — 3008F PR BODY MASS INDEX (BMI) DOCUMENTED: ICD-10-PCS | Mod: CPTII,S$GLB,, | Performed by: NURSE PRACTITIONER

## 2021-08-26 ENCOUNTER — LAB VISIT (OUTPATIENT)
Dept: LAB | Facility: OTHER | Age: 71
End: 2021-08-26
Payer: COMMERCIAL

## 2021-08-26 DIAGNOSIS — E78.5 DYSLIPIDEMIA: Chronic | ICD-10-CM

## 2021-08-26 LAB
CHOLEST SERPL-MCNC: 227 MG/DL (ref 120–199)
CHOLEST/HDLC SERPL: 5.8 {RATIO} (ref 2–5)
HDLC SERPL-MCNC: 39 MG/DL (ref 40–75)
HDLC SERPL: 17.2 % (ref 20–50)
LDLC SERPL CALC-MCNC: 131.8 MG/DL (ref 63–159)
NONHDLC SERPL-MCNC: 188 MG/DL
TRIGL SERPL-MCNC: 281 MG/DL (ref 30–150)

## 2021-08-26 PROCEDURE — 36415 COLL VENOUS BLD VENIPUNCTURE: CPT | Performed by: INTERNAL MEDICINE

## 2021-08-26 PROCEDURE — 80061 LIPID PANEL: CPT | Performed by: INTERNAL MEDICINE

## 2021-09-15 ENCOUNTER — PATIENT MESSAGE (OUTPATIENT)
Dept: SLEEP MEDICINE | Facility: CLINIC | Age: 71
End: 2021-09-15

## 2021-10-05 ENCOUNTER — PATIENT MESSAGE (OUTPATIENT)
Dept: ADMINISTRATIVE | Facility: HOSPITAL | Age: 71
End: 2021-10-05

## 2021-10-11 RX ORDER — IRBESARTAN 300 MG/1
300 TABLET ORAL NIGHTLY
Qty: 90 TABLET | Refills: 3 | Status: SHIPPED | OUTPATIENT
Start: 2021-10-11 | End: 2022-10-11

## 2021-10-13 ENCOUNTER — OFFICE VISIT (OUTPATIENT)
Dept: PAIN MEDICINE | Facility: CLINIC | Age: 71
End: 2021-10-13
Payer: COMMERCIAL

## 2021-10-13 ENCOUNTER — HOSPITAL ENCOUNTER (OUTPATIENT)
Dept: RADIOLOGY | Facility: OTHER | Age: 71
Discharge: HOME | End: 2021-10-13
Attending: NURSE PRACTITIONER
Payer: COMMERCIAL

## 2021-10-13 VITALS
HEIGHT: 66 IN | TEMPERATURE: 99 F | RESPIRATION RATE: 20 BRPM | WEIGHT: 262.38 LBS | SYSTOLIC BLOOD PRESSURE: 146 MMHG | BODY MASS INDEX: 42.17 KG/M2 | HEART RATE: 86 BPM | DIASTOLIC BLOOD PRESSURE: 86 MMHG

## 2021-10-13 DIAGNOSIS — M48.061 SPINAL STENOSIS OF LUMBAR REGION, UNSPECIFIED WHETHER NEUROGENIC CLAUDICATION PRESENT: ICD-10-CM

## 2021-10-13 DIAGNOSIS — G89.29 CHRONIC PAIN OF LEFT KNEE: ICD-10-CM

## 2021-10-13 DIAGNOSIS — M47.816 LUMBAR SPONDYLOSIS: ICD-10-CM

## 2021-10-13 DIAGNOSIS — M25.562 CHRONIC PAIN OF LEFT KNEE: Primary | ICD-10-CM

## 2021-10-13 DIAGNOSIS — M25.562 CHRONIC PAIN OF LEFT KNEE: ICD-10-CM

## 2021-10-13 DIAGNOSIS — G89.29 CHRONIC PAIN OF LEFT KNEE: Primary | ICD-10-CM

## 2021-10-13 PROCEDURE — 1101F PR PT FALLS ASSESS DOC 0-1 FALLS W/OUT INJ PAST YR: ICD-10-PCS | Mod: CPTII,S$GLB,, | Performed by: NURSE PRACTITIONER

## 2021-10-13 PROCEDURE — 99999 PR PBB SHADOW E&M-EST. PATIENT-LVL III: ICD-10-PCS | Mod: PBBFAC,,, | Performed by: NURSE PRACTITIONER

## 2021-10-13 PROCEDURE — 3079F DIAST BP 80-89 MM HG: CPT | Mod: CPTII,S$GLB,, | Performed by: NURSE PRACTITIONER

## 2021-10-13 PROCEDURE — 1125F PR PAIN SEVERITY QUANTIFIED, PAIN PRESENT: ICD-10-PCS | Mod: CPTII,S$GLB,, | Performed by: NURSE PRACTITIONER

## 2021-10-13 PROCEDURE — 1125F AMNT PAIN NOTED PAIN PRSNT: CPT | Mod: CPTII,S$GLB,, | Performed by: NURSE PRACTITIONER

## 2021-10-13 PROCEDURE — 3077F SYST BP >= 140 MM HG: CPT | Mod: CPTII,S$GLB,, | Performed by: NURSE PRACTITIONER

## 2021-10-13 PROCEDURE — 99213 OFFICE O/P EST LOW 20 MIN: CPT | Mod: S$GLB,,, | Performed by: NURSE PRACTITIONER

## 2021-10-13 PROCEDURE — 1160F PR REVIEW ALL MEDS BY PRESCRIBER/CLIN PHARMACIST DOCUMENTED: ICD-10-PCS | Mod: CPTII,S$GLB,, | Performed by: NURSE PRACTITIONER

## 2021-10-13 PROCEDURE — 1101F PT FALLS ASSESS-DOCD LE1/YR: CPT | Mod: CPTII,S$GLB,, | Performed by: NURSE PRACTITIONER

## 2021-10-13 PROCEDURE — 4010F PR ACE/ARB THEARPY RXD/TAKEN: ICD-10-PCS | Mod: CPTII,S$GLB,, | Performed by: NURSE PRACTITIONER

## 2021-10-13 PROCEDURE — 3077F PR MOST RECENT SYSTOLIC BLOOD PRESSURE >= 140 MM HG: ICD-10-PCS | Mod: CPTII,S$GLB,, | Performed by: NURSE PRACTITIONER

## 2021-10-13 PROCEDURE — 3288F FALL RISK ASSESSMENT DOCD: CPT | Mod: CPTII,S$GLB,, | Performed by: NURSE PRACTITIONER

## 2021-10-13 PROCEDURE — 99213 PR OFFICE/OUTPT VISIT, EST, LEVL III, 20-29 MIN: ICD-10-PCS | Mod: S$GLB,,, | Performed by: NURSE PRACTITIONER

## 2021-10-13 PROCEDURE — 99999 PR PBB SHADOW E&M-EST. PATIENT-LVL III: CPT | Mod: PBBFAC,,, | Performed by: NURSE PRACTITIONER

## 2021-10-13 PROCEDURE — 3008F PR BODY MASS INDEX (BMI) DOCUMENTED: ICD-10-PCS | Mod: CPTII,S$GLB,, | Performed by: NURSE PRACTITIONER

## 2021-10-13 PROCEDURE — 1159F PR MEDICATION LIST DOCUMENTED IN MEDICAL RECORD: ICD-10-PCS | Mod: CPTII,S$GLB,, | Performed by: NURSE PRACTITIONER

## 2021-10-13 PROCEDURE — 73560 X-RAY EXAM OF KNEE 1 OR 2: CPT | Mod: TC,50,FY

## 2021-10-13 PROCEDURE — 3079F PR MOST RECENT DIASTOLIC BLOOD PRESSURE 80-89 MM HG: ICD-10-PCS | Mod: CPTII,S$GLB,, | Performed by: NURSE PRACTITIONER

## 2021-10-13 PROCEDURE — 4010F ACE/ARB THERAPY RXD/TAKEN: CPT | Mod: CPTII,S$GLB,, | Performed by: NURSE PRACTITIONER

## 2021-10-13 PROCEDURE — 73560 X-RAY EXAM OF KNEE 1 OR 2: CPT | Mod: 26,50,, | Performed by: RADIOLOGY

## 2021-10-13 PROCEDURE — 3008F BODY MASS INDEX DOCD: CPT | Mod: CPTII,S$GLB,, | Performed by: NURSE PRACTITIONER

## 2021-10-13 PROCEDURE — 1160F RVW MEDS BY RX/DR IN RCRD: CPT | Mod: CPTII,S$GLB,, | Performed by: NURSE PRACTITIONER

## 2021-10-13 PROCEDURE — 3288F PR FALLS RISK ASSESSMENT DOCUMENTED: ICD-10-PCS | Mod: CPTII,S$GLB,, | Performed by: NURSE PRACTITIONER

## 2021-10-13 PROCEDURE — 1159F MED LIST DOCD IN RCRD: CPT | Mod: CPTII,S$GLB,, | Performed by: NURSE PRACTITIONER

## 2021-10-13 PROCEDURE — 73560 XR KNEE AP STANDING WITH BOTH LATERAL: ICD-10-PCS | Mod: 26,50,, | Performed by: RADIOLOGY

## 2021-10-14 ENCOUNTER — PATIENT OUTREACH (OUTPATIENT)
Dept: ADMINISTRATIVE | Facility: OTHER | Age: 71
End: 2021-10-14

## 2021-10-15 ENCOUNTER — LAB VISIT (OUTPATIENT)
Dept: LAB | Facility: HOSPITAL | Age: 71
End: 2021-10-15
Payer: COMMERCIAL

## 2021-10-15 ENCOUNTER — OFFICE VISIT (OUTPATIENT)
Dept: CARDIOLOGY | Facility: CLINIC | Age: 71
End: 2021-10-15
Payer: COMMERCIAL

## 2021-10-15 VITALS
SYSTOLIC BLOOD PRESSURE: 124 MMHG | BODY MASS INDEX: 41.95 KG/M2 | DIASTOLIC BLOOD PRESSURE: 74 MMHG | OXYGEN SATURATION: 99 % | HEART RATE: 88 BPM | HEIGHT: 66 IN | WEIGHT: 261 LBS

## 2021-10-15 DIAGNOSIS — E78.5 DYSLIPIDEMIA: Chronic | ICD-10-CM

## 2021-10-15 DIAGNOSIS — I10 PRIMARY HYPERTENSION: ICD-10-CM

## 2021-10-15 DIAGNOSIS — I42.8 NON-ISCHEMIC CARDIOMYOPATHY: ICD-10-CM

## 2021-10-15 DIAGNOSIS — E66.01 MORBID OBESITY: ICD-10-CM

## 2021-10-15 DIAGNOSIS — G47.33 OSA (OBSTRUCTIVE SLEEP APNEA): ICD-10-CM

## 2021-10-15 DIAGNOSIS — N18.31 STAGE 3A CHRONIC KIDNEY DISEASE: ICD-10-CM

## 2021-10-15 DIAGNOSIS — I42.8 NON-ISCHEMIC CARDIOMYOPATHY: Primary | ICD-10-CM

## 2021-10-15 DIAGNOSIS — E83.52 HYPERCALCEMIA: ICD-10-CM

## 2021-10-15 LAB
ALBUMIN SERPL BCP-MCNC: 3.9 G/DL (ref 3.5–5.2)
ALP SERPL-CCNC: 162 U/L (ref 55–135)
ALT SERPL W/O P-5'-P-CCNC: 14 U/L (ref 10–44)
ANION GAP SERPL CALC-SCNC: 8 MMOL/L (ref 8–16)
AST SERPL-CCNC: 15 U/L (ref 10–40)
BILIRUB SERPL-MCNC: 0.7 MG/DL (ref 0.1–1)
BUN SERPL-MCNC: 9 MG/DL (ref 8–23)
CALCIUM SERPL-MCNC: 11 MG/DL (ref 8.7–10.5)
CHLORIDE SERPL-SCNC: 108 MMOL/L (ref 95–110)
CO2 SERPL-SCNC: 25 MMOL/L (ref 23–29)
CREAT SERPL-MCNC: 1.2 MG/DL (ref 0.5–1.4)
ERYTHROCYTE [DISTWIDTH] IN BLOOD BY AUTOMATED COUNT: 13.4 % (ref 11.5–14.5)
EST. GFR  (AFRICAN AMERICAN): 52.9 ML/MIN/1.73 M^2
EST. GFR  (NON AFRICAN AMERICAN): 45.9 ML/MIN/1.73 M^2
GLUCOSE SERPL-MCNC: 99 MG/DL (ref 70–110)
HCT VFR BLD AUTO: 38.2 % (ref 37–48.5)
HGB BLD-MCNC: 12.4 G/DL (ref 12–16)
MAGNESIUM SERPL-MCNC: 2.3 MG/DL (ref 1.6–2.6)
MCH RBC QN AUTO: 28.6 PG (ref 27–31)
MCHC RBC AUTO-ENTMCNC: 32.5 G/DL (ref 32–36)
MCV RBC AUTO: 88 FL (ref 82–98)
PLATELET # BLD AUTO: 272 K/UL (ref 150–450)
PMV BLD AUTO: 9.3 FL (ref 9.2–12.9)
POTASSIUM SERPL-SCNC: 4.3 MMOL/L (ref 3.5–5.1)
PROT SERPL-MCNC: 7.3 G/DL (ref 6–8.4)
RBC # BLD AUTO: 4.33 M/UL (ref 4–5.4)
SODIUM SERPL-SCNC: 141 MMOL/L (ref 136–145)
WBC # BLD AUTO: 3.61 K/UL (ref 3.9–12.7)

## 2021-10-15 PROCEDURE — 1160F PR REVIEW ALL MEDS BY PRESCRIBER/CLIN PHARMACIST DOCUMENTED: ICD-10-PCS | Mod: CPTII,S$GLB,, | Performed by: NURSE PRACTITIONER

## 2021-10-15 PROCEDURE — 4010F PR ACE/ARB THEARPY RXD/TAKEN: ICD-10-PCS | Mod: CPTII,S$GLB,, | Performed by: NURSE PRACTITIONER

## 2021-10-15 PROCEDURE — 1101F PR PT FALLS ASSESS DOC 0-1 FALLS W/OUT INJ PAST YR: ICD-10-PCS | Mod: CPTII,S$GLB,, | Performed by: NURSE PRACTITIONER

## 2021-10-15 PROCEDURE — 99214 PR OFFICE/OUTPT VISIT, EST, LEVL IV, 30-39 MIN: ICD-10-PCS | Mod: S$GLB,,, | Performed by: NURSE PRACTITIONER

## 2021-10-15 PROCEDURE — 3074F SYST BP LT 130 MM HG: CPT | Mod: CPTII,S$GLB,, | Performed by: NURSE PRACTITIONER

## 2021-10-15 PROCEDURE — 1160F RVW MEDS BY RX/DR IN RCRD: CPT | Mod: CPTII,S$GLB,, | Performed by: NURSE PRACTITIONER

## 2021-10-15 PROCEDURE — 1126F PR PAIN SEVERITY QUANTIFIED, NO PAIN PRESENT: ICD-10-PCS | Mod: CPTII,S$GLB,, | Performed by: NURSE PRACTITIONER

## 2021-10-15 PROCEDURE — 1126F AMNT PAIN NOTED NONE PRSNT: CPT | Mod: CPTII,S$GLB,, | Performed by: NURSE PRACTITIONER

## 2021-10-15 PROCEDURE — 85027 COMPLETE CBC AUTOMATED: CPT | Performed by: NURSE PRACTITIONER

## 2021-10-15 PROCEDURE — 3288F FALL RISK ASSESSMENT DOCD: CPT | Mod: CPTII,S$GLB,, | Performed by: NURSE PRACTITIONER

## 2021-10-15 PROCEDURE — 99214 OFFICE O/P EST MOD 30 MIN: CPT | Mod: S$GLB,,, | Performed by: NURSE PRACTITIONER

## 2021-10-15 PROCEDURE — 1159F PR MEDICATION LIST DOCUMENTED IN MEDICAL RECORD: ICD-10-PCS | Mod: CPTII,S$GLB,, | Performed by: NURSE PRACTITIONER

## 2021-10-15 PROCEDURE — 1101F PT FALLS ASSESS-DOCD LE1/YR: CPT | Mod: CPTII,S$GLB,, | Performed by: NURSE PRACTITIONER

## 2021-10-15 PROCEDURE — 99999 PR PBB SHADOW E&M-EST. PATIENT-LVL IV: CPT | Mod: PBBFAC,,, | Performed by: NURSE PRACTITIONER

## 2021-10-15 PROCEDURE — 3074F PR MOST RECENT SYSTOLIC BLOOD PRESSURE < 130 MM HG: ICD-10-PCS | Mod: CPTII,S$GLB,, | Performed by: NURSE PRACTITIONER

## 2021-10-15 PROCEDURE — 4010F ACE/ARB THERAPY RXD/TAKEN: CPT | Mod: CPTII,S$GLB,, | Performed by: NURSE PRACTITIONER

## 2021-10-15 PROCEDURE — 1159F MED LIST DOCD IN RCRD: CPT | Mod: CPTII,S$GLB,, | Performed by: NURSE PRACTITIONER

## 2021-10-15 PROCEDURE — 80053 COMPREHEN METABOLIC PANEL: CPT | Performed by: NURSE PRACTITIONER

## 2021-10-15 PROCEDURE — 3008F BODY MASS INDEX DOCD: CPT | Mod: CPTII,S$GLB,, | Performed by: NURSE PRACTITIONER

## 2021-10-15 PROCEDURE — 99999 PR PBB SHADOW E&M-EST. PATIENT-LVL IV: ICD-10-PCS | Mod: PBBFAC,,, | Performed by: NURSE PRACTITIONER

## 2021-10-15 PROCEDURE — 3078F PR MOST RECENT DIASTOLIC BLOOD PRESSURE < 80 MM HG: ICD-10-PCS | Mod: CPTII,S$GLB,, | Performed by: NURSE PRACTITIONER

## 2021-10-15 PROCEDURE — 3078F DIAST BP <80 MM HG: CPT | Mod: CPTII,S$GLB,, | Performed by: NURSE PRACTITIONER

## 2021-10-15 PROCEDURE — 83735 ASSAY OF MAGNESIUM: CPT | Performed by: NURSE PRACTITIONER

## 2021-10-15 PROCEDURE — 36415 COLL VENOUS BLD VENIPUNCTURE: CPT | Performed by: NURSE PRACTITIONER

## 2021-10-15 PROCEDURE — 3008F PR BODY MASS INDEX (BMI) DOCUMENTED: ICD-10-PCS | Mod: CPTII,S$GLB,, | Performed by: NURSE PRACTITIONER

## 2021-10-15 PROCEDURE — 3288F PR FALLS RISK ASSESSMENT DOCUMENTED: ICD-10-PCS | Mod: CPTII,S$GLB,, | Performed by: NURSE PRACTITIONER

## 2021-10-15 RX ORDER — CARVEDILOL 25 MG/1
25 TABLET ORAL 2 TIMES DAILY WITH MEALS
Qty: 180 TABLET | Refills: 3 | Status: SHIPPED | OUTPATIENT
Start: 2021-10-15 | End: 2023-01-17 | Stop reason: SDUPTHER

## 2021-10-15 RX ORDER — ATORVASTATIN CALCIUM 40 MG/1
40 TABLET, FILM COATED ORAL DAILY
Qty: 90 TABLET | Refills: 3 | Status: SHIPPED | OUTPATIENT
Start: 2021-10-15 | End: 2021-10-25

## 2021-10-18 ENCOUNTER — TELEPHONE (OUTPATIENT)
Dept: PAIN MEDICINE | Facility: CLINIC | Age: 71
End: 2021-10-18

## 2021-10-18 ENCOUNTER — IMMUNIZATION (OUTPATIENT)
Dept: PHARMACY | Facility: CLINIC | Age: 71
End: 2021-10-18
Payer: COMMERCIAL

## 2021-10-19 ENCOUNTER — HOSPITAL ENCOUNTER (EMERGENCY)
Facility: OTHER | Age: 71
Discharge: HOME | End: 2021-10-19
Attending: EMERGENCY MEDICINE
Payer: COMMERCIAL

## 2021-10-19 VITALS
DIASTOLIC BLOOD PRESSURE: 84 MMHG | HEART RATE: 93 BPM | OXYGEN SATURATION: 95 % | WEIGHT: 261 LBS | RESPIRATION RATE: 20 BRPM | HEIGHT: 66 IN | TEMPERATURE: 98 F | SYSTOLIC BLOOD PRESSURE: 140 MMHG | BODY MASS INDEX: 41.95 KG/M2

## 2021-10-19 DIAGNOSIS — R03.0 ELEVATED BLOOD PRESSURE READING: ICD-10-CM

## 2021-10-19 DIAGNOSIS — R51.9 INTERMITTENT HEADACHE: Primary | ICD-10-CM

## 2021-10-19 PROCEDURE — 99283 EMERGENCY DEPT VISIT LOW MDM: CPT

## 2021-10-19 PROCEDURE — 25000003 PHARM REV CODE 250: Performed by: EMERGENCY MEDICINE

## 2021-10-19 RX ORDER — ACETAMINOPHEN 500 MG
1000 TABLET ORAL
Status: COMPLETED | OUTPATIENT
Start: 2021-10-19 | End: 2021-10-19

## 2021-10-19 RX ADMIN — ACETAMINOPHEN 1000 MG: 500 TABLET, FILM COATED ORAL at 03:10

## 2021-10-20 ENCOUNTER — TELEPHONE (OUTPATIENT)
Dept: PAIN MEDICINE | Facility: CLINIC | Age: 71
End: 2021-10-20

## 2021-10-20 DIAGNOSIS — G89.29 CHRONIC PAIN OF LEFT KNEE: Primary | ICD-10-CM

## 2021-10-20 DIAGNOSIS — M25.562 CHRONIC PAIN OF LEFT KNEE: Primary | ICD-10-CM

## 2021-10-25 ENCOUNTER — PATIENT MESSAGE (OUTPATIENT)
Dept: CARDIOLOGY | Facility: CLINIC | Age: 71
End: 2021-10-25
Payer: COMMERCIAL

## 2021-10-25 RX ORDER — ATORVASTATIN CALCIUM 80 MG/1
80 TABLET, FILM COATED ORAL DAILY
Qty: 90 TABLET | Refills: 3 | Status: SHIPPED | OUTPATIENT
Start: 2021-10-25 | End: 2022-08-16

## 2021-11-16 ENCOUNTER — OFFICE VISIT (OUTPATIENT)
Dept: PRIMARY CARE CLINIC | Facility: CLINIC | Age: 71
End: 2021-11-16
Attending: FAMILY MEDICINE
Payer: COMMERCIAL

## 2021-11-16 VITALS
SYSTOLIC BLOOD PRESSURE: 132 MMHG | HEART RATE: 87 BPM | DIASTOLIC BLOOD PRESSURE: 84 MMHG | OXYGEN SATURATION: 99 % | HEIGHT: 66 IN | WEIGHT: 263.69 LBS | BODY MASS INDEX: 42.38 KG/M2

## 2021-11-16 DIAGNOSIS — G89.29 CHRONIC PAIN OF LEFT KNEE: ICD-10-CM

## 2021-11-16 DIAGNOSIS — M79.10 MUSCLE PAIN: Primary | ICD-10-CM

## 2021-11-16 DIAGNOSIS — I10 PRIMARY HYPERTENSION: ICD-10-CM

## 2021-11-16 DIAGNOSIS — M25.562 CHRONIC PAIN OF LEFT KNEE: ICD-10-CM

## 2021-11-16 DIAGNOSIS — M48.062 SPINAL STENOSIS OF LUMBAR REGION WITH NEUROGENIC CLAUDICATION: ICD-10-CM

## 2021-11-16 PROCEDURE — 99214 OFFICE O/P EST MOD 30 MIN: CPT | Mod: S$GLB,,, | Performed by: FAMILY MEDICINE

## 2021-11-16 PROCEDURE — 4010F ACE/ARB THERAPY RXD/TAKEN: CPT | Mod: CPTII,S$GLB,, | Performed by: FAMILY MEDICINE

## 2021-11-16 PROCEDURE — 99999 PR PBB SHADOW E&M-EST. PATIENT-LVL III: ICD-10-PCS | Mod: PBBFAC,,, | Performed by: FAMILY MEDICINE

## 2021-11-16 PROCEDURE — 4010F PR ACE/ARB THEARPY RXD/TAKEN: ICD-10-PCS | Mod: CPTII,S$GLB,, | Performed by: FAMILY MEDICINE

## 2021-11-16 PROCEDURE — 99999 PR PBB SHADOW E&M-EST. PATIENT-LVL III: CPT | Mod: PBBFAC,,, | Performed by: FAMILY MEDICINE

## 2021-11-16 PROCEDURE — 99214 PR OFFICE/OUTPT VISIT, EST, LEVL IV, 30-39 MIN: ICD-10-PCS | Mod: S$GLB,,, | Performed by: FAMILY MEDICINE

## 2021-11-16 RX ORDER — TIZANIDINE 4 MG/1
4 TABLET ORAL EVERY 8 HOURS
Qty: 45 TABLET | Refills: 0 | Status: SHIPPED | OUTPATIENT
Start: 2021-11-16 | End: 2021-11-24

## 2021-11-16 RX ORDER — AMOXICILLIN 500 MG
CAPSULE ORAL DAILY
COMMUNITY

## 2021-11-22 ENCOUNTER — LAB VISIT (OUTPATIENT)
Dept: LAB | Facility: OTHER | Age: 71
End: 2021-11-22
Attending: NURSE PRACTITIONER
Payer: COMMERCIAL

## 2021-11-22 DIAGNOSIS — I10 PRIMARY HYPERTENSION: ICD-10-CM

## 2021-11-22 DIAGNOSIS — E78.5 DYSLIPIDEMIA: Chronic | ICD-10-CM

## 2021-11-22 DIAGNOSIS — M79.10 MUSCLE PAIN: ICD-10-CM

## 2021-11-22 LAB
ALBUMIN SERPL BCP-MCNC: 4 G/DL (ref 3.5–5.2)
ALP SERPL-CCNC: 161 U/L (ref 55–135)
ALT SERPL W/O P-5'-P-CCNC: 18 U/L (ref 10–44)
ANION GAP SERPL CALC-SCNC: 6 MMOL/L (ref 8–16)
AST SERPL-CCNC: 18 U/L (ref 10–40)
BILIRUB SERPL-MCNC: 0.8 MG/DL (ref 0.1–1)
BUN SERPL-MCNC: 13 MG/DL (ref 8–23)
CALCIUM SERPL-MCNC: 10.7 MG/DL (ref 8.7–10.5)
CHLORIDE SERPL-SCNC: 111 MMOL/L (ref 95–110)
CHOLEST SERPL-MCNC: 160 MG/DL (ref 120–199)
CHOLEST/HDLC SERPL: 3.9 {RATIO} (ref 2–5)
CO2 SERPL-SCNC: 25 MMOL/L (ref 23–29)
CREAT SERPL-MCNC: 1.2 MG/DL (ref 0.5–1.4)
ERYTHROCYTE [DISTWIDTH] IN BLOOD BY AUTOMATED COUNT: 13.2 % (ref 11.5–14.5)
EST. GFR  (AFRICAN AMERICAN): 53 ML/MIN/1.73 M^2
EST. GFR  (NON AFRICAN AMERICAN): 46 ML/MIN/1.73 M^2
ESTIMATED AVG GLUCOSE: 103 MG/DL (ref 68–131)
GLUCOSE SERPL-MCNC: 106 MG/DL (ref 70–110)
HBA1C MFR BLD: 5.2 % (ref 4–5.6)
HCT VFR BLD AUTO: 39.8 % (ref 37–48.5)
HDLC SERPL-MCNC: 41 MG/DL (ref 40–75)
HDLC SERPL: 25.6 % (ref 20–50)
HGB BLD-MCNC: 12.7 G/DL (ref 12–16)
LDLC SERPL CALC-MCNC: 91.4 MG/DL (ref 63–159)
MAGNESIUM SERPL-MCNC: 2.2 MG/DL (ref 1.6–2.6)
MCH RBC QN AUTO: 28 PG (ref 27–31)
MCHC RBC AUTO-ENTMCNC: 31.9 G/DL (ref 32–36)
MCV RBC AUTO: 88 FL (ref 82–98)
NONHDLC SERPL-MCNC: 119 MG/DL
PLATELET # BLD AUTO: 214 K/UL (ref 150–450)
PMV BLD AUTO: 9.4 FL (ref 9.2–12.9)
POTASSIUM SERPL-SCNC: 4.2 MMOL/L (ref 3.5–5.1)
PROT SERPL-MCNC: 7.2 G/DL (ref 6–8.4)
RBC # BLD AUTO: 4.54 M/UL (ref 4–5.4)
SODIUM SERPL-SCNC: 142 MMOL/L (ref 136–145)
TRIGL SERPL-MCNC: 138 MG/DL (ref 30–150)
WBC # BLD AUTO: 3.64 K/UL (ref 3.9–12.7)

## 2021-11-22 PROCEDURE — 85027 COMPLETE CBC AUTOMATED: CPT | Performed by: NURSE PRACTITIONER

## 2021-11-22 PROCEDURE — 80061 LIPID PANEL: CPT | Performed by: FAMILY MEDICINE

## 2021-11-22 PROCEDURE — 80053 COMPREHEN METABOLIC PANEL: CPT | Performed by: FAMILY MEDICINE

## 2021-11-22 PROCEDURE — 83036 HEMOGLOBIN GLYCOSYLATED A1C: CPT | Performed by: FAMILY MEDICINE

## 2021-11-22 PROCEDURE — 36415 COLL VENOUS BLD VENIPUNCTURE: CPT | Performed by: NURSE PRACTITIONER

## 2021-11-22 PROCEDURE — 83735 ASSAY OF MAGNESIUM: CPT | Performed by: NURSE PRACTITIONER

## 2021-11-23 ENCOUNTER — TELEPHONE (OUTPATIENT)
Dept: PRIMARY CARE CLINIC | Facility: CLINIC | Age: 71
End: 2021-11-23
Payer: COMMERCIAL

## 2021-11-23 ENCOUNTER — PATIENT MESSAGE (OUTPATIENT)
Dept: PRIMARY CARE CLINIC | Facility: CLINIC | Age: 71
End: 2021-11-23
Payer: COMMERCIAL

## 2021-11-24 ENCOUNTER — HOSPITAL ENCOUNTER (OUTPATIENT)
Dept: RADIOLOGY | Facility: OTHER | Age: 71
Discharge: HOME | End: 2021-11-24
Attending: FAMILY MEDICINE
Payer: COMMERCIAL

## 2021-11-24 DIAGNOSIS — Z12.31 OTHER SCREENING MAMMOGRAM: ICD-10-CM

## 2021-11-24 PROCEDURE — 77063 MAMMO DIGITAL SCREENING BILAT WITH TOMO: ICD-10-PCS | Mod: 26,,, | Performed by: INTERNAL MEDICINE

## 2021-11-24 PROCEDURE — 77067 SCR MAMMO BI INCL CAD: CPT | Mod: TC

## 2021-11-24 PROCEDURE — 77063 BREAST TOMOSYNTHESIS BI: CPT | Mod: 26,,, | Performed by: INTERNAL MEDICINE

## 2021-11-24 PROCEDURE — 77067 MAMMO DIGITAL SCREENING BILAT WITH TOMO: ICD-10-PCS | Mod: 26,,, | Performed by: INTERNAL MEDICINE

## 2021-11-24 PROCEDURE — 77067 SCR MAMMO BI INCL CAD: CPT | Mod: 26,,, | Performed by: INTERNAL MEDICINE

## 2021-11-29 ENCOUNTER — OFFICE VISIT (OUTPATIENT)
Dept: PRIMARY CARE CLINIC | Facility: CLINIC | Age: 71
End: 2021-11-29
Attending: FAMILY MEDICINE
Payer: COMMERCIAL

## 2021-11-29 DIAGNOSIS — E83.52 HYPERCALCEMIA: Primary | ICD-10-CM

## 2021-11-29 PROCEDURE — 4010F ACE/ARB THERAPY RXD/TAKEN: CPT | Mod: CPTII,95,, | Performed by: FAMILY MEDICINE

## 2021-11-29 PROCEDURE — 99213 OFFICE O/P EST LOW 20 MIN: CPT | Mod: 95,,, | Performed by: FAMILY MEDICINE

## 2021-11-29 PROCEDURE — 99213 PR OFFICE/OUTPT VISIT, EST, LEVL III, 20-29 MIN: ICD-10-PCS | Mod: 95,,, | Performed by: FAMILY MEDICINE

## 2021-11-29 PROCEDURE — 4010F PR ACE/ARB THEARPY RXD/TAKEN: ICD-10-PCS | Mod: CPTII,95,, | Performed by: FAMILY MEDICINE

## 2021-11-30 ENCOUNTER — TELEPHONE (OUTPATIENT)
Dept: PRIMARY CARE CLINIC | Facility: CLINIC | Age: 71
End: 2021-11-30
Payer: COMMERCIAL

## 2021-12-01 ENCOUNTER — PATIENT MESSAGE (OUTPATIENT)
Dept: PRIMARY CARE CLINIC | Facility: CLINIC | Age: 71
End: 2021-12-01
Payer: COMMERCIAL

## 2021-12-01 ENCOUNTER — PROCEDURE VISIT (OUTPATIENT)
Dept: PAIN MEDICINE | Facility: CLINIC | Age: 71
End: 2021-12-01
Payer: COMMERCIAL

## 2021-12-01 ENCOUNTER — LAB VISIT (OUTPATIENT)
Dept: LAB | Facility: OTHER | Age: 71
End: 2021-12-01
Attending: FAMILY MEDICINE
Payer: COMMERCIAL

## 2021-12-01 VITALS
HEIGHT: 66 IN | SYSTOLIC BLOOD PRESSURE: 147 MMHG | BODY MASS INDEX: 41.88 KG/M2 | HEART RATE: 71 BPM | WEIGHT: 260.56 LBS | RESPIRATION RATE: 20 BRPM | TEMPERATURE: 98 F | DIASTOLIC BLOOD PRESSURE: 95 MMHG

## 2021-12-01 DIAGNOSIS — M47.816 LUMBAR SPONDYLOSIS: ICD-10-CM

## 2021-12-01 DIAGNOSIS — M54.16 LUMBAR RADICULOPATHY: ICD-10-CM

## 2021-12-01 DIAGNOSIS — E83.52 HYPERCALCEMIA: ICD-10-CM

## 2021-12-01 DIAGNOSIS — M48.061 SPINAL STENOSIS OF LUMBAR REGION, UNSPECIFIED WHETHER NEUROGENIC CLAUDICATION PRESENT: ICD-10-CM

## 2021-12-01 DIAGNOSIS — M25.562 CHRONIC PAIN OF LEFT KNEE: Primary | ICD-10-CM

## 2021-12-01 DIAGNOSIS — G89.29 CHRONIC PAIN OF LEFT KNEE: Primary | ICD-10-CM

## 2021-12-01 DIAGNOSIS — R79.89 ELEVATED PTHRP LEVEL: Primary | ICD-10-CM

## 2021-12-01 LAB
ALBUMIN SERPL BCP-MCNC: 3.8 G/DL (ref 3.5–5.2)
ALP SERPL-CCNC: 140 U/L (ref 55–135)
ALT SERPL W/O P-5'-P-CCNC: 18 U/L (ref 10–44)
ANION GAP SERPL CALC-SCNC: 9 MMOL/L (ref 8–16)
AST SERPL-CCNC: 16 U/L (ref 10–40)
BILIRUB SERPL-MCNC: 0.7 MG/DL (ref 0.1–1)
BUN SERPL-MCNC: 16 MG/DL (ref 8–23)
CALCIUM SERPL-MCNC: 10.3 MG/DL (ref 8.7–10.5)
CHLORIDE SERPL-SCNC: 113 MMOL/L (ref 95–110)
CO2 SERPL-SCNC: 22 MMOL/L (ref 23–29)
CREAT SERPL-MCNC: 1.2 MG/DL (ref 0.5–1.4)
EST. GFR  (AFRICAN AMERICAN): 53 ML/MIN/1.73 M^2
EST. GFR  (NON AFRICAN AMERICAN): 46 ML/MIN/1.73 M^2
GLUCOSE SERPL-MCNC: 97 MG/DL (ref 70–110)
POTASSIUM SERPL-SCNC: 4.1 MMOL/L (ref 3.5–5.1)
PROT SERPL-MCNC: 6.9 G/DL (ref 6–8.4)
PTH-INTACT SERPL-MCNC: 440.2 PG/ML (ref 9–77)
SODIUM SERPL-SCNC: 144 MMOL/L (ref 136–145)

## 2021-12-01 PROCEDURE — 80053 COMPREHEN METABOLIC PANEL: CPT | Performed by: FAMILY MEDICINE

## 2021-12-01 PROCEDURE — 99214 OFFICE O/P EST MOD 30 MIN: CPT | Mod: 25,S$GLB,, | Performed by: ANESTHESIOLOGY

## 2021-12-01 PROCEDURE — 99214 PR OFFICE/OUTPT VISIT, EST, LEVL IV, 30-39 MIN: ICD-10-PCS | Mod: 25,S$GLB,, | Performed by: ANESTHESIOLOGY

## 2021-12-01 PROCEDURE — 36415 COLL VENOUS BLD VENIPUNCTURE: CPT | Performed by: FAMILY MEDICINE

## 2021-12-01 PROCEDURE — 20610 PR DRAIN/INJECT LARGE JOINT/BURSA: ICD-10-PCS | Mod: LT,S$GLB,, | Performed by: ANESTHESIOLOGY

## 2021-12-01 PROCEDURE — 20610 DRAIN/INJ JOINT/BURSA W/O US: CPT | Mod: LT,S$GLB,, | Performed by: ANESTHESIOLOGY

## 2021-12-01 PROCEDURE — 83970 ASSAY OF PARATHORMONE: CPT | Performed by: FAMILY MEDICINE

## 2021-12-03 ENCOUNTER — TELEPHONE (OUTPATIENT)
Dept: ENDOCRINOLOGY | Facility: CLINIC | Age: 71
End: 2021-12-03
Payer: COMMERCIAL

## 2021-12-03 ENCOUNTER — TELEPHONE (OUTPATIENT)
Dept: INTERNAL MEDICINE | Facility: CLINIC | Age: 71
End: 2021-12-03
Payer: COMMERCIAL

## 2021-12-10 ENCOUNTER — TELEPHONE (OUTPATIENT)
Dept: PAIN MEDICINE | Facility: CLINIC | Age: 71
End: 2021-12-10
Payer: COMMERCIAL

## 2021-12-11 ENCOUNTER — HOSPITAL ENCOUNTER (OUTPATIENT)
Dept: RADIOLOGY | Facility: OTHER | Age: 71
Discharge: HOME | End: 2021-12-11
Attending: ANESTHESIOLOGY
Payer: COMMERCIAL

## 2021-12-11 DIAGNOSIS — M54.16 LUMBAR RADICULOPATHY: ICD-10-CM

## 2021-12-11 DIAGNOSIS — M47.816 LUMBAR SPONDYLOSIS: ICD-10-CM

## 2021-12-11 DIAGNOSIS — M48.061 SPINAL STENOSIS OF LUMBAR REGION, UNSPECIFIED WHETHER NEUROGENIC CLAUDICATION PRESENT: ICD-10-CM

## 2021-12-11 PROCEDURE — 72148 MRI LUMBAR SPINE W/O DYE: CPT | Mod: TC

## 2021-12-11 PROCEDURE — 72148 MRI LUMBAR SPINE WITHOUT CONTRAST: ICD-10-PCS | Mod: 26,,, | Performed by: RADIOLOGY

## 2021-12-11 PROCEDURE — 72148 MRI LUMBAR SPINE W/O DYE: CPT | Mod: 26,,, | Performed by: RADIOLOGY

## 2021-12-13 ENCOUNTER — OFFICE VISIT (OUTPATIENT)
Dept: PAIN MEDICINE | Facility: CLINIC | Age: 71
End: 2021-12-13
Payer: COMMERCIAL

## 2021-12-13 ENCOUNTER — TELEPHONE (OUTPATIENT)
Dept: PAIN MEDICINE | Facility: CLINIC | Age: 71
End: 2021-12-13
Payer: COMMERCIAL

## 2021-12-13 VITALS
HEIGHT: 66 IN | WEIGHT: 257.94 LBS | RESPIRATION RATE: 18 BRPM | TEMPERATURE: 98 F | DIASTOLIC BLOOD PRESSURE: 92 MMHG | BODY MASS INDEX: 41.45 KG/M2 | HEART RATE: 73 BPM | SYSTOLIC BLOOD PRESSURE: 131 MMHG

## 2021-12-13 DIAGNOSIS — M48.061 SPINAL STENOSIS OF LUMBAR REGION, UNSPECIFIED WHETHER NEUROGENIC CLAUDICATION PRESENT: ICD-10-CM

## 2021-12-13 DIAGNOSIS — M25.562 CHRONIC PAIN OF LEFT KNEE: ICD-10-CM

## 2021-12-13 DIAGNOSIS — M47.816 LUMBAR SPONDYLOSIS: Primary | ICD-10-CM

## 2021-12-13 DIAGNOSIS — G89.29 CHRONIC PAIN OF LEFT KNEE: ICD-10-CM

## 2021-12-13 PROCEDURE — 99213 OFFICE O/P EST LOW 20 MIN: CPT | Mod: S$GLB,,, | Performed by: NURSE PRACTITIONER

## 2021-12-13 PROCEDURE — 4010F ACE/ARB THERAPY RXD/TAKEN: CPT | Mod: CPTII,S$GLB,, | Performed by: NURSE PRACTITIONER

## 2021-12-13 PROCEDURE — 4010F PR ACE/ARB THEARPY RXD/TAKEN: ICD-10-PCS | Mod: CPTII,S$GLB,, | Performed by: NURSE PRACTITIONER

## 2021-12-13 PROCEDURE — 99213 PR OFFICE/OUTPT VISIT, EST, LEVL III, 20-29 MIN: ICD-10-PCS | Mod: S$GLB,,, | Performed by: NURSE PRACTITIONER

## 2021-12-13 PROCEDURE — 99999 PR PBB SHADOW E&M-EST. PATIENT-LVL IV: CPT | Mod: PBBFAC,,, | Performed by: NURSE PRACTITIONER

## 2021-12-13 PROCEDURE — 99999 PR PBB SHADOW E&M-EST. PATIENT-LVL IV: ICD-10-PCS | Mod: PBBFAC,,, | Performed by: NURSE PRACTITIONER

## 2022-01-03 PROBLEM — E21.3 HYPERPARATHYROIDISM: Status: ACTIVE | Noted: 2022-01-03

## 2022-01-04 ENCOUNTER — PATIENT MESSAGE (OUTPATIENT)
Dept: ORTHOPEDICS | Facility: CLINIC | Age: 72
End: 2022-01-04
Payer: COMMERCIAL

## 2022-01-05 ENCOUNTER — OFFICE VISIT (OUTPATIENT)
Dept: ENDOCRINOLOGY | Facility: CLINIC | Age: 72
End: 2022-01-05
Payer: COMMERCIAL

## 2022-01-05 ENCOUNTER — LAB VISIT (OUTPATIENT)
Dept: LAB | Facility: OTHER | Age: 72
End: 2022-01-05
Attending: INTERNAL MEDICINE
Payer: COMMERCIAL

## 2022-01-05 VITALS
DIASTOLIC BLOOD PRESSURE: 74 MMHG | BODY MASS INDEX: 41.59 KG/M2 | WEIGHT: 258.81 LBS | SYSTOLIC BLOOD PRESSURE: 126 MMHG | HEART RATE: 98 BPM | HEIGHT: 66 IN

## 2022-01-05 DIAGNOSIS — J30.2 OTHER SEASONAL ALLERGIC RHINITIS: ICD-10-CM

## 2022-01-05 DIAGNOSIS — M85.89 OSTEOPENIA OF MULTIPLE SITES: Primary | ICD-10-CM

## 2022-01-05 DIAGNOSIS — E66.01 MORBID OBESITY: ICD-10-CM

## 2022-01-05 DIAGNOSIS — E21.3 HYPERPARATHYROIDISM: ICD-10-CM

## 2022-01-05 DIAGNOSIS — I10 PRIMARY HYPERTENSION: ICD-10-CM

## 2022-01-05 LAB
ALBUMIN SERPL BCP-MCNC: 4 G/DL (ref 3.5–5.2)
ALP SERPL-CCNC: 145 U/L (ref 55–135)
ALT SERPL W/O P-5'-P-CCNC: 16 U/L (ref 10–44)
ANION GAP SERPL CALC-SCNC: 9 MMOL/L (ref 8–16)
AST SERPL-CCNC: 14 U/L (ref 10–40)
BILIRUB SERPL-MCNC: 0.6 MG/DL (ref 0.1–1)
BUN SERPL-MCNC: 10 MG/DL (ref 8–23)
CALCIUM SERPL-MCNC: 10.5 MG/DL (ref 8.7–10.5)
CHLORIDE SERPL-SCNC: 109 MMOL/L (ref 95–110)
CO2 SERPL-SCNC: 25 MMOL/L (ref 23–29)
CREAT SERPL-MCNC: 1 MG/DL (ref 0.5–1.4)
EST. GFR  (AFRICAN AMERICAN): >60 ML/MIN/1.73 M^2
EST. GFR  (NON AFRICAN AMERICAN): 57 ML/MIN/1.73 M^2
GLUCOSE SERPL-MCNC: 110 MG/DL (ref 70–110)
POTASSIUM SERPL-SCNC: 4.3 MMOL/L (ref 3.5–5.1)
PROT SERPL-MCNC: 6.9 G/DL (ref 6–8.4)
PTH-INTACT SERPL-MCNC: 520.6 PG/ML (ref 9–77)
SODIUM SERPL-SCNC: 143 MMOL/L (ref 136–145)

## 2022-01-05 PROCEDURE — 3078F PR MOST RECENT DIASTOLIC BLOOD PRESSURE < 80 MM HG: ICD-10-PCS | Mod: CPTII,S$GLB,, | Performed by: INTERNAL MEDICINE

## 2022-01-05 PROCEDURE — 3288F PR FALLS RISK ASSESSMENT DOCUMENTED: ICD-10-PCS | Mod: CPTII,S$GLB,, | Performed by: INTERNAL MEDICINE

## 2022-01-05 PROCEDURE — 3074F PR MOST RECENT SYSTOLIC BLOOD PRESSURE < 130 MM HG: ICD-10-PCS | Mod: CPTII,S$GLB,, | Performed by: INTERNAL MEDICINE

## 2022-01-05 PROCEDURE — 3288F FALL RISK ASSESSMENT DOCD: CPT | Mod: CPTII,S$GLB,, | Performed by: INTERNAL MEDICINE

## 2022-01-05 PROCEDURE — 99204 OFFICE O/P NEW MOD 45 MIN: CPT | Mod: S$GLB,,, | Performed by: INTERNAL MEDICINE

## 2022-01-05 PROCEDURE — 99204 PR OFFICE/OUTPT VISIT, NEW, LEVL IV, 45-59 MIN: ICD-10-PCS | Mod: S$GLB,,, | Performed by: INTERNAL MEDICINE

## 2022-01-05 PROCEDURE — 1125F PR PAIN SEVERITY QUANTIFIED, PAIN PRESENT: ICD-10-PCS | Mod: CPTII,S$GLB,, | Performed by: INTERNAL MEDICINE

## 2022-01-05 PROCEDURE — 1160F RVW MEDS BY RX/DR IN RCRD: CPT | Mod: CPTII,S$GLB,, | Performed by: INTERNAL MEDICINE

## 2022-01-05 PROCEDURE — 3074F SYST BP LT 130 MM HG: CPT | Mod: CPTII,S$GLB,, | Performed by: INTERNAL MEDICINE

## 2022-01-05 PROCEDURE — 1125F AMNT PAIN NOTED PAIN PRSNT: CPT | Mod: CPTII,S$GLB,, | Performed by: INTERNAL MEDICINE

## 2022-01-05 PROCEDURE — 83970 ASSAY OF PARATHORMONE: CPT | Performed by: INTERNAL MEDICINE

## 2022-01-05 PROCEDURE — 80053 COMPREHEN METABOLIC PANEL: CPT | Performed by: INTERNAL MEDICINE

## 2022-01-05 PROCEDURE — 1159F MED LIST DOCD IN RCRD: CPT | Mod: CPTII,S$GLB,, | Performed by: INTERNAL MEDICINE

## 2022-01-05 PROCEDURE — 3078F DIAST BP <80 MM HG: CPT | Mod: CPTII,S$GLB,, | Performed by: INTERNAL MEDICINE

## 2022-01-05 PROCEDURE — 3008F BODY MASS INDEX DOCD: CPT | Mod: CPTII,S$GLB,, | Performed by: INTERNAL MEDICINE

## 2022-01-05 PROCEDURE — 36415 COLL VENOUS BLD VENIPUNCTURE: CPT | Performed by: INTERNAL MEDICINE

## 2022-01-05 PROCEDURE — 1101F PR PT FALLS ASSESS DOC 0-1 FALLS W/OUT INJ PAST YR: ICD-10-PCS | Mod: CPTII,S$GLB,, | Performed by: INTERNAL MEDICINE

## 2022-01-05 PROCEDURE — 1159F PR MEDICATION LIST DOCUMENTED IN MEDICAL RECORD: ICD-10-PCS | Mod: CPTII,S$GLB,, | Performed by: INTERNAL MEDICINE

## 2022-01-05 PROCEDURE — 3008F PR BODY MASS INDEX (BMI) DOCUMENTED: ICD-10-PCS | Mod: CPTII,S$GLB,, | Performed by: INTERNAL MEDICINE

## 2022-01-05 PROCEDURE — 1160F PR REVIEW ALL MEDS BY PRESCRIBER/CLIN PHARMACIST DOCUMENTED: ICD-10-PCS | Mod: CPTII,S$GLB,, | Performed by: INTERNAL MEDICINE

## 2022-01-05 PROCEDURE — 1101F PT FALLS ASSESS-DOCD LE1/YR: CPT | Mod: CPTII,S$GLB,, | Performed by: INTERNAL MEDICINE

## 2022-01-05 RX ORDER — FLUTICASONE PROPIONATE 50 MCG
2 SPRAY, SUSPENSION (ML) NASAL DAILY
Qty: 48 ML | Refills: 3 | Status: SHIPPED | OUTPATIENT
Start: 2022-01-05 | End: 2023-01-16

## 2022-01-05 NOTE — TELEPHONE ENCOUNTER
No new care gaps identified.  Powered by [employer]. Reference number: [id number].   1/05/2022 12:36:34 PM CST

## 2022-01-05 NOTE — PROGRESS NOTES
Subjective:      Chief Complaint: Hyperparathyroidism    HPI: Charlotte Crowder is a 71 y.o. female who is here for an initial evaluation for parathyroid.    With regards to the hyperparathyroidism:  Was found to have hypercalcemia on routine labs - first noted: since at least 1/2013. Off and on  PTH was checked: 12/2021, .    she denied current or past lithium use.    Parathyroid imaging: None  Thyroid ultrasound: None    Last labs:    Lab Results   Component Value Date    CALCIUM 10.3 12/01/2021    ALBUMIN 3.8 12/01/2021    CREATININE 1.2 12/01/2021    YDRCHVJG51KG 31 04/23/2020    .2 (H) 12/01/2021     Not taking biotin    Daily intake of calcium is: none  Taking vitamin D: none    Not on HCTZ    Last bmd was: 10/2020. Osteopenia   Spine -1.5, right fem neck -1.6. FRAX 3.9% major, 0.5% hip.    Denies fractures, height loss.    Maybe thinks she had kidney stone a few years ago, but not sure, thinks it may have bene gallstone or something else    Today, pt reports feeling okay overall.   pain in the left side comes and goes.    Denies constipation, depression.   some increased urination, on fluid pills per patient. Though don't see any on chart currently.    Reviewed past medical, family, social history and updated as appropriate.    Review of Systems  As above    Objective:     Vitals:    01/05/22 1352   BP: 126/74   Pulse: 98     BP Readings from Last 5 Encounters:   12/13/21 (!) 131/92   12/01/21 (!) 147/95   11/16/21 132/84   10/19/21 (!) 140/84   10/15/21 124/74     Physical Exam  Vitals reviewed.   Constitutional:       General: She is not in acute distress.     Appearance: She is obese.   Cardiovascular:      Heart sounds: Normal heart sounds.   Pulmonary:      Effort: Pulmonary effort is normal.   Musculoskeletal:         General: Swelling (in ankles) present.       Wt Readings from Last 5 Encounters:   12/13/21 1522 117 kg (257 lb 15 oz)   12/01/21 1149 118.2 kg (260 lb 9.3 oz)   11/16/21  0737 119.6 kg (263 lb 10.7 oz)   10/19/21 0158 118.4 kg (261 lb)   10/15/21 1308 118.4 kg (261 lb 0.4 oz)     Lab Results   Component Value Date    HGBA1C 5.2 11/22/2021    HGBA1C 5.3 01/02/2019    HGBA1C 5.1 02/08/2018    HGBA1C 5.1 01/26/2017     Lab Results   Component Value Date    CHOL 160 11/22/2021    CHOL 227 (H) 08/26/2021    HDL 41 11/22/2021    HDL 39 (L) 08/26/2021    LDLCALC 91.4 11/22/2021    LDLCALC 131.8 08/26/2021    TRIG 138 11/22/2021    TRIG 281 (H) 08/26/2021    CHOLHDL 25.6 11/22/2021    CHOLHDL 17.2 (L) 08/26/2021     Lab Results   Component Value Date     12/01/2021    K 4.1 12/01/2021     (H) 12/01/2021    CO2 22 (L) 12/01/2021    GLU 97 12/01/2021    BUN 16 12/01/2021    CREATININE 1.2 12/01/2021    CALCIUM 10.3 12/01/2021    PROT 6.9 12/01/2021    ALBUMIN 3.8 12/01/2021    BILITOT 0.7 12/01/2021    ALKPHOS 140 (H) 12/01/2021    AST 16 12/01/2021    ALT 18 12/01/2021    ANIONGAP 9 12/01/2021    ESTGFRAFRICA 53 (A) 12/01/2021    EGFRNONAA 46 (A) 12/01/2021    TSH 1.040 01/02/2019     Assessment/Plan:     Hyperparathyroidism  Potentially primary hyperparathyroidism  High calcium since 2003. Relatively stable, making H a bit more likely.  Has CKD, but PTH higher than expected with her degree of renal function.     - unable to order repeat vit D due to tube shortage at this time. Last level was okay.   - Check 24 urine calcium to r/o [facility]   - last DXA with osteopenia 10/2020. Repeat 10/2022, also add forearm at that time    Discussed indications for surgery if primary hyperparathyroidism proven- handout provided to patient.  Avoid dehydration, excessive calcium supplementation and meds (HCTZ) that can worsen hypercalcemia.    F/u after DXA in 10/2020.      Morbid obesity  Body mass index is 41.77 kg/m².   complicated by HTN, KIMMIE.   monitor weight      HTN (hypertension)  bp controlled today. Continue same regimen, f/u with PCP.    avoid HCTZ, chlorthalidone, other thiazide agents  due to hypercalcemia      Follow up in about 10 months (around 11/5/2022) for lab review, imaging review, further monitoring.      Georgi Lynn MD  Endocrinology

## 2022-01-05 NOTE — TELEPHONE ENCOUNTER
Pt would like to know if there is another medication she can try for sinus congestion along with the Flonase.

## 2022-01-05 NOTE — ASSESSMENT & PLAN NOTE
bp controlled today. Continue same regimen, f/u with PCP.    avoid HCTZ, chlorthalidone, other thiazide agents due to hypercalcemia

## 2022-01-05 NOTE — PATIENT INSTRUCTIONS
"For parathyroid, check 24 hour urine test and repeat blood test.    Repeat bone density in October/November 2022, follow-up appointment after that.    Patient Education       Primary Hyperparathyroidism   The Basics   Written by the doctors and editors at [facility]   What is primary hyperparathyroidism? -- Primary hyperparathyroidism is a disorder of the parathyroid glands in your neck (figure 1). These glands make a hormone that helps control the amount of calcium in the blood. This hormone is called "parathyroid hormone," or "PTH."    Primary hyperparathyroidism is when your parathyroid glands make too much PTH. This can cause too much calcium to build up in your blood. It can happen when a gland develops an abnormal benign (non-cancer) growth. It can also happen when 1 or more of the glands grow bigger than they should. Parathyroid cancer is a very rare cause of primary hyperparathyroidism.  What are the symptoms of primary hyperparathyroidism? -- Most people with this condition have no symptoms. But some people do have symptoms that might be related to having more calcium in their blood than normal. These symptoms include:  · Pain in the joints  · Feeling tired or weak  · Loss of appetite  · Feeling depressed  · Trouble concentrating  If your PTH and blood calcium levels get very high, you might get constipated, feel very thirsty, or urinate more often than usual. Some people have more serious symptoms, such as:  · Problems with how the kidneys work  · Kidney stones  · Weak bones  · Gout (a kind of arthritis) or other problems in the joints  · Chemical imbalances in the blood  "Parathyroid crisis" is a rare but serious problem. It can happen if you have primary hyperparathyroidism and get sick with something that causes you to lose fluids (like vomiting or diarrhea). This causes the amounts of PTH and calcium in the blood to go up suddenly. If this happens, you might have belly pain, nausea, and sometimes " problems thinking clearly and staying alert. It is important to see a doctor or nurse right away if you have primary hyperparathyroidism plus lasting vomiting or diarrhea, and can't keep fluids down.  Is there a test for primary hyperparathyroidism? -- Yes. A doctor or nurse can do tests to measure the levels of PTH and calcium in your blood. Many people with primary hyperparathyroidism do not notice any symptoms. The condition is often found when a doctor or nurse does a blood test for some other reason.  If you have primary hyperparathyroidism, your doctor or nurse might do other tests, too. You will probably get a special kind of X-ray to see if your bones are weaker than normal. Plus, you might get checked for kidney stones.  Is there anything I can do on my own to help my condition? -- Yes. Even if you do not have any symptoms, there are things you can do to help prevent problems:  · Drink plenty of liquids, and try not to get dehydrated. This can help to prevent kidney stones.  · Stay active. This can help keep your calcium levels normal and your bones healthy.  · Try to get about 1000 milligrams of calcium each day. These tables show how much calcium is in certain foods and vitamin supplements (table 1 and table 2). It is better to get your calcium from foods and drinks rather than supplements. Some people might need to reduce the amount of calcium in their diet. Your doctor will let you know if this is the case for you.  · Try to get about 400 to 800 international units (IU) of vitamin D each day (table 3). This is the same as 10 to 20 micrograms of vitamin D. Not having enough vitamin D can weaken your bones.  · Do not take certain medicines that can affect the amount of calcium in the blood. Your doctor or nurse can tell you which medicines to avoid.  Even if you feel healthy, your doctor or nurse should still check your blood calcium every 6 months. They will also do regular tests to check your kidneys  and bones. (People whose bones are weakened because of their condition can get medicines to help protect their bones.)  How is primary hyperparathyroidism treated? -- The main treatment is surgery to remove the gland or glands that are causing the problem. In most cases, surgery cures the condition. Still, people who have no symptoms do not always need surgery.  You will most likely need surgery if:  · The amount of calcium in your blood is much higher than normal  · Your primary hyperparathyroidism is causing problems with your kidneys or bones  · You are younger than 50  · You are not able to get regular checkups and tests  All topics are updated as new evidence becomes available and our peer review process is complete.  This topic retrieved from [employer] on: Sep 21, 2021.  Topic 27449 Version 7.0  Release: 29.4.2 - C29.263  © 2021 UpToDate, Inc. and/or its affiliates. All rights reserved.  figure 1: Thyroid and parathyroid glands     The thyroid is a butterfly-shaped gland in the middle of the neck. It sits just below the larynx (voice box). The thyroid makes two hormones, called T3 and T4, which control how the body uses and stores energy. The parathyroid glands are four small glands behind the thyroid. They make a hormone called parathyroid hormone, which helps control the amount of calcium in the blood.  Graphic 06604 Version 9.0    table 1: Foods and drinks with calcium  Food  Calcium in milligrams    Milk (skim, 2%, or whole; 8 oz [240 mL]) 300   Yogurt (6 oz [168 g]) 250   Orange juice (with calcium; 8 oz [240 mL]) 300   Tofu with calcium (0.5 cup [113 g]) 435   Cheese (1 oz [28 g]) 195 to 335 (hard cheese = higher calcium)   Cottage cheese (0.5 cup [113 g]) 130   Ice cream or frozen yogurt (0.5 cup [113 g]) 100   Soy milk (8 oz [240 mL]) 300   Beans (0.5 cup cooked [113 g]) 60 to 80   Dark, leafy green vegetables (0.5 cup cooked [113 g]) 50 to 135   Almonds (24 whole) 70   Orange (1 medium) 60   Graphic  35973 Version 6.0  table 2: Examples of calcium supplements     Elemental calcium per tablet  Calcium compound  Vitamin D    Caltrate 600 + D3  600 mg Carbonate 800 units (20 mcg)   Caltrate 600 + D3 Soft Chews  600 mg Carbonate 800 units (20 mcg)   Caltrate Gummy Bites  250 mg Tribasic calcium phosphate 400 units (10 mcg)   Caltrate 600 + D3 Plus Minerals Chewables  600 mg Carbonate 800 units (20 mcg)   Caltrate 600 + D3 Plus Minerals Minis  300 mg Carbonate 800 units (20 mcg)   Citracal Petites  200 mg Citrate 250 units (6.25 mcg)   Citracal Maximum  315 mg Citrate 250 units (6.25 mcg)   Citracal Plus Magnesium&Minerals  250 mg Citrate 125 units (3.12 mcg)   Citracal + D Slow Release  600 mg Citrate + carbonate blend 500 units (12.5 mcg)   Citracal Calcium Gummies  250 mg Tricalcium phosphate 500 units (12.5 mcg)   Citracal Calcium Pearls  200 mg Carbonate 500 units (12.5 mcg)   Os-Yobany Calcium + D3  500 mg Carbonate 200 units (5 mcg)   Os-Yobany Extra + D3  500 mg Carbonate 600 units (15 mcg)   Os-Yobany Ultra  600 mg Carbonate 500 units (12.5 mcg)   Os-Yobany Chewable  500 mg Carbonate 600 units (15 mcg)   Tums  200 mg Carbonate -   Tums Extra Strength  300 mg Carbonate -   Tums Ultra Strength  400 mg Carbonate -   Tums Chewy Delights  400 mg Carbonate -   Viactiv Calcium plus D + K  650 mg Carbonate 500 units (12.5 mcg)   These are examples of calcium supplements. You can also find other brands, as well as generic versions, in most drug stores.  Graphic 54742 Version 9.0  table 3: Selected food sources of vitamin D[1]  Food  Amount per serving     In international units (IU)  In micrograms    Cod liver oil, 1 tablespoon (15 mL) 1360 34   Sligo (sockeye), cooked, 3 ounces (85 g) 380 to 570* 9.5 to 14*   Mushrooms that have been exposed to ultraviolet light to increase vitamin D, 3 ounces (85 g) (not yet commonly available) 889 22.3   Mackerel, cooked, 3 ounces (85 g) 388 9.7   Tuna fish, canned in water, drained, 3  ounces (85 g) 40 to 68 1 to 2   Milk, nonfat, reduced fat, and whole, vitamin D-fortified, 8 ounces (240 mL) 100 2.5   Orange juice fortified with vitamin D, 8 ounces (240 mL) (check product labels, as amount of added vitamin D varies) 100 2.5   Yogurt, fortified with vitamin D, 6 ounces (180 mL) (more heavily fortified yogurts provide more of the DV) 80 2   Margarine, fortified, 1 tablespoon (15 g) 60 1.5   Sardines, canned in oil, drained, 2 sardines 46 1   Liver, beef, cooked, 3.5 ounces (100 g) 46 1   Ready-to-eat cereal, fortified with vitamin D, 6 to 8 ounces (227 g) (more heavily fortified cereals might provide more of the DV) 40 1   Egg, 1 whole (vitamin D is found in yolk) 25 0.6   Cheese, Swiss, 1 ounce (29 g) 6 0   In the [place], reference values are listed on food labels as a percentage of DVs (%DV), based on a 2000 calorie daily energy intake.  Graphic 06340 Version 7.0  Consumer Information Use and Disclaimer   This information is not specific medical advice and does not replace information you receive from your health care provider. This is only a brief summary of general information. It does NOT include all information about conditions, illnesses, injuries, tests, procedures, treatments, therapies, discharge instructions or life-style choices that may apply to you. You must talk with your health care provider for complete information about your health and treatment options. This information should not be used to decide whether or not to accept your health care provider's advice, instructions or recommendations. Only your health care provider has the knowledge and training to provide advice that is right for you. The use of this information is governed by the [employer] End User License Agreement, available at [email or web address].The use of [employer] content is governed by the [employer] Terms of Use. ©2021 UpToDate, Inc. All rights reserved.  Copyright    © 2021 [employer] and/or its affiliates. All rights reserved.

## 2022-01-05 NOTE — TELEPHONE ENCOUNTER
----- Message from Claude Singh sent at 1/5/2022 11:54 AM CST -----  Regarding: Refill  Contact: MADELEINE RAMSAY [[id number]]      Can the clinic reply in [facility]: no    1) Would like to speak with staff in regards to a prescription for sinus congestion due to have mucus stuck in her throat. Would like something to break it up.     Please refill the medication(s) listed below. Please call the patient when the prescription(s) is ready for  at this phone number   [phone]      Medication #1 fluticasone propionate (FLONASE) 50 mcg/actuation nasal spray    Preferred Pharm[facility]: [id number] [place] [facility] [address]

## 2022-01-05 NOTE — ASSESSMENT & PLAN NOTE
Potentially primary hyperparathyroidism  High calcium since 2003. Relatively stable, making FHH a bit more likely.  Has CKD, but PTH higher than expected with her degree of renal function.     - unable to order repeat vit D due to tube shortage at this time. Last level was okay.   - Check 24 urine calcium to r/o FHH   - last DXA with osteopenia 10/2020. Repeat 10/2022, also add forearm at that time    Discussed indications for surgery if primary hyperparathyroidism proven- handout provided to patient.  Avoid dehydration, excessive calcium supplementation and meds (HCTZ) that can worsen hypercalcemia.    F/u after DXA in 10/2020.

## 2022-01-07 ENCOUNTER — LAB VISIT (OUTPATIENT)
Dept: LAB | Facility: OTHER | Age: 72
End: 2022-01-07
Attending: INTERNAL MEDICINE
Payer: COMMERCIAL

## 2022-01-07 DIAGNOSIS — E21.3 HYPERPARATHYROIDISM: ICD-10-CM

## 2022-01-07 LAB
CALCIUM 24H UR-MRATE: 5 MG/HR (ref 4–12)
CALCIUM UR-MCNC: 5.5 MG/DL (ref 0–15)
CALCIUM URINE (MG/SPEC): 116 MG/SPEC
CREAT 24H UR-MRATE: 35.3 MG/HR (ref 40–75)
CREAT UR-MCNC: 40.3 MG/DL (ref 15–325)
CREATININE, URINE (MG/SPEC): 846.3 MG/SPEC
URINE COLLECTION DURATION: 24 HR
URINE COLLECTION DURATION: 24 HR
URINE VOLUME: 2100 ML
URINE VOLUME: 2100 ML

## 2022-01-07 PROCEDURE — 82340 ASSAY OF CALCIUM IN URINE: CPT | Performed by: INTERNAL MEDICINE

## 2022-01-07 PROCEDURE — 82570 ASSAY OF URINE CREATININE: CPT | Performed by: INTERNAL MEDICINE

## 2022-01-14 ENCOUNTER — TELEPHONE (OUTPATIENT)
Dept: INTERNAL MEDICINE | Facility: CLINIC | Age: 72
End: 2022-01-14
Payer: COMMERCIAL

## 2022-01-14 NOTE — TELEPHONE ENCOUNTER
I recommend Mucinex.  If you have high blood pressure you can not take Sudafed or any medications with DM (dextromethorphan) because it will elevate your blood pressure. You may take Coricidin HBP over the counter.

## 2022-01-14 NOTE — TELEPHONE ENCOUNTER
----- Message from Declan Santana sent at 1/14/2022  2:03 PM CST -----  Name of Who is Calling: MADELEINE RAMSAY          What is the request in detail: The patient is calling to speak to the nurse in regards to a few questions. Please advise          Can the clinic reply by [facility]: Yes         [facility]: [phone]

## 2022-01-14 NOTE — TELEPHONE ENCOUNTER
Returned pt's call.  Pt states she has been having congestion that the flonase isn't helping and she'd like something to break up mucous more. + ongoing productive cough with white mucous. Denies fever or other symptoms.     Attempted to make VV, but can can only do audio and unable to find audio visit for today.    Pended message to PCP or covering MD and informed pt of [facility] as needed.

## 2022-01-18 NOTE — TELEPHONE ENCOUNTER
Informed pt of Dr. Braswell's advice. Pt states that she will try Mucinex. Pt has no further questions or concerns.

## 2022-01-26 DIAGNOSIS — E21.3 HYPERPARATHYROIDISM: Primary | ICD-10-CM

## 2022-02-18 DIAGNOSIS — G47.33 OSA (OBSTRUCTIVE SLEEP APNEA): Primary | ICD-10-CM

## 2022-03-22 ENCOUNTER — TELEPHONE (OUTPATIENT)
Dept: PRIMARY CARE CLINIC | Facility: CLINIC | Age: 72
End: 2022-03-22
Payer: COMMERCIAL

## 2022-03-22 NOTE — TELEPHONE ENCOUNTER
----- Message from Ashley Correia sent at 3/18/2022  3:16 PM CDT -----  Regarding: [facility] fitness program  Name of Who is Calling:MADELEINE RAMSAY [[id number]]          What is the request in detail:Patient is requesting a call for the [facility]           Can the clinic reply by [facility]:NO           What [facility]:[phone]

## 2022-07-08 DIAGNOSIS — I10 ESSENTIAL HYPERTENSION: ICD-10-CM

## 2022-07-08 RX ORDER — AMLODIPINE BESYLATE 5 MG/1
TABLET ORAL
Qty: 90 TABLET | Refills: 0 | Status: SHIPPED | OUTPATIENT
Start: 2022-07-08 | End: 2022-07-15 | Stop reason: SDUPTHER

## 2022-07-12 ENCOUNTER — TELEPHONE (OUTPATIENT)
Dept: PRIMARY CARE CLINIC | Facility: CLINIC | Age: 72
End: 2022-07-12
Payer: COMMERCIAL

## 2022-07-12 DIAGNOSIS — Z00.00 ANNUAL PHYSICAL EXAM: Primary | ICD-10-CM

## 2022-07-12 NOTE — TELEPHONE ENCOUNTER
----- Message from Claude Singh sent at 7/12/2022  1:43 PM CDT -----  Regarding: Orders  Contact: MADELEINE RAMSAY [[id number]]  Name of Who is Calling: MADELEINE RASMAY [[id number]]      What is the request in detail: Would like to speak with staff in regards to annual blood work. Please advise      Can the clinic reply by [facility]: no      What [facility] Back [facility]: [phone]

## 2022-07-13 ENCOUNTER — TELEPHONE (OUTPATIENT)
Dept: ENDOCRINOLOGY | Facility: CLINIC | Age: 72
End: 2022-07-13
Payer: COMMERCIAL

## 2022-07-13 NOTE — TELEPHONE ENCOUNTER
----- Message from Nasrin Vincent RN sent at 7/12/2022  4:22 PM CDT -----  Contact: @[phone]    ----- Message -----  From: Loren Ceballos  Sent: 7/12/2022   2:25 PM CDT  To: Leah LEAL Staff    Pt is calling in to schedule her follow up appt from a letter she received in the mail. Please call to discuss further.

## 2022-07-15 ENCOUNTER — OFFICE VISIT (OUTPATIENT)
Dept: CARDIOLOGY | Facility: CLINIC | Age: 72
End: 2022-07-15
Payer: COMMERCIAL

## 2022-07-15 ENCOUNTER — TELEPHONE (OUTPATIENT)
Dept: SLEEP MEDICINE | Facility: CLINIC | Age: 72
End: 2022-07-15
Payer: COMMERCIAL

## 2022-07-15 VITALS
WEIGHT: 256.19 LBS | BODY MASS INDEX: 41.17 KG/M2 | HEART RATE: 82 BPM | SYSTOLIC BLOOD PRESSURE: 131 MMHG | OXYGEN SATURATION: 99 % | DIASTOLIC BLOOD PRESSURE: 78 MMHG | HEIGHT: 66 IN

## 2022-07-15 DIAGNOSIS — N18.31 STAGE 3A CHRONIC KIDNEY DISEASE: ICD-10-CM

## 2022-07-15 DIAGNOSIS — E78.5 DYSLIPIDEMIA: Chronic | ICD-10-CM

## 2022-07-15 DIAGNOSIS — I10 ESSENTIAL HYPERTENSION: ICD-10-CM

## 2022-07-15 DIAGNOSIS — E66.01 MORBID OBESITY: ICD-10-CM

## 2022-07-15 DIAGNOSIS — I42.8 NON-ISCHEMIC CARDIOMYOPATHY: Primary | ICD-10-CM

## 2022-07-15 DIAGNOSIS — G47.33 OSA (OBSTRUCTIVE SLEEP APNEA): ICD-10-CM

## 2022-07-15 PROCEDURE — 1126F AMNT PAIN NOTED NONE PRSNT: CPT | Mod: CPTII,S$GLB,, | Performed by: NURSE PRACTITIONER

## 2022-07-15 PROCEDURE — 3008F PR BODY MASS INDEX (BMI) DOCUMENTED: ICD-10-PCS | Mod: CPTII,S$GLB,, | Performed by: NURSE PRACTITIONER

## 2022-07-15 PROCEDURE — 99214 PR OFFICE/OUTPT VISIT, EST, LEVL IV, 30-39 MIN: ICD-10-PCS | Mod: S$GLB,,, | Performed by: NURSE PRACTITIONER

## 2022-07-15 PROCEDURE — 1126F PR PAIN SEVERITY QUANTIFIED, NO PAIN PRESENT: ICD-10-PCS | Mod: CPTII,S$GLB,, | Performed by: NURSE PRACTITIONER

## 2022-07-15 PROCEDURE — 1160F PR REVIEW ALL MEDS BY PRESCRIBER/CLIN PHARMACIST DOCUMENTED: ICD-10-PCS | Mod: CPTII,S$GLB,, | Performed by: NURSE PRACTITIONER

## 2022-07-15 PROCEDURE — 99214 OFFICE O/P EST MOD 30 MIN: CPT | Mod: S$GLB,,, | Performed by: NURSE PRACTITIONER

## 2022-07-15 PROCEDURE — 4010F PR ACE/ARB THEARPY RXD/TAKEN: ICD-10-PCS | Mod: CPTII,S$GLB,, | Performed by: NURSE PRACTITIONER

## 2022-07-15 PROCEDURE — 3288F PR FALLS RISK ASSESSMENT DOCUMENTED: ICD-10-PCS | Mod: CPTII,S$GLB,, | Performed by: NURSE PRACTITIONER

## 2022-07-15 PROCEDURE — 3288F FALL RISK ASSESSMENT DOCD: CPT | Mod: CPTII,S$GLB,, | Performed by: NURSE PRACTITIONER

## 2022-07-15 PROCEDURE — 3008F BODY MASS INDEX DOCD: CPT | Mod: CPTII,S$GLB,, | Performed by: NURSE PRACTITIONER

## 2022-07-15 PROCEDURE — 99499 RISK ADDL DX/OHS AUDIT: ICD-10-PCS | Mod: S$GLB,,, | Performed by: NURSE PRACTITIONER

## 2022-07-15 PROCEDURE — 3075F PR MOST RECENT SYSTOLIC BLOOD PRESS GE 130-139MM HG: ICD-10-PCS | Mod: CPTII,S$GLB,, | Performed by: NURSE PRACTITIONER

## 2022-07-15 PROCEDURE — 1101F PR PT FALLS ASSESS DOC 0-1 FALLS W/OUT INJ PAST YR: ICD-10-PCS | Mod: CPTII,S$GLB,, | Performed by: NURSE PRACTITIONER

## 2022-07-15 PROCEDURE — 1159F MED LIST DOCD IN RCRD: CPT | Mod: CPTII,S$GLB,, | Performed by: NURSE PRACTITIONER

## 2022-07-15 PROCEDURE — 1159F PR MEDICATION LIST DOCUMENTED IN MEDICAL RECORD: ICD-10-PCS | Mod: CPTII,S$GLB,, | Performed by: NURSE PRACTITIONER

## 2022-07-15 PROCEDURE — 99999 PR PBB SHADOW E&M-EST. PATIENT-LVL IV: CPT | Mod: PBBFAC,,, | Performed by: NURSE PRACTITIONER

## 2022-07-15 PROCEDURE — 4010F ACE/ARB THERAPY RXD/TAKEN: CPT | Mod: CPTII,S$GLB,, | Performed by: NURSE PRACTITIONER

## 2022-07-15 PROCEDURE — 99999 PR PBB SHADOW E&M-EST. PATIENT-LVL IV: ICD-10-PCS | Mod: PBBFAC,,, | Performed by: NURSE PRACTITIONER

## 2022-07-15 PROCEDURE — 3075F SYST BP GE 130 - 139MM HG: CPT | Mod: CPTII,S$GLB,, | Performed by: NURSE PRACTITIONER

## 2022-07-15 PROCEDURE — 1101F PT FALLS ASSESS-DOCD LE1/YR: CPT | Mod: CPTII,S$GLB,, | Performed by: NURSE PRACTITIONER

## 2022-07-15 PROCEDURE — 1160F RVW MEDS BY RX/DR IN RCRD: CPT | Mod: CPTII,S$GLB,, | Performed by: NURSE PRACTITIONER

## 2022-07-15 PROCEDURE — 3078F DIAST BP <80 MM HG: CPT | Mod: CPTII,S$GLB,, | Performed by: NURSE PRACTITIONER

## 2022-07-15 PROCEDURE — 3078F PR MOST RECENT DIASTOLIC BLOOD PRESSURE < 80 MM HG: ICD-10-PCS | Mod: CPTII,S$GLB,, | Performed by: NURSE PRACTITIONER

## 2022-07-15 PROCEDURE — 99499 UNLISTED E&M SERVICE: CPT | Mod: S$GLB,,, | Performed by: NURSE PRACTITIONER

## 2022-07-15 RX ORDER — AMLODIPINE BESYLATE 5 MG/1
5 TABLET ORAL DAILY
Qty: 90 TABLET | Refills: 3 | Status: SHIPPED | OUTPATIENT
Start: 2022-07-15 | End: 2023-02-08 | Stop reason: SDUPTHER

## 2022-07-15 NOTE — TELEPHONE ENCOUNTER
----- Message from Tanisha Rodriguez NP sent at 7/15/2022  3:07 PM CDT -----  Regarding: Follow up  Hi Yokasta,     It looks like she last saw you about a year ago.  She is noting dry mucous membranes and congestion with the cpap.  Could you please see her and adjust her machine?  Thanks,  Tanisha

## 2022-07-15 NOTE — PROGRESS NOTES
Ms. Crowder was last seen in [facility] Visit 10/15/21    Subjective:   Patient ID:  Charlotte Crowder is a 72 y.o. female who presents for follow-up of Establish Care, Follow-up, and Annual Exam    Problems:  Non-ischemic cardiomyopathy  -EF 40-45% in 2015 improved to 60% in 2018  -normal coronary arteries in 2016  HTN  HLD  Severe KIMMIE, using cpap    HPI  Ms. Crowder is in clinic today for routine follow up.  Patient denies chest pain with exertion or at rest, palpitations, SOB, FUCHS, dizziness, syncope, edema, orthopnea, PND, or claudication.  Reports walking 10 minutes daily.  She uses hand weights with her arms.  Denies swelling with the amlodipine.  Patient has history of obstructive sleep apnea.  Reports nightly use of CPAP machine and denies any associated sx of KIMMIE.    Review of Systems   Constitutional: Negative for decreased appetite, diaphoresis, malaise/fatigue, weight gain and weight loss.   HENT: Positive for congestion.    Eyes: Negative for visual disturbance.   Cardiovascular: Negative for chest pain, claudication, dyspnea on exertion, irregular heartbeat, leg swelling, near-syncope, orthopnea, palpitations, paroxysmal nocturnal dyspnea and syncope.        Denies chest pressure   Respiratory: Negative for cough, hemoptysis, shortness of breath, sleep disturbances due to breathing and snoring.    Endocrine: Negative for cold intolerance and heat intolerance.   Hematologic/Lymphatic: Negative for bleeding problem. Does not bruise/bleed easily.   Musculoskeletal: Negative for myalgias.   Gastrointestinal: Negative for bloating, abdominal pain, anorexia, change in bowel habit, constipation, diarrhea, nausea and vomiting.   Neurological: Negative for difficulty with concentration, disturbances in coordination, excessive daytime sleepiness, dizziness, headaches, light-headedness, loss of balance, numbness and weakness.   Psychiatric/Behavioral: The patient does not have insomnia.        Allergies and  "current medications updated and reviewed:  Review of patient's allergies indicates:  No Known Allergies  Current Outpatient Medications   Medication Sig    atorvastatin (LIPITOR) 80 MG tablet Take 1 tablet (80 mg total) by mouth once daily.    carvediloL (COREG) 25 MG tablet Take 1 tablet (25 mg total) by mouth 2 (two) times daily with meals.    fluticasone propionate (FLONASE) 50 mcg/actuation nasal spray 2 sprays (100 mcg total) by Each Nostril route once daily.    irbesartan (AVAPRO) 300 MG tablet Take 1 tablet (300 mg total) by mouth every evening.    MULTIVIT-IRON-MIN-FOLIC ACID 3,500-18-0.4 UNIT-MG-MG ORAL CHEW Take 1 tablet by mouth once daily.    omega-3 fatty acids/fish oil (FISH OIL-OMEGA-3 FATTY ACIDS) 300-1,000 mg capsule Take by mouth once daily.    amLODIPine (NORVASC) 5 MG tablet Take 1 tablet (5 mg total) by mouth once daily.    loratadine (CLARITIN) 10 mg tablet Take 1 tablet (10 mg total) by mouth once daily.     No current facility-administered medications for this visit.       Objective:     Right Arm BP - Sittin/71 (07/15/22 1429)  Left Arm BP - Sittin/78 (07/15/22 1429)    /78 (BP Location: Left arm, Patient Position: Sitting, BP Method: Large (Automatic))   Pulse 82   Ht 5' 6" (1.676 m)   Wt 116.2 kg (256 lb 2.8 oz)   SpO2 99%   BMI 41.35 kg/m²       Physical Exam  Vitals and nursing note reviewed.   Constitutional:       General: She is not in acute distress.     Appearance: Normal appearance. She is well-developed. She is not diaphoretic.   HENT:      Head: Normocephalic and atraumatic.   Eyes:      General: Lids are normal. No scleral icterus.     Conjunctiva/sclera: Conjunctivae normal.   Neck:      Vascular: Normal carotid pulses. No carotid bruit, hepatojugular reflux or JVD.   Cardiovascular:      Rate and Rhythm: Normal rate and regular rhythm.      Chest Wall: PMI is not displaced.      Pulses: Intact distal pulses.           Carotid pulses are 2+ on the " right side and 2+ on the left side.       Radial pulses are 2+ on the right side and 2+ on the left side.        Dorsalis pedis pulses are 2+ on the right side and 2+ on the left side.        Posterior tibial pulses are 1+ on the right side and 1+ on the left side.      Heart sounds: S1 normal and S2 normal. No murmur heard.    No friction rub. No gallop.   Pulmonary:      Effort: Pulmonary effort is normal. No respiratory distress.      Breath sounds: Normal breath sounds. No decreased breath sounds, wheezing, rhonchi or rales.   Chest:      Chest wall: No tenderness.   Abdominal:      General: Bowel sounds are normal. There is no distension or abdominal bruit.      Palpations: Abdomen is soft. There is no fluid wave or pulsatile mass.      Tenderness: There is no abdominal tenderness.   Musculoskeletal:      Cervical back: Neck supple.   Skin:     General: Skin is warm and dry.      Findings: No rash.      Nails: There is no clubbing.   Neurological:      Mental Status: She is alert and oriented to person, place, and time.      Gait: Gait normal.   Psychiatric:         Speech: Speech normal.         Behavior: Behavior normal.         Thought Content: Thought content normal.         Judgment: Judgment normal.         Chemistry        Component Value Date/Time     01/05/2022 1458    K 4.3 01/05/2022 1458     01/05/2022 1458    CO2 25 01/05/2022 1458    BUN 10 01/05/2022 1458    CREATININE 1.0 01/05/2022 1458     01/05/2022 1458        Component Value Date/Time    CALCIUM 10.5 01/05/2022 1458    ALKPHOS 145 (H) 01/05/2022 1458    AST 14 01/05/2022 1458    ALT 16 01/05/2022 1458    BILITOT 0.6 01/05/2022 1458    ESTGFRAFRICA >60 01/05/2022 1458    EGFRNONAA 57 (A) 01/05/2022 1458        Lab Results   Component Value Date    HGBA1C 5.2 11/22/2021     Recent Labs   Lab 11/22/21  0920   WBC 3.64 L   Hemoglobin 12.7   Hematocrit 39.8   MCV 88   Platelets 214   Cholesterol 160   HDL 41   LDL Cholesterol  91.4   Triglycerides 138   HDL/Cholesterol Ratio 25.6              Test(s) Reviewed  I have reviewed the following in detail:  [] Stress test   [] Angiography   [x] Echocardiogram   [x] Labs   [] Other:         Assessment/Plan:   1. Non-ischemic cardiomyopathy  Well compensated.  NYHA class I.  Last TTE in 2018.  Repeat TTE prior to next visit. Stable. Monitor.     - Echo Saline Bubble? No; Future    2. Essential hypertension  BP at goal <130/80. Continue current regimen.    - amLODIPine (NORVASC) 5 MG tablet; Take 1 tablet (5 mg total) by mouth once daily.  Dispense: 90 tablet; Refill: 3  - Echo Saline Bubble? No; Future    3. Dyslipidemia  LDL at goal <100. No changes      4. Stage 3a chronic kidney disease  Mild. Stable. Monitor. Avoid NSAID use.     5. Morbid obesity  BMI 41.4 Encouraged CV exercise for 30 minutes a day for 5 days a week.       6. Severe KIMMIE (obstructive sleep apnea)  Using cpap. Encouraged nightly use of cpap and annual f/u with sleep clinic.         ,A copy of this note will be forwarded to Dr. Cochran and Dr. Thompson.     Follow up in about 1 year (around 7/15/2023).

## 2022-08-10 ENCOUNTER — LAB VISIT (OUTPATIENT)
Dept: LAB | Facility: OTHER | Age: 72
End: 2022-08-10
Attending: FAMILY MEDICINE
Payer: COMMERCIAL

## 2022-08-10 DIAGNOSIS — Z00.00 ANNUAL PHYSICAL EXAM: ICD-10-CM

## 2022-08-10 LAB
ALBUMIN SERPL BCP-MCNC: 3.8 G/DL (ref 3.5–5.2)
ALP SERPL-CCNC: 138 U/L (ref 55–135)
ALT SERPL W/O P-5'-P-CCNC: 15 U/L (ref 10–44)
ANION GAP SERPL CALC-SCNC: 8 MMOL/L (ref 8–16)
AST SERPL-CCNC: 13 U/L (ref 10–40)
BASOPHILS # BLD AUTO: 0.02 K/UL (ref 0–0.2)
BASOPHILS NFR BLD: 0.6 % (ref 0–1.9)
BILIRUB SERPL-MCNC: 0.6 MG/DL (ref 0.1–1)
BUN SERPL-MCNC: 14 MG/DL (ref 8–23)
CALCIUM SERPL-MCNC: 10.5 MG/DL (ref 8.7–10.5)
CHLORIDE SERPL-SCNC: 111 MMOL/L (ref 95–110)
CHOLEST SERPL-MCNC: 151 MG/DL (ref 120–199)
CHOLEST/HDLC SERPL: 3.7 {RATIO} (ref 2–5)
CO2 SERPL-SCNC: 24 MMOL/L (ref 23–29)
CREAT SERPL-MCNC: 1 MG/DL (ref 0.5–1.4)
DIFFERENTIAL METHOD: ABNORMAL
EOSINOPHIL # BLD AUTO: 0.1 K/UL (ref 0–0.5)
EOSINOPHIL NFR BLD: 4 % (ref 0–8)
ERYTHROCYTE [DISTWIDTH] IN BLOOD BY AUTOMATED COUNT: 13.3 % (ref 11.5–14.5)
EST. GFR  (NO RACE VARIABLE): >60 ML/MIN/1.73 M^2
ESTIMATED AVG GLUCOSE: 100 MG/DL (ref 68–131)
GLUCOSE SERPL-MCNC: 101 MG/DL (ref 70–110)
HBA1C MFR BLD: 5.1 % (ref 4–5.6)
HCT VFR BLD AUTO: 39.9 % (ref 37–48.5)
HDLC SERPL-MCNC: 41 MG/DL (ref 40–75)
HDLC SERPL: 27.2 % (ref 20–50)
HGB BLD-MCNC: 12.9 G/DL (ref 12–16)
IMM GRANULOCYTES # BLD AUTO: 0 K/UL (ref 0–0.04)
IMM GRANULOCYTES NFR BLD AUTO: 0 % (ref 0–0.5)
LDLC SERPL CALC-MCNC: 83.4 MG/DL (ref 63–159)
LYMPHOCYTES # BLD AUTO: 1.7 K/UL (ref 1–4.8)
LYMPHOCYTES NFR BLD: 49.7 % (ref 18–48)
MCH RBC QN AUTO: 28.7 PG (ref 27–31)
MCHC RBC AUTO-ENTMCNC: 32.3 G/DL (ref 32–36)
MCV RBC AUTO: 89 FL (ref 82–98)
MONOCYTES # BLD AUTO: 0.3 K/UL (ref 0.3–1)
MONOCYTES NFR BLD: 7.5 % (ref 4–15)
NEUTROPHILS # BLD AUTO: 1.3 K/UL (ref 1.8–7.7)
NEUTROPHILS NFR BLD: 38.2 % (ref 38–73)
NONHDLC SERPL-MCNC: 110 MG/DL
NRBC BLD-RTO: 0 /100 WBC
PLATELET # BLD AUTO: 201 K/UL (ref 150–450)
PMV BLD AUTO: 9.3 FL (ref 9.2–12.9)
POTASSIUM SERPL-SCNC: 4.7 MMOL/L (ref 3.5–5.1)
PROT SERPL-MCNC: 6.7 G/DL (ref 6–8.4)
RBC # BLD AUTO: 4.49 M/UL (ref 4–5.4)
SODIUM SERPL-SCNC: 143 MMOL/L (ref 136–145)
TRIGL SERPL-MCNC: 133 MG/DL (ref 30–150)
TSH SERPL DL<=0.005 MIU/L-ACNC: 1.54 UIU/ML (ref 0.4–4)
WBC # BLD AUTO: 3.46 K/UL (ref 3.9–12.7)

## 2022-08-10 PROCEDURE — 83036 HEMOGLOBIN GLYCOSYLATED A1C: CPT | Performed by: FAMILY MEDICINE

## 2022-08-10 PROCEDURE — 36415 COLL VENOUS BLD VENIPUNCTURE: CPT | Performed by: FAMILY MEDICINE

## 2022-08-10 PROCEDURE — 84443 ASSAY THYROID STIM HORMONE: CPT | Performed by: FAMILY MEDICINE

## 2022-08-10 PROCEDURE — 80061 LIPID PANEL: CPT | Performed by: FAMILY MEDICINE

## 2022-08-10 PROCEDURE — 80053 COMPREHEN METABOLIC PANEL: CPT | Performed by: FAMILY MEDICINE

## 2022-08-10 PROCEDURE — 85025 COMPLETE CBC W/AUTO DIFF WBC: CPT | Performed by: FAMILY MEDICINE

## 2022-08-16 ENCOUNTER — OFFICE VISIT (OUTPATIENT)
Dept: PRIMARY CARE CLINIC | Facility: CLINIC | Age: 72
End: 2022-08-16
Attending: FAMILY MEDICINE
Payer: COMMERCIAL

## 2022-08-16 VITALS
HEART RATE: 82 BPM | SYSTOLIC BLOOD PRESSURE: 118 MMHG | BODY MASS INDEX: 41.25 KG/M2 | OXYGEN SATURATION: 98 % | HEIGHT: 66 IN | DIASTOLIC BLOOD PRESSURE: 86 MMHG | WEIGHT: 256.63 LBS

## 2022-08-16 DIAGNOSIS — I10 HYPERTENSION, UNSPECIFIED TYPE: ICD-10-CM

## 2022-08-16 DIAGNOSIS — Z00.00 ANNUAL PHYSICAL EXAM: Primary | ICD-10-CM

## 2022-08-16 DIAGNOSIS — E66.01 MORBID OBESITY: ICD-10-CM

## 2022-08-16 PROCEDURE — 1159F PR MEDICATION LIST DOCUMENTED IN MEDICAL RECORD: ICD-10-PCS | Mod: CPTII,S$GLB,, | Performed by: FAMILY MEDICINE

## 2022-08-16 PROCEDURE — 99999 PR PBB SHADOW E&M-EST. PATIENT-LVL IV: ICD-10-PCS | Mod: PBBFAC,,, | Performed by: FAMILY MEDICINE

## 2022-08-16 PROCEDURE — 3044F PR MOST RECENT HEMOGLOBIN A1C LEVEL <7.0%: ICD-10-PCS | Mod: CPTII,S$GLB,, | Performed by: FAMILY MEDICINE

## 2022-08-16 PROCEDURE — 3074F PR MOST RECENT SYSTOLIC BLOOD PRESSURE < 130 MM HG: ICD-10-PCS | Mod: CPTII,S$GLB,, | Performed by: FAMILY MEDICINE

## 2022-08-16 PROCEDURE — 4010F PR ACE/ARB THEARPY RXD/TAKEN: ICD-10-PCS | Mod: CPTII,S$GLB,, | Performed by: FAMILY MEDICINE

## 2022-08-16 PROCEDURE — 3288F FALL RISK ASSESSMENT DOCD: CPT | Mod: CPTII,S$GLB,, | Performed by: FAMILY MEDICINE

## 2022-08-16 PROCEDURE — 3079F PR MOST RECENT DIASTOLIC BLOOD PRESSURE 80-89 MM HG: ICD-10-PCS | Mod: CPTII,S$GLB,, | Performed by: FAMILY MEDICINE

## 2022-08-16 PROCEDURE — 99397 PER PM REEVAL EST PAT 65+ YR: CPT | Mod: S$GLB,,, | Performed by: FAMILY MEDICINE

## 2022-08-16 PROCEDURE — 1160F PR REVIEW ALL MEDS BY PRESCRIBER/CLIN PHARMACIST DOCUMENTED: ICD-10-PCS | Mod: CPTII,S$GLB,, | Performed by: FAMILY MEDICINE

## 2022-08-16 PROCEDURE — 3079F DIAST BP 80-89 MM HG: CPT | Mod: CPTII,S$GLB,, | Performed by: FAMILY MEDICINE

## 2022-08-16 PROCEDURE — 3044F HG A1C LEVEL LT 7.0%: CPT | Mod: CPTII,S$GLB,, | Performed by: FAMILY MEDICINE

## 2022-08-16 PROCEDURE — 4010F ACE/ARB THERAPY RXD/TAKEN: CPT | Mod: CPTII,S$GLB,, | Performed by: FAMILY MEDICINE

## 2022-08-16 PROCEDURE — 1160F RVW MEDS BY RX/DR IN RCRD: CPT | Mod: CPTII,S$GLB,, | Performed by: FAMILY MEDICINE

## 2022-08-16 PROCEDURE — 1159F MED LIST DOCD IN RCRD: CPT | Mod: CPTII,S$GLB,, | Performed by: FAMILY MEDICINE

## 2022-08-16 PROCEDURE — 1101F PR PT FALLS ASSESS DOC 0-1 FALLS W/OUT INJ PAST YR: ICD-10-PCS | Mod: CPTII,S$GLB,, | Performed by: FAMILY MEDICINE

## 2022-08-16 PROCEDURE — 1101F PT FALLS ASSESS-DOCD LE1/YR: CPT | Mod: CPTII,S$GLB,, | Performed by: FAMILY MEDICINE

## 2022-08-16 PROCEDURE — 3074F SYST BP LT 130 MM HG: CPT | Mod: CPTII,S$GLB,, | Performed by: FAMILY MEDICINE

## 2022-08-16 PROCEDURE — 3288F PR FALLS RISK ASSESSMENT DOCUMENTED: ICD-10-PCS | Mod: CPTII,S$GLB,, | Performed by: FAMILY MEDICINE

## 2022-08-16 PROCEDURE — 1126F AMNT PAIN NOTED NONE PRSNT: CPT | Mod: CPTII,S$GLB,, | Performed by: FAMILY MEDICINE

## 2022-08-16 PROCEDURE — 3008F BODY MASS INDEX DOCD: CPT | Mod: CPTII,S$GLB,, | Performed by: FAMILY MEDICINE

## 2022-08-16 PROCEDURE — 1126F PR PAIN SEVERITY QUANTIFIED, NO PAIN PRESENT: ICD-10-PCS | Mod: CPTII,S$GLB,, | Performed by: FAMILY MEDICINE

## 2022-08-16 PROCEDURE — 99999 PR PBB SHADOW E&M-EST. PATIENT-LVL IV: CPT | Mod: PBBFAC,,, | Performed by: FAMILY MEDICINE

## 2022-08-16 PROCEDURE — 99397 PR PREVENTIVE VISIT,EST,65 & OVER: ICD-10-PCS | Mod: S$GLB,,, | Performed by: FAMILY MEDICINE

## 2022-08-16 PROCEDURE — 3008F PR BODY MASS INDEX (BMI) DOCUMENTED: ICD-10-PCS | Mod: CPTII,S$GLB,, | Performed by: FAMILY MEDICINE

## 2022-08-16 RX ORDER — ATORVASTATIN CALCIUM 40 MG/1
40 TABLET, FILM COATED ORAL DAILY
COMMUNITY
Start: 2022-06-24 | End: 2023-01-04 | Stop reason: SDUPTHER

## 2022-08-16 NOTE — PROGRESS NOTES
Subjective:       Patient ID: Charlotte Corwder is a 72 y.o. female.    Chief Complaint: Annual Exam    Pt feels well,no complaints. Needs annual exam    Review of Systems   Constitutional: Negative for activity change, appetite change, chills, fatigue and fever.   HENT: Negative for congestion, ear pain, sinus pressure, sore throat and trouble swallowing.    Eyes: Negative for photophobia, pain and visual disturbance.   Respiratory: Negative for apnea, cough, chest tightness, shortness of breath and wheezing.    Cardiovascular: Negative for chest pain, palpitations and leg swelling.   Gastrointestinal: Negative for abdominal distention, abdominal pain, constipation, diarrhea, nausea and vomiting.   Genitourinary: Negative.    Musculoskeletal: Negative for back pain, joint swelling and neck pain.   Skin: Negative.    Neurological: Negative for dizziness, tremors, weakness, numbness and headaches.   Psychiatric/Behavioral: Negative for behavioral problems, decreased concentration and sleep disturbance. The patient is not nervous/anxious.    All other systems reviewed and are negative.        Objective:      Physical Exam  Vitals reviewed.   Constitutional:       General: She is not in acute distress.     Appearance: Normal appearance. She is well-developed and normal weight. She is not ill-appearing, toxic-appearing or diaphoretic.   HENT:      Head: Normocephalic and atraumatic.      Right Ear: Tympanic membrane, ear canal and external ear normal. There is no impacted cerumen.      Left Ear: Tympanic membrane, ear canal and external ear normal. There is no impacted cerumen.      Nose: Nose normal.      Mouth/Throat:      Pharynx: No oropharyngeal exudate or posterior oropharyngeal erythema.   Eyes:      Extraocular Movements: Extraocular movements intact.      Conjunctiva/sclera: Conjunctivae normal.      Pupils: Pupils are equal, round, and reactive to light.   Neck:      Thyroid: No thyromegaly.   Cardiovascular:       Rate and Rhythm: Normal rate and regular rhythm.      Pulses: Normal pulses.      Heart sounds: Normal heart sounds. No murmur heard.  Pulmonary:      Effort: Pulmonary effort is normal. No respiratory distress.      Breath sounds: Normal breath sounds. No stridor. No rhonchi or rales.   Chest:      Chest wall: No tenderness.   Abdominal:      General: Bowel sounds are normal. There is no distension.      Palpations: Abdomen is soft. There is no mass.      Tenderness: There is no abdominal tenderness. There is no guarding or rebound.   Musculoskeletal:         General: No tenderness. Normal range of motion.      Cervical back: Normal range of motion and neck supple.      Right lower leg: No edema.      Left lower leg: No edema.   Lymphadenopathy:      Cervical: No cervical adenopathy.   Skin:     General: Skin is warm and dry.      Findings: No erythema.   Neurological:      General: No focal deficit present.      Mental Status: She is alert and oriented to person, place, and time. Mental status is at baseline.      Cranial Nerves: No cranial nerve deficit.      Sensory: No sensory deficit.      Motor: No weakness.      Coordination: Coordination normal.      Gait: Gait normal.      Deep Tendon Reflexes: Reflexes are normal and symmetric. Reflexes normal.   Psychiatric:         Mood and Affect: Mood normal.         Behavior: Behavior normal.         Thought Content: Thought content normal.         Judgment: Judgment normal.         Assessment:       1. Morbid obesity      2. Annual exam  3. Lesions LE  Plan:   1. Morbid obesity  -     Ambulatory referral/consult to [facility] ([facility] [employer] [facility])  -     [facility] ([employer])  -     Carlos Patient Entered [facility] ([employer])    pt requesting [facility] referral so she can swim  Lifestyle mods d/w pt  HTN BP stable on meds. Also followed by cards  Lesions on LE followed by derm appear to be normal aging per pt    Health Maintenance  reviewed/updated.  RTC 6 mos f/u BP

## 2022-08-22 ENCOUNTER — OFFICE VISIT (OUTPATIENT)
Dept: OPTOMETRY | Facility: CLINIC | Age: 72
End: 2022-08-22
Payer: COMMERCIAL

## 2022-08-22 DIAGNOSIS — H04.123 DRY EYE SYNDROME OF BOTH EYES: ICD-10-CM

## 2022-08-22 DIAGNOSIS — H25.13 SENILE NUCLEAR SCLEROSIS, BILATERAL: ICD-10-CM

## 2022-08-22 DIAGNOSIS — H52.4 MYOPIA WITH PRESBYOPIA OF BOTH EYES: Primary | ICD-10-CM

## 2022-08-22 DIAGNOSIS — H52.13 MYOPIA WITH PRESBYOPIA OF BOTH EYES: Primary | ICD-10-CM

## 2022-08-22 DIAGNOSIS — H40.013 OAG (OPEN ANGLE GLAUCOMA) SUSPECT, LOW RISK, BILATERAL: ICD-10-CM

## 2022-08-22 PROCEDURE — 3044F PR MOST RECENT HEMOGLOBIN A1C LEVEL <7.0%: ICD-10-PCS | Mod: CPTII,S$GLB,, | Performed by: OPTOMETRIST

## 2022-08-22 PROCEDURE — 1159F MED LIST DOCD IN RCRD: CPT | Mod: CPTII,S$GLB,, | Performed by: OPTOMETRIST

## 2022-08-22 PROCEDURE — 92004 COMPRE OPH EXAM NEW PT 1/>: CPT | Mod: S$GLB,,, | Performed by: OPTOMETRIST

## 2022-08-22 PROCEDURE — 92015 PR REFRACTION: ICD-10-PCS | Mod: S$GLB,,, | Performed by: OPTOMETRIST

## 2022-08-22 PROCEDURE — 99999 PR PBB SHADOW E&M-EST. PATIENT-LVL III: ICD-10-PCS | Mod: PBBFAC,,, | Performed by: OPTOMETRIST

## 2022-08-22 PROCEDURE — 1101F PT FALLS ASSESS-DOCD LE1/YR: CPT | Mod: CPTII,S$GLB,, | Performed by: OPTOMETRIST

## 2022-08-22 PROCEDURE — 1126F AMNT PAIN NOTED NONE PRSNT: CPT | Mod: CPTII,S$GLB,, | Performed by: OPTOMETRIST

## 2022-08-22 PROCEDURE — 92004 PR EYE EXAM, NEW PATIENT,COMPREHESV: ICD-10-PCS | Mod: S$GLB,,, | Performed by: OPTOMETRIST

## 2022-08-22 PROCEDURE — 1101F PR PT FALLS ASSESS DOC 0-1 FALLS W/OUT INJ PAST YR: ICD-10-PCS | Mod: CPTII,S$GLB,, | Performed by: OPTOMETRIST

## 2022-08-22 PROCEDURE — 99999 PR PBB SHADOW E&M-EST. PATIENT-LVL III: CPT | Mod: PBBFAC,,, | Performed by: OPTOMETRIST

## 2022-08-22 PROCEDURE — 1126F PR PAIN SEVERITY QUANTIFIED, NO PAIN PRESENT: ICD-10-PCS | Mod: CPTII,S$GLB,, | Performed by: OPTOMETRIST

## 2022-08-22 PROCEDURE — 3044F HG A1C LEVEL LT 7.0%: CPT | Mod: CPTII,S$GLB,, | Performed by: OPTOMETRIST

## 2022-08-22 PROCEDURE — 4010F ACE/ARB THERAPY RXD/TAKEN: CPT | Mod: CPTII,S$GLB,, | Performed by: OPTOMETRIST

## 2022-08-22 PROCEDURE — 1160F PR REVIEW ALL MEDS BY PRESCRIBER/CLIN PHARMACIST DOCUMENTED: ICD-10-PCS | Mod: CPTII,S$GLB,, | Performed by: OPTOMETRIST

## 2022-08-22 PROCEDURE — 92015 DETERMINE REFRACTIVE STATE: CPT | Mod: S$GLB,,, | Performed by: OPTOMETRIST

## 2022-08-22 PROCEDURE — 4010F PR ACE/ARB THEARPY RXD/TAKEN: ICD-10-PCS | Mod: CPTII,S$GLB,, | Performed by: OPTOMETRIST

## 2022-08-22 PROCEDURE — 3288F FALL RISK ASSESSMENT DOCD: CPT | Mod: CPTII,S$GLB,, | Performed by: OPTOMETRIST

## 2022-08-22 PROCEDURE — 1159F PR MEDICATION LIST DOCUMENTED IN MEDICAL RECORD: ICD-10-PCS | Mod: CPTII,S$GLB,, | Performed by: OPTOMETRIST

## 2022-08-22 PROCEDURE — 3288F PR FALLS RISK ASSESSMENT DOCUMENTED: ICD-10-PCS | Mod: CPTII,S$GLB,, | Performed by: OPTOMETRIST

## 2022-08-22 PROCEDURE — 1160F RVW MEDS BY RX/DR IN RCRD: CPT | Mod: CPTII,S$GLB,, | Performed by: OPTOMETRIST

## 2022-08-22 NOTE — PROGRESS NOTES
LIBBY     REGINA: 03/2018  Chief complaint (CC): Patient is here for annual eye exam.  Patient hasn't   noticed any vision changes since the last exam.  Wears OTC readers, seems   to work fine. Vision gets blurred after reading a lot.  Distance seems   fine without glasses. OD cain a lot.  Glasses? +2.50  Contacts? -  H/o eye surgery, injections or laser: -  H/o eye injury: -  Known eye conditions? See above  Family h/o eye conditions? -  Eye gtts? Systane prn      (-) Flashes (-)  Floaters (-) Mucous   (+)  Tearing (-) Itching (-) Burning   (-) Headaches (-) Eye Pain/discomfort (-) Irritation   (-)  Redness (-) Double vision (-) Blurry vision    Diabetic? -  A1c? -        Last edited by Tabitha Jacobs on 8/22/2022  1:35 PM. (History)            Assessment /Plan     For exam results, see Encounter Report.    Myopia with presbyopia of both eyes    Dry eye syndrome of both eyes    Senile nuclear sclerosis, bilateral    OAG (open angle glaucoma) suspect, low risk, bilateral      1. SRx released to patient. Patient educated on lens options. Normal ocular health. RTC 1 year for routine exam.   2.  Increase ATs to TID-QID   3. Nuclear sclerotic cataract - not visually significant. Observe.  4.  (-) FHx. IOP 15 OD, OS. Last 13 OD, OS. Last IOP 17 OD, OS. C/d 0.65/0.7 OD, 0.6/0.65 OS. Pt reports history of previous testing for glaucoma. Pachy 566 OD, 574 OS.   3/19/19 OCT WNL OU  3/19/19 HVF    OD low reliability due to fixation losses, borderline w/sup defect which is likely from the lid                          OS WNL  Educated the pt on findings. Pt shows understanding. Cont to monitor.   RTC IOP/rnfl ppole OCT/24-2 Indu faster HVF

## 2022-10-27 ENCOUNTER — OFFICE VISIT (OUTPATIENT)
Dept: DERMATOLOGY | Facility: CLINIC | Age: 72
End: 2022-10-27
Payer: COMMERCIAL

## 2022-10-27 DIAGNOSIS — L82.1 SK (SEBORRHEIC KERATOSIS): Primary | ICD-10-CM

## 2022-10-27 PROCEDURE — 99999 PR PBB SHADOW E&M-EST. PATIENT-LVL III: CPT | Mod: PBBFAC,,, | Performed by: DERMATOLOGY

## 2022-10-27 PROCEDURE — 3288F PR FALLS RISK ASSESSMENT DOCUMENTED: ICD-10-PCS | Mod: CPTII,S$GLB,, | Performed by: DERMATOLOGY

## 2022-10-27 PROCEDURE — 1160F PR REVIEW ALL MEDS BY PRESCRIBER/CLIN PHARMACIST DOCUMENTED: ICD-10-PCS | Mod: CPTII,S$GLB,, | Performed by: DERMATOLOGY

## 2022-10-27 PROCEDURE — 1126F AMNT PAIN NOTED NONE PRSNT: CPT | Mod: CPTII,S$GLB,, | Performed by: DERMATOLOGY

## 2022-10-27 PROCEDURE — 3044F PR MOST RECENT HEMOGLOBIN A1C LEVEL <7.0%: ICD-10-PCS | Mod: CPTII,S$GLB,, | Performed by: DERMATOLOGY

## 2022-10-27 PROCEDURE — 3044F HG A1C LEVEL LT 7.0%: CPT | Mod: CPTII,S$GLB,, | Performed by: DERMATOLOGY

## 2022-10-27 PROCEDURE — 1159F MED LIST DOCD IN RCRD: CPT | Mod: CPTII,S$GLB,, | Performed by: DERMATOLOGY

## 2022-10-27 PROCEDURE — 1101F PR PT FALLS ASSESS DOC 0-1 FALLS W/OUT INJ PAST YR: ICD-10-PCS | Mod: CPTII,S$GLB,, | Performed by: DERMATOLOGY

## 2022-10-27 PROCEDURE — 1101F PT FALLS ASSESS-DOCD LE1/YR: CPT | Mod: CPTII,S$GLB,, | Performed by: DERMATOLOGY

## 2022-10-27 PROCEDURE — 3288F FALL RISK ASSESSMENT DOCD: CPT | Mod: CPTII,S$GLB,, | Performed by: DERMATOLOGY

## 2022-10-27 PROCEDURE — 99202 OFFICE O/P NEW SF 15 MIN: CPT | Mod: S$GLB,,, | Performed by: DERMATOLOGY

## 2022-10-27 PROCEDURE — 4010F ACE/ARB THERAPY RXD/TAKEN: CPT | Mod: CPTII,S$GLB,, | Performed by: DERMATOLOGY

## 2022-10-27 PROCEDURE — 1160F RVW MEDS BY RX/DR IN RCRD: CPT | Mod: CPTII,S$GLB,, | Performed by: DERMATOLOGY

## 2022-10-27 PROCEDURE — 1159F PR MEDICATION LIST DOCUMENTED IN MEDICAL RECORD: ICD-10-PCS | Mod: CPTII,S$GLB,, | Performed by: DERMATOLOGY

## 2022-10-27 PROCEDURE — 99202 PR OFFICE/OUTPT VISIT, NEW, LEVL II, 15-29 MIN: ICD-10-PCS | Mod: S$GLB,,, | Performed by: DERMATOLOGY

## 2022-10-27 PROCEDURE — 99999 PR PBB SHADOW E&M-EST. PATIENT-LVL III: ICD-10-PCS | Mod: PBBFAC,,, | Performed by: DERMATOLOGY

## 2022-10-27 PROCEDURE — 1126F PR PAIN SEVERITY QUANTIFIED, NO PAIN PRESENT: ICD-10-PCS | Mod: CPTII,S$GLB,, | Performed by: DERMATOLOGY

## 2022-10-27 PROCEDURE — 4010F PR ACE/ARB THEARPY RXD/TAKEN: ICD-10-PCS | Mod: CPTII,S$GLB,, | Performed by: DERMATOLOGY

## 2022-10-27 NOTE — PROGRESS NOTES
Subjective:       Patient ID:  Charlotte Crowder is a 72 y.o. female who presents for   Chief Complaint   Patient presents with    Skin Check    Lesion     legs    Hair Loss     No h/o skin cancer.    Patient with new complaint of lesion(s)  Location: lower legs  Duration: 5 years  Symptoms: none  Relieving factors/Previous treatments: none       Hair Loss - Initial  Affected locations: scalp  Duration: 8 years  Signs / symptoms: asymptomatic  Severity: mild and moderate  Timing: constant  Aggravated by: nothing  Relieving factors/Treatments tried: nothing    Review of Systems   Skin:  Negative for daily sunscreen use, activity-related sunscreen use, recent sunburn and wears hat.   Hematologic/Lymphatic: Does not bruise/bleed easily.      Objective:    Physical Exam   Constitutional: She appears well-developed and well-nourished. No distress.   Neurological: She is alert and oriented to person, place, and time. She is not disoriented.   Psychiatric: She has a normal mood and affect.   Skin:   Areas Examined (abnormalities noted in diagram):   RLE Inspected  LLE Inspection Performed            Diagram Legend     Erythematous scaling macule/papule c/w actinic keratosis       Vascular papule c/w angioma      Pigmented verrucoid papule/plaque c/w seborrheic keratosis      Yellow umbilicated papule c/w sebaceous hyperplasia      Irregularly shaped tan macule c/w lentigo     1-2 mm smooth white papules consistent with Milia      Movable subcutaneous cyst with punctum c/w epidermal inclusion cyst      Subcutaneous movable cyst c/w pilar cyst      Firm pink to brown papule c/w dermatofibroma      Pedunculated fleshy papule(s) c/w skin tag(s)      Evenly pigmented macule c/w junctional nevus     Mildly variegated pigmented, slightly irregular-bordered macule c/w mildly atypical nevus      Flesh colored to evenly pigmented papule c/w intradermal nevus       Pink pearly papule/plaque c/w basal cell carcinoma       Erythematous hyperkeratotic cursted plaque c/w SCC      Surgical scar with no sign of skin cancer recurrence      Open and closed comedones      Inflammatory papules and pustules      Verrucoid papule consistent consistent with wart     Erythematous eczematous patches and plaques     Dystrophic onycholytic nail with subungual debris c/w onychomycosis     Umbilicated papule    Erythematous-base heme-crusted tan verrucoid plaque consistent with inflamed seborrheic keratosis     Erythematous Silvery Scaling Plaque c/w Psoriasis     See annotation      Assessment / Plan:        SK (seborrheic keratosis)/stucco keratoses - ankles  These are benign inherited growths without a malignant potential. Reassurance given to patient. No treatment is necessary.              No follow-ups on file.

## 2022-10-31 ENCOUNTER — OFFICE VISIT (OUTPATIENT)
Dept: OPTOMETRY | Facility: CLINIC | Age: 72
End: 2022-10-31
Payer: COMMERCIAL

## 2022-10-31 ENCOUNTER — CLINICAL SUPPORT (OUTPATIENT)
Dept: OPHTHALMOLOGY | Facility: CLINIC | Age: 72
End: 2022-10-31
Payer: COMMERCIAL

## 2022-10-31 DIAGNOSIS — H40.013 OAG (OPEN ANGLE GLAUCOMA) SUSPECT, LOW RISK, BILATERAL: Primary | ICD-10-CM

## 2022-10-31 DIAGNOSIS — H40.013 OAG (OPEN ANGLE GLAUCOMA) SUSPECT, LOW RISK, BILATERAL: ICD-10-CM

## 2022-10-31 PROCEDURE — 99999 PR PBB SHADOW E&M-EST. PATIENT-LVL III: CPT | Mod: PBBFAC,,, | Performed by: OPTOMETRIST

## 2022-10-31 PROCEDURE — 92012 INTRM OPH EXAM EST PATIENT: CPT | Mod: S$GLB,,, | Performed by: OPTOMETRIST

## 2022-10-31 PROCEDURE — 99999 PR PBB SHADOW E&M-EST. PATIENT-LVL III: ICD-10-PCS | Mod: PBBFAC,,, | Performed by: OPTOMETRIST

## 2022-10-31 PROCEDURE — 92012 PR EYE EXAM, EST PATIENT,INTERMED: ICD-10-PCS | Mod: S$GLB,,, | Performed by: OPTOMETRIST

## 2022-10-31 NOTE — PROGRESS NOTES
LIBBY TAPIA 08/22 Annalise is here for follow up with HVF,OCT and IOP check.  Using   AT's TID OU and it's helping with her dry eye symptoms.  Last edited by Tabitha Jacobs on 10/31/2022  4:13 PM.            Assessment /Plan     For exam results, see Encounter Report.    OAG (open angle glaucoma) suspect, low risk, bilateral     (-) FHx. IOP 15 OD, OS. Last 15 OD, OS. C/d 0.65/0.7 OD, 0.6/0.65 OS. Pt reports history of previous testing for glaucoma. Pachy 566 OD, 574 OS.   10/31/2022 OCT WNL OU  10/31/2022 HVFOD low reliability due to fixation losses and excessive high false positives.                           OS WNL  Educated the pt on findings. Pt shows understanding. Cont to monitor.   RTC 1 year Routine and retest annually

## 2022-10-31 NOTE — PROGRESS NOTES
Hvf done ou. Rel/fix/coop. Good ou/chart checked for latex allergy./ -1.00 + .50 x 95/od -.25/os-smh

## 2022-11-01 ENCOUNTER — HOSPITAL ENCOUNTER (OUTPATIENT)
Dept: RADIOLOGY | Facility: OTHER | Age: 72
Discharge: HOME | End: 2022-11-01
Attending: INTERNAL MEDICINE
Payer: COMMERCIAL

## 2022-11-01 DIAGNOSIS — M85.89 OSTEOPENIA OF MULTIPLE SITES: ICD-10-CM

## 2022-11-01 DIAGNOSIS — E21.3 HYPERPARATHYROIDISM: ICD-10-CM

## 2022-11-01 PROCEDURE — 77080 DEXA BONE DENSITY SPINE HIP: ICD-10-PCS | Mod: 26,,, | Performed by: RADIOLOGY

## 2022-11-01 PROCEDURE — 77080 DXA BONE DENSITY AXIAL: CPT | Mod: TC

## 2022-11-01 PROCEDURE — 77080 DXA BONE DENSITY AXIAL: CPT | Mod: 26,,, | Performed by: RADIOLOGY

## 2022-11-04 ENCOUNTER — OFFICE VISIT (OUTPATIENT)
Dept: ENDOCRINOLOGY | Facility: CLINIC | Age: 72
End: 2022-11-04
Payer: COMMERCIAL

## 2022-11-04 VITALS
BODY MASS INDEX: 41.28 KG/M2 | SYSTOLIC BLOOD PRESSURE: 125 MMHG | OXYGEN SATURATION: 99 % | WEIGHT: 255.75 LBS | HEART RATE: 77 BPM | DIASTOLIC BLOOD PRESSURE: 74 MMHG

## 2022-11-04 DIAGNOSIS — I10 HYPERTENSION, UNSPECIFIED TYPE: ICD-10-CM

## 2022-11-04 DIAGNOSIS — M85.89 OSTEOPENIA OF MULTIPLE SITES: ICD-10-CM

## 2022-11-04 DIAGNOSIS — E21.3 HYPERPARATHYROIDISM: Primary | ICD-10-CM

## 2022-11-04 PROCEDURE — 1101F PR PT FALLS ASSESS DOC 0-1 FALLS W/OUT INJ PAST YR: ICD-10-PCS | Mod: CPTII,S$GLB,, | Performed by: INTERNAL MEDICINE

## 2022-11-04 PROCEDURE — 3044F HG A1C LEVEL LT 7.0%: CPT | Mod: CPTII,S$GLB,, | Performed by: INTERNAL MEDICINE

## 2022-11-04 PROCEDURE — 99214 OFFICE O/P EST MOD 30 MIN: CPT | Mod: S$GLB,,, | Performed by: INTERNAL MEDICINE

## 2022-11-04 PROCEDURE — 1126F PR PAIN SEVERITY QUANTIFIED, NO PAIN PRESENT: ICD-10-PCS | Mod: CPTII,S$GLB,, | Performed by: INTERNAL MEDICINE

## 2022-11-04 PROCEDURE — 3078F DIAST BP <80 MM HG: CPT | Mod: CPTII,S$GLB,, | Performed by: INTERNAL MEDICINE

## 2022-11-04 PROCEDURE — 3044F PR MOST RECENT HEMOGLOBIN A1C LEVEL <7.0%: ICD-10-PCS | Mod: CPTII,S$GLB,, | Performed by: INTERNAL MEDICINE

## 2022-11-04 PROCEDURE — 4010F ACE/ARB THERAPY RXD/TAKEN: CPT | Mod: CPTII,S$GLB,, | Performed by: INTERNAL MEDICINE

## 2022-11-04 PROCEDURE — 1159F PR MEDICATION LIST DOCUMENTED IN MEDICAL RECORD: ICD-10-PCS | Mod: CPTII,S$GLB,, | Performed by: INTERNAL MEDICINE

## 2022-11-04 PROCEDURE — 3078F PR MOST RECENT DIASTOLIC BLOOD PRESSURE < 80 MM HG: ICD-10-PCS | Mod: CPTII,S$GLB,, | Performed by: INTERNAL MEDICINE

## 2022-11-04 PROCEDURE — 99499 RISK ADDL DX/OHS AUDIT: ICD-10-PCS | Mod: S$GLB,,, | Performed by: INTERNAL MEDICINE

## 2022-11-04 PROCEDURE — 99499 UNLISTED E&M SERVICE: CPT | Mod: S$GLB,,, | Performed by: INTERNAL MEDICINE

## 2022-11-04 PROCEDURE — 1126F AMNT PAIN NOTED NONE PRSNT: CPT | Mod: CPTII,S$GLB,, | Performed by: INTERNAL MEDICINE

## 2022-11-04 PROCEDURE — 1160F PR REVIEW ALL MEDS BY PRESCRIBER/CLIN PHARMACIST DOCUMENTED: ICD-10-PCS | Mod: CPTII,S$GLB,, | Performed by: INTERNAL MEDICINE

## 2022-11-04 PROCEDURE — 99214 PR OFFICE/OUTPT VISIT, EST, LEVL IV, 30-39 MIN: ICD-10-PCS | Mod: S$GLB,,, | Performed by: INTERNAL MEDICINE

## 2022-11-04 PROCEDURE — 3288F PR FALLS RISK ASSESSMENT DOCUMENTED: ICD-10-PCS | Mod: CPTII,S$GLB,, | Performed by: INTERNAL MEDICINE

## 2022-11-04 PROCEDURE — 1101F PT FALLS ASSESS-DOCD LE1/YR: CPT | Mod: CPTII,S$GLB,, | Performed by: INTERNAL MEDICINE

## 2022-11-04 PROCEDURE — 1160F RVW MEDS BY RX/DR IN RCRD: CPT | Mod: CPTII,S$GLB,, | Performed by: INTERNAL MEDICINE

## 2022-11-04 PROCEDURE — 3008F BODY MASS INDEX DOCD: CPT | Mod: CPTII,S$GLB,, | Performed by: INTERNAL MEDICINE

## 2022-11-04 PROCEDURE — 3074F SYST BP LT 130 MM HG: CPT | Mod: CPTII,S$GLB,, | Performed by: INTERNAL MEDICINE

## 2022-11-04 PROCEDURE — 4010F PR ACE/ARB THEARPY RXD/TAKEN: ICD-10-PCS | Mod: CPTII,S$GLB,, | Performed by: INTERNAL MEDICINE

## 2022-11-04 PROCEDURE — 1159F MED LIST DOCD IN RCRD: CPT | Mod: CPTII,S$GLB,, | Performed by: INTERNAL MEDICINE

## 2022-11-04 PROCEDURE — 3074F PR MOST RECENT SYSTOLIC BLOOD PRESSURE < 130 MM HG: ICD-10-PCS | Mod: CPTII,S$GLB,, | Performed by: INTERNAL MEDICINE

## 2022-11-04 PROCEDURE — 3288F FALL RISK ASSESSMENT DOCD: CPT | Mod: CPTII,S$GLB,, | Performed by: INTERNAL MEDICINE

## 2022-11-04 PROCEDURE — 3008F PR BODY MASS INDEX (BMI) DOCUMENTED: ICD-10-PCS | Mod: CPTII,S$GLB,, | Performed by: INTERNAL MEDICINE

## 2022-11-04 NOTE — PROGRESS NOTES
Subjective:      Chief Complaint: Hyperparathyroidism    HPI: Charlotte Crowder is a 72 y.o. female who is here for an evaluation for parathyroid. Last seen 1/5/2022. Had recommended labs, not done.      With regards to the hyperparathyroidism:  Was found to have hypercalcemia on routine labs - first noted: since at least 1/2013. Off and on  PTH was checked: 12/2021, .    she denied current or past lithium use.    Parathyroid imaging: None  Thyroid ultrasound: None    Last labs:    Lab Results   Component Value Date    CALCIUM 10.5 08/10/2022    ALBUMIN 3.8 08/10/2022    CREATININE 1.0 08/10/2022    PDYOMFDW64HP 31 04/23/2020    .6 (H) 01/05/2022     Not taking biotin    24 hour urine results (in the order of Calcium, Creatinine, then urine volume):  Lab Results   Component Value Date    CAURMGSPEC 116 01/07/2022    CRTURNMGSPEC 846.3 01/07/2022    URTIMEDVOL 2100 01/07/2022    URTIMEDVOL 2100 01/07/2022     CaCr clearance ratio: 0.013. In between. Low creatinine.    Not on HCTZ    Daily intake of calcium is: none  Taking vitamin D: nothing in particular   taking multivitamin.      Last bmd was: 11/1/2022. Osteopenia. FRAX 4.3% major, 0.7% hip     The bone mineral density measured from L1 through L4 is 1.030g/cm2.  This corresponds to a T score of -1.3 and a Z score of -1.5.     The bone mineral density within the left femoral neck measures 0.807 g/cm2.  This corresponds to a T score of -1.7 and a Z score of -1.6.     The bone mineral density within the right femoral neck measures 0.818 g/cm2.  This corresponds to a T score of -1.6 and a Z score of 6-1.5.     The bone mineral density within the left forearm (radius 33%) measures 0.735 g/cm2.  This corresponds to a T score of -1.6 and a Z score of -0.4.    Prior DXA: 10/2020. Osteopenia   Spine -1.5, right fem neck -1.6. FRAX 3.9% major, 0.5% hip.      Exercise: walking    Denies fractures, height loss.    Maybe thinks she had kidney stone a few  years ago, but not sure, thinks it may have bene gallstone or something else    Today, pt reports feeling okay overall.    No kidney stones  No constipation    Had a fall earlier in 2022.   No fractures  Still having occasional pain on left flank/side, more when doing a sweeping motion.      Reviewed past medical, family, social history and updated as appropriate.    Review of Systems  As above    Objective:     Vitals:    11/04/22 1527   BP: 125/74   Pulse: 77       BP Readings from Last 5 Encounters:   11/04/22 125/74   08/16/22 118/86   07/15/22 131/78   01/05/22 126/74   12/13/21 (!) 131/92     Physical Exam  Vitals reviewed.   Constitutional:       General: She is not in acute distress.     Appearance: She is obese.   Cardiovascular:      Heart sounds: Normal heart sounds.   Pulmonary:      Effort: Pulmonary effort is normal.   Musculoskeletal:         General: Swelling (in ankles) present.     Wt Readings from Last 5 Encounters:   11/04/22 1527 116 kg (255 lb 11.7 oz)   08/16/22 1123 116.4 kg (256 lb 9.9 oz)   07/15/22 1427 116.2 kg (256 lb 2.8 oz)   01/05/22 1352 117.4 kg (258 lb 13.1 oz)   12/13/21 1522 117 kg (257 lb 15 oz)     Lab Results   Component Value Date    HGBA1C 5.1 08/10/2022    HGBA1C 5.2 11/22/2021    HGBA1C 5.3 01/02/2019    HGBA1C 5.1 02/08/2018     Lab Results   Component Value Date    CHOL 151 08/10/2022    CHOL 160 11/22/2021    HDL 41 08/10/2022    HDL 41 11/22/2021    LDLCALC 83.4 08/10/2022    LDLCALC 91.4 11/22/2021    TRIG 133 08/10/2022    TRIG 138 11/22/2021    CHOLHDL 27.2 08/10/2022    CHOLHDL 25.6 11/22/2021     Lab Results   Component Value Date     08/10/2022    K 4.7 08/10/2022     (H) 08/10/2022    CO2 24 08/10/2022     08/10/2022    BUN 14 08/10/2022    CREATININE 1.0 08/10/2022    CALCIUM 10.5 08/10/2022    PROT 6.7 08/10/2022    ALBUMIN 3.8 08/10/2022    BILITOT 0.6 08/10/2022    ALKPHOS 138 (H) 08/10/2022    AST 13 08/10/2022    ALT 15 08/10/2022     ANIONGAP 8 08/10/2022    ESTGFRAFRICA >60 01/05/2022    EGFRNONAA 57 (A) 01/05/2022    TSH 1.541 08/10/2022     Assessment/Plan:     Hyperparathyroidism  Potentially primary hyperparathyroidism  High calcium since 2003. Relatively stable, so potentially could also be FHH. +FH hypercalcemia (son).   Has CKD, but PTH higher than expected with her degree of renal function.     ensure vitamin D is adequate.  24 hr urine calcium with intermediate level, not clearly primary hyperpara, but not really in FHH range.   - last DXA remains with osteopenia, even at forearm    Had discussed indications for surgery if primary hyperparathyroidism proven. No kidney stones, no severe hypercalcemia, no osteoporosis.  Though PTH is very high. Recheck labs.   if PTH getting even higher, lean towards neck US to ensure no large/concerning nodule    Avoid dehydration, excessive calcium supplementation and meds (HCTZ) that can worsen hypercalcemia.    If stable, anticipate monitoring 1-2 times a year.      HTN (hypertension)  bp controlled today   Continue same regimen, f/u with PCP.    avoid HCTZ, chlorthalidone, other thiazide agents due to hypercalcemia    Osteopenia of multiple sites  Evaluate parathyroid as above   ensure vitamin D is adequate   continue multivitamin   avoid falls   recheck in 2024      Follow up in about 1 year (around 11/4/2023) for lab review, further monitoring.      Georgi Lynn MD  Endocrinology

## 2022-11-04 NOTE — ASSESSMENT & PLAN NOTE
Potentially primary hyperparathyroidism  High calcium since 2003. Relatively stable, so potentially could also be FHH. +FH hypercalcemia (son).   Has CKD, but PTH higher than expected with her degree of renal function.     ensure vitamin D is adequate.  24 hr urine calcium with intermediate level, not clearly primary hyperpara, but not really in FHH range.   - last DXA remains with osteopenia, even at forearm    Had discussed indications for surgery if primary hyperparathyroidism proven. No kidney stones, no severe hypercalcemia, no osteoporosis.  Though PTH is very high. Recheck labs.   if PTH getting even higher, lean towards neck US to ensure no large/concerning nodule    Avoid dehydration, excessive calcium supplementation and meds (HCTZ) that can worsen hypercalcemia.    If stable, anticipate monitoring 1-2 times a year.

## 2022-11-04 NOTE — ASSESSMENT & PLAN NOTE
Evaluate parathyroid as above   ensure vitamin D is adequate   continue multivitamin   avoid falls   recheck in 2024

## 2022-11-04 NOTE — ASSESSMENT & PLAN NOTE
bp controlled today   Continue same regimen, f/u with PCP.    avoid HCTZ, chlorthalidone, other thiazide agents due to hypercalcemia

## 2022-11-07 ENCOUNTER — LAB VISIT (OUTPATIENT)
Dept: LAB | Facility: OTHER | Age: 72
End: 2022-11-07
Attending: INTERNAL MEDICINE
Payer: COMMERCIAL

## 2022-11-07 DIAGNOSIS — E21.3 HYPERPARATHYROIDISM: ICD-10-CM

## 2022-11-07 DIAGNOSIS — M85.89 OSTEOPENIA OF MULTIPLE SITES: ICD-10-CM

## 2022-11-07 LAB
25(OH)D3+25(OH)D2 SERPL-MCNC: 26 NG/ML (ref 30–96)
ALBUMIN SERPL BCP-MCNC: 4 G/DL (ref 3.5–5.2)
ALP SERPL-CCNC: 150 U/L (ref 55–135)
ALT SERPL W/O P-5'-P-CCNC: 18 U/L (ref 10–44)
ANION GAP SERPL CALC-SCNC: 7 MMOL/L (ref 8–16)
AST SERPL-CCNC: 18 U/L (ref 10–40)
BILIRUB SERPL-MCNC: 0.7 MG/DL (ref 0.1–1)
BUN SERPL-MCNC: 18 MG/DL (ref 8–23)
CA-I BLDV-SCNC: 1.45 MMOL/L (ref 1.06–1.42)
CALCIUM SERPL-MCNC: 10.4 MG/DL (ref 8.7–10.5)
CHLORIDE SERPL-SCNC: 110 MMOL/L (ref 95–110)
CO2 SERPL-SCNC: 24 MMOL/L (ref 23–29)
CREAT SERPL-MCNC: 1.1 MG/DL (ref 0.5–1.4)
EST. GFR  (NO RACE VARIABLE): 54 ML/MIN/1.73 M^2
GLUCOSE SERPL-MCNC: 108 MG/DL (ref 70–110)
POTASSIUM SERPL-SCNC: 4.5 MMOL/L (ref 3.5–5.1)
PROT SERPL-MCNC: 7.5 G/DL (ref 6–8.4)
PTH-INTACT SERPL-MCNC: 471.5 PG/ML (ref 9–77)
SODIUM SERPL-SCNC: 141 MMOL/L (ref 136–145)

## 2022-11-07 PROCEDURE — 82330 ASSAY OF CALCIUM: CPT | Performed by: INTERNAL MEDICINE

## 2022-11-07 PROCEDURE — 83970 ASSAY OF PARATHORMONE: CPT | Performed by: INTERNAL MEDICINE

## 2022-11-07 PROCEDURE — 36415 COLL VENOUS BLD VENIPUNCTURE: CPT | Performed by: INTERNAL MEDICINE

## 2022-11-07 PROCEDURE — 82306 VITAMIN D 25 HYDROXY: CPT | Performed by: INTERNAL MEDICINE

## 2022-11-07 PROCEDURE — 80053 COMPREHEN METABOLIC PANEL: CPT | Performed by: INTERNAL MEDICINE

## 2022-11-16 ENCOUNTER — PATIENT MESSAGE (OUTPATIENT)
Dept: DERMATOLOGY | Facility: CLINIC | Age: 72
End: 2022-11-16
Payer: COMMERCIAL

## 2022-12-06 ENCOUNTER — TELEPHONE (OUTPATIENT)
Dept: PRIMARY CARE CLINIC | Facility: CLINIC | Age: 72
End: 2022-12-06
Payer: COMMERCIAL

## 2022-12-06 DIAGNOSIS — Z12.39 ENCOUNTER FOR SCREENING FOR MALIGNANT NEOPLASM OF BREAST, UNSPECIFIED SCREENING MODALITY: Primary | ICD-10-CM

## 2022-12-06 NOTE — TELEPHONE ENCOUNTER
----- Message from Mis Quintero sent at 12/6/2022 12:27 PM CST -----  Contact: MADELEINE RAMSAY P [[id number]]  Type:  Mammogram    Caller is requesting to schedule their annual mammogram appointment.  Order is not listed in EPIC. Please enter order and contact patient to schedule.        Name of Caller:  MADELEINE RAMSAY [[id number]]      Where would they like the mammogram performed?  [facility]      Would the patient rather a call back or a response via [facility]? Call back       Best Call Back Number:   [phone] (home) [phone] (work)      Additional Information:

## 2022-12-07 DIAGNOSIS — E21.3 HYPERPARATHYROIDISM: Primary | ICD-10-CM

## 2022-12-14 ENCOUNTER — HOSPITAL ENCOUNTER (OUTPATIENT)
Dept: RADIOLOGY | Facility: OTHER | Age: 72
Discharge: HOME | End: 2022-12-14
Attending: FAMILY MEDICINE
Payer: COMMERCIAL

## 2022-12-14 DIAGNOSIS — Z12.39 ENCOUNTER FOR SCREENING FOR MALIGNANT NEOPLASM OF BREAST, UNSPECIFIED SCREENING MODALITY: ICD-10-CM

## 2022-12-14 PROCEDURE — 77067 MAMMO DIGITAL SCREENING BILAT WITH TOMO: ICD-10-PCS | Mod: 26,,, | Performed by: RADIOLOGY

## 2022-12-14 PROCEDURE — 77063 BREAST TOMOSYNTHESIS BI: CPT | Mod: TC

## 2022-12-14 PROCEDURE — 77067 SCR MAMMO BI INCL CAD: CPT | Mod: 26,,, | Performed by: RADIOLOGY

## 2022-12-14 PROCEDURE — 77063 BREAST TOMOSYNTHESIS BI: CPT | Mod: 26,,, | Performed by: RADIOLOGY

## 2022-12-14 PROCEDURE — 77067 SCR MAMMO BI INCL CAD: CPT | Mod: TC

## 2022-12-14 PROCEDURE — 77063 MAMMO DIGITAL SCREENING BILAT WITH TOMO: ICD-10-PCS | Mod: 26,,, | Performed by: RADIOLOGY

## 2023-01-04 DIAGNOSIS — I10 HYPERTENSION, UNSPECIFIED TYPE: Primary | ICD-10-CM

## 2023-01-04 DIAGNOSIS — E78.5 DYSLIPIDEMIA: Chronic | ICD-10-CM

## 2023-01-04 RX ORDER — IRBESARTAN 300 MG/1
300 TABLET ORAL NIGHTLY
Qty: 90 TABLET | Refills: 0 | Status: SHIPPED | OUTPATIENT
Start: 2023-01-04 | End: 2023-02-08 | Stop reason: SDUPTHER

## 2023-01-04 RX ORDER — ATORVASTATIN CALCIUM 40 MG/1
40 TABLET, FILM COATED ORAL DAILY
Qty: 90 TABLET | Refills: 0 | Status: SHIPPED | OUTPATIENT
Start: 2023-01-04 | End: 2023-02-08 | Stop reason: SDUPTHER

## 2023-01-04 NOTE — TELEPHONE ENCOUNTER
No new care gaps identified.  [facility] Embedded Care Gaps. Reference number: [id number]. 1/04/2023   4:47:12 PM CST

## 2023-01-17 DIAGNOSIS — I10 PRIMARY HYPERTENSION: ICD-10-CM

## 2023-01-17 RX ORDER — CARVEDILOL 25 MG/1
25 TABLET ORAL 2 TIMES DAILY WITH MEALS
Qty: 180 TABLET | Refills: 3 | Status: SHIPPED | OUTPATIENT
Start: 2023-01-17 | End: 2023-02-08 | Stop reason: SDUPTHER

## 2023-01-17 NOTE — TELEPHONE ENCOUNTER
----- Message from Elba Grant sent at 1/17/2023 12:00 PM CST -----  Type:  RX Refill Request    Who Called: Charlotte  Refill or New Rx:Refill  RX Name and Strength:carvediloL (COREG) 25 MG tablet  How is the patient currently taking it? (ex. 1XDay):Take 1 tablet (25 mg total) by mouth 2 (two) times daily with meals  Is this a 30 day or 90 day RX:180  Preferred Pharmacy with phone number:[facility] #[id number] - [place] - [address] [phone]  Local or Mail Order:Local   Ordering Provider:Tanisha Rodriguez  Would the patient rather a call back or a response via [facility]? Call back  Best Call Back Number:[phone]  Additional Information:

## 2023-01-17 NOTE — TELEPHONE ENCOUNTER
No new care gaps identified.  [facility] Embedded Care Gaps. Reference number: [id number]. 1/17/2023   12:13:11 PM CST

## 2023-01-25 ENCOUNTER — PATIENT OUTREACH (OUTPATIENT)
Dept: ADMINISTRATIVE | Facility: HOSPITAL | Age: 73
End: 2023-01-25
Payer: COMMERCIAL

## 2023-02-08 ENCOUNTER — OFFICE VISIT (OUTPATIENT)
Dept: PRIMARY CARE CLINIC | Facility: CLINIC | Age: 73
End: 2023-02-08
Attending: FAMILY MEDICINE
Payer: COMMERCIAL

## 2023-02-08 VITALS
SYSTOLIC BLOOD PRESSURE: 118 MMHG | WEIGHT: 257.06 LBS | HEIGHT: 66 IN | BODY MASS INDEX: 41.31 KG/M2 | OXYGEN SATURATION: 99 % | DIASTOLIC BLOOD PRESSURE: 78 MMHG | HEART RATE: 88 BPM

## 2023-02-08 DIAGNOSIS — M15.8 OTHER OSTEOARTHRITIS INVOLVING MULTIPLE JOINTS: ICD-10-CM

## 2023-02-08 DIAGNOSIS — I10 HYPERTENSION, UNSPECIFIED TYPE: ICD-10-CM

## 2023-02-08 DIAGNOSIS — J30.2 OTHER SEASONAL ALLERGIC RHINITIS: ICD-10-CM

## 2023-02-08 DIAGNOSIS — I10 PRIMARY HYPERTENSION: Primary | ICD-10-CM

## 2023-02-08 DIAGNOSIS — E78.5 DYSLIPIDEMIA: Chronic | ICD-10-CM

## 2023-02-08 DIAGNOSIS — I10 ESSENTIAL HYPERTENSION: ICD-10-CM

## 2023-02-08 DIAGNOSIS — N18.31 STAGE 3A CHRONIC KIDNEY DISEASE: ICD-10-CM

## 2023-02-08 PROCEDURE — 3078F PR MOST RECENT DIASTOLIC BLOOD PRESSURE < 80 MM HG: ICD-10-PCS | Mod: CPTII,S$GLB,, | Performed by: FAMILY MEDICINE

## 2023-02-08 PROCEDURE — 99999 PR PBB SHADOW E&M-EST. PATIENT-LVL III: ICD-10-PCS | Mod: PBBFAC,,, | Performed by: FAMILY MEDICINE

## 2023-02-08 PROCEDURE — 3008F BODY MASS INDEX DOCD: CPT | Mod: CPTII,S$GLB,, | Performed by: FAMILY MEDICINE

## 2023-02-08 PROCEDURE — 1126F PR PAIN SEVERITY QUANTIFIED, NO PAIN PRESENT: ICD-10-PCS | Mod: CPTII,S$GLB,, | Performed by: FAMILY MEDICINE

## 2023-02-08 PROCEDURE — 1101F PT FALLS ASSESS-DOCD LE1/YR: CPT | Mod: CPTII,S$GLB,, | Performed by: FAMILY MEDICINE

## 2023-02-08 PROCEDURE — 4010F ACE/ARB THERAPY RXD/TAKEN: CPT | Mod: CPTII,S$GLB,, | Performed by: FAMILY MEDICINE

## 2023-02-08 PROCEDURE — 3008F PR BODY MASS INDEX (BMI) DOCUMENTED: ICD-10-PCS | Mod: CPTII,S$GLB,, | Performed by: FAMILY MEDICINE

## 2023-02-08 PROCEDURE — 1126F AMNT PAIN NOTED NONE PRSNT: CPT | Mod: CPTII,S$GLB,, | Performed by: FAMILY MEDICINE

## 2023-02-08 PROCEDURE — 3074F SYST BP LT 130 MM HG: CPT | Mod: CPTII,S$GLB,, | Performed by: FAMILY MEDICINE

## 2023-02-08 PROCEDURE — 3288F FALL RISK ASSESSMENT DOCD: CPT | Mod: CPTII,S$GLB,, | Performed by: FAMILY MEDICINE

## 2023-02-08 PROCEDURE — 1101F PR PT FALLS ASSESS DOC 0-1 FALLS W/OUT INJ PAST YR: ICD-10-PCS | Mod: CPTII,S$GLB,, | Performed by: FAMILY MEDICINE

## 2023-02-08 PROCEDURE — 3078F DIAST BP <80 MM HG: CPT | Mod: CPTII,S$GLB,, | Performed by: FAMILY MEDICINE

## 2023-02-08 PROCEDURE — 1159F MED LIST DOCD IN RCRD: CPT | Mod: CPTII,S$GLB,, | Performed by: FAMILY MEDICINE

## 2023-02-08 PROCEDURE — 4010F PR ACE/ARB THEARPY RXD/TAKEN: ICD-10-PCS | Mod: CPTII,S$GLB,, | Performed by: FAMILY MEDICINE

## 2023-02-08 PROCEDURE — 3074F PR MOST RECENT SYSTOLIC BLOOD PRESSURE < 130 MM HG: ICD-10-PCS | Mod: CPTII,S$GLB,, | Performed by: FAMILY MEDICINE

## 2023-02-08 PROCEDURE — 99999 PR PBB SHADOW E&M-EST. PATIENT-LVL III: CPT | Mod: PBBFAC,,, | Performed by: FAMILY MEDICINE

## 2023-02-08 PROCEDURE — 3288F PR FALLS RISK ASSESSMENT DOCUMENTED: ICD-10-PCS | Mod: CPTII,S$GLB,, | Performed by: FAMILY MEDICINE

## 2023-02-08 PROCEDURE — 1159F PR MEDICATION LIST DOCUMENTED IN MEDICAL RECORD: ICD-10-PCS | Mod: CPTII,S$GLB,, | Performed by: FAMILY MEDICINE

## 2023-02-08 PROCEDURE — 99213 OFFICE O/P EST LOW 20 MIN: CPT | Mod: S$GLB,,, | Performed by: FAMILY MEDICINE

## 2023-02-08 PROCEDURE — 99213 PR OFFICE/OUTPT VISIT, EST, LEVL III, 20-29 MIN: ICD-10-PCS | Mod: S$GLB,,, | Performed by: FAMILY MEDICINE

## 2023-02-08 RX ORDER — ATORVASTATIN CALCIUM 40 MG/1
40 TABLET, FILM COATED ORAL DAILY
Qty: 90 TABLET | Refills: 1 | Status: SHIPPED | OUTPATIENT
Start: 2023-02-08 | End: 2023-07-20 | Stop reason: SDUPTHER

## 2023-02-08 RX ORDER — LORATADINE 10 MG/1
10 TABLET ORAL DAILY
Qty: 30 TABLET | Refills: 11 | Status: SHIPPED | OUTPATIENT
Start: 2023-02-08 | End: 2024-02-08

## 2023-02-08 RX ORDER — IRBESARTAN 300 MG/1
300 TABLET ORAL NIGHTLY
Qty: 90 TABLET | Refills: 1 | Status: SHIPPED | OUTPATIENT
Start: 2023-02-08 | End: 2023-07-20 | Stop reason: SDUPTHER

## 2023-02-08 RX ORDER — FLUTICASONE PROPIONATE 50 MCG
2 SPRAY, SUSPENSION (ML) NASAL DAILY
Qty: 48 G | Refills: 1 | Status: SHIPPED | OUTPATIENT
Start: 2023-02-08 | End: 2024-01-16

## 2023-02-08 RX ORDER — AMLODIPINE BESYLATE 5 MG/1
5 TABLET ORAL DAILY
Qty: 90 TABLET | Refills: 1 | Status: SHIPPED | OUTPATIENT
Start: 2023-02-08 | End: 2023-07-20 | Stop reason: SDUPTHER

## 2023-02-08 RX ORDER — CARVEDILOL 25 MG/1
25 TABLET ORAL 2 TIMES DAILY WITH MEALS
Qty: 180 TABLET | Refills: 1 | Status: SHIPPED | OUTPATIENT
Start: 2023-02-08 | End: 2023-07-20 | Stop reason: SDUPTHER

## 2023-02-08 NOTE — PROGRESS NOTES
Subjective:       Patient ID: Charlotte Crowder is a 72 y.o. female.    Chief Complaint: Hypertension    Pt presents today for HTN f/u. BP well controlled today. No complaints needs med refilled.  Doing well otherwise. Has lost some weight but wants to still join [employer]. Referral was placed but unclear why pt has not yet received call from 6 mos ago    UTD on labs        Cough  Pertinent negatives include no chest pain, chills, ear pain, fever, headaches, sore throat, shortness of breath or wheezing.   Hypertension  Pertinent negatives include no chest pain, headaches, neck pain, palpitations or shortness of breath.   Hyperlipidemia  Pertinent negatives include no chest pain or shortness of breath.   Review of Systems   Constitutional: Negative.  Negative for activity change, appetite change, chills, fatigue and fever.   HENT:  Negative for congestion, ear pain, sinus pressure, sore throat and trouble swallowing.    Eyes: Negative.  Negative for photophobia, pain and visual disturbance.   Respiratory:  Negative for apnea, cough, chest tightness, shortness of breath and wheezing.    Cardiovascular:  Negative for chest pain, palpitations and leg swelling.   Gastrointestinal: Negative.  Negative for abdominal distention, abdominal pain, constipation, diarrhea, nausea and vomiting.   Genitourinary: Negative.    Musculoskeletal: Negative.  Negative for back pain, joint swelling and neck pain.   Skin: Negative.    Neurological:  Negative for dizziness, tremors, weakness, light-headedness, numbness and headaches.   Psychiatric/Behavioral:  Negative for behavioral problems, decreased concentration and sleep disturbance. The patient is not nervous/anxious.    All other systems reviewed and are negative.      Objective:      Physical Exam  Constitutional:       General: She is not in acute distress.     Appearance: She is well-developed. She is obese. She is not ill-appearing, toxic-appearing or diaphoretic.    HENT:      Head: Normocephalic and atraumatic.      Right Ear: External ear normal.      Left Ear: External ear normal.      Nose: Nose normal.      Mouth/Throat:      Pharynx: No oropharyngeal exudate.   Eyes:      Extraocular Movements: Extraocular movements intact.      Conjunctiva/sclera: Conjunctivae normal.      Pupils: Pupils are equal, round, and reactive to light.   Neck:      Thyroid: No thyromegaly.   Cardiovascular:      Rate and Rhythm: Normal rate and regular rhythm.      Heart sounds: Normal heart sounds. No murmur heard.  Pulmonary:      Effort: Pulmonary effort is normal. No respiratory distress.      Breath sounds: Normal breath sounds. No wheezing or rales.   Chest:      Chest wall: No tenderness.   Abdominal:      General: Bowel sounds are normal. There is no distension.      Palpations: Abdomen is soft. There is no mass.      Tenderness: There is no abdominal tenderness. There is no guarding or rebound.   Musculoskeletal:         General: No tenderness. Normal range of motion.      Cervical back: Normal range of motion and neck supple.      Right lower leg: No edema.      Left lower leg: No edema.   Lymphadenopathy:      Cervical: No cervical adenopathy.   Skin:     General: Skin is warm and dry.      Findings: No erythema.   Neurological:      Mental Status: She is alert and oriented to person, place, and time.      Cranial Nerves: No cranial nerve deficit.      Motor: No abnormal muscle tone.      Coordination: Coordination normal.      Deep Tendon Reflexes: Reflexes are normal and symmetric. Reflexes normal.   Psychiatric:         Mood and Affect: Mood normal.         Behavior: Behavior normal.         Thought Content: Thought content normal.         Judgment: Judgment normal.       Assessment:     HTN controlled  Hyperlipidemia: stable      Plan:       Primary hypertension  -     carvediloL (COREG) 25 MG tablet; Take 1 tablet (25 mg total) by mouth 2 (two) times daily with meals.   Dispense: 180 tablet; Refill: 1    Essential hypertension  -     amLODIPine (NORVASC) 5 MG tablet; Take 1 tablet (5 mg total) by mouth once daily.  Dispense: 90 tablet; Refill: 1    Dyslipidemia  -     atorvastatin (LIPITOR) 40 MG tablet; Take 1 tablet (40 mg total) by mouth once daily.  Dispense: 90 tablet; Refill: 1    Other seasonal allergic rhinitis  -     fluticasone propionate (FLONASE) 50 mcg/actuation nasal spray; 2 sprays (100 mcg total) by Each Nostril route once daily.  Dispense: 48 g; Refill: 1  -     loratadine (CLARITIN) 10 mg tablet; Take 1 tablet (10 mg total) by mouth once daily.  Dispense: 30 tablet; Refill: 11    Hypertension, unspecified type  -     irbesartan (AVAPRO) 300 MG tablet; Take 1 tablet (300 mg total) by mouth every evening.  Dispense: 90 tablet; Refill: 1    Other osteoarthritis involving multiple joints    Stage 3a chronic kidney disease    PE wnl   Hyperlipidemia  HTN controlled q 6 mos prn HTN

## 2023-04-03 NOTE — TELEPHONE ENCOUNTER
----- Message from Mckayla Rivero sent at 1/4/2023  2:30 PM CST -----  Contact: Self [phone]  Requesting an RX refill or new RX.    Is this a refill or new RX: refill    RX name and strength (copy/paste from chart):  atorvastatin (LIPITOR) 40 MG tablet and irbesartan (AVAPRO) 300 MG tablet    Is this a 30 day or 90 day RX: 90    Pharmacy name and phone # (copy/paste from chart):      [id number] - [place] - [address]  [address] [facility] [address]  Phone: [phone] Fax: [phone]    Pt is requesting a call back.           show

## 2023-04-27 ENCOUNTER — TELEPHONE (OUTPATIENT)
Dept: PRIMARY CARE CLINIC | Facility: CLINIC | Age: 73
End: 2023-04-27
Payer: COMMERCIAL

## 2023-04-27 DIAGNOSIS — M15.8 OTHER OSTEOARTHRITIS INVOLVING MULTIPLE JOINTS: Primary | ICD-10-CM

## 2023-04-27 DIAGNOSIS — M48.062 SPINAL STENOSIS OF LUMBAR REGION WITH NEUROGENIC CLAUDICATION: ICD-10-CM

## 2023-04-27 NOTE — TELEPHONE ENCOUNTER
----- Message from Carly Joseph sent at 4/27/2023  2:34 PM CDT -----  Type:  Needs Medical Advice    Who Called: pt  Symptoms (please be specific): pt is requesting to get a return call to answer some questions about water therapy     Would the patient rather a call back or a response via [facility]? call  Best Call Back Number: [phone]  Additional Information:

## 2023-04-27 NOTE — TELEPHONE ENCOUNTER
Orders Placed This Encounter   Procedures    Ambulatory referral/consult to Physical/Occupational Therapy     Standing Status:   Future     Standing Expiration Date:   5/27/2024     Referral Priority:   Routine     Referral Type:   Physical Medicine     Referral Reason:   Specialty Services Required     Number of Visits Requested:   1

## 2023-04-28 NOTE — TELEPHONE ENCOUNTER
Informed pt that an order for water therapy was placed in the sytem yesterday and they will be calling to get her schedule. Pt verbalized understanding. Pt states that they called and put her on the wait list. Pt has no further questions or concerns.

## 2023-05-17 ENCOUNTER — TELEPHONE (OUTPATIENT)
Dept: SLEEP MEDICINE | Facility: CLINIC | Age: 73
End: 2023-05-17
Payer: MEDICARE

## 2023-05-17 DIAGNOSIS — G47.33 OSA (OBSTRUCTIVE SLEEP APNEA): Primary | ICD-10-CM

## 2023-05-17 NOTE — TELEPHONE ENCOUNTER
----- Message from Helena Scanlon sent at 5/17/2023 12:05 PM CDT -----  Regarding: [facility]  Name of caller: Estelle ( [facility])       What is the requesting detail: Estelle is requesting a call back regarding orders that needs faxed to [phone]. Please give her a call back to further discuss       Can the clinic reply by [facility]:       What number to call back: [phone]

## 2023-05-18 ENCOUNTER — TELEPHONE (OUTPATIENT)
Dept: SLEEP MEDICINE | Facility: CLINIC | Age: 73
End: 2023-05-18
Payer: MEDICARE

## 2023-05-18 NOTE — TELEPHONE ENCOUNTER
----- Message from Maru Oquendo sent at 5/18/2023 11:46 AM CDT -----      Name of Who is Calling: MADELEINE RAMSAY [[id number]]      What is the request in detail: [employer] [facility] called to get sleep study results and latest clinical notes.Please contact to further discuss and advise.          Can the clinic reply by [facility]: N      What Number to Call Back if not in [facility]: [employer] fax [phone]

## 2023-07-20 ENCOUNTER — OFFICE VISIT (OUTPATIENT)
Dept: CARDIOLOGY | Facility: CLINIC | Age: 73
End: 2023-07-20
Payer: MEDICARE

## 2023-07-20 DIAGNOSIS — I10 ESSENTIAL HYPERTENSION: ICD-10-CM

## 2023-07-20 DIAGNOSIS — E78.5 DYSLIPIDEMIA: Chronic | ICD-10-CM

## 2023-07-20 DIAGNOSIS — N18.31 STAGE 3A CHRONIC KIDNEY DISEASE: ICD-10-CM

## 2023-07-20 DIAGNOSIS — E66.01 MORBID OBESITY: ICD-10-CM

## 2023-07-20 DIAGNOSIS — I10 PRIMARY HYPERTENSION: ICD-10-CM

## 2023-07-20 DIAGNOSIS — I42.8 NON-ISCHEMIC CARDIOMYOPATHY: Primary | ICD-10-CM

## 2023-07-20 DIAGNOSIS — G47.33 OSA (OBSTRUCTIVE SLEEP APNEA): ICD-10-CM

## 2023-07-20 DIAGNOSIS — I10 HYPERTENSION, UNSPECIFIED TYPE: ICD-10-CM

## 2023-07-20 PROCEDURE — 99999 PR PBB SHADOW E&M-EST. PATIENT-LVL III: ICD-10-PCS | Mod: PBBFAC,,, | Performed by: PHYSICIAN ASSISTANT

## 2023-07-20 PROCEDURE — 1160F PR REVIEW ALL MEDS BY PRESCRIBER/CLIN PHARMACIST DOCUMENTED: ICD-10-PCS | Mod: CPTII,S$GLB,, | Performed by: PHYSICIAN ASSISTANT

## 2023-07-20 PROCEDURE — 1159F MED LIST DOCD IN RCRD: CPT | Mod: CPTII,S$GLB,, | Performed by: PHYSICIAN ASSISTANT

## 2023-07-20 PROCEDURE — 4010F ACE/ARB THERAPY RXD/TAKEN: CPT | Mod: CPTII,S$GLB,, | Performed by: PHYSICIAN ASSISTANT

## 2023-07-20 PROCEDURE — 99214 OFFICE O/P EST MOD 30 MIN: CPT | Mod: S$GLB,,, | Performed by: PHYSICIAN ASSISTANT

## 2023-07-20 PROCEDURE — 99214 PR OFFICE/OUTPT VISIT, EST, LEVL IV, 30-39 MIN: ICD-10-PCS | Mod: S$GLB,,, | Performed by: PHYSICIAN ASSISTANT

## 2023-07-20 PROCEDURE — 4010F PR ACE/ARB THEARPY RXD/TAKEN: ICD-10-PCS | Mod: CPTII,S$GLB,, | Performed by: PHYSICIAN ASSISTANT

## 2023-07-20 PROCEDURE — 99999 PR PBB SHADOW E&M-EST. PATIENT-LVL III: CPT | Mod: PBBFAC,,, | Performed by: PHYSICIAN ASSISTANT

## 2023-07-20 PROCEDURE — 1159F PR MEDICATION LIST DOCUMENTED IN MEDICAL RECORD: ICD-10-PCS | Mod: CPTII,S$GLB,, | Performed by: PHYSICIAN ASSISTANT

## 2023-07-20 PROCEDURE — 1160F RVW MEDS BY RX/DR IN RCRD: CPT | Mod: CPTII,S$GLB,, | Performed by: PHYSICIAN ASSISTANT

## 2023-07-20 RX ORDER — IRBESARTAN 300 MG/1
300 TABLET ORAL NIGHTLY
Qty: 90 TABLET | Refills: 3 | Status: SHIPPED | OUTPATIENT
Start: 2023-07-20 | End: 2024-07-14

## 2023-07-20 RX ORDER — ATORVASTATIN CALCIUM 40 MG/1
40 TABLET, FILM COATED ORAL DAILY
Qty: 90 TABLET | Refills: 3 | Status: SHIPPED | OUTPATIENT
Start: 2023-07-20 | End: 2024-07-14

## 2023-07-20 RX ORDER — AMLODIPINE BESYLATE 5 MG/1
5 TABLET ORAL DAILY
Qty: 90 TABLET | Refills: 3 | Status: SHIPPED | OUTPATIENT
Start: 2023-07-20 | End: 2024-07-14

## 2023-07-20 RX ORDER — CARVEDILOL 25 MG/1
25 TABLET ORAL 2 TIMES DAILY WITH MEALS
Qty: 180 TABLET | Refills: 3 | Status: SHIPPED | OUTPATIENT
Start: 2023-07-20 | End: 2024-07-14

## 2023-07-20 NOTE — PROGRESS NOTES
General Cardiology Clinic Note  Reason for Visit: Annual follow up   Last Clinic Visit: 7/15/2022 with Tanisha Rodriguez NP    HPI:   Charlotte Crowder is a 73 y.o. female who presents for annual follow up.     Problems:   Non-ischemic cardiomyopathy  -EF 40-45% in 2015 improved to 60% in 2018  -normal coronary arteries in 2016  HTN  HLD  CKD Stage 3  Severe KIMMIE, using cpap    Interval HPI   Patient presents for annual follow up.  Patient denies symptoms of CAD, CHF, arrhythmia, hypotension, TIA, claudication. BP at home is 110s-120s/60s-70s. Only complaint is of some arthritic pain in her side relieved with tylenol.    7/15/2022 HPI (Tanisha Rodriguez)  Ms. Crowder is in clinic today for routine follow up.  Patient denies chest pain with exertion or at rest, palpitations, SOB, FUCHS, dizziness, syncope, edema, orthopnea, PND, or claudication.  Reports walking 10 minutes daily.  She uses hand weights with her arms.  Denies swelling with the amlodipine.  Patient has history of obstructive sleep apnea.  Reports nightly use of CPAP machine and denies any associated sx of KIMMIE.    ROS:      Review of Systems   Constitutional: Negative for diaphoresis, malaise/fatigue, weight gain and weight loss.   HENT:  Negative for nosebleeds.    Eyes:  Negative for vision loss in left eye, vision loss in right eye and visual disturbance.   Cardiovascular:  Negative for chest pain, claudication, dyspnea on exertion, irregular heartbeat, leg swelling, near-syncope, orthopnea, palpitations, paroxysmal nocturnal dyspnea and syncope.   Respiratory:  Negative for cough, shortness of breath, sleep disturbances due to breathing, snoring and wheezing.    Hematologic/Lymphatic: Negative for bleeding problem. Does not bruise/bleed easily.   Skin:  Negative for poor wound healing and rash.   Musculoskeletal:  Positive for joint pain. Negative for muscle cramps and myalgias.   Gastrointestinal:  Negative for bloating, abdominal pain, diarrhea,  heartburn, melena, nausea and vomiting.   Genitourinary:  Negative for hematuria and nocturia.   Neurological:  Negative for brief paralysis, dizziness, headaches, light-headedness, numbness and weakness.   Psychiatric/Behavioral:  Negative for depression.    Allergic/Immunologic: Negative for hives.     PMH:     Past Medical History:   Diagnosis Date    Abnormal Pap smear of cervix     Allergy     Hx of colonic polyps 08/17/2016    Hyperlipidemia     Hypertension     Morbid obesity     Nuclear sclerosis of both eyes 10/18/2018    OA (osteoarthritis)      Past Surgical History:   Procedure Laterality Date    none      SKIN BIOPSY       Allergies:   Review of patient's allergies indicates:  No Known Allergies  Medications:     Current Outpatient Medications on File Prior to Visit   Medication Sig Dispense Refill    amLODIPine (NORVASC) 5 MG tablet Take 1 tablet (5 mg total) by mouth once daily. 90 tablet 1    atorvastatin (LIPITOR) 40 MG tablet Take 1 tablet (40 mg total) by mouth once daily. 90 tablet 1    carvediloL (COREG) 25 MG tablet Take 1 tablet (25 mg total) by mouth 2 (two) times daily with meals. 180 tablet 1    fluticasone propionate (FLONASE) 50 mcg/actuation nasal spray 2 sprays (100 mcg total) by Each Nostril route once daily. 48 g 1    irbesartan (AVAPRO) 300 MG tablet Take 1 tablet (300 mg total) by mouth every evening. 90 tablet 1    loratadine (CLARITIN) 10 mg tablet Take 1 tablet (10 mg total) by mouth once daily. 30 tablet 11    MULTIVIT-IRON-MIN-FOLIC ACID 3,500-18-0.4 UNIT-MG-MG ORAL CHEW Take 1 tablet by mouth once daily.      omega-3 fatty acids/fish oil (FISH OIL-OMEGA-3 FATTY ACIDS) 300-1,000 mg capsule Take by mouth once daily.       No current facility-administered medications on file prior to visit.     Social History:     Social History     Tobacco Use    Smoking status: Never     Passive exposure: Never    Smokeless tobacco: Never   Substance Use Topics    Alcohol use: Yes      Alcohol/week: 1.0 standard drink     Types: 1 Glasses of wine per week     Comment: rarely      Family History:     Family History   Problem Relation Age of Onset    Glaucoma Father     Hypercalcemia Son     No Known Problems Other     Heart attack Neg Hx     Heart disease Neg Hx     Hypertension Neg Hx     Breast cancer Neg Hx     Colon cancer Neg Hx     Ovarian cancer Neg Hx     Calcium disorder Neg Hx      Physical Exam:   There were no vitals taken for this visit.   /80 HR 88    Physical Exam  Vitals and nursing note reviewed.   Constitutional:       General: She is not in acute distress.     Appearance: Normal appearance. She is not ill-appearing.   HENT:      Head: Normocephalic and atraumatic.   Eyes:      General: No scleral icterus.     Conjunctiva/sclera: Conjunctivae normal.   Neck:      Thyroid: No thyromegaly.      Vascular: No carotid bruit or JVD.   Cardiovascular:      Rate and Rhythm: Normal rate and regular rhythm.      Pulses: Normal pulses.      Heart sounds: Normal heart sounds. No murmur heard.    No friction rub. No gallop.   Pulmonary:      Effort: Pulmonary effort is normal.      Breath sounds: Normal breath sounds. No wheezing, rhonchi or rales.   Chest:      Chest wall: No tenderness.   Abdominal:      General: Bowel sounds are normal. There is no distension.      Palpations: Abdomen is soft.      Tenderness: There is no abdominal tenderness.   Musculoskeletal:         General: No swelling.      Cervical back: Neck supple.      Right lower leg: No edema.      Left lower leg: No edema.   Skin:     General: Skin is warm and dry.      Coloration: Skin is not pale.      Findings: No erythema or rash.      Nails: There is no clubbing.   Neurological:      Mental Status: She is alert and oriented to person, place, and time. Mental status is at baseline.   Psychiatric:         Mood and Affect: Mood and affect normal.         Behavior: Behavior normal.        Labs:     Lab Results    Component Value Date     11/07/2022    K 4.5 11/07/2022     11/07/2022    CO2 24 11/07/2022    BUN 18 11/07/2022    CREATININE 1.1 11/07/2022    ANIONGAP 7 (L) 11/07/2022     Lab Results   Component Value Date    HGBA1C 5.1 08/10/2022     No results found for: BNP, BNPTRIAGEBLO Lab Results   Component Value Date    WBC 3.46 (L) 08/10/2022    HGB 12.9 08/10/2022    HCT 39.9 08/10/2022     08/10/2022    GRAN 1.3 (L) 08/10/2022    GRAN 38.2 08/10/2022     Lab Results   Component Value Date    CHOL 151 08/10/2022    HDL 41 08/10/2022    LDLCALC 83.4 08/10/2022    TRIG 133 08/10/2022          Imaging:   Echocardiograms:   TTE 12/10/2018  Normal left ventricular systolic function. The estimated ejection fraction is 60%  Normal right ventricular systolic function.  Normal LV diastolic function.  The estimated PA systolic pressure is 17 mm Hg  Normal central venous pressure (3 mm Hg).    Stress Tests:   Stress echo 11/5/2015   1 - Technically difficult study.     2 - Mildly to moderately depressed left ventricular systolic function (EF 40-45%).     3 - Low normal to mildly depressed right ventricular systolic function .     4 - Left ventricular diastolic dysfunction.     Positive stress echocardiographic study demonstrating an ischemic response involving the apical lateral wall, mid anterolateral wall, apical anterior wall, mid anterior wall.     Caths:   [facility] 7/6/2016  Normal coronary arteries.     LVEDP=5mmHg.     Other:  Carotid ultrasound 5/18/2020  1. No sonographic evidence of hemodynamically significant stenosis of the bilateral extracranial internal carotid arteries.       Assessment:     1. Non-ischemic cardiomyopathy    2. Primary hypertension    3. Dyslipidemia    4. Stage 3a chronic kidney disease    5. Morbid obesity    6. Severe KIMMIE (obstructive sleep apnea)      Plan:     Nonischemic cardiomyopathy  EF recovered. No signs of ADHF    Hypertension  Well controlled at home  Continue  Amlodipine 5 mg daily  Continue Coreg 25 mg bid  Continue Irbesartan 300 mg daily     Dyslipidemia  Controlled on Lipitor 40 mg daily     Stage 3 CKD  Stable    Morbid obesity  Refer to bariatric medicine    Severe KIMMIE  Compliant with CPAP     Follow up in one year.     Signed:  Jocelyne Alexandre PA-C  Cardiology   [phone] - General

## 2023-07-21 ENCOUNTER — TELEPHONE (OUTPATIENT)
Dept: BARIATRICS | Facility: CLINIC | Age: 73
End: 2023-07-21
Payer: MEDICARE

## 2023-07-25 ENCOUNTER — TELEPHONE (OUTPATIENT)
Dept: SURGERY | Facility: CLINIC | Age: 73
End: 2023-07-25
Payer: MEDICARE

## 2023-07-31 ENCOUNTER — TELEPHONE (OUTPATIENT)
Dept: BARIATRICS | Facility: CLINIC | Age: 73
End: 2023-07-31
Payer: MEDICARE

## 2023-08-22 ENCOUNTER — OFFICE VISIT (OUTPATIENT)
Dept: PRIMARY CARE CLINIC | Facility: CLINIC | Age: 73
End: 2023-08-22
Attending: FAMILY MEDICINE
Payer: MEDICARE

## 2023-08-22 VITALS
HEART RATE: 89 BPM | WEIGHT: 260.69 LBS | SYSTOLIC BLOOD PRESSURE: 122 MMHG | RESPIRATION RATE: 16 BRPM | BODY MASS INDEX: 41.9 KG/M2 | OXYGEN SATURATION: 98 % | DIASTOLIC BLOOD PRESSURE: 82 MMHG | HEIGHT: 66 IN

## 2023-08-22 DIAGNOSIS — N18.31 STAGE 3A CHRONIC KIDNEY DISEASE: ICD-10-CM

## 2023-08-22 DIAGNOSIS — T50.905A HYPERCALCEMIA DUE TO A DRUG: ICD-10-CM

## 2023-08-22 DIAGNOSIS — D70.0 CONGENITAL NEUTROPENIA: ICD-10-CM

## 2023-08-22 DIAGNOSIS — I10 ESSENTIAL HYPERTENSION: ICD-10-CM

## 2023-08-22 DIAGNOSIS — E83.52 HYPERCALCEMIA: ICD-10-CM

## 2023-08-22 DIAGNOSIS — M62.838 MUSCLE SPASM: ICD-10-CM

## 2023-08-22 DIAGNOSIS — Z00.00 ANNUAL PHYSICAL EXAM: ICD-10-CM

## 2023-08-22 DIAGNOSIS — E78.5 DYSLIPIDEMIA: ICD-10-CM

## 2023-08-22 DIAGNOSIS — I10 PRIMARY HYPERTENSION: ICD-10-CM

## 2023-08-22 DIAGNOSIS — E83.52 HYPERCALCEMIA DUE TO A DRUG: ICD-10-CM

## 2023-08-22 DIAGNOSIS — R79.9 ABNORMAL FINDING OF BLOOD CHEMISTRY, UNSPECIFIED: ICD-10-CM

## 2023-08-22 DIAGNOSIS — G47.33 OSA (OBSTRUCTIVE SLEEP APNEA): Primary | ICD-10-CM

## 2023-08-22 DIAGNOSIS — E21.3 HYPERPARATHYROIDISM: ICD-10-CM

## 2023-08-22 PROCEDURE — 3044F HG A1C LEVEL LT 7.0%: CPT | Mod: CPTII,S$GLB,, | Performed by: FAMILY MEDICINE

## 2023-08-22 PROCEDURE — 1101F PT FALLS ASSESS-DOCD LE1/YR: CPT | Mod: CPTII,S$GLB,, | Performed by: FAMILY MEDICINE

## 2023-08-22 PROCEDURE — 3008F PR BODY MASS INDEX (BMI) DOCUMENTED: ICD-10-PCS | Mod: CPTII,S$GLB,, | Performed by: FAMILY MEDICINE

## 2023-08-22 PROCEDURE — 3008F BODY MASS INDEX DOCD: CPT | Mod: CPTII,S$GLB,, | Performed by: FAMILY MEDICINE

## 2023-08-22 PROCEDURE — 4010F ACE/ARB THERAPY RXD/TAKEN: CPT | Mod: CPTII,S$GLB,, | Performed by: FAMILY MEDICINE

## 2023-08-22 PROCEDURE — 1126F AMNT PAIN NOTED NONE PRSNT: CPT | Mod: CPTII,S$GLB,, | Performed by: FAMILY MEDICINE

## 2023-08-22 PROCEDURE — 3288F PR FALLS RISK ASSESSMENT DOCUMENTED: ICD-10-PCS | Mod: CPTII,S$GLB,, | Performed by: FAMILY MEDICINE

## 2023-08-22 PROCEDURE — 3079F PR MOST RECENT DIASTOLIC BLOOD PRESSURE 80-89 MM HG: ICD-10-PCS | Mod: CPTII,S$GLB,, | Performed by: FAMILY MEDICINE

## 2023-08-22 PROCEDURE — 1159F PR MEDICATION LIST DOCUMENTED IN MEDICAL RECORD: ICD-10-PCS | Mod: CPTII,S$GLB,, | Performed by: FAMILY MEDICINE

## 2023-08-22 PROCEDURE — 99999 PR PBB SHADOW E&M-EST. PATIENT-LVL IV: CPT | Mod: PBBFAC,,, | Performed by: FAMILY MEDICINE

## 2023-08-22 PROCEDURE — 99999 PR PBB SHADOW E&M-EST. PATIENT-LVL IV: ICD-10-PCS | Mod: PBBFAC,,, | Performed by: FAMILY MEDICINE

## 2023-08-22 PROCEDURE — 3044F PR MOST RECENT HEMOGLOBIN A1C LEVEL <7.0%: ICD-10-PCS | Mod: CPTII,S$GLB,, | Performed by: FAMILY MEDICINE

## 2023-08-22 PROCEDURE — 99397 PR PREVENTIVE VISIT,EST,65 & OVER: ICD-10-PCS | Mod: S$GLB,,, | Performed by: FAMILY MEDICINE

## 2023-08-22 PROCEDURE — 4010F PR ACE/ARB THEARPY RXD/TAKEN: ICD-10-PCS | Mod: CPTII,S$GLB,, | Performed by: FAMILY MEDICINE

## 2023-08-22 PROCEDURE — 3079F DIAST BP 80-89 MM HG: CPT | Mod: CPTII,S$GLB,, | Performed by: FAMILY MEDICINE

## 2023-08-22 PROCEDURE — 1159F MED LIST DOCD IN RCRD: CPT | Mod: CPTII,S$GLB,, | Performed by: FAMILY MEDICINE

## 2023-08-22 PROCEDURE — 1126F PR PAIN SEVERITY QUANTIFIED, NO PAIN PRESENT: ICD-10-PCS | Mod: CPTII,S$GLB,, | Performed by: FAMILY MEDICINE

## 2023-08-22 PROCEDURE — 3074F PR MOST RECENT SYSTOLIC BLOOD PRESSURE < 130 MM HG: ICD-10-PCS | Mod: CPTII,S$GLB,, | Performed by: FAMILY MEDICINE

## 2023-08-22 PROCEDURE — 3074F SYST BP LT 130 MM HG: CPT | Mod: CPTII,S$GLB,, | Performed by: FAMILY MEDICINE

## 2023-08-22 PROCEDURE — 1160F PR REVIEW ALL MEDS BY PRESCRIBER/CLIN PHARMACIST DOCUMENTED: ICD-10-PCS | Mod: CPTII,S$GLB,, | Performed by: FAMILY MEDICINE

## 2023-08-22 PROCEDURE — 99499 UNLISTED E&M SERVICE: CPT | Mod: S$GLB,,, | Performed by: FAMILY MEDICINE

## 2023-08-22 PROCEDURE — 3288F FALL RISK ASSESSMENT DOCD: CPT | Mod: CPTII,S$GLB,, | Performed by: FAMILY MEDICINE

## 2023-08-22 PROCEDURE — 99397 PER PM REEVAL EST PAT 65+ YR: CPT | Mod: S$GLB,,, | Performed by: FAMILY MEDICINE

## 2023-08-22 PROCEDURE — 1101F PR PT FALLS ASSESS DOC 0-1 FALLS W/OUT INJ PAST YR: ICD-10-PCS | Mod: CPTII,S$GLB,, | Performed by: FAMILY MEDICINE

## 2023-08-22 PROCEDURE — 1160F RVW MEDS BY RX/DR IN RCRD: CPT | Mod: CPTII,S$GLB,, | Performed by: FAMILY MEDICINE

## 2023-08-22 RX ORDER — CYCLOBENZAPRINE HCL 5 MG
5 TABLET ORAL 3 TIMES DAILY PRN
Qty: 90 TABLET | Refills: 0 | Status: SHIPPED | OUTPATIENT
Start: 2023-08-22 | End: 2023-09-25

## 2023-08-24 ENCOUNTER — LAB VISIT (OUTPATIENT)
Dept: LAB | Facility: OTHER | Age: 73
End: 2023-08-24
Attending: FAMILY MEDICINE
Payer: MEDICARE

## 2023-08-24 DIAGNOSIS — I10 ESSENTIAL HYPERTENSION: ICD-10-CM

## 2023-08-24 DIAGNOSIS — Z00.00 ANNUAL PHYSICAL EXAM: ICD-10-CM

## 2023-08-24 DIAGNOSIS — E78.5 DYSLIPIDEMIA: ICD-10-CM

## 2023-08-24 DIAGNOSIS — R79.9 ABNORMAL FINDING OF BLOOD CHEMISTRY, UNSPECIFIED: ICD-10-CM

## 2023-08-24 DIAGNOSIS — E83.52 HYPERCALCEMIA: ICD-10-CM

## 2023-08-24 LAB
25(OH)D3+25(OH)D2 SERPL-MCNC: 32 NG/ML (ref 30–96)
ALBUMIN SERPL BCP-MCNC: 3.8 G/DL (ref 3.5–5.2)
ALP SERPL-CCNC: 125 U/L (ref 55–135)
ALT SERPL W/O P-5'-P-CCNC: 14 U/L (ref 10–44)
ANION GAP SERPL CALC-SCNC: 7 MMOL/L (ref 8–16)
AST SERPL-CCNC: 19 U/L (ref 10–40)
BASOPHILS # BLD AUTO: 0.02 K/UL (ref 0–0.2)
BASOPHILS NFR BLD: 0.5 % (ref 0–1.9)
BILIRUB SERPL-MCNC: 0.6 MG/DL (ref 0.1–1)
BUN SERPL-MCNC: 13 MG/DL (ref 8–23)
CALCIUM SERPL-MCNC: 10.3 MG/DL (ref 8.7–10.5)
CHLORIDE SERPL-SCNC: 111 MMOL/L (ref 95–110)
CHOLEST SERPL-MCNC: 205 MG/DL (ref 120–199)
CHOLEST/HDLC SERPL: 5 {RATIO} (ref 2–5)
CO2 SERPL-SCNC: 25 MMOL/L (ref 23–29)
CREAT SERPL-MCNC: 1.1 MG/DL (ref 0.5–1.4)
DIFFERENTIAL METHOD: ABNORMAL
EOSINOPHIL # BLD AUTO: 0.2 K/UL (ref 0–0.5)
EOSINOPHIL NFR BLD: 4.6 % (ref 0–8)
ERYTHROCYTE [DISTWIDTH] IN BLOOD BY AUTOMATED COUNT: 13.4 % (ref 11.5–14.5)
EST. GFR  (NO RACE VARIABLE): 53 ML/MIN/1.73 M^2
ESTIMATED AVG GLUCOSE: 100 MG/DL (ref 68–131)
GLUCOSE SERPL-MCNC: 94 MG/DL (ref 70–110)
HBA1C MFR BLD: 5.1 % (ref 4–5.6)
HCT VFR BLD AUTO: 39.1 % (ref 37–48.5)
HDLC SERPL-MCNC: 41 MG/DL (ref 40–75)
HDLC SERPL: 20 % (ref 20–50)
HGB BLD-MCNC: 12.7 G/DL (ref 12–16)
IMM GRANULOCYTES # BLD AUTO: 0.01 K/UL (ref 0–0.04)
IMM GRANULOCYTES NFR BLD AUTO: 0.3 % (ref 0–0.5)
LDLC SERPL CALC-MCNC: 123.6 MG/DL (ref 63–159)
LYMPHOCYTES # BLD AUTO: 1.7 K/UL (ref 1–4.8)
LYMPHOCYTES NFR BLD: 47 % (ref 18–48)
MCH RBC QN AUTO: 28.3 PG (ref 27–31)
MCHC RBC AUTO-ENTMCNC: 32.5 G/DL (ref 32–36)
MCV RBC AUTO: 87 FL (ref 82–98)
MONOCYTES # BLD AUTO: 0.3 K/UL (ref 0.3–1)
MONOCYTES NFR BLD: 7.9 % (ref 4–15)
NEUTROPHILS # BLD AUTO: 1.5 K/UL (ref 1.8–7.7)
NEUTROPHILS NFR BLD: 39.7 % (ref 38–73)
NONHDLC SERPL-MCNC: 164 MG/DL
NRBC BLD-RTO: 0 /100 WBC
PLATELET # BLD AUTO: 194 K/UL (ref 150–450)
PMV BLD AUTO: 8.9 FL (ref 9.2–12.9)
POTASSIUM SERPL-SCNC: 4.3 MMOL/L (ref 3.5–5.1)
PROT SERPL-MCNC: 7.1 G/DL (ref 6–8.4)
RBC # BLD AUTO: 4.48 M/UL (ref 4–5.4)
SODIUM SERPL-SCNC: 143 MMOL/L (ref 136–145)
TRIGL SERPL-MCNC: 202 MG/DL (ref 30–150)
TSH SERPL DL<=0.005 MIU/L-ACNC: 1.85 UIU/ML (ref 0.4–4)
WBC # BLD AUTO: 3.66 K/UL (ref 3.9–12.7)

## 2023-08-24 PROCEDURE — 36415 COLL VENOUS BLD VENIPUNCTURE: CPT | Performed by: FAMILY MEDICINE

## 2023-08-24 PROCEDURE — 83036 HEMOGLOBIN GLYCOSYLATED A1C: CPT | Performed by: FAMILY MEDICINE

## 2023-08-24 PROCEDURE — 84443 ASSAY THYROID STIM HORMONE: CPT | Performed by: FAMILY MEDICINE

## 2023-08-24 PROCEDURE — 80053 COMPREHEN METABOLIC PANEL: CPT | Performed by: FAMILY MEDICINE

## 2023-08-24 PROCEDURE — 85025 COMPLETE CBC W/AUTO DIFF WBC: CPT | Performed by: FAMILY MEDICINE

## 2023-08-24 PROCEDURE — 82306 VITAMIN D 25 HYDROXY: CPT | Performed by: FAMILY MEDICINE

## 2023-08-24 PROCEDURE — 80061 LIPID PANEL: CPT | Performed by: FAMILY MEDICINE

## 2023-08-24 NOTE — PROGRESS NOTES
"Cc:mgt of KIMMIE    Continued use apap 6-14cm with resmed machine but got replacement DS2 machine and wishes to use it. Using stringer fx mask but wants mask over nose again.   Sleep consolidated when using    HTN stable, bp suboptimal today    Interrogation: 124/365d used, avg 5h/n AHI 0.8, 90% tile 9.8cm      BP (!) 161/99 (BP Location: Left arm, Patient Position: Sitting, BP Method: Medium (Automatic))   Pulse 73   Ht 5' 6" (1.676 m)   Wt 117.9 kg (260 lb)   BMI 41.97 kg/m²       PSG (262#) 3/16/116: AHI was 40.3 with an oxygen yenny of 85.0%. The supine AHI was 60.7 and the REM AHI was 77.6. Due to poor sleep efficiency, the patient did not qualify for a split night study in the first half of the study.     ASSESSMENT:   1. KIMMIE, severe. She remains adherent with APAP, benefits from therapy AHI<5. Needs alternative mask  Medical comorbidities include:obesity, dilated cardiomyopathy, HTN    PLAN:   1. continue apap 6-14cm, switch to Wisp mask, DME THS, set DS2 machine/ready to begin using   Discussed control of KIMMIE  See cards as advised re: cardiomyopathy/continue meds  2. She does not drive. See pcp re: HTN mgt/continue meds  3. RTC annually    "

## 2023-08-30 ENCOUNTER — OFFICE VISIT (OUTPATIENT)
Dept: SLEEP MEDICINE | Facility: CLINIC | Age: 73
End: 2023-08-30
Payer: MEDICARE

## 2023-08-30 VITALS
HEART RATE: 73 BPM | HEIGHT: 66 IN | DIASTOLIC BLOOD PRESSURE: 99 MMHG | WEIGHT: 260 LBS | SYSTOLIC BLOOD PRESSURE: 161 MMHG | BODY MASS INDEX: 41.78 KG/M2

## 2023-08-30 DIAGNOSIS — G47.33 OSA (OBSTRUCTIVE SLEEP APNEA): Primary | ICD-10-CM

## 2023-08-30 DIAGNOSIS — I10 HYPERTENSION, UNSPECIFIED TYPE: ICD-10-CM

## 2023-08-30 PROCEDURE — 3044F HG A1C LEVEL LT 7.0%: CPT | Mod: CPTII,S$GLB,, | Performed by: NURSE PRACTITIONER

## 2023-08-30 PROCEDURE — 1101F PR PT FALLS ASSESS DOC 0-1 FALLS W/OUT INJ PAST YR: ICD-10-PCS | Mod: CPTII,S$GLB,, | Performed by: NURSE PRACTITIONER

## 2023-08-30 PROCEDURE — 3077F SYST BP >= 140 MM HG: CPT | Mod: CPTII,S$GLB,, | Performed by: NURSE PRACTITIONER

## 2023-08-30 PROCEDURE — 3008F BODY MASS INDEX DOCD: CPT | Mod: CPTII,S$GLB,, | Performed by: NURSE PRACTITIONER

## 2023-08-30 PROCEDURE — 1159F PR MEDICATION LIST DOCUMENTED IN MEDICAL RECORD: ICD-10-PCS | Mod: CPTII,S$GLB,, | Performed by: NURSE PRACTITIONER

## 2023-08-30 PROCEDURE — 3008F PR BODY MASS INDEX (BMI) DOCUMENTED: ICD-10-PCS | Mod: CPTII,S$GLB,, | Performed by: NURSE PRACTITIONER

## 2023-08-30 PROCEDURE — 4010F PR ACE/ARB THEARPY RXD/TAKEN: ICD-10-PCS | Mod: CPTII,S$GLB,, | Performed by: NURSE PRACTITIONER

## 2023-08-30 PROCEDURE — 1126F PR PAIN SEVERITY QUANTIFIED, NO PAIN PRESENT: ICD-10-PCS | Mod: CPTII,S$GLB,, | Performed by: NURSE PRACTITIONER

## 2023-08-30 PROCEDURE — 99999 PR PBB SHADOW E&M-EST. PATIENT-LVL III: CPT | Mod: PBBFAC,,, | Performed by: NURSE PRACTITIONER

## 2023-08-30 PROCEDURE — 1126F AMNT PAIN NOTED NONE PRSNT: CPT | Mod: CPTII,S$GLB,, | Performed by: NURSE PRACTITIONER

## 2023-08-30 PROCEDURE — 3077F PR MOST RECENT SYSTOLIC BLOOD PRESSURE >= 140 MM HG: ICD-10-PCS | Mod: CPTII,S$GLB,, | Performed by: NURSE PRACTITIONER

## 2023-08-30 PROCEDURE — 3080F PR MOST RECENT DIASTOLIC BLOOD PRESSURE >= 90 MM HG: ICD-10-PCS | Mod: CPTII,S$GLB,, | Performed by: NURSE PRACTITIONER

## 2023-08-30 PROCEDURE — 1159F MED LIST DOCD IN RCRD: CPT | Mod: CPTII,S$GLB,, | Performed by: NURSE PRACTITIONER

## 2023-08-30 PROCEDURE — 1101F PT FALLS ASSESS-DOCD LE1/YR: CPT | Mod: CPTII,S$GLB,, | Performed by: NURSE PRACTITIONER

## 2023-08-30 PROCEDURE — 3080F DIAST BP >= 90 MM HG: CPT | Mod: CPTII,S$GLB,, | Performed by: NURSE PRACTITIONER

## 2023-08-30 PROCEDURE — 99214 PR OFFICE/OUTPT VISIT, EST, LEVL IV, 30-39 MIN: ICD-10-PCS | Mod: S$GLB,,, | Performed by: NURSE PRACTITIONER

## 2023-08-30 PROCEDURE — 4010F ACE/ARB THERAPY RXD/TAKEN: CPT | Mod: CPTII,S$GLB,, | Performed by: NURSE PRACTITIONER

## 2023-08-30 PROCEDURE — 3288F PR FALLS RISK ASSESSMENT DOCUMENTED: ICD-10-PCS | Mod: CPTII,S$GLB,, | Performed by: NURSE PRACTITIONER

## 2023-08-30 PROCEDURE — 99999 PR PBB SHADOW E&M-EST. PATIENT-LVL III: ICD-10-PCS | Mod: PBBFAC,,, | Performed by: NURSE PRACTITIONER

## 2023-08-30 PROCEDURE — 3288F FALL RISK ASSESSMENT DOCD: CPT | Mod: CPTII,S$GLB,, | Performed by: NURSE PRACTITIONER

## 2023-08-30 PROCEDURE — 3044F PR MOST RECENT HEMOGLOBIN A1C LEVEL <7.0%: ICD-10-PCS | Mod: CPTII,S$GLB,, | Performed by: NURSE PRACTITIONER

## 2023-08-30 PROCEDURE — 99214 OFFICE O/P EST MOD 30 MIN: CPT | Mod: S$GLB,,, | Performed by: NURSE PRACTITIONER

## 2023-09-07 ENCOUNTER — CLINICAL SUPPORT (OUTPATIENT)
Dept: REHABILITATION | Facility: HOSPITAL | Age: 73
End: 2023-09-07
Payer: MEDICARE

## 2023-09-07 DIAGNOSIS — M15.8 OTHER OSTEOARTHRITIS INVOLVING MULTIPLE JOINTS: ICD-10-CM

## 2023-09-07 DIAGNOSIS — M48.062 SPINAL STENOSIS OF LUMBAR REGION WITH NEUROGENIC CLAUDICATION: ICD-10-CM

## 2023-09-07 PROCEDURE — 97161 PT EVAL LOW COMPLEX 20 MIN: CPT

## 2023-09-07 PROCEDURE — 97110 THERAPEUTIC EXERCISES: CPT

## 2023-09-07 NOTE — PLAN OF CARE
[facility]   Physical Therapy Initial Evaluation     Date: 9/7/2023   Name: Charlotte Crowder  Clinic Number: [id number]    Therapy Diagnosis:   Encounter Diagnoses   Name Primary?    Other osteoarthritis involving multiple joints     Spinal stenosis of lumbar region with neurogenic claudication      Physician: Wendy Thompson MD    Physician Orders: Aquatic Therapy  Medical Diagnosis from Referral:   M15.8 (ICD-10-CM) - Other osteoarthritis involving multiple joints   M48.062 (ICD-10-CM) - Spinal stenosis of lumbar region with neurogenic claudication     Evaluation Date: 9/7/2023  Authorization Period Expiration: 11/2/23  Plan of Care Expiration: 11/7/23  Visit # / Visits authorized: 1/1     Precautions: Standard and Fall    Time In: 1:45 pm   Time Out: 2:20 pm  Total Appointment Time (timed & untimed codes): 35 minutes    SUBJECTIVE   Date of onset: chronic     History of current condition - Charlotte reports: she has a history of chronic LBP over the last 2-3 years. She stated she went to the MD where she was diagnosed with arthritis and issued muscle relaxer and referred to Aquatic PT.     Falls: none    Prior Therapy: None  Social History: lives with their spouse in 1 story with 6 steps to enter  Prior Level of Function: Independent  Current Level of Function: Pain and difficulty with sweeping and making the bed and any twisting activity    Pain:  Current 5/10, worst 10/10, best 0/10   Location: L greater than R Lumbar region  Description: compressing  Aggravating Factors: Activities that require twisting (sweeping, making the bed, reaching across her body)  Easing Factors: Tylenol, rest     Patients goals: to relieve  pain and get more healthy. To be able to walk with her grandchildren     Medical History:   Past Medical History:   Diagnosis Date    Abnormal Pap smear of cervix     Allergy     Hx of colonic polyps 08/17/2016    Hyperlipidemia     Hypertension     Morbid obesity      Nuclear sclerosis of both eyes 10/18/2018    OA (osteoarthritis)        Surgical History:   Charlotte Crowder  has a past surgical history that includes none and Skin biopsy.    Medications:   Charlotte has a current medication list which includes the following prescription(s): amlodipine, atorvastatin, carvedilol, cyclobenzaprine, fluticasone propionate, irbesartan, loratadine, multivit-iron-min-folic acid, and fish oil-omega-3 fatty acids.    Allergies:   Review of patient's allergies indicates:  No Known Allergies     Pool contraindications, including but not limited to, incontinence, seizures, fever/GI issues were reviewed with the patient. Patient agrees that based on their knowledge and medical history, they are appropriate for Aquatic Therapy.     OBJECTIVE     TU seconds without AD or signfiicant gait deviations     5 Times Sit to Stand: 15 seconds with out use of hands with pain reports on the L side     Gait: Mild antalgic gait.      Lumbar Range of Motion:     Right Left   Flexion WFL  No pain         Extension WFL No pain         SB R Limited 50% Stretch and pain on L lumbar region   SB L Limited 75  Pressure on L lumbar region        Lower Extremity Strength  Right LE   Left LE     Knee extension: 4/5 Knee extension: 4/5   Knee flexion: 4/5 Knee flexion: 4/5   Hip flexion: 4-/5 Hip flexion: 4-/5   Hip abduction: 4/5 Hip abduction: 4/5      Palpation: (+) TTP L greater than R Lumbosacral Paraspinals, QL and PSIS    Flexibility:   Hamstring R:Mild restriction       L Mild restriction     Piriformis RMild restriction     LMild restriction      TREATMENT     Total Treatment time (time-based codes) separate from Evaluation: 8 minutes      Charlotte received the treatments listed below:      THERAPEUTIC EXERCISES to develop ROM for 8 minutes including LTR and SL clam    PATIENT EDUCATION AND HOME EXERCISES     Education provided:   - Diagnosis, prognosis, relevant anatomy, role of therapy      Written  Home Exercises Provided: Exercises were reviewed and Charlotte was able to demonstrate them prior to the end of the session.    ASSESSMENT     Charlotte is a 73 y.o. female referred to outpatient Physical Therapy with a medical diagnosis of Spinal Stenosis and OA. Patient presents with limited lumbar AROM into B SB, decreased B LE strength and functional limitations that impact the patient's ability to perform ADLs and preferred activities at prior level of function.     Patient prognosis is Good.     Patient will benefit from skilled outpatient Physical Therapy to address the deficits stated above and in the chart below, provide patient /family education, and to maximize patientt's level of independence.     Plan of care discussed with patient: Yes    Patient's spiritual, cultural and educational needs considered and patient is agreeable to the plan of care and goals as stated below:     Anticipated Barriers for therapy: None    Medical Necessity is demonstrated by the following  History  Co-morbidities and personal factors that may impact the plan of care Co-morbidities:   Past Medical History:   Diagnosis Date    Abnormal Pap smear of cervix     Allergy     Hx of colonic polyps 08/17/2016    Hyperlipidemia     Hypertension     Morbid obesity     Nuclear sclerosis of both eyes 10/18/2018    OA (osteoarthritis)        Personal Factors:   no deficits     low   Examination  Body Structures and Functions, activity limitations and participation restrictions that may impact the plan of care Body Regions:   lower extremities    Body Systems:    ROM  strength  gait    Participation Restrictions:   none    Activity limitations:   Learning and applying knowledge  no deficits    General Tasks and Commands  no deficits    Communication  no deficits    Mobility  lifting and carrying objects    Self care  no deficits    Domestic Life  cooking  doing house work (cleaning house, washing dishes,  laundry)    Interactions/Relationships  no deficits    Life Areas  no deficits    Community and Social Life  no deficits         low   Clinical Presentation stable and uncomplicated low   Decision Making/ Complexity Score: low       Goals:  Short Term Goals:4 weeks   1. Patient will be independent in HEP & progressions.  2. Patient will demonstrate improved Lumbar AROM to WFL.  3. The patient will achieve 5 sit to stand score of 12 seconds to demonstrate improved transfers and endurance.     Long Term Goals: 8 weeks   1. The patient will demonstrate independence with extensive HEP.  2. Patient will achieve 5 sit to stand score of 10 seconds to demonstrate improved transfers & endurance.  3. Patent is able to demonstrate MMT 4+/5 B LE without pain reports during testing.      PLAN   Plan of care Certification: 9/7/2023 to 11/7/23.    Outpatient Physical Therapy 1-2 times weekly for 8 weeks to include the following interventions: manual therapy, aquatic therapy, patient education, therapeutic exercise, therapeutic activities.    Patient may be seen by PTA as part of rehabilitation team.    Mckayla Castellon, PT      I CERTIFY THE NEED FOR THESE SERVICES FURNISHED UNDER THIS PLAN OF TREATMENT AND WHILE UNDER MY CARE   Physician's comments:     Physician's Signature: ___________________________________________________

## 2023-09-07 NOTE — PROGRESS NOTES
[facility]   Physical Therapy Initial Evaluation     Date: 9/7/2023   Name: Charlotte Crowder  Clinic Number: [id number]    Therapy Diagnosis:   Encounter Diagnoses   Name Primary?    Other osteoarthritis involving multiple joints     Spinal stenosis of lumbar region with neurogenic claudication      Physician: Wendy Thompson MD    Physician Orders: Aquatic Therapy  Medical Diagnosis from Referral:   M15.8 (ICD-10-CM) - Other osteoarthritis involving multiple joints   M48.062 (ICD-10-CM) - Spinal stenosis of lumbar region with neurogenic claudication     Evaluation Date: 9/7/2023  Authorization Period Expiration: 11/2/23  Plan of Care Expiration: 11/7/23  Visit # / Visits authorized: 1/1     Precautions: Standard and Fall    Time In: 1:45 pm   Time Out: 2:20 pm  Total Appointment Time (timed & untimed codes): 35 minutes    SUBJECTIVE   Date of onset: chronic     History of current condition - Charlotte reports: she has a history of chronic LBP over the last 2-3 years. She stated she went to the MD where she was diagnosed with arthritis and issued muscle relaxer and referred to Aquatic PT.     Falls: none    Prior Therapy: None  Social History: lives with their spouse in 1 story with 6 steps to enter  Prior Level of Function: Independent  Current Level of Function: Pain and difficulty with sweeping and making the bed and any twisting activity    Pain:  Current 5/10, worst 10/10, best 0/10   Location: L greater than R Lumbar region  Description: compressing  Aggravating Factors: Activities that require twisting (sweeping, making the bed, reaching across her body)  Easing Factors: Tylenol, rest     Patients goals: to relieve  pain and get more healthy. To be able to walk with her grandchildren     Medical History:   Past Medical History:   Diagnosis Date    Abnormal Pap smear of cervix     Allergy     Hx of colonic polyps 08/17/2016    Hyperlipidemia     Hypertension     Morbid obesity      Nuclear sclerosis of both eyes 10/18/2018    OA (osteoarthritis)        Surgical History:   Charlotte Crowder  has a past surgical history that includes none and Skin biopsy.    Medications:   Charlotte has a current medication list which includes the following prescription(s): amlodipine, atorvastatin, carvedilol, cyclobenzaprine, fluticasone propionate, irbesartan, loratadine, multivit-iron-min-folic acid, and fish oil-omega-3 fatty acids.    Allergies:   Review of patient's allergies indicates:  No Known Allergies     Pool contraindications, including but not limited to, incontinence, seizures, fever/GI issues were reviewed with the patient. Patient agrees that based on their knowledge and medical history, they are appropriate for Aquatic Therapy.     OBJECTIVE     TU seconds without AD or signfiicant gait deviations     5 Times Sit to Stand: 15 seconds with out use of hands with pain reports on the L side     Gait: Mild antalgic gait.      Lumbar Range of Motion:     Right Left   Flexion WFL  No pain         Extension WFL No pain         SB R Limited 50% Stretch and pain on L lumbar region   SB L Limited 75  Pressure on L lumbar region        Lower Extremity Strength  Right LE   Left LE     Knee extension: 4/5 Knee extension: 4/5   Knee flexion: 4/5 Knee flexion: 4/5   Hip flexion: 4-/5 Hip flexion: 4-/5   Hip abduction: 4/5 Hip abduction: 4/5      Palpation: (+) TTP L greater than R Lumbosacral Paraspinals, QL and PSIS    Flexibility:   Hamstring R:Mild restriction       L Mild restriction     Piriformis RMild restriction     LMild restriction      TREATMENT     Total Treatment time (time-based codes) separate from Evaluation: 8 minutes      Charlotte received the treatments listed below:      THERAPEUTIC EXERCISES to develop ROM for 8 minutes including LTR and SL clam    PATIENT EDUCATION AND HOME EXERCISES     Education provided:   - Diagnosis, prognosis, relevant anatomy, role of therapy      Written  Home Exercises Provided: Exercises were reviewed and Charlotte was able to demonstrate them prior to the end of the session.    ASSESSMENT     Charlotte is a 73 y.o. female referred to outpatient Physical Therapy with a medical diagnosis of Spinal Stenosis and OA. Patient presents with limited lumbar AROM into B SB, decreased B LE strength and functional limitations that impact the patient's ability to perform ADLs and preferred activities at prior level of function.     Patient prognosis is Good.     Patient will benefit from skilled outpatient Physical Therapy to address the deficits stated above and in the chart below, provide patient /family education, and to maximize patientt's level of independence.     Plan of care discussed with patient: Yes    Patient's spiritual, cultural and educational needs considered and patient is agreeable to the plan of care and goals as stated below:     Anticipated Barriers for therapy: None    Medical Necessity is demonstrated by the following  History  Co-morbidities and personal factors that may impact the plan of care Co-morbidities:   Past Medical History:   Diagnosis Date    Abnormal Pap smear of cervix     Allergy     Hx of colonic polyps 08/17/2016    Hyperlipidemia     Hypertension     Morbid obesity     Nuclear sclerosis of both eyes 10/18/2018    OA (osteoarthritis)        Personal Factors:   no deficits     low   Examination  Body Structures and Functions, activity limitations and participation restrictions that may impact the plan of care Body Regions:   lower extremities    Body Systems:    ROM  strength  gait    Participation Restrictions:   none    Activity limitations:   Learning and applying knowledge  no deficits    General Tasks and Commands  no deficits    Communication  no deficits    Mobility  lifting and carrying objects    Self care  no deficits    Domestic Life  cooking  doing house work (cleaning house, washing dishes,  laundry)    Interactions/Relationships  no deficits    Life Areas  no deficits    Community and Social Life  no deficits         low   Clinical Presentation stable and uncomplicated low   Decision Making/ Complexity Score: low       Goals:  Short Term Goals:4 weeks   1. Patient will be independent in HEP & progressions.  2. Patient will demonstrate improved Lumbar AROM to WFL.  3. The patient will achieve 5 sit to stand score of 12 seconds to demonstrate improved transfers and endurance.     Long Term Goals: 8 weeks   1. The patient will demonstrate independence with extensive HEP.  2. Patient will achieve 5 sit to stand score of 10 seconds to demonstrate improved transfers & endurance.  3. Patent is able to demonstrate MMT 4+/5 B LE without pain reports during testing.      PLAN   Plan of care Certification: 9/7/2023 to 11/7/23.    Outpatient Physical Therapy 1-2 times weekly for 8 weeks to include the following interventions: manual therapy, aquatic therapy, patient education, therapeutic exercise, therapeutic activities.    Patient may be seen by PTA as part of rehabilitation team.    Mckayla Castellon, PT      I CERTIFY THE NEED FOR THESE SERVICES FURNISHED UNDER THIS PLAN OF TREATMENT AND WHILE UNDER MY CARE   Physician's comments:     Physician's Signature: ___________________________________________________

## 2023-09-13 ENCOUNTER — PATIENT MESSAGE (OUTPATIENT)
Dept: ADMINISTRATIVE | Facility: HOSPITAL | Age: 73
End: 2023-09-13
Payer: MEDICARE

## 2023-09-14 ENCOUNTER — CLINICAL SUPPORT (OUTPATIENT)
Dept: REHABILITATION | Facility: HOSPITAL | Age: 73
End: 2023-09-14
Payer: MEDICARE

## 2023-09-14 DIAGNOSIS — M54.16 LUMBAR RADICULOPATHY: ICD-10-CM

## 2023-09-14 DIAGNOSIS — M47.816 LUMBAR SPONDYLOSIS: ICD-10-CM

## 2023-09-14 DIAGNOSIS — G89.29 CHRONIC PAIN OF LEFT KNEE: Primary | ICD-10-CM

## 2023-09-14 DIAGNOSIS — M25.562 CHRONIC PAIN OF LEFT KNEE: Primary | ICD-10-CM

## 2023-09-14 PROCEDURE — 97113 AQUATIC THERAPY/EXERCISES: CPT

## 2023-09-14 NOTE — PROGRESS NOTES
[facility]   Physical Therapy Treatment Note     Name: Charlotte Crowder  Clinic Number: [id number]    Therapy Diagnosis:   Encounter Diagnoses   Name Primary?    Chronic pain of left knee Yes    Lumbar radiculopathy     Lumbar spondylosis      Physician: Wendy Thompson MD    Visit Date: 9/14/2023    Physician Orders: Aquatic Therapy  Medical Diagnosis from Referral:   M15.8 (ICD-10-CM) - Other osteoarthritis involving multiple joints   M48.062 (ICD-10-CM) - Spinal stenosis of lumbar region with neurogenic claudication     Evaluation Date: 9/7/2023  Authorization Period Expiration: 11/2/23  Plan of Care Expiration: 11/7/23  Visit # / Visits authorized: 1/23 + Eval     Precautions: Standard and Fall      Time In: 9:30 am  Time Out: 10:25 am   Total Billable Time: 30 minutes    SUBJECTIVE     Pt reports: she is feeling ok today. She stated she felt good after her initial evaluation.    She was compliant with home exercise program.    Response to previous treatment: first pool visit  Functional change: initiated aquatic PT    Pain: 7/10  Location: bilateral knee      OBJECTIVE     Objective Measures updated at progress report unless specified.     Treatment     Charlotte received aquatic therapeutic exercises to develop strength, endurance, ROM, flexibility, posture, and core stabilization for 60 minutes including:    FUNCTIONAL MOBILITY TRAINING x 2 laps each at beginning and 1 lap each at end of session  Walk forward/backward/lateral    STRETCHES 2 x 30sec  HS--add next    LE EX x 20  Squat  Heel raise   Hip abduction/adduction  Hip flex/ext  HS curl  March  Clam    Sit to stand from pool stool--NP    Walking marches x 2 laps--NP      UE EX/CORE  x 20  Shoulder flex/ext TA activation paddles CLOSED  Shoulder horizontal abd/add TA activation paddles CLOSED  Shoulder abd/add with TA activation and paddles CLOSED    Mini squat with push/pull red kickboard with back against the  wall      ENDURANCE  LTR x 2'  Bicycle in // bars x 3'      Patient Education and Home Exercises     Home Exercises Provided and Patient Education Provided     Education provided:   Role of aquatic therapy  Hydration post therapy      Written Home Exercises Provided: Patient instructed to cont prior HEP.   ASSESSMENT     Patient tolerated her initial pool session well and reported reduction of her back and hip tightness with ambulation in the pool.     Charlotte Is progressing well towards her goals.     Pt prognosis is Good.     Pt will continue to benefit from skilled outpatient physical therapy to address the deficits listed in the problem list box on initial evaluation, provide pt/family education and to maximize pt's level of independence in the home and community environment.     Pt's spiritual, cultural and educational needs considered and pt agreeable to plan of care and goals.     Anticipated barriers to physical therapy: none at this time.    Goals:   Short Term Goals:4 weeks   1. Patient will be independent in HEP & progressions.  2. Patient will demonstrate improved Lumbar AROM to WFL.  3. The patient will achieve 5 sit to stand score of 12 seconds to demonstrate improved transfers and endurance.     Long Term Goals: 8 weeks   1. The patient will demonstrate independence with extensive HEP.  2. Patient will achieve 5 sit to stand score of 10 seconds to demonstrate improved transfers & endurance.  3. Patent is able to demonstrate MMT 4+/5 B LE without pain reports during testing.        PLAN     Plan of care Certification: 9/7/2023 to 11/7/23.    Outpatient Physical Therapy 1-2 times weekly for 8 weeks to include the following interventions: manual therapy, aquatic therapy, patient education, therapeutic exercise, therapeutic activities.    Patient may be seen by PTA as part of rehabilitation team.    Mckayla Castellon, PT

## 2023-09-18 NOTE — PROGRESS NOTES
Subjective:       Patient ID: Charlotte Crowder is a 73 y.o. female.    Chief Complaint: Annual Exam    Pt feels well,no complaints. Needs annual exam      Review of Systems   Constitutional:  Negative for activity change, appetite change, chills, fatigue and fever.   HENT:  Negative for congestion, ear pain, sinus pressure, sore throat and trouble swallowing.    Eyes:  Negative for photophobia, pain and visual disturbance.   Respiratory:  Negative for apnea, cough, chest tightness, shortness of breath and wheezing.    Cardiovascular:  Negative for chest pain, palpitations and leg swelling.   Gastrointestinal:  Negative for abdominal distention, abdominal pain, constipation, diarrhea, nausea and vomiting.   Genitourinary: Negative.    Musculoskeletal:  Negative for back pain, joint swelling and neck pain.   Skin: Negative.    Neurological:  Negative for dizziness, tremors, weakness, numbness and headaches.   Psychiatric/Behavioral:  Negative for behavioral problems, decreased concentration and sleep disturbance. The patient is not nervous/anxious.    All other systems reviewed and are negative.        Objective:      Physical Exam  Vitals reviewed.   Constitutional:       General: She is not in acute distress.     Appearance: Normal appearance. She is well-developed and normal weight. She is not ill-appearing, toxic-appearing or diaphoretic.   HENT:      Head: Normocephalic and atraumatic.      Right Ear: Tympanic membrane, ear canal and external ear normal. There is no impacted cerumen.      Left Ear: Tympanic membrane, ear canal and external ear normal. There is no impacted cerumen.      Nose: Nose normal.      Mouth/Throat:      Pharynx: No oropharyngeal exudate or posterior oropharyngeal erythema.   Eyes:      Extraocular Movements: Extraocular movements intact.      Conjunctiva/sclera: Conjunctivae normal.      Pupils: Pupils are equal, round, and reactive to light.   Neck:      Thyroid: No thyromegaly.    Cardiovascular:      Rate and Rhythm: Normal rate and regular rhythm.      Pulses: Normal pulses.      Heart sounds: Normal heart sounds. No murmur heard.  Pulmonary:      Effort: Pulmonary effort is normal. No respiratory distress.      Breath sounds: Normal breath sounds. No stridor. No rhonchi or rales.   Chest:      Chest wall: No tenderness.   Abdominal:      General: Bowel sounds are normal. There is no distension.      Palpations: Abdomen is soft. There is no mass.      Tenderness: There is no abdominal tenderness. There is no guarding or rebound.   Musculoskeletal:         General: No tenderness. Normal range of motion.      Cervical back: Normal range of motion and neck supple.      Right lower leg: No edema.      Left lower leg: No edema.   Lymphadenopathy:      Cervical: No cervical adenopathy.   Skin:     General: Skin is warm and dry.      Findings: No erythema.   Neurological:      General: No focal deficit present.      Mental Status: She is alert and oriented to person, place, and time. Mental status is at baseline.      Cranial Nerves: No cranial nerve deficit.      Sensory: No sensory deficit.      Motor: No weakness.      Coordination: Coordination normal.      Gait: Gait normal.      Deep Tendon Reflexes: Reflexes are normal and symmetric. Reflexes normal.   Psychiatric:         Mood and Affect: Mood normal.         Behavior: Behavior normal.         Thought Content: Thought content normal.         Judgment: Judgment normal.         Assessment:       1. Severe KIMMIE (obstructive sleep apnea)    2. Essential hypertension    3. Hypercalcemia    4. Dyslipidemia    5. Annual physical exam    6. Abnormal finding of blood chemistry, unspecified    7. Muscle spasm    8. Primary hypertension    9. Stage 3a chronic kidney disease    10. Hypercalcemia due to a drug    11. Hyperparathyroidism    12. Congenital neutropenia      2. Annual exam  3. Lesions LE  Plan:   1. Severe KIMMIE (obstructive sleep  apnea)    2. Essential hypertension  -     CBC Auto Differential; Future; Expected date: 08/22/2023  -     Comprehensive Metabolic Panel; Future; Expected date: 08/22/2023  -     Hemoglobin A1C; Future; Expected date: 08/22/2023  -     Lipid Panel; Future; Expected date: 08/27/2023  -     Vitamin D; Future; Expected date: 08/22/2023  -     TSH; Future; Expected date: 08/22/2023    3. Hypercalcemia  -     CBC Auto Differential; Future; Expected date: 08/22/2023  -     Comprehensive Metabolic Panel; Future; Expected date: 08/22/2023  -     Hemoglobin A1C; Future; Expected date: 08/22/2023  -     Lipid Panel; Future; Expected date: 08/27/2023  -     Vitamin D; Future; Expected date: 08/22/2023  -     TSH; Future; Expected date: 08/22/2023    4. Dyslipidemia  -     CBC Auto Differential; Future; Expected date: 08/22/2023  -     Comprehensive Metabolic Panel; Future; Expected date: 08/22/2023  -     Hemoglobin A1C; Future; Expected date: 08/22/2023  -     Lipid Panel; Future; Expected date: 08/27/2023  -     Vitamin D; Future; Expected date: 08/22/2023  -     TSH; Future; Expected date: 08/22/2023    5. Annual physical exam  -     CBC Auto Differential; Future; Expected date: 08/22/2023  -     Comprehensive Metabolic Panel; Future; Expected date: 08/22/2023  -     Hemoglobin A1C; Future; Expected date: 08/22/2023  -     Lipid Panel; Future; Expected date: 08/27/2023  -     Vitamin D; Future; Expected date: 08/22/2023  -     TSH; Future; Expected date: 08/22/2023    6. Abnormal finding of blood chemistry, unspecified  -     Hemoglobin A1C; Future; Expected date: 08/22/2023    7. Muscle spasm  -     cyclobenzaprine (FLEXERIL) 5 MG tablet; Take 1 tablet (5 mg total) by mouth 3 (three) times daily as needed for Muscle spasms.  Dispense: 90 tablet; Refill: 0    8. Primary hypertension    9. Stage 3a chronic kidney disease    10. Hypercalcemia due to a drug    11. Hyperparathyroidism    12. Congenital neutropenia    pt  requesting [place] referral so she can swim  Lifestyle mods d/w pt  HTN BP stable on meds. Also followed by cards  Lesions on LE followed by derm appear to be normal aging per pt  Congenital neutropenia.  Stable on labs patient with no complaints.  Kidney disease.  Patient is followed by Nephrology.  Stable  Health Maintenance reviewed/updated.  RTC 6 mos f/u BP

## 2023-09-21 ENCOUNTER — CLINICAL SUPPORT (OUTPATIENT)
Dept: REHABILITATION | Facility: HOSPITAL | Age: 73
End: 2023-09-21
Payer: MEDICARE

## 2023-09-21 DIAGNOSIS — G89.29 CHRONIC PAIN OF LEFT KNEE: ICD-10-CM

## 2023-09-21 DIAGNOSIS — M47.816 LUMBAR SPONDYLOSIS: ICD-10-CM

## 2023-09-21 DIAGNOSIS — M54.16 LUMBAR RADICULOPATHY: Primary | ICD-10-CM

## 2023-09-21 DIAGNOSIS — M25.562 CHRONIC PAIN OF LEFT KNEE: ICD-10-CM

## 2023-09-21 PROCEDURE — 97113 AQUATIC THERAPY/EXERCISES: CPT

## 2023-09-21 NOTE — PROGRESS NOTES
[facility]   Physical Therapy Treatment Note     Name: Charlotte Crowder  Clinic Number: [id number]    Therapy Diagnosis:   Encounter Diagnoses   Name Primary?    Lumbar radiculopathy Yes    Lumbar spondylosis     Chronic pain of left knee      Physician: Wenyd Thompson MD    Visit Date: 9/21/2023    Physician Orders: Aquatic Therapy  Medical Diagnosis from Referral:   M15.8 (ICD-10-CM) - Other osteoarthritis involving multiple joints   M48.062 (ICD-10-CM) - Spinal stenosis of lumbar region with neurogenic claudication     Evaluation Date: 9/7/2023  Authorization Period Expiration: 11/2/23  Plan of Care Expiration: 11/7/23  Visit # / Visits authorized: 1/23 + Eval     Precautions: Standard and Fall      Time In: 9:40 am  Time Out: 10:50 am  Total Billable Time: 25 minutes    SUBJECTIVE     Pt reports: the water has been really helping with her pain and feels good in the water.     She was compliant with home exercise program.    Response to previous treatment: first pool visit  Functional change: initiated aquatic PT    Pain: 7/10  Location: bilateral knee      OBJECTIVE     Objective Measures updated at progress report unless specified.     Treatment     Charlotte received aquatic therapeutic exercises to develop strength, endurance, ROM, flexibility, posture, and core stabilization for 60 minutes including:    FUNCTIONAL MOBILITY TRAINING x 2 laps each at beginning and 1 lap each at end of session  Walk forward/backward/lateral    STRETCHES 2 x 30sec  HS--add next    LE EX x 20  Squat  Heel raise   Hip abduction/adduction  Hip flex/ext  HS curl  March  Clam      Add next visit  Sit to stand from pool stool  LAQ    Walking marches x 2 laps--NP    UE EX/CORE  x 20  Shoulder flex/ext TA activation paddles CLOSED  Shoulder horizontal abd/add TA activation paddles CLOSED  Shoulder abd/add with TA activation and paddles CLOSED    Mini squat with push/pull red kickboard with back against the  wall    ENDURANCE  LTR x 2'  Bicycle in // bars x 3'      Patient Education and Home Exercises     Home Exercises Provided and Patient Education Provided     Education provided:   Role of aquatic therapy  Hydration post therapy      Written Home Exercises Provided: Patient instructed to cont prior HEP.   ASSESSMENT     The patient reported some intermittent R sided back tightness which improved throughout the duration of her treatment in the water. She reported reduction of pain following the session today.     Charlotte Is progressing well towards her goals.     Pt prognosis is Good.     Pt will continue to benefit from skilled outpatient physical therapy to address the deficits listed in the problem list box on initial evaluation, provide pt/family education and to maximize pt's level of independence in the home and community environment.     Pt's spiritual, cultural and educational needs considered and pt agreeable to plan of care and goals.     Anticipated barriers to physical therapy: none at this time.    Goals:   Short Term Goals:4 weeks   1. Patient will be independent in HEP & progressions.  2. Patient will demonstrate improved Lumbar AROM to WFL.  3. The patient will achieve 5 sit to stand score of 12 seconds to demonstrate improved transfers and endurance.     Long Term Goals: 8 weeks   1. The patient will demonstrate independence with extensive HEP.  2. Patient will achieve 5 sit to stand score of 10 seconds to demonstrate improved transfers & endurance.  3. Patent is able to demonstrate MMT 4+/5 B LE without pain reports during testing.        PLAN     Plan of care Certification: 9/7/2023 to 11/7/23.    Outpatient Physical Therapy 1-2 times weekly for 8 weeks to include the following interventions: manual therapy, aquatic therapy, patient education, therapeutic exercise, therapeutic activities.    Patient may be seen by PTA as part of rehabilitation team.    Mckayla Castellon, PT

## 2023-09-23 DIAGNOSIS — M62.838 MUSCLE SPASM: ICD-10-CM

## 2023-09-23 NOTE — TELEPHONE ENCOUNTER
No care due was identified.  [facility] Embedded Care Due Messages. Reference number: [id number].   9/23/2023 11:59:20 AM CDT

## 2023-09-25 RX ORDER — CYCLOBENZAPRINE HCL 5 MG
TABLET ORAL
Qty: 90 TABLET | Refills: 0 | Status: SHIPPED | OUTPATIENT
Start: 2023-09-25 | End: 2023-12-08

## 2023-10-03 ENCOUNTER — CLINICAL SUPPORT (OUTPATIENT)
Dept: REHABILITATION | Facility: HOSPITAL | Age: 73
End: 2023-10-03
Payer: MEDICARE

## 2023-10-03 DIAGNOSIS — M47.816 LUMBAR SPONDYLOSIS: ICD-10-CM

## 2023-10-03 DIAGNOSIS — M25.562 CHRONIC PAIN OF LEFT KNEE: ICD-10-CM

## 2023-10-03 DIAGNOSIS — M54.16 LUMBAR RADICULOPATHY: Primary | ICD-10-CM

## 2023-10-03 DIAGNOSIS — G89.29 CHRONIC PAIN OF LEFT KNEE: ICD-10-CM

## 2023-10-03 PROCEDURE — 97113 AQUATIC THERAPY/EXERCISES: CPT

## 2023-10-03 NOTE — PROGRESS NOTES
[facility]   Physical Therapy Treatment Note     Name: Charlotte Crowder  Clinic Number: [id number]    Therapy Diagnosis:   Encounter Diagnoses   Name Primary?    Lumbar radiculopathy Yes    Lumbar spondylosis     Chronic pain of left knee      Physician: Wendy Thompson MD    Visit Date: 10/3/2023    Physician Orders: Aquatic Therapy  Medical Diagnosis from Referral:   M15.8 (ICD-10-CM) - Other osteoarthritis involving multiple joints   M48.062 (ICD-10-CM) - Spinal stenosis of lumbar region with neurogenic claudication     Evaluation Date: 9/7/2023  Authorization Period Expiration: 11/2/23  Plan of Care Expiration: 11/7/23  Visit # / Visits authorized: 3/23 + Eval     Precautions: Standard and Fall    Time In: 1:25 PM  Time Out: 53  Total Billable Time: 45 minutes    SUBJECTIVE     Pt reports: that she has felt a little bit better making the bed, and even sweeping. Her exercises in bed are going ok.     She was compliant with home exercise program.    Response to previous treatment: first pool visit  Functional change: initiated aquatic PT    Pain: 6/10  Location: bilateral knee      OBJECTIVE     Objective Measures updated at progress report unless specified.     Treatment     Charlotte received aquatic therapeutic exercises to develop strength, endurance, ROM, flexibility, posture, and core stabilization for 53 minutes including:    FUNCTIONAL MOBILITY TRAINING x 2 laps each at beginning and 1 lap each at end of session  Walk forward/backward/lateral    STRETCHES 2 x 30sec  HS    LE EX x 20  Squat  Heel raise   Hip abduction/adduction  Hip flex/ext  HS curl  March  Clam    Sit to stand from pool stool  LAQ    Walking marches x 2 laps--NP    UE EX/CORE  x 20  Shoulder flex/ext TA activation paddles CLOSED  Shoulder horizontal abd/add TA activation paddles CLOSED  Shoulder abd/add with TA activation and paddles CLOSED    Mini squat with push/pull red kickboard with back against the  wall    ENDURANCE  LTR x 2'  Bicycle in // bars x 3'      Patient Education and Home Exercises     Home Exercises Provided and Patient Education Provided     Education provided:   Role of aquatic therapy  Hydration post therapy    Written Home Exercises Provided: Patient instructed to cont prior HEP.   ASSESSMENT     Patient demonstrating greater understanding of exercises, however requires frequent cues to activate glutes rather than knee flexors and extensors during hip strengthening exercises. Patient reported no complaints during treatment today. Limitations in sagittal plane core stability continue to make forward bending activities such as making the bed provocative for LBP. Therefore continuation of core exercises to establish sagittal and frontal plane stability are appropriate. Continue to progress per patient tolerance.    Charlotte Is progressing well towards her goals.     Pt prognosis is Good.     Pt will continue to benefit from skilled outpatient physical therapy to address the deficits listed in the problem list box on initial evaluation, provide pt/family education and to maximize pt's level of independence in the home and community environment.     Pt's spiritual, cultural and educational needs considered and pt agreeable to plan of care and goals.     Anticipated barriers to physical therapy: none at this time.    Goals:   Short Term Goals:4 weeks   1. Patient will be independent in HEP & progressions.  2. Patient will demonstrate improved Lumbar AROM to WFL.  3. The patient will achieve 5 sit to stand score of 12 seconds to demonstrate improved transfers and endurance.     Long Term Goals: 8 weeks   1. The patient will demonstrate independence with extensive HEP.  2. Patient will achieve 5 sit to stand score of 10 seconds to demonstrate improved transfers & endurance.  3. Patent is able to demonstrate MMT 4+/5 B LE without pain reports during testing.        PLAN     Plan of care Certification:  9/7/2023 to 11/7/23.    Outpatient Physical Therapy 1-2 times weekly for 8 weeks to include the following interventions: manual therapy, aquatic therapy, patient education, therapeutic exercise, therapeutic activities.    Patient may be seen by PTA as part of rehabilitation team.    Moriah Lopez, PT

## 2023-10-05 ENCOUNTER — CLINICAL SUPPORT (OUTPATIENT)
Dept: REHABILITATION | Facility: HOSPITAL | Age: 73
End: 2023-10-05
Payer: MEDICARE

## 2023-10-05 DIAGNOSIS — G89.29 CHRONIC PAIN OF LEFT KNEE: ICD-10-CM

## 2023-10-05 DIAGNOSIS — M54.16 LUMBAR RADICULOPATHY: Primary | ICD-10-CM

## 2023-10-05 DIAGNOSIS — M47.816 LUMBAR SPONDYLOSIS: ICD-10-CM

## 2023-10-05 DIAGNOSIS — M25.562 CHRONIC PAIN OF LEFT KNEE: ICD-10-CM

## 2023-10-05 PROCEDURE — 97113 AQUATIC THERAPY/EXERCISES: CPT

## 2023-10-05 NOTE — PROGRESS NOTES
[facility]   Physical Therapy Treatment Note     Name: Chalrotte Crowder  Clinic Number: [id number]    Therapy Diagnosis:   Encounter Diagnoses   Name Primary?    Lumbar radiculopathy Yes    Lumbar spondylosis     Chronic pain of left knee      Physician: Wendy Thompson MD    Visit Date: 10/5/2023    Physician Orders: Aquatic Therapy  Medical Diagnosis from Referral:   M15.8 (ICD-10-CM) - Other osteoarthritis involving multiple joints   M48.062 (ICD-10-CM) - Spinal stenosis of lumbar region with neurogenic claudication     Evaluation Date: 9/7/2023  Authorization Period Expiration: 11/2/23  Plan of Care Expiration: 11/7/23  Visit # / Visits authorized: 3/23 + Eval     Precautions: Standard and Fall    Time In: 10:25 am   Time Out:11:30 am   Total Billable Time: 65 minutes    SUBJECTIVE     Pt reports: she is feeling pretty good today. She overall feels like she is moving better since starting PT.     She was compliant with home exercise program.    Response to previous treatment: first pool visit    Functional change: improved ability to do household chores    Pain: 6/10  Location: bilateral knee      OBJECTIVE     Objective Measures updated at progress report unless specified.     Treatment     Charlotte received aquatic therapeutic exercises to develop strength, endurance, ROM, flexibility, posture, and core stabilization for 65 minutes including:    FUNCTIONAL MOBILITY TRAINING x 2 laps each at beginning and 1 lap each at end of session  Walk forward/backward/lateral    STRETCHES 2 x 30sec  HS    LE EX x 25  Squat  Heel raise   Hip abduction/adduction  Hip flex/ext  HS curl  March  Clam    Sit to stand from pool stool  LAQ    Walking marches x 2 laps with Orange dumbbell x 2 laps  Tandem Walking with orange dumbbell x 2 laps    UE EX/CORE  x 20  Shoulder flex/ext TA activation paddles CLOSED  Shoulder horizontal abd/add TA activation paddles CLOSED  Shoulder abd/add with TA activation  and paddles CLOSED    Mini squat with push/pull red kickboard with back against the wall    ENDURANCE  LTR x 2'  Bicycle in // bars x 3'      Patient Education and Home Exercises     Home Exercises Provided and Patient Education Provided     Education provided:   Role of aquatic therapy  Hydration post therapy    Written Home Exercises Provided: Patient instructed to cont prior HEP.   ASSESSMENT     The patient was able to advance balance exercises today with use of orange dumbbell for support with marches and added tandem walking. She voiced some difficulty and performed with moderate sway but able to perform without LOB.     Charlotte Is progressing well towards her goals.     Pt prognosis is Good.     Pt will continue to benefit from skilled outpatient physical therapy to address the deficits listed in the problem list box on initial evaluation, provide pt/family education and to maximize pt's level of independence in the home and community environment.     Pt's spiritual, cultural and educational needs considered and pt agreeable to plan of care and goals.     Anticipated barriers to physical therapy: none at this time.    Goals:   Short Term Goals:4 weeks   1. Patient will be independent in HEP & progressions.  2. Patient will demonstrate improved Lumbar AROM to WFL.  3. The patient will achieve 5 sit to stand score of 12 seconds to demonstrate improved transfers and endurance.     Long Term Goals: 8 weeks   1. The patient will demonstrate independence with extensive HEP.  2. Patient will achieve 5 sit to stand score of 10 seconds to demonstrate improved transfers & endurance.  3. Patent is able to demonstrate MMT 4+/5 B LE without pain reports during testing.        PLAN     Plan of care Certification: 9/7/2023 to 11/7/23.    Outpatient Physical Therapy 1-2 times weekly for 8 weeks to include the following interventions: manual therapy, aquatic therapy, patient education, therapeutic exercise, therapeutic  activities.    Patient may be seen by PTA as part of rehabilitation team.    Mckayla Castellon, PT

## 2023-10-10 ENCOUNTER — CLINICAL SUPPORT (OUTPATIENT)
Dept: REHABILITATION | Facility: HOSPITAL | Age: 73
End: 2023-10-10
Payer: MEDICARE

## 2023-10-10 DIAGNOSIS — G89.29 CHRONIC PAIN OF LEFT KNEE: ICD-10-CM

## 2023-10-10 DIAGNOSIS — M25.562 CHRONIC PAIN OF LEFT KNEE: ICD-10-CM

## 2023-10-10 DIAGNOSIS — M47.816 LUMBAR SPONDYLOSIS: ICD-10-CM

## 2023-10-10 DIAGNOSIS — M54.16 LUMBAR RADICULOPATHY: Primary | ICD-10-CM

## 2023-10-10 PROCEDURE — 97113 AQUATIC THERAPY/EXERCISES: CPT

## 2023-10-10 NOTE — PROGRESS NOTES
[facility]   Physical Therapy Treatment Note     Name: Charlotte Crowder  Clinic Number: [id number]    Therapy Diagnosis:   Encounter Diagnoses   Name Primary?    Lumbar radiculopathy Yes    Lumbar spondylosis     Chronic pain of left knee      Physician: Wendy Thompson MD    Visit Date: 10/10/2023    Physician Orders: Aquatic Therapy  Medical Diagnosis from Referral:   M15.8 (ICD-10-CM) - Other osteoarthritis involving multiple joints   M48.062 (ICD-10-CM) - Spinal stenosis of lumbar region with neurogenic claudication     Evaluation Date: 9/7/2023  Authorization Period Expiration: 11/2/23  Plan of Care Expiration: 11/7/23  Visit # / Visits authorized: 4/23 + Eval     Precautions: Standard and Fall    Time In: 2:27 PM  Time Out: 3:33 PM   Total Billable Time: 30 minutes    SUBJECTIVE     Pt reports: that she is doing better. She hasn't hardly any pain down her left side, and reports the lowest level pain rating since beginning therapy.    She was compliant with home exercise program.    Response to previous treatment: first pool visit    Functional change: improved ability to do household chores    Pain: 1/10  Location: bilateral knee      OBJECTIVE     Objective Measures updated at progress report unless specified.     Treatment     Charlotte received aquatic therapeutic exercises to develop strength, endurance, ROM, flexibility, posture, and core stabilization for 60 minutes including:    FUNCTIONAL MOBILITY TRAINING x 2 laps each at beginning and 1 lap each at end of session  Walk forward/backward/lateral    STRETCHES 2 x 30sec  HS    LE EX x 25  Squat  Heel raise   Hip abduction/adduction  Hip flex/ext  HS curl  March  Clam + OTB    Sit to stand from pool stool  + OTB around knees  LAQ    Walking marches x 2 laps with Orange dumbbell x 2 laps  Tandem Walking with orange dumbbell x 2 laps    UE EX/CORE  x 20  Shoulder flex/ext TA activation paddles CLOSED  Shoulder horizontal abd/add  TA activation paddles CLOSED  Shoulder abd/add with TA activation and paddles CLOSED    Mini squat with push/pull red kickboard with back against the wall    ENDURANCE  LTR x 2'  Bicycle in // bars x 3'    Patient Education and Home Exercises     Home Exercises Provided and Patient Education Provided     Education provided:   Role of aquatic therapy  Hydration post therapy    Written Home Exercises Provided: Patient instructed to cont prior HEP.   ASSESSMENT     Patient presents on positive slope with lowest level of pain reported thus far. Continued with dynamic balance and added resistance to clamshells and squats for continued progression of posterior chain hip strengthening. Patient required max. verbal cueing for posterior pelvic tilt with squat and reported some LBP during core/upper extremity exercises. She will benefit from TrA activation review in future appointments. Continue to progress patient per tolerance.     The patient was able to advance balance exercises today with use of orange dumbbell for support with marches and added tandem walking. She voiced some difficulty and performed with moderate sway but able to perform without LOB.     Charlotte Is progressing well towards her goals.     Pt prognosis is Good.     Pt will continue to benefit from skilled outpatient physical therapy to address the deficits listed in the problem list box on initial evaluation, provide pt/family education and to maximize pt's level of independence in the home and community environment.     Pt's spiritual, cultural and educational needs considered and pt agreeable to plan of care and goals.     Anticipated barriers to physical therapy: none at this time.    Goals:   Short Term Goals:4 weeks   1. Patient will be independent in HEP & progressions.  2. Patient will demonstrate improved Lumbar AROM to WFL.  3. The patient will achieve 5 sit to stand score of 12 seconds to demonstrate improved transfers and endurance.     Long  Term Goals: 8 weeks   1. The patient will demonstrate independence with extensive HEP.  2. Patient will achieve 5 sit to stand score of 10 seconds to demonstrate improved transfers & endurance.  3. Patent is able to demonstrate MMT 4+/5 B LE without pain reports during testing.        PLAN     Plan of care Certification: 9/7/2023 to 11/7/23.    Outpatient Physical Therapy 1-2 times weekly for 8 weeks to include the following interventions: manual therapy, aquatic therapy, patient education, therapeutic exercise, therapeutic activities.    Patient may be seen by PTA as part of rehabilitation team.    Moriah Lopez, PT

## 2023-10-12 ENCOUNTER — CLINICAL SUPPORT (OUTPATIENT)
Dept: REHABILITATION | Facility: HOSPITAL | Age: 73
End: 2023-10-12
Payer: MEDICARE

## 2023-10-12 ENCOUNTER — OFFICE VISIT (OUTPATIENT)
Dept: BARIATRICS | Facility: CLINIC | Age: 73
End: 2023-10-12
Payer: MEDICARE

## 2023-10-12 VITALS
HEART RATE: 76 BPM | HEIGHT: 66 IN | DIASTOLIC BLOOD PRESSURE: 95 MMHG | WEIGHT: 258.69 LBS | OXYGEN SATURATION: 99 % | BODY MASS INDEX: 41.57 KG/M2 | SYSTOLIC BLOOD PRESSURE: 165 MMHG

## 2023-10-12 DIAGNOSIS — M54.16 LUMBAR RADICULOPATHY: Primary | ICD-10-CM

## 2023-10-12 DIAGNOSIS — Z71.3 ENCOUNTER FOR WEIGHT LOSS COUNSELING: ICD-10-CM

## 2023-10-12 DIAGNOSIS — I42.8 NON-ISCHEMIC CARDIOMYOPATHY: ICD-10-CM

## 2023-10-12 DIAGNOSIS — M47.816 LUMBAR SPONDYLOSIS: ICD-10-CM

## 2023-10-12 DIAGNOSIS — G47.33 OSA (OBSTRUCTIVE SLEEP APNEA): ICD-10-CM

## 2023-10-12 DIAGNOSIS — G89.29 CHRONIC PAIN OF LEFT KNEE: ICD-10-CM

## 2023-10-12 DIAGNOSIS — E78.5 DYSLIPIDEMIA: Chronic | ICD-10-CM

## 2023-10-12 DIAGNOSIS — E66.01 CLASS 3 SEVERE OBESITY DUE TO EXCESS CALORIES WITH SERIOUS COMORBIDITY AND BODY MASS INDEX (BMI) OF 40.0 TO 44.9 IN ADULT: Primary | ICD-10-CM

## 2023-10-12 DIAGNOSIS — I10 PRIMARY HYPERTENSION: ICD-10-CM

## 2023-10-12 DIAGNOSIS — M25.562 CHRONIC PAIN OF LEFT KNEE: ICD-10-CM

## 2023-10-12 PROCEDURE — 3044F HG A1C LEVEL LT 7.0%: CPT | Mod: CPTII,S$GLB,, | Performed by: STUDENT IN AN ORGANIZED HEALTH CARE EDUCATION/TRAINING PROGRAM

## 2023-10-12 PROCEDURE — 99204 OFFICE O/P NEW MOD 45 MIN: CPT | Mod: S$GLB,,, | Performed by: STUDENT IN AN ORGANIZED HEALTH CARE EDUCATION/TRAINING PROGRAM

## 2023-10-12 PROCEDURE — 1159F MED LIST DOCD IN RCRD: CPT | Mod: CPTII,S$GLB,, | Performed by: STUDENT IN AN ORGANIZED HEALTH CARE EDUCATION/TRAINING PROGRAM

## 2023-10-12 PROCEDURE — 3080F DIAST BP >= 90 MM HG: CPT | Mod: CPTII,S$GLB,, | Performed by: STUDENT IN AN ORGANIZED HEALTH CARE EDUCATION/TRAINING PROGRAM

## 2023-10-12 PROCEDURE — 3077F PR MOST RECENT SYSTOLIC BLOOD PRESSURE >= 140 MM HG: ICD-10-PCS | Mod: CPTII,S$GLB,, | Performed by: STUDENT IN AN ORGANIZED HEALTH CARE EDUCATION/TRAINING PROGRAM

## 2023-10-12 PROCEDURE — 3080F PR MOST RECENT DIASTOLIC BLOOD PRESSURE >= 90 MM HG: ICD-10-PCS | Mod: CPTII,S$GLB,, | Performed by: STUDENT IN AN ORGANIZED HEALTH CARE EDUCATION/TRAINING PROGRAM

## 2023-10-12 PROCEDURE — 1160F PR REVIEW ALL MEDS BY PRESCRIBER/CLIN PHARMACIST DOCUMENTED: ICD-10-PCS | Mod: CPTII,S$GLB,, | Performed by: STUDENT IN AN ORGANIZED HEALTH CARE EDUCATION/TRAINING PROGRAM

## 2023-10-12 PROCEDURE — 3044F PR MOST RECENT HEMOGLOBIN A1C LEVEL <7.0%: ICD-10-PCS | Mod: CPTII,S$GLB,, | Performed by: STUDENT IN AN ORGANIZED HEALTH CARE EDUCATION/TRAINING PROGRAM

## 2023-10-12 PROCEDURE — 3008F PR BODY MASS INDEX (BMI) DOCUMENTED: ICD-10-PCS | Mod: CPTII,S$GLB,, | Performed by: STUDENT IN AN ORGANIZED HEALTH CARE EDUCATION/TRAINING PROGRAM

## 2023-10-12 PROCEDURE — 97113 AQUATIC THERAPY/EXERCISES: CPT

## 2023-10-12 PROCEDURE — 1126F AMNT PAIN NOTED NONE PRSNT: CPT | Mod: CPTII,S$GLB,, | Performed by: STUDENT IN AN ORGANIZED HEALTH CARE EDUCATION/TRAINING PROGRAM

## 2023-10-12 PROCEDURE — 4010F ACE/ARB THERAPY RXD/TAKEN: CPT | Mod: CPTII,S$GLB,, | Performed by: STUDENT IN AN ORGANIZED HEALTH CARE EDUCATION/TRAINING PROGRAM

## 2023-10-12 PROCEDURE — 99999 PR PBB SHADOW E&M-EST. PATIENT-LVL V: CPT | Mod: PBBFAC,,, | Performed by: STUDENT IN AN ORGANIZED HEALTH CARE EDUCATION/TRAINING PROGRAM

## 2023-10-12 PROCEDURE — 1126F PR PAIN SEVERITY QUANTIFIED, NO PAIN PRESENT: ICD-10-PCS | Mod: CPTII,S$GLB,, | Performed by: STUDENT IN AN ORGANIZED HEALTH CARE EDUCATION/TRAINING PROGRAM

## 2023-10-12 PROCEDURE — 3008F BODY MASS INDEX DOCD: CPT | Mod: CPTII,S$GLB,, | Performed by: STUDENT IN AN ORGANIZED HEALTH CARE EDUCATION/TRAINING PROGRAM

## 2023-10-12 PROCEDURE — 1101F PT FALLS ASSESS-DOCD LE1/YR: CPT | Mod: CPTII,S$GLB,, | Performed by: STUDENT IN AN ORGANIZED HEALTH CARE EDUCATION/TRAINING PROGRAM

## 2023-10-12 PROCEDURE — 99204 PR OFFICE/OUTPT VISIT, NEW, LEVL IV, 45-59 MIN: ICD-10-PCS | Mod: S$GLB,,, | Performed by: STUDENT IN AN ORGANIZED HEALTH CARE EDUCATION/TRAINING PROGRAM

## 2023-10-12 PROCEDURE — 99999 PR PBB SHADOW E&M-EST. PATIENT-LVL V: ICD-10-PCS | Mod: PBBFAC,,, | Performed by: STUDENT IN AN ORGANIZED HEALTH CARE EDUCATION/TRAINING PROGRAM

## 2023-10-12 PROCEDURE — 4010F PR ACE/ARB THEARPY RXD/TAKEN: ICD-10-PCS | Mod: CPTII,S$GLB,, | Performed by: STUDENT IN AN ORGANIZED HEALTH CARE EDUCATION/TRAINING PROGRAM

## 2023-10-12 PROCEDURE — 1159F PR MEDICATION LIST DOCUMENTED IN MEDICAL RECORD: ICD-10-PCS | Mod: CPTII,S$GLB,, | Performed by: STUDENT IN AN ORGANIZED HEALTH CARE EDUCATION/TRAINING PROGRAM

## 2023-10-12 PROCEDURE — 3077F SYST BP >= 140 MM HG: CPT | Mod: CPTII,S$GLB,, | Performed by: STUDENT IN AN ORGANIZED HEALTH CARE EDUCATION/TRAINING PROGRAM

## 2023-10-12 PROCEDURE — 1101F PR PT FALLS ASSESS DOC 0-1 FALLS W/OUT INJ PAST YR: ICD-10-PCS | Mod: CPTII,S$GLB,, | Performed by: STUDENT IN AN ORGANIZED HEALTH CARE EDUCATION/TRAINING PROGRAM

## 2023-10-12 PROCEDURE — 3288F PR FALLS RISK ASSESSMENT DOCUMENTED: ICD-10-PCS | Mod: CPTII,S$GLB,, | Performed by: STUDENT IN AN ORGANIZED HEALTH CARE EDUCATION/TRAINING PROGRAM

## 2023-10-12 PROCEDURE — 3288F FALL RISK ASSESSMENT DOCD: CPT | Mod: CPTII,S$GLB,, | Performed by: STUDENT IN AN ORGANIZED HEALTH CARE EDUCATION/TRAINING PROGRAM

## 2023-10-12 PROCEDURE — 1160F RVW MEDS BY RX/DR IN RCRD: CPT | Mod: CPTII,S$GLB,, | Performed by: STUDENT IN AN ORGANIZED HEALTH CARE EDUCATION/TRAINING PROGRAM

## 2023-10-12 RX ORDER — SEMAGLUTIDE 0.68 MG/ML
0.5 INJECTION, SOLUTION SUBCUTANEOUS
Qty: 3 ML | Refills: 2 | Status: SHIPPED | OUTPATIENT
Start: 2023-10-12 | End: 2024-01-11 | Stop reason: SDUPTHER

## 2023-10-12 NOTE — PATIENT INSTRUCTIONS
Start Ozempic once a week. Start with 0.25mg once a week x 4 weeks, then 0.5 mg weekly.     Decrease portions as soon as you start Ozempic. Avoid fried, greasy, fatty foods.     Some nausea in the first 2 weeks is not unusual.     If you get pain across the upper abdomen and around to your back, please call the office.             Copyright © 2011, [facility]. For more information about The Healthy Eating Plate, please see The Nutrition Source, Department of Nutrition, [employer], www.thenutritionsource.org, and [employer], www.health.[employer].edu.      Meal Planning & Grocery Shopping    Meal planning builds the foundation for healthy eating. When you have structured ideas for healthy meals and foods available at home to prepare those meals, weight control becomes easier.  If only healthy foods are available at home, then you will be much more likely to eat healthy foods. And you will be less likely to go to a restaurant or  a fast food meal, which tend to be unhealthy and higher in calories than meals prepared at home.      Take 5-10 minutes each week to plan meals for the next 7 days.  Make a grocery list based on the meal plan.    Grocery Shopping Tips:  Shop on a full stomach.  Schedule your shopping for times when you are most motivated and able to be disciplined about your purchases. For example, after a stressful day at work it may be difficult to make the healthiest choices. Shopping at other times, such as early in the morning or after dinner, may be easier.  Focus your shopping on the outside aisles of the store, which tend to contain more fresh foods and lower calorie foods. The inside aisles tend to have more processed foods.  Stick to your list. Avoid buying unhealthy items just because they are on sale.   Compare nutrition labels to check the number of calories and percentage of fat.      What to buy:    Vegetables  Fresh vegetables  Frozen  vegetables with no sauce or added salt  Canned vegetables with no sauce or added salt    Protein  Lean meats, such as chicken and turkey  Limit red meats, such as beef to no more than 1x/week  Limit processed meats, such as cold cuts, head, sausage, and hot dogs. Look for brands that have no nitrites and are minimally processed. Consider turkey sausage or turkey head.  Fish and Shellfish  Eggs  Dried beans  Canned beans (reduced sodium)    Fat  Use healthy oils, such as olive oil or canola oil, for cooking, salad dressings, etc.  Unflavored nuts and seeds  Nut butters (no added sugar)    Dairy  Yogurt (no sugar added)  Cheese  Low-fat milk  Unsweetened nondairy milks (almond milk, soy milk, etc)    Fruit  Fresh Fruit  Frozen fruit with no added sugar  Canned fruit with no added sugar  Dried fruit with no added sugar  100% fruit juice    Whole Grains  Single ingredient grains, such as oats, quinoa, brown rice  Whole-wheat pasta  Sprouted whole-grain bread    What to avoid:  - Avoid fried foods  - Avoid foods with added sugar  - Avoid sugar-sweetened beverages  - Avoid ultra-processed foods      Lifestyle Activity    Lifestyle activity consists of all the activities that burn calories during the course of a normal day. Using the stairs, washing the dishes, or even getting up to turn off the television are all examples of lifestyle activity. All activities, no matter how small, burn calories. Increasing lifestyle activity can help with weight control, so building physical activity into your everyday routine is important.     A pedometer is a tool that can help you track how much lifestyle activity you are getting. It can help you stay active. A pedometer counts each step you take and displays your total steps on the screen. Many smartphones, including iPhones and Androids, have applications that automatically count your steps. If you decide to use this method, make sure you are holding or wearing your smartphone so  it can count all of your steps.     During the next 2 weeks, aim for 5,000 or more steps per day. Each week aim to increase your daily step goal by 250 so that you will reach an average of 10,000 steps per day (equal to walking 4 to 5 miles).     Start making more active choices in your routine in order to increase the amount of walking you do each day.     Strategies to Increase Lifestyle Activity:    Take several 5-10-minute walks during the day.   Set a reminder to take a 5 minute break from your desk every hour.   Choose the farthest entrance to a building that you are entering.   Host walking meetings at work.   Walk down the michelle to contact co-workers face-to-face, instead of emailing or calling.  Walk to a restroom, water cooler, or copy machine on a different floor at work.   Walk during your lunch break.   Park farther away in parking lots.   Get off the bus or train earlier and walk farther to your destination.   Take the stairs rather than the elevator or the escalator.   Start a walking club with co-workers or neighbors.   Walk - don't drive - for trips less than one mile.   Take an after-dinner walk with family.   Go for a walk while talking on your wireless phone.   Walk the dog more often.   If you prefer to stay indoors because of the weather, try walking in a shopping mall or doing laps around a large store.   Purchase a treadmill to use at home.   Schedule time for walking every week and stick to it like any other appointment.   Or think of your own strategy: _______________________________       Physical Activity    Physical activity improves your health and helps with weight control, especially weight loss maintenance.      When starting to incorporate an exercise routine into your day, it is helpful to set specific goals.  For example, I will walk at moderate intensity from 12:30-12:45pm on Monday, Wednesday, and Friday.  Schedule your exercise time into your calendar.  Think of any  potential barriers that may come up and prevent you from exercising.  Develop solutions or back-up plans for when these barriers may arise.    Walking is an ideal activity to start with if you do not currently have an exercise routine. You can make the activity more fun by doing it with a friend or listening to music or a book on tape while you do it. If you don't enjoy walking, think of other activities you like, such as bike riding or swimming.  Other alternatives include group fitness classes or exercise at home using DVDs, Apps, etc.    If you are new to exercising, then start with 10 minutes 3-4 days per week.  After two weeks, increase to 15 minutes 3-4 days per week.  If you already exercise more than this, continue at your higher level, or increase by 10-15 minutes if able.         Aerobic Activity  Aerobic Activity gets you breathing harder and your heart beating faster.  Eventually, you should work up to 150 - 300 minutes of moderate-intensity aerobic activity every week or 75 - 150 minutes of vigorous-intensity aerobic activity every week.  An easy way to gauge the intensity of your activity is with the Talk Test.  During moderate activity, you should be able to talk, but not sing without having to pause for a breath.  During vigorous activity, you shouldn't be able to say more than a few words without pausing for a breath.  You should not push yourself until you are completely out of breath, tired, or sore.    Examples of Aerobic Activity:  Walking  Running  Swimming  Biking  Playing sports like tennis or basketball  Dancing  Jumping Rope      Strength Training  It is also recommended that you perform muscle-strengthening activities at least 2 days per week.  Be sure to include activities that work all major muscle groups (legs, arms, abdomen, back, buttocks, chest, and shoulders)    Examples of Strength Training  Lifting weights  Working with resistance band  Using your body weight for resistance  (squats, push-ups, sit-ups)  Pilates  Yoga      https://health.gov/moveyourway/activity-planner/      Remember to keep a record of your activity to track your progress.

## 2023-10-12 NOTE — PROGRESS NOTES
[facility]   Physical Therapy Treatment Note     Name: Charlotte Crowder  Clinic Number: [id number]    Therapy Diagnosis:   Encounter Diagnoses   Name Primary?    Lumbar radiculopathy Yes    Lumbar spondylosis     Chronic pain of left knee      Physician: Wendy Thompson MD    Visit Date: 10/12/2023    Physician Orders: Aquatic Therapy  Medical Diagnosis from Referral:   M15.8 (ICD-10-CM) - Other osteoarthritis involving multiple joints   M48.062 (ICD-10-CM) - Spinal stenosis of lumbar region with neurogenic claudication     Evaluation Date: 9/7/2023  Authorization Period Expiration: 11/2/23  Plan of Care Expiration: 11/7/23  Visit # / Visits authorized: 6/23 + Eval     Precautions: Standard and Fall    Time In: 10:30 am   Time Out: 11:40 am   Total Billable Time: 70 minutes    SUBJECTIVE     Pt reports: that she is feeling good today and likes the cooler weather.     She was compliant with home exercise program.    Response to previous treatment: first pool visit    Functional change: improved ability to do household chores    Pain: 1/10  Location: bilateral knee      OBJECTIVE     Objective Measures updated at progress report unless specified.     Treatment     Charlotte received aquatic therapeutic exercises to develop strength, endurance, ROM, flexibility, posture, and core stabilization for 70 minutes including:    FUNCTIONAL MOBILITY TRAINING x 2 laps each at beginning and 1 lap each at end of session  Walk forward/backward/lateral    STRETCHES 2 x 30sec  HS    LE EX x 30  Squat  Heel raise   Hip abduction/adduction--add resistance next visit  Hip flex/ext--add resistance next visit  HS curl  March  Clam + OTB    Sit to stand from pool stool  + OTB around knees  LAQ    Walking marches x 2 laps with Orange dumbbell x 2 laps  Tandem Walking with orange dumbbell x 2 laps    UE EX/CORE  x 20  Shoulder flex/ext TA activation with Multicolor Dumbbells  Shoulder horizontal abd/add TA  activation with Multicolor Dumbbells  Shoulder abd/add with TA activation with Multicolor Dumbbells    Mini squat with push/pull red kickboard     ENDURANCE  LTR x 2'  Bicycle in // bars x 3'    Patient Education and Home Exercises     Home Exercises Provided and Patient Education Provided     Education provided:   Role of aquatic therapy  Hydration post therapy    Written Home Exercises Provided: Patient instructed to cont prior HEP.   ASSESSMENT     The patient continues to tolerate therapy well with overall improved pain levels and increased function noted since starting PT. She performed advanced reps with LE exercises and advanced resistance with UE exercises with minimal VC required for correct posture with UE exercises.     Charlotte Is progressing well towards her goals.     Pt prognosis is Good.     Pt will continue to benefit from skilled outpatient physical therapy to address the deficits listed in the problem list box on initial evaluation, provide pt/family education and to maximize pt's level of independence in the home and community environment.     Pt's spiritual, cultural and educational needs considered and pt agreeable to plan of care and goals.     Anticipated barriers to physical therapy: none at this time.    Goals:   Short Term Goals:4 weeks   1. Patient will be independent in HEP & progressions.  2. Patient will demonstrate improved Lumbar AROM to WFL.  3. The patient will achieve 5 sit to stand score of 12 seconds to demonstrate improved transfers and endurance.     Long Term Goals: 8 weeks   1. The patient will demonstrate independence with extensive HEP.  2. Patient will achieve 5 sit to stand score of 10 seconds to demonstrate improved transfers & endurance.  3. Patent is able to demonstrate MMT 4+/5 B LE without pain reports during testing.        PLAN     Plan of care Certification: 9/7/2023 to 11/7/23.    Outpatient Physical Therapy 1-2 times weekly for 8 weeks to include the following  interventions: manual therapy, aquatic therapy, patient education, therapeutic exercise, therapeutic activities.    Patient may be seen by PTA as part of rehabilitation team.    Mckayla Castellon, PT

## 2023-10-12 NOTE — PROGRESS NOTES
Subjective     Patient ID: Charlotte Crowder is a 73 y.o. female.    Chief Complaint: Consult and Obesity    Patient presents for treatment of obesity.   Referred by cardiology    170 lbs about 20 years ago  200 lbs about 10 years ago    Co-morbidities  HTN  HLD  Non-ischemic cardiomyopathy  KIMMIE  CKD 3a  Glaucoma    Negative for thyroid cancer    Current Physical Activity  Water aerobics  Just joined a gym    Current Eating Habits  Breakfast - coffee with sugar and cream  Lunch - eggs, head, grits; egg and rice  Dinner - red beans and rice; spaghetti and meatballs; stewed meats, rice and gravy, brossoli, cauliflower, grees, cabbage  Snacks - chips, homemade smoothie with Greek yogurt  Beverages - soft drinks, water    Medical Weight Loss  10/12/2023: 258.7 lbs, BMI 41.8, BFP 51.6%, .5 lbs, SMM 69 lbs, BMR 1596 kcal      Review of Systems   Constitutional:  Negative for chills and fever.   Respiratory:  Negative for shortness of breath.    Cardiovascular:  Negative for chest pain.   Gastrointestinal:  Negative for abdominal pain, nausea and vomiting.   Neurological:  Negative for dizziness and light-headedness.   Psychiatric/Behavioral:  The patient is not nervous/anxious.           Objective    Latest Reference Range & Units 08/10/22 08:41 11/07/22 15:10 08/24/23 09:34   WBC 3.90 - 12.70 K/uL 3.46 (L)  3.66 (L)   RBC 4.00 - 5.40 M/uL 4.49  4.48   Hemoglobin 12.0 - 16.0 g/dL 12.9  12.7   Hematocrit 37.0 - 48.5 % 39.9  39.1   MCV 82 - 98 fL 89  87   MCH 27.0 - 31.0 pg 28.7  28.3   MCHC 32.0 - 36.0 g/dL 32.3  32.5   RDW 11.5 - 14.5 % 13.3  13.4   Platelet Count 150 - 450 K/uL 201  194   MPV 9.2 - 12.9 fL 9.3  8.9 (L)   Gran % 38.0 - 73.0 % 38.2  39.7   Lymph % 18.0 - 48.0 % 49.7 (H)  47.0   Mono % 4.0 - 15.0 % 7.5  7.9   Eosinophil % 0.0 - 8.0 % 4.0  4.6   Basophil % 0.0 - 1.9 % 0.6  0.5   Immature Granulocytes 0.0 - 0.5 % 0.0  0.3   Gran # (ANC) 1.8 - 7.7 K/uL 1.3 (L)  1.5 (L)   Lymph # 1.0 - 4.8 K/uL 1.7  1.7    Mono # 0.3 - 1.0 K/uL 0.3  0.3   Eos # 0.0 - 0.5 K/uL 0.1  0.2   Baso # 0.00 - 0.20 K/uL 0.02  0.02   Immature Grans (Abs) 0.00 - 0.04 K/uL 0.00  0.01   nRBC 0 /100 WBC 0  0   Differential Method  Automated  Automated   Sodium 136 - 145 mmol/L 143 141 143   Potassium 3.5 - 5.1 mmol/L 4.7 4.5 4.3   Chloride 95 - 110 mmol/L 111 (H) 110 111 (H)   CO2 23 - 29 mmol/L 24 24 25   Anion Gap 8 - 16 mmol/L 8 7 (L) 7 (L)   BUN 8 - 23 mg/dL 14 18 13   Creatinine 0.5 - 1.4 mg/dL 1.0 1.1 1.1   eGFR >60 mL/min/1.73 m^2 >60 54 ! 53 !   Glucose 70 - 110 mg/dL 101 108 94   Calcium 8.7 - 10.5 mg/dL 10.5 10.4 10.3   Calcium Level Ionized 1.06 - 1.42 mmol/L  1.45 (H)    ALP 55 - 135 U/L 138 (H) 150 (H) 125   PROTEIN TOTAL 6.0 - 8.4 g/dL 6.7 7.5 7.1   Albumin 3.5 - 5.2 g/dL 3.8 4.0 3.8   BILIRUBIN TOTAL 0.1 - 1.0 mg/dL 0.6 0.7 0.6   AST 10 - 40 U/L 13 18 19   ALT 10 - 44 U/L 15 18 14   Cholesterol Total 120 - 199 mg/dL 151  205 (H)   HDL 40 - 75 mg/dL 41  41   HDL/Cholesterol Ratio 20.0 - 50.0 % 27.2  20.0   LDL Cholesterol External 63.0 - 159.0 mg/dL 83.4  123.6   Non-HDL Cholesterol mg/dL 110  164   Total Cholesterol/HDL Ratio 2.0 - 5.0  3.7  5.0   Triglycerides 30 - 150 mg/dL 133  202 (H)   Vit D, 25-Hydroxy 30 - 96 ng/mL  26 (L) 32   Hemoglobin A1C External 4.0 - 5.6 % 5.1  5.1   Estimated Avg Glucose 68 - 131 mg/dL 100  100   TSH 0.400 - 4.000 uIU/mL 1.541  1.848   PTH 9.0 - 77.0 pg/mL  471.5 (H)    (L): Data is abnormally low  (H): Data is abnormally high  !: Data is abnormal    Vitals:    10/12/23 1357   BP: (!) 165/95   Pulse: 76       Physical Exam  Vitals reviewed.   Constitutional:       General: She is not in acute distress.     Appearance: Normal appearance. She is obese. She is not ill-appearing, toxic-appearing or diaphoretic.   HENT:      Head: Normocephalic and atraumatic.   Cardiovascular:      Rate and Rhythm: Normal rate.   Pulmonary:      Effort: Pulmonary effort is normal. No respiratory distress.   Skin:      General: Skin is warm and dry.   Neurological:      Mental Status: She is alert and oriented to person, place, and time.            Assessment and Plan     1. Class 3 severe obesity due to excess calories with serious comorbidity and body mass index (BMI) of 40.0 to 44.9 in adult  -     Ambulatory referral/consult to Bariatric Medicine  -     semaglutide (OZEMPIC) 0.25 mg or 0.5 mg (2 mg/3 mL) pen injector; Inject 0.5 mg into the skin every 7 days. Start with 0.25 mg once a week for 4 weeks, then increase to 0.5 mg once a week  Dispense: 3 mL; Refill: 2    2. Primary hypertension  -     semaglutide (OZEMPIC) 0.25 mg or 0.5 mg (2 mg/3 mL) pen injector; Inject 0.5 mg into the skin every 7 days. Start with 0.25 mg once a week for 4 weeks, then increase to 0.5 mg once a week  Dispense: 3 mL; Refill: 2    3. Dyslipidemia  -     semaglutide (OZEMPIC) 0.25 mg or 0.5 mg (2 mg/3 mL) pen injector; Inject 0.5 mg into the skin every 7 days. Start with 0.25 mg once a week for 4 weeks, then increase to 0.5 mg once a week  Dispense: 3 mL; Refill: 2    4. Severe KIMMIE (obstructive sleep apnea)  -     semaglutide (OZEMPIC) 0.25 mg or 0.5 mg (2 mg/3 mL) pen injector; Inject 0.5 mg into the skin every 7 days. Start with 0.25 mg once a week for 4 weeks, then increase to 0.5 mg once a week  Dispense: 3 mL; Refill: 2    5. Non-ischemic cardiomyopathy  Overview:  mild    Orders:  -     semaglutide (OZEMPIC) 0.25 mg or 0.5 mg (2 mg/3 mL) pen injector; Inject 0.5 mg into the skin every 7 days. Start with 0.25 mg once a week for 4 weeks, then increase to 0.5 mg once a week  Dispense: 3 mL; Refill: 2    6. Encounter for weight loss counseling        - Log all food and beverage intake with a daily calorie goal of 1200 calories per day    - Low to moderate intensity aerobic exercise for 30 minutes 3-5x/week

## 2023-10-17 ENCOUNTER — CLINICAL SUPPORT (OUTPATIENT)
Dept: REHABILITATION | Facility: HOSPITAL | Age: 73
End: 2023-10-17
Payer: MEDICARE

## 2023-10-17 DIAGNOSIS — M47.816 LUMBAR SPONDYLOSIS: ICD-10-CM

## 2023-10-17 DIAGNOSIS — M25.562 CHRONIC PAIN OF LEFT KNEE: ICD-10-CM

## 2023-10-17 DIAGNOSIS — M54.16 LUMBAR RADICULOPATHY: Primary | ICD-10-CM

## 2023-10-17 DIAGNOSIS — G89.29 CHRONIC PAIN OF LEFT KNEE: ICD-10-CM

## 2023-10-17 PROCEDURE — 97113 AQUATIC THERAPY/EXERCISES: CPT

## 2023-10-17 NOTE — PROGRESS NOTES
[facility]   Physical Therapy Treatment Note     Name: Charlotte Crowder  Clinic Number: [id number]    Therapy Diagnosis:   Encounter Diagnoses   Name Primary?    Lumbar radiculopathy Yes    Lumbar spondylosis     Chronic pain of left knee      Physician: Wendy Thompson MD    Visit Date: 10/17/2023    Physician Orders: Aquatic Therapy  Medical Diagnosis from Referral:   M15.8 (ICD-10-CM) - Other osteoarthritis involving multiple joints   M48.062 (ICD-10-CM) - Spinal stenosis of lumbar region with neurogenic claudication     Evaluation Date: 9/7/2023  Authorization Period Expiration: 11/2/23  Plan of Care Expiration: 11/7/23  Visit # / Visits authorized: 7/23 + Eval     Precautions: Standard and Fall    Time In: 2:30 PM     Time Out: 3:35 PM  Total Billable Time: 30 minutes    SUBJECTIVE     Pt reports: that she continues to do well and was able to walk further at her 's work party the other day than she has been able to.    She was compliant with home exercise program.    Response to previous treatment: first pool visit    Functional change: improved ability to do household chores    Pain: 1/10  Location: bilateral knee      OBJECTIVE     Objective Measures updated at progress report unless specified.     Treatment     Charlotte received aquatic therapeutic exercises to develop strength, endurance, ROM, flexibility, posture, and core stabilization for 53 minutes including:    FUNCTIONAL MOBILITY TRAINING x 2 laps each at beginning and 1 lap each at end of session -- Held d/t time  Walk forward/backward/lateral    STRETCHES 2 x 30sec -- Held d/t time  HS    LE EX x 30  Squat  Heel raise   Hip abduction/adduction-+ OTB  Hip flex/ext-+ OTB  HS curl  March (Removed)  Clam + OTB    + Forward 8 in. Step ups w/unilateral UE    Sit to stand from pool stool    LAQ (Removed)    Walking marches x 2 laps with Orange dumbbell x 2 laps  Tandem Walking with orange dumbbell x 2 laps    UE EX/CORE   x 20  Shoulder flex/ext TA activation with Multicolor Dumbbells  Shoulder horizontal abd/add TA activation with Multicolor Dumbbells  Shoulder abd/add with TA activation with Multicolor Dumbbells    Mini squat with push/pull red kickboard -- Held d/t time    ENDURANCE  LTR x 2'  Bicycle in // bars x 3'    Patient Education and Home Exercises     Home Exercises Provided and Patient Education Provided     Education provided:   Role of aquatic therapy  Hydration post therapy    Written Home Exercises Provided: Patient instructed to cont prior HEP.   ASSESSMENT     Introduced light resistance to patient's standing hip strengthening which she tolerated well, however requires moderate cueing to maintain knee extension in order to emphasize gluteus rather than hamstring activation. Removed static marches and long arc quads in order to progress to stair training to allow for increased hip flexor and quad recruitment during functional activity. Continue to progress patient per tolerance.     Charlotte Is progressing well towards her goals.     Pt prognosis is Good.     Pt will continue to benefit from skilled outpatient physical therapy to address the deficits listed in the problem list box on initial evaluation, provide pt/family education and to maximize pt's level of independence in the home and community environment.     Pt's spiritual, cultural and educational needs considered and pt agreeable to plan of care and goals.     Anticipated barriers to physical therapy: none at this time.    Goals:   Short Term Goals:4 weeks   1. Patient will be independent in HEP & progressions.  2. Patient will demonstrate improved Lumbar AROM to WFL.  3. The patient will achieve 5 sit to stand score of 12 seconds to demonstrate improved transfers and endurance.     Long Term Goals: 8 weeks   1. The patient will demonstrate independence with extensive HEP.  2. Patient will achieve 5 sit to stand score of 10 seconds to demonstrate improved  transfers & endurance.  3. Patent is able to demonstrate MMT 4+/5 B LE without pain reports during testing.        PLAN     Plan of care Certification: 9/7/2023 to 11/7/23.    Outpatient Physical Therapy 1-2 times weekly for 8 weeks to include the following interventions: manual therapy, aquatic therapy, patient education, therapeutic exercise, therapeutic activities.    Patient may be seen by PTA as part of rehabilitation team.    Moriah Lopez, PT

## 2023-10-19 ENCOUNTER — CLINICAL SUPPORT (OUTPATIENT)
Dept: REHABILITATION | Facility: HOSPITAL | Age: 73
End: 2023-10-19
Payer: MEDICARE

## 2023-10-19 DIAGNOSIS — G89.29 CHRONIC PAIN OF LEFT KNEE: ICD-10-CM

## 2023-10-19 DIAGNOSIS — M25.562 CHRONIC PAIN OF LEFT KNEE: ICD-10-CM

## 2023-10-19 DIAGNOSIS — M47.816 LUMBAR SPONDYLOSIS: ICD-10-CM

## 2023-10-19 DIAGNOSIS — M54.16 LUMBAR RADICULOPATHY: Primary | ICD-10-CM

## 2023-10-19 PROCEDURE — 97113 AQUATIC THERAPY/EXERCISES: CPT

## 2023-10-19 NOTE — PROGRESS NOTES
[facility]   Physical Therapy Treatment Note     Name: Charlotte Crowder  Clinic Number: [id number]    Therapy Diagnosis:   Encounter Diagnoses   Name Primary?    Lumbar radiculopathy Yes    Lumbar spondylosis     Chronic pain of left knee      Physician: Wendy Thompson MD    Visit Date: 10/19/2023    Physician Orders: Aquatic Therapy  Medical Diagnosis from Referral:   M15.8 (ICD-10-CM) - Other osteoarthritis involving multiple joints   M48.062 (ICD-10-CM) - Spinal stenosis of lumbar region with neurogenic claudication     Evaluation Date: 9/7/2023  Authorization Period Expiration: 11/2/23  Plan of Care Expiration: 11/7/23  Visit # / Visits authorized: 8/23 + Eval     Precautions: Standard and Fall    Time In: 10:30 am     Time Out:11:30 am   Total Billable Time: 30 minutes    SUBJECTIVE     Pt reports: she was in some pain after the last visit which improved slightly since the visit.     She was compliant with home exercise program.    Response to previous treatment: first pool visit    Functional change: improved ability to do household chores    Pain: 1/10  Location: bilateral knee      OBJECTIVE     Objective Measures updated at progress report unless specified.     Treatment     Charlotte received aquatic therapeutic exercises to develop strength, endurance, ROM, flexibility, posture, and core stabilization for 60 minutes including:    FUNCTIONAL MOBILITY TRAINING x 2 laps each at beginning and 1 lap each at end of session -- Held d/t time  Walk forward/backward/lateral    STRETCHES 2 x 30sec -- Held d/t time  HS    LE EX x 30  Squat  Heel raise   Hip abduction/adduction-with OTB  Hip flex/ext-with OTB  HS curl  Clam   LAQ    Step up on 8 inch step with 1 hand hold  Lateral step up on 8 inch step --add next    Sit to stand from pool stool      Walking marches x 2 laps with Orange dumbbell x 2 laps  Tandem Walking with orange dumbbell x 2 laps    UE EX/CORE  x 20  Shoulder flex/ext TA  activation with Multicolor Dumbbells  Shoulder horizontal abd/add TA activation with Multicolor Dumbbells  Shoulder abd/add with TA activation with Multicolor Dumbbells      ENDURANCE  LTR x 2'  Bicycle in // bars x 3'    Patient Education and Home Exercises     Home Exercises Provided and Patient Education Provided     Education provided:   Role of aquatic therapy  Hydration post therapy    Written Home Exercises Provided: Patient instructed to cont prior HEP.   ASSESSMENT   Despite some soreness from her previous visit the patient was able to perform all exercises today with no modifications required. She did require some VC and demo to avoid forward trunk lean with UE exercises today.     Charlotte Is progressing well towards her goals.     Pt prognosis is Good.     Pt will continue to benefit from skilled outpatient physical therapy to address the deficits listed in the problem list box on initial evaluation, provide pt/family education and to maximize pt's level of independence in the home and community environment.     Pt's spiritual, cultural and educational needs considered and pt agreeable to plan of care and goals.     Anticipated barriers to physical therapy: none at this time.    Goals:   Short Term Goals:4 weeks   1. Patient will be independent in HEP & progressions.  2. Patient will demonstrate improved Lumbar AROM to WFL.  3. The patient will achieve 5 sit to stand score of 12 seconds to demonstrate improved transfers and endurance.     Long Term Goals: 8 weeks   1. The patient will demonstrate independence with extensive HEP.  2. Patient will achieve 5 sit to stand score of 10 seconds to demonstrate improved transfers & endurance.  3. Patent is able to demonstrate MMT 4+/5 B LE without pain reports during testing.        PLAN     Plan of care Certification: 9/7/2023 to 11/7/23.    Outpatient Physical Therapy 1-2 times weekly for 8 weeks to include the following interventions: manual therapy, aquatic  therapy, patient education, therapeutic exercise, therapeutic activities.    Patient may be seen by PTA as part of rehabilitation team.    Mckayla Castellon, PT

## 2023-10-24 ENCOUNTER — CLINICAL SUPPORT (OUTPATIENT)
Dept: REHABILITATION | Facility: HOSPITAL | Age: 73
End: 2023-10-24
Payer: MEDICARE

## 2023-10-24 DIAGNOSIS — M54.16 LUMBAR RADICULOPATHY: Primary | ICD-10-CM

## 2023-10-24 PROCEDURE — 97113 AQUATIC THERAPY/EXERCISES: CPT

## 2023-10-24 NOTE — PROGRESS NOTES
[facility]   Physical Therapy Treatment Note     Name: Charlotte Crowder  Clinic Number: [id number]    Therapy Diagnosis:   No diagnosis found.    Physician: Wendy Thompson MD    Visit Date: 10/24/2023    Physician Orders: Aquatic Therapy  Medical Diagnosis from Referral:   M15.8 (ICD-10-CM) - Other osteoarthritis involving multiple joints   M48.062 (ICD-10-CM) - Spinal stenosis of lumbar region with neurogenic claudication     Evaluation Date: 9/7/2023  Authorization Period Expiration: 11/2/23  Plan of Care Expiration: 11/7/23  Visit # / Visits authorized: 9/23 + Eval     Precautions: Standard and Fall    Time In: 2:25 PM    Time Out:3:30 PM     Total Billable Time: 30  minutes    SUBJECTIVE     Pt reports: Patient says that she is able to tolerate standing and walking much longer now. She de-scaled a huge red fish yesterday and was standing for a very long time, so she's a little sore today.       She was compliant with home exercise program.    Response to previous treatment: first pool visit    Functional change: improved ability to do household chores    Pain: 1  /10  Location: bilateral knee      OBJECTIVE     Objective Measures updated at progress report unless specified.     Treatment     Charlotte received aquatic therapeutic exercises to develop strength, endurance, ROM, flexibility, posture, and core stabilization for 55  minutes including:    FUNCTIONAL MOBILITY TRAINING x 2 laps each at beginning and 1 lap each at end of session -- Held d/t time  Walk forward/backward/lateral    STRETCHES 2 x 30sec -- Held d/t time  HS -- Replaced w/ hip flexor stretch at stair    LE EX x 20  Squat  Heel raise   Hip abduction/adduction-with PTB  Hip flex/ext-with PTB  HS curl  Clam with PTB  LAQ    Step up on 8 inch step with 1 hand hold  Lateral step up on 8 inch step --add next    Sit to stand from pool stool      Walking marches x 2 laps with Orange dumbbell x 2 laps  Tandem Walking with  orange dumbbell x 2 laps    UE EX/CORE  x 20  Shoulder flex/ext TA activation with Multicolor Dumbbells  Shoulder horizontal abd/add TA activation with Multicolor Dumbbells  Shoulder abd/add with TA activation with Multicolor Dumbbells    ENDURANCE  LTR x 2'  Bicycle in // bars x 3'    Patient Education and Home Exercises     Home Exercises Provided and Patient Education Provided     Education provided:   Role of aquatic therapy  Hydration post therapy    Written Home Exercises Provided: Patient instructed to cont prior HEP.   ASSESSMENT     Patient tolerated increase in resistance for hip strengthening exercises well. She requires min. Verbal cues to maintain upright posture and displays limitations in bilateral hip extension ROM. Therefore replaced hamstring muscle lengthening with anterior hip stretching. Patient expressed some increase in LBP when squatting, however able to reduce with cue for posterior pelvic tilt. Patient shows difficulty recruiting TrA with posterior pelvic tilt unless visually and manual cued. Continue to progress patient per tolerance.    Charlotte Is progressing well towards her goals.     Pt prognosis is Good.     Pt will continue to benefit from skilled outpatient physical therapy to address the deficits listed in the problem list box on initial evaluation, provide pt/family education and to maximize pt's level of independence in the home and community environment.     Pt's spiritual, cultural and educational needs considered and pt agreeable to plan of care and goals.     Anticipated barriers to physical therapy: none at this time.    Goals:   Short Term Goals:4 weeks   1. Patient will be independent in HEP & progressions.  2. Patient will demonstrate improved Lumbar AROM to WFL.  3. The patient will achieve 5 sit to stand score of 12 seconds to demonstrate improved transfers and endurance.     Long Term Goals: 8 weeks   1. The patient will demonstrate independence with extensive  HEP.  2. Patient will achieve 5 sit to stand score of 10 seconds to demonstrate improved transfers & endurance.  3. Patent is able to demonstrate MMT 4+/5 B LE without pain reports during testing.        PLAN     Plan of care Certification: 9/7/2023 to 11/7/23.    Outpatient Physical Therapy 1-2 times weekly for 8 weeks to include the following interventions: manual therapy, aquatic therapy, patient education, therapeutic exercise, therapeutic activities.    Patient may be seen by PTA as part of rehabilitation team.    Moriah Lopez, PT

## 2023-10-26 ENCOUNTER — CLINICAL SUPPORT (OUTPATIENT)
Dept: REHABILITATION | Facility: HOSPITAL | Age: 73
End: 2023-10-26
Payer: MEDICARE

## 2023-10-26 DIAGNOSIS — G89.29 CHRONIC PAIN OF LEFT KNEE: ICD-10-CM

## 2023-10-26 DIAGNOSIS — M25.562 CHRONIC PAIN OF LEFT KNEE: ICD-10-CM

## 2023-10-26 DIAGNOSIS — M47.816 LUMBAR SPONDYLOSIS: ICD-10-CM

## 2023-10-26 DIAGNOSIS — M54.16 LUMBAR RADICULOPATHY: Primary | ICD-10-CM

## 2023-10-26 PROCEDURE — 97113 AQUATIC THERAPY/EXERCISES: CPT

## 2023-10-26 NOTE — PROGRESS NOTES
[facility]   Physical Therapy Treatment Note     Name: Charlotte Crowder  Clinic Number: [id number]    Therapy Diagnosis:   Encounter Diagnoses   Name Primary?    Lumbar radiculopathy Yes    Lumbar spondylosis     Chronic pain of left knee        Physician: Wendy Thompson MD    Visit Date: 10/26/2023    Physician Orders: Aquatic Therapy  Medical Diagnosis from Referral:   M15.8 (ICD-10-CM) - Other osteoarthritis involving multiple joints   M48.062 (ICD-10-CM) - Spinal stenosis of lumbar region with neurogenic claudication     Evaluation Date: 9/7/2023  Authorization Period Expiration: 11/2/23  Plan of Care Expiration: 11/7/23  Visit # / Visits authorized: 9/23 + Eval     Precautions: Standard and Fall    Time In: 10:25 am     Time Out:11:40 am   Total Billable Time: 55 minutes    SUBJECTIVE     Pt reports: she is feeling pretty good today.     She was compliant with home exercise program.    Response to previous treatment: first pool visit    Functional change: improved ability to do household chores    Pain: 1  /10  Location: bilateral knee      OBJECTIVE     Objective Measures updated at progress report unless specified.     Treatment     Charlotte received aquatic therapeutic exercises to develop strength, endurance, ROM, flexibility, posture, and core stabilization for 65  minutes including:    FUNCTIONAL MOBILITY TRAINING x 2 laps each at beginning and 1 lap each at end of session -- Held d/t time  Walk forward/backward/lateral    STRETCHES 2 x 30sec  HS -- Replaced w/ hip flexor stretch at stair    LE EX x 25  Squat  Heel raise   HS curl  LAQ  Hip abduction/adduction-with PTB  Hip flex/ext-with PTB  Clam with PTB    Step up on 8 inch step with 1 hand hold x 20 reps  Lateral step up on 8 inch step --added today x 20 reps    Sit to stand from pool stool      Walking marches x 2 laps with Orange dumbbell x 2 laps  Tandem Walking with orange dumbbell x 2 laps    UE EX/CORE  x  25  Shoulder flex/ext TA activation with Multicolor Dumbbells  Shoulder horizontal abd/add TA activation with Multicolor Dumbbells  Shoulder abd/add with TA activation with Multicolor Dumbbells    ENDURANCE  LTR x 2'  Bicycle in // bars x 3'    Patient Education and Home Exercises     Home Exercises Provided and Patient Education Provided     Education provided:   Role of aquatic therapy  Hydration post therapy    Written Home Exercises Provided: Patient instructed to cont prior HEP.   ASSESSMENT     The patient continues to remain motivated throughout her treatment sessions and reports reduced pain following the sessions. She required some VC to improve upright posture with UE exercises today but otherwise performed with good quality of execution.     Charlotte Is progressing well towards her goals.     Pt prognosis is Good.     Pt will continue to benefit from skilled outpatient physical therapy to address the deficits listed in the problem list box on initial evaluation, provide pt/family education and to maximize pt's level of independence in the home and community environment.     Pt's spiritual, cultural and educational needs considered and pt agreeable to plan of care and goals.     Anticipated barriers to physical therapy: none at this time.    Goals:   Short Term Goals:4 weeks   1. Patient will be independent in HEP & progressions.  2. Patient will demonstrate improved Lumbar AROM to WFL.  3. The patient will achieve 5 sit to stand score of 12 seconds to demonstrate improved transfers and endurance.     Long Term Goals: 8 weeks   1. The patient will demonstrate independence with extensive HEP.  2. Patient will achieve 5 sit to stand score of 10 seconds to demonstrate improved transfers & endurance.  3. Patent is able to demonstrate MMT 4+/5 B LE without pain reports during testing.        PLAN     Plan of care Certification: 9/7/2023 to 11/7/23.    Outpatient Physical Therapy 1-2 times weekly for 8 weeks to  include the following interventions: manual therapy, aquatic therapy, patient education, therapeutic exercise, therapeutic activities.    Patient may be seen by PTA as part of rehabilitation team.    Mckayla Castellon, PT

## 2023-10-31 ENCOUNTER — CLINICAL SUPPORT (OUTPATIENT)
Dept: REHABILITATION | Facility: HOSPITAL | Age: 73
End: 2023-10-31
Payer: MEDICARE

## 2023-10-31 DIAGNOSIS — M47.816 LUMBAR SPONDYLOSIS: ICD-10-CM

## 2023-10-31 DIAGNOSIS — M54.16 LUMBAR RADICULOPATHY: Primary | ICD-10-CM

## 2023-10-31 DIAGNOSIS — G89.29 CHRONIC PAIN OF LEFT KNEE: ICD-10-CM

## 2023-10-31 DIAGNOSIS — M25.562 CHRONIC PAIN OF LEFT KNEE: ICD-10-CM

## 2023-10-31 PROCEDURE — 97113 AQUATIC THERAPY/EXERCISES: CPT

## 2023-10-31 NOTE — PROGRESS NOTES
[facility]   Physical Therapy Treatment Note     Name: Charlotte Crowder  Clinic Number: [id number]    Therapy Diagnosis:   Encounter Diagnoses   Name Primary?    Lumbar radiculopathy Yes    Lumbar spondylosis     Chronic pain of left knee        Physician: Wendy Thompson MD    Visit Date: 10/31/2023    Physician Orders: Aquatic Therapy  Medical Diagnosis from Referral:   M15.8 (ICD-10-CM) - Other osteoarthritis involving multiple joints   M48.062 (ICD-10-CM) - Spinal stenosis of lumbar region with neurogenic claudication     Evaluation Date: 9/7/2023  Authorization Period Expiration: 11/2/23  Plan of Care Expiration: 11/7/23  Visit # / Visits authorized: 10/23 + Eval     Precautions: Standard and Fall    Time In: 2:35 PM   Time Out: 3:40 PM  Total Billable Time: 60 minutes    SUBJECTIVE     Pt reports: that walking is going well still. She feels that her LBP is that which has been there the whole time.    She was compliant with home exercise program.    Response to previous treatment: first pool visit    Functional change: improved ability to do household chores    Pain: Knee 2/10 Left LBP 5/10  Location: bilateral knee      OBJECTIVE     Objective Measures updated at progress report unless specified.     Treatment     Charlotte received aquatic therapeutic exercises to develop strength, endurance, ROM, flexibility, posture, and core stabilization for 60 minutes including:    FUNCTIONAL MOBILITY TRAINING x 2 laps each at beginning and 1 lap each at end of session   Walk forward/backward/lateral    STRETCHES 2 x 30sec  HS -- Replaced w/ hip flexor stretch at stair    LE EX x 25  Squat  Heel raise on 1st ladder step   HS curl  LAQ  Hip abduction/adduction-with GTB  Hip flex/ext-with GTB  Clam with GTB    Step up on 8 inch step x 20 reps  Lateral step up on 8 inch step x 20 reps    Sit to stand from pool stool      Walking marches x 2 laps with Orange dumbbell x 2 laps  Tandem Walking with  orange dumbbell x 2 laps    UE EX/CORE  x 25  Shoulder flex/ext TA activation with Multicolor Dumbbells  Shoulder horizontal abd/add TA activation with Multicolor Dumbbells  Shoulder abd/add with TA activation with Multicolor Dumbbells    ENDURANCE  LTR x 2'  Bicycle in // bars x 3'    Patient Education and Home Exercises     Home Exercises Provided and Patient Education Provided     Education provided:   Role of aquatic therapy  Hydration post therapy    Written Home Exercises Provided: Patient instructed to cont prior HEP.   ASSESSMENT     Patient displays greater proprioceptive awareness of posterior pelvic tilt and is able to maintain a neutral lumbar spine during squatted exercise. She was able to perform all exercises without posterior wall support, and requires min. Cues for adequate form. Patient may benefit from increase in dosage of exercises to further improve cardiovascular and muscular endurance. Continue to progress patient as tolerated.    Charlotte Is progressing well towards her goals.     Pt prognosis is Good.     Pt will continue to benefit from skilled outpatient physical therapy to address the deficits listed in the problem list box on initial evaluation, provide pt/family education and to maximize pt's level of independence in the home and community environment.     Pt's spiritual, cultural and educational needs considered and pt agreeable to plan of care and goals.     Anticipated barriers to physical therapy: none at this time.    Goals:   Short Term Goals:4 weeks   1. Patient will be independent in HEP & progressions.  2. Patient will demonstrate improved Lumbar AROM to WFL.  3. The patient will achieve 5 sit to stand score of 12 seconds to demonstrate improved transfers and endurance.     Long Term Goals: 8 weeks   1. The patient will demonstrate independence with extensive HEP.  2. Patient will achieve 5 sit to stand score of 10 seconds to demonstrate improved transfers & endurance.  3.  Patent is able to demonstrate MMT 4+/5 B LE without pain reports during testing.        PLAN     Plan of care Certification: 9/7/2023 to 11/7/23.    Outpatient Physical Therapy 1-2 times weekly for 8 weeks to include the following interventions: manual therapy, aquatic therapy, patient education, therapeutic exercise, therapeutic activities.    Patient may be seen by PTA as part of rehabilitation team.    Moriah Lopez, PT

## 2023-11-01 NOTE — PROGRESS NOTES
[facility]   Physical Therapy Treatment Note     Name: Charlotte Crowder  Clinic Number: [id number]    Therapy Diagnosis:   Encounter Diagnoses   Name Primary?    Lumbar radiculopathy Yes    Lumbar spondylosis     Chronic pain of left knee      Physician: Wendy Thompson MD    Visit Date: 11/2/2023    Physician Orders: Aquatic Therapy  Medical Diagnosis from Referral:   M15.8 (ICD-10-CM) - Other osteoarthritis involving multiple joints   M48.062 (ICD-10-CM) - Spinal stenosis of lumbar region with neurogenic claudication     Evaluation Date: 9/7/2023  Authorization Period Expiration: 11/2/23  Plan of Care Expiration: 12/2/23  Visit # / Visits authorized: 11/23 + Eval     Precautions: Standard and Fall    Time In: 10:28 AM  Time Out: 11:33 AM  Total Billable Time: 55 minutes    SUBJECTIVE     Pt reports: that her back is feeling 70% better. She is able to walk a lot more, and able to perform household chores including making the bed and vacuuming is much easier.       She was compliant with home exercise program.    Response to previous treatment: first pool visit    Functional change: improved ability to do household chores    Pain: Knee 4 /10 Left LBP 5 /10  Location: bilateral knee      OBJECTIVE     TUG: Initial Evaluation: 9 seconds without AD or signfiicant gait deviations Re-assessment 11/2/31: 9 seconds     5 Times Sit to Stand: 15 seconds with out use of hands with pain reports on the L side Re-assessment 11/2/31: 11.3 seconds      Gait: Mild antalgic gait.      Lumbar Range of Motion:      Right Left 11/2/23   Flexion WFL  No pain WFL   Extension WFL No pain WFL   SB R Limited 50% Stretch and pain on L lumbar region Limited 40% and pain on L lumbar region   SB L Limited 75  Pressure on L lumbar region Limited 20%   Rotation L   Limited 30% P! L lumbar region   Rotation R   Limited 20%       Treatment     Charlotte received aquatic therapeutic exercises to develop strength,  endurance, ROM, flexibility, posture, and core stabilization for 55 minutes including:    FUNCTIONAL MOBILITY TRAINING x 2 laps each at beginning and 1 lap each at end of session   Walk forward/backward/lateral    STRETCHES 2 x 30sec  HS -- Replaced w/ hip flexor stretch at stair    LE EX x 25  Squat  Heel raise on 1st ladder step   HS curl  LAQ -- Removed  Hip abduction/adduction-with GTB  Hip flex/ext-with GTB    Step up on 8 inch step x 20 reps  Lateral step up on 8 inch step x 20 reps    Sit to stand from pool stool with GTB  Clam with GTB    Walking marches x 2 laps with Orange dumbbell x 2 laps  Tandem Walking with orange dumbbell x 2 laps    UE EX/CORE  x 25  Shoulder flex/ext TA activation with Multicolor Dumbbells  Shoulder horizontal abd/add TA activation with Multicolor Dumbbells  Shoulder abd/add with TA activation with Multicolor Dumbbells    ENDURANCE  LTR x 2'  Bicycle in // bars x 3'    Patient Education and Home Exercises     Home Exercises Provided and Patient Education Provided     Education provided:   Role of aquatic therapy  Hydration post therapy    Written Home Exercises Provided: Patient instructed to cont prior HEP.   ASSESSMENT     Patient presents at re-assessment with reported decrease in low back pain and greater tolerance to household chores and walking. Patient subjectively reports a 70% improvement of symptoms overall. Re-assessment shows increased bilateral lumbar side bending and rotation ROM, however ROM remains painful with right side bending and left rotation. Patient's 5x Sit to Stand score improved indicating increased functional capacity, however TUG score remains the same. Patient will benefit from continued skilled aquatic intervention to reach remaining goals and address the impairments mentioned in this evaluation for return to maximal functional capacity.     Charlotte Is progressing well towards her goals.     Pt prognosis is Good.     Pt will continue to benefit from  skilled outpatient physical therapy to address the deficits listed in the problem list box on initial evaluation, provide pt/family education and to maximize pt's level of independence in the home and community environment.     Pt's spiritual, cultural and educational needs considered and pt agreeable to plan of care and goals.     Anticipated barriers to physical therapy: none at this time.    Goals:   Short Term Goals:4 weeks   1. Patient will be independent in HEP & progressions. MET  2. Patient will demonstrate improved Lumbar AROM to WFL. Appropriate & Ongoing  3. The patient will achieve 5 sit to stand score of 12 seconds to demonstrate improved transfers and endurance. MET    Long Term Goals: 8 weeks   1. The patient will demonstrate independence with extensive HEP.  2. Patient will achieve 5 sit to stand score of 10 seconds to demonstrate improved transfers & endurance.  3. Patent is able to demonstrate MMT 4+/5 B LE without pain reports during testing.        PLAN     Plan of care Certification: 9/7/2023 to 12/2/23.    Outpatient Physical Therapy 1-2 times weekly for 8 weeks to include the following interventions: manual therapy, aquatic therapy, patient education, therapeutic exercise, therapeutic activities.    Patient may be seen by PTA as part of rehabilitation team.    Moriah Lopez, PT

## 2023-11-02 ENCOUNTER — CLINICAL SUPPORT (OUTPATIENT)
Dept: REHABILITATION | Facility: HOSPITAL | Age: 73
End: 2023-11-02
Payer: MEDICARE

## 2023-11-02 DIAGNOSIS — G89.29 CHRONIC PAIN OF LEFT KNEE: ICD-10-CM

## 2023-11-02 DIAGNOSIS — M25.562 CHRONIC PAIN OF LEFT KNEE: ICD-10-CM

## 2023-11-02 DIAGNOSIS — M54.16 LUMBAR RADICULOPATHY: Primary | ICD-10-CM

## 2023-11-02 DIAGNOSIS — M47.816 LUMBAR SPONDYLOSIS: ICD-10-CM

## 2023-11-02 PROCEDURE — 97113 AQUATIC THERAPY/EXERCISES: CPT

## 2023-11-02 NOTE — PLAN OF CARE
[facility]   Physical Therapy Treatment Note     Name: Charlotte Crowder  Clinic Number: [id number]    Therapy Diagnosis:   Encounter Diagnoses   Name Primary?    Lumbar radiculopathy Yes    Lumbar spondylosis     Chronic pain of left knee      Physician: Wendy Thompson MD    Visit Date: 11/2/2023    Physician Orders: Aquatic Therapy  Medical Diagnosis from Referral:   M15.8 (ICD-10-CM) - Other osteoarthritis involving multiple joints   M48.062 (ICD-10-CM) - Spinal stenosis of lumbar region with neurogenic claudication     Evaluation Date: 9/7/2023  Authorization Period Expiration: 11/2/23  Plan of Care Expiration: 12/2/23  Visit # / Visits authorized: 11/23 + Eval     Precautions: Standard and Fall    Time In: 10:28 AM  Time Out: 11:33 AM  Total Billable Time: 55 minutes    SUBJECTIVE     Pt reports: that her back is feeling 70% better. She is able to walk a lot more, and able to perform household chores including making the bed and vacuuming is much easier.       She was compliant with home exercise program.    Response to previous treatment: first pool visit    Functional change: improved ability to do household chores    Pain: Knee 4 /10 Left LBP 5 /10  Location: bilateral knee      OBJECTIVE     TUG: Initial Evaluation: 9 seconds without AD or signfiicant gait deviations Re-assessment 11/2/31: 9 seconds     5 Times Sit to Stand: 15 seconds with out use of hands with pain reports on the L side Re-assessment 11/2/31: 11.3 seconds      Gait: Mild antalgic gait.      Lumbar Range of Motion:      Right Left 11/2/23   Flexion WFL  No pain WFL   Extension WFL No pain WFL   SB R Limited 50% Stretch and pain on L lumbar region Limited 40% and pain on L lumbar region   SB L Limited 75  Pressure on L lumbar region Limited 20%   Rotation L   Limited 30% P! L lumbar region   Rotation R   Limited 20%     Patient Education and Home Exercises     Home Exercises Provided and Patient Education Provided      Education provided:   Role of aquatic therapy  Hydration post therapy    Written Home Exercises Provided: Patient instructed to cont prior HEP.   ASSESSMENT     Patient presents at re-assessment with reported decrease in low back pain and greater tolerance to household chores and walking. Patient subjectively reports a 70% improvement of symptoms overall. Re-assessment shows increased bilateral lumbar side bending and rotation ROM, however ROM remains painful with right side bending and left rotation. Patient's 5x Sit to Stand score improved indicating increased functional capacity, however TUG score remains the same. Patient will benefit from continued skilled aquatic intervention to reach remaining goals and address the impairments mentioned in this evaluation for return to maximal functional capacity.     Charlotte Is progressing well towards her goals.     Pt prognosis is Good.     Pt will continue to benefit from skilled outpatient physical therapy to address the deficits listed in the problem list box on initial evaluation, provide pt/family education and to maximize pt's level of independence in the home and community environment.     Pt's spiritual, cultural and educational needs considered and pt agreeable to plan of care and goals.     Anticipated barriers to physical therapy: none at this time.    Goals:   Short Term Goals:4 weeks   1. Patient will be independent in HEP & progressions. MET  2. Patient will demonstrate improved Lumbar AROM to WFL. Appropriate & Ongoing  3. The patient will achieve 5 sit to stand score of 12 seconds to demonstrate improved transfers and endurance. MET    Long Term Goals: 8 weeks   1. The patient will demonstrate independence with extensive HEP.  2. Patient will achieve 5 sit to stand score of 10 seconds to demonstrate improved transfers & endurance.  3. Patent is able to demonstrate MMT 4+/5 B LE without pain reports during testing.        PLAN     Plan of care  Certification: 9/7/2023 to 12/2/23.    Outpatient Physical Therapy 1-2 times weekly for 8 weeks to include the following interventions: manual therapy, aquatic therapy, patient education, therapeutic exercise, therapeutic activities.    Patient may be seen by PTA as part of rehabilitation team.    Moriah Lopez, PT

## 2023-11-07 ENCOUNTER — CLINICAL SUPPORT (OUTPATIENT)
Dept: REHABILITATION | Facility: HOSPITAL | Age: 73
End: 2023-11-07
Payer: MEDICARE

## 2023-11-07 DIAGNOSIS — G89.29 CHRONIC PAIN OF LEFT KNEE: ICD-10-CM

## 2023-11-07 DIAGNOSIS — M54.16 LUMBAR RADICULOPATHY: Primary | ICD-10-CM

## 2023-11-07 DIAGNOSIS — M25.562 CHRONIC PAIN OF LEFT KNEE: ICD-10-CM

## 2023-11-07 DIAGNOSIS — M47.816 LUMBAR SPONDYLOSIS: ICD-10-CM

## 2023-11-07 PROCEDURE — 97113 AQUATIC THERAPY/EXERCISES: CPT

## 2023-11-07 NOTE — PROGRESS NOTES
[facility]   Physical Therapy Treatment Note     Name: Charlotte Crowder  Clinic Number: [id number]    Therapy Diagnosis:   No diagnosis found.    Physician: Wendy Thompson MD    Visit Date: 11/7/2023    Physician Orders: Aquatic Therapy  Medical Diagnosis from Referral:   M15.8 (ICD-10-CM) - Other osteoarthritis involving multiple joints   M48.062 (ICD-10-CM) - Spinal stenosis of lumbar region with neurogenic claudication     Evaluation Date: 9/7/2023  Authorization Period Expiration: 11/2/23  Plan of Care Expiration: 12/2/23  Visit # / Visits authorized: 11/23 + Eval     Precautions: Standard and Fall    Time In: 2:27 PM  Time Out: 3:26 PM  Total Billable Time: 30 minutes    SUBJECTIVE     Pt reports: No pain in the knee, however after a lot of walking and cleaning her left hip was sore for the rest of the weekend.     She was compliant with home exercise program.    Response to previous treatment: first pool visit    Functional change: improved ability to do household chores    Pain: Knee 1 /10 Left LBP 2 /10  Location: bilateral knee      OBJECTIVE     Objective Measures updated at progress report unless specified.     Treatment     Charlotte received aquatic therapeutic exercises to develop strength, endurance, ROM, flexibility, posture, and core stabilization for 55 minutes including:    FUNCTIONAL MOBILITY TRAINING x 2 laps each at beginning and 1 lap each at end of session   Walk forward/backward/lateral    STRETCHES 2 x 30sec  HS -- Replaced w/ hip flexor stretch at stair    LE EX x 25  Squat  Heel raise on 1st ladder step   HS curl  Hip abduction/adduction-with GTB  Hip flex/ext-with GTB    Step up on 8 inch step x 20 reps  Lateral step up on 8 inch step x 20 reps    Sit to stand from pool stool with GTB  Clam with GTB    Walking marches x 2 laps with Orange dumbbell x 2 laps  Tandem Walking in PB (no UE) x 2 laps    UE EX/CORE  x 25  Shoulder flex/ext TA activation with  Multicolor Dumbbells  Shoulder horizontal abd/add TA activation with Multicolor Dumbbells  Shoulder abd/add with TA activation with Multicolor Dumbbells    ENDURANCE  LTR x 2'  Bicycle in // bars x 3'    Patient Education and Home Exercises     Home Exercises Provided and Patient Education Provided     Education provided:   Role of aquatic therapy  Hydration post therapy    Written Home Exercises Provided: Patient instructed to cont prior HEP.   ASSESSMENT     Patient presents reporting that she had an exacerbation in left hip pain after a significant amount of walking and household chores last weekend. She continues to tolerate lower quarter resistance progressions well. Moderate verbal and visual cues still consistently necessary to achieve a neutral spine. Continue to progress patient as tolerated.    Charlotte Is progressing well towards her goals.     Pt prognosis is Good.     Pt will continue to benefit from skilled outpatient physical therapy to address the deficits listed in the problem list box on initial evaluation, provide pt/family education and to maximize pt's level of independence in the home and community environment.     Pt's spiritual, cultural and educational needs considered and pt agreeable to plan of care and goals.     Anticipated barriers to physical therapy: none at this time.    Goals:   Short Term Goals:4 weeks   1. Patient will be independent in HEP & progressions. MET  2. Patient will demonstrate improved Lumbar AROM to WFL. Appropriate & Ongoing  3. The patient will achieve 5 sit to stand score of 12 seconds to demonstrate improved transfers and endurance. MET    Long Term Goals: 8 weeks   1. The patient will demonstrate independence with extensive HEP.  2. Patient will achieve 5 sit to stand score of 10 seconds to demonstrate improved transfers & endurance.  3. Patent is able to demonstrate MMT 4+/5 B LE without pain reports during testing.        PLAN     Plan of care Certification:  9/7/2023 to 12/2/23.    Outpatient Physical Therapy 1-2 times weekly for 8 weeks to include the following interventions: manual therapy, aquatic therapy, patient education, therapeutic exercise, therapeutic activities.    Patient may be seen by PTA as part of rehabilitation team.    Moriah Lopez, PT

## 2023-11-09 ENCOUNTER — CLINICAL SUPPORT (OUTPATIENT)
Dept: REHABILITATION | Facility: HOSPITAL | Age: 73
End: 2023-11-09
Payer: MEDICARE

## 2023-11-09 DIAGNOSIS — G89.29 CHRONIC PAIN OF LEFT KNEE: ICD-10-CM

## 2023-11-09 DIAGNOSIS — M54.16 LUMBAR RADICULOPATHY: Primary | ICD-10-CM

## 2023-11-09 DIAGNOSIS — M25.562 CHRONIC PAIN OF LEFT KNEE: ICD-10-CM

## 2023-11-09 DIAGNOSIS — H40.013 OAG (OPEN ANGLE GLAUCOMA) SUSPECT, LOW RISK, BILATERAL: Primary | ICD-10-CM

## 2023-11-09 DIAGNOSIS — M47.816 LUMBAR SPONDYLOSIS: ICD-10-CM

## 2023-11-09 PROCEDURE — 97113 AQUATIC THERAPY/EXERCISES: CPT

## 2023-11-09 NOTE — PROGRESS NOTES
[facility]   Physical Therapy Treatment Note     Name: Charlotte Crowder  Clinic Number: [id number]    Therapy Diagnosis:   Encounter Diagnoses   Name Primary?    Lumbar radiculopathy Yes    Lumbar spondylosis     Chronic pain of left knee        Physician: Wendy Thompson MD    Visit Date: 11/9/2023    Physician Orders: Aquatic Therapy  Medical Diagnosis from Referral:   M15.8 (ICD-10-CM) - Other osteoarthritis involving multiple joints   M48.062 (ICD-10-CM) - Spinal stenosis of lumbar region with neurogenic claudication     Evaluation Date: 9/7/2023  Authorization Period Expiration: 11/2/23  Plan of Care Expiration: 12/2/23  Visit # / Visits authorized: 14/23 + Eval     Precautions: Standard and Fall    Time In: 10:30 am  Time Out:11:30 am  Total Billable Time: 30 minutes    SUBJECTIVE     Pt reports: she is feeling good today.     She was compliant with home exercise program.    Response to previous treatment: improved strength and endurance    Functional change: improved ability to do household chores    Pain: Knee 1 /10 Left LBP 2 /10  Location: bilateral knee      OBJECTIVE     Objective Measures updated at progress report unless specified.     Treatment     Charlotte received aquatic therapeutic exercises to develop strength, endurance, ROM, flexibility, posture, and core stabilization for 60 minutes including:    FUNCTIONAL MOBILITY TRAINING x 2 laps each at beginning and 1 lap each at end of session   Walk forward/backward/lateral    STRETCHES 2 x 30sec  HS -- Replaced w/ hip flexor stretch at stair    LE EX x 25  Squat  Heel raise on 1st ladder step   HS curl  Hip abduction/adduction-with GTB  Hip flex/ext-with GTB    Step up on 8 inch step x 20 reps  Lateral step up on 8 inch step x 20 reps    Sit to stand from pool stool with GTB  Clam with GTB    Walking marches x 2 laps with Orange dumbbell x 2 laps  Tandem Walking outside of  // bars (no UE) x 2 laps    UE EX/CORE  x  25  Shoulder flex/ext TA activation with Multicolor Dumbbells  Shoulder horizontal abd/add TA activation with Multicolor Dumbbells  Shoulder abd/add with TA activation with Multicolor Dumbbells    ENDURANCE  LTR x 2'  Bicycle in // bars x 3'    Patient Education and Home Exercises     Home Exercises Provided and Patient Education Provided     Education provided:   Role of aquatic therapy  Hydration post therapy    Written Home Exercises Provided: Patient instructed to cont prior HEP.   ASSESSMENT   The patient was able to perform all exercises today without increased pain reports today. She performed the tandem walking on the outside of the // bars today with good quality of execution, minimal sway  and no LOB noted.     Charlotte Is progressing well towards her goals.     Pt prognosis is Good.     Pt will continue to benefit from skilled outpatient physical therapy to address the deficits listed in the problem list box on initial evaluation, provide pt/family education and to maximize pt's level of independence in the home and community environment.     Pt's spiritual, cultural and educational needs considered and pt agreeable to plan of care and goals.     Anticipated barriers to physical therapy: none at this time.    Goals:   Short Term Goals:4 weeks   1. Patient will be independent in HEP & progressions. MET  2. Patient will demonstrate improved Lumbar AROM to WFL. Appropriate & Ongoing  3. The patient will achieve 5 sit to stand score of 12 seconds to demonstrate improved transfers and endurance. MET    Long Term Goals: 8 weeks   1. The patient will demonstrate independence with extensive HEP.  2. Patient will achieve 5 sit to stand score of 10 seconds to demonstrate improved transfers & endurance.  3. Patent is able to demonstrate MMT 4+/5 B LE without pain reports during testing.        PLAN     Plan of care Certification: 9/7/2023 to 12/2/23.    Outpatient Physical Therapy 1-2 times weekly for 8 weeks to  include the following interventions: manual therapy, aquatic therapy, patient education, therapeutic exercise, therapeutic activities.    Patient may be seen by PTA as part of rehabilitation team.    Mckayla Castellon, PT

## 2023-11-16 ENCOUNTER — CLINICAL SUPPORT (OUTPATIENT)
Dept: REHABILITATION | Facility: HOSPITAL | Age: 73
End: 2023-11-16
Payer: MEDICARE

## 2023-11-16 DIAGNOSIS — G89.29 CHRONIC PAIN OF LEFT KNEE: ICD-10-CM

## 2023-11-16 DIAGNOSIS — M54.16 LUMBAR RADICULOPATHY: Primary | ICD-10-CM

## 2023-11-16 DIAGNOSIS — M25.562 CHRONIC PAIN OF LEFT KNEE: ICD-10-CM

## 2023-11-16 DIAGNOSIS — M47.816 LUMBAR SPONDYLOSIS: ICD-10-CM

## 2023-11-16 PROCEDURE — 97113 AQUATIC THERAPY/EXERCISES: CPT

## 2023-11-16 NOTE — PROGRESS NOTES
[facility]   Physical Therapy Treatment Note     Name: Charlotte Crowder  Clinic Number: [id number]    Therapy Diagnosis:   Encounter Diagnoses   Name Primary?    Lumbar radiculopathy Yes    Lumbar spondylosis     Chronic pain of left knee        Physician: Wendy Thompson MD    Visit Date: 11/16/2023    Physician Orders: Aquatic Therapy  Medical Diagnosis from Referral:   M15.8 (ICD-10-CM) - Other osteoarthritis involving multiple joints   M48.062 (ICD-10-CM) - Spinal stenosis of lumbar region with neurogenic claudication     Evaluation Date: 9/7/2023  Authorization Period Expiration: 11/2/23  Plan of Care Expiration: 12/2/23  Visit # / Visits authorized: 14/23 + Eval     Precautions: Standard and Fall    Time In: 10:00 am   Time Out:11:00 am  Total Billable Time: 30 minutes    SUBJECTIVE     Pt reports: she doing well with therapy and overall feeling pretty good today.     She was compliant with home exercise program.    Response to previous treatment: improved strength and endurance    Functional change: improved ability to do household chores    Pain: Knee 1 /10 Left LBP 2 /10  Location: bilateral knee      OBJECTIVE     Objective Measures updated at progress report unless specified.     Treatment     Charlotte received aquatic therapeutic exercises to develop strength, endurance, ROM, flexibility, posture, and core stabilization for 60 minutes including:    FUNCTIONAL MOBILITY TRAINING x 2 laps each at beginning and 1 lap each at end of session   Walk forward/backward/lateral    STRETCHES 2 x 30sec  HS -- Replaced w/ hip flexor stretch at stair    LE EX x 30  Squat  Heel raise on 1st ladder step   HS curl    Hip abduction/adduction-with GTB  Hip flex/ext-with GTB    Step up on 8 inch step x 20 reps  Lateral step up on 8 inch step x 20 reps    Sit to stand from pool stool with GTB  Clam with GTB    Walking marches x 2 laps with Orange dumbbell x 2 laps  Tandem Walking outside of  //  bars (no UE) x 2 laps    UE EX/CORE  x 25  Shoulder flex/ext TA activation with Multicolor Dumbbells  Shoulder horizontal abd/add TA activation with Multicolor Dumbbells  Shoulder abd/add with TA activation with Multicolor Dumbbells    ENDURANCE  LTR x 2'  Bicycle in // bars x 3'    Patient Education and Home Exercises     Home Exercises Provided and Patient Education Provided     Education provided:   Role of aquatic therapy  Hydration post therapy    Written Home Exercises Provided: Patient instructed to cont prior HEP.   ASSESSMENT   The patient continues to tolerate her treatment sessions well and is overall progressing well with therapy.     Charlotte Is progressing well towards her goals.     Pt prognosis is Good.     Pt will continue to benefit from skilled outpatient physical therapy to address the deficits listed in the problem list box on initial evaluation, provide pt/family education and to maximize pt's level of independence in the home and community environment.     Pt's spiritual, cultural and educational needs considered and pt agreeable to plan of care and goals.     Anticipated barriers to physical therapy: none at this time.    Goals:   Short Term Goals:4 weeks   1. Patient will be independent in HEP & progressions. MET  2. Patient will demonstrate improved Lumbar AROM to WFL. Appropriate & Ongoing  3. The patient will achieve 5 sit to stand score of 12 seconds to demonstrate improved transfers and endurance. MET    Long Term Goals: 8 weeks   1. The patient will demonstrate independence with extensive HEP.  2. Patient will achieve 5 sit to stand score of 10 seconds to demonstrate improved transfers & endurance.  3. Patent is able to demonstrate MMT 4+/5 B LE without pain reports during testing.        PLAN     Plan of care Certification: 9/7/2023 to 12/2/23.    Outpatient Physical Therapy 1-2 times weekly for 8 weeks to include the following interventions: manual therapy, aquatic therapy, patient  education, therapeutic exercise, therapeutic activities.    Patient may be seen by PTA as part of rehabilitation team.    Mckayla Castellon, PT

## 2023-11-24 ENCOUNTER — CLINICAL SUPPORT (OUTPATIENT)
Dept: REHABILITATION | Facility: HOSPITAL | Age: 73
End: 2023-11-24
Payer: MEDICARE

## 2023-11-24 DIAGNOSIS — G89.29 CHRONIC PAIN OF LEFT KNEE: ICD-10-CM

## 2023-11-24 DIAGNOSIS — M25.562 CHRONIC PAIN OF LEFT KNEE: ICD-10-CM

## 2023-11-24 DIAGNOSIS — M47.816 LUMBAR SPONDYLOSIS: ICD-10-CM

## 2023-11-24 DIAGNOSIS — M54.16 LUMBAR RADICULOPATHY: Primary | ICD-10-CM

## 2023-11-24 PROCEDURE — 97113 AQUATIC THERAPY/EXERCISES: CPT

## 2023-11-24 NOTE — PROGRESS NOTES
[facility]   Physical Therapy Treatment Note     Name: Charlotte Crowder  Clinic Number: [id number]    Therapy Diagnosis:   Encounter Diagnoses   Name Primary?    Lumbar radiculopathy Yes    Lumbar spondylosis     Chronic pain of left knee        Physician: Wendy Thompson MD    Visit Date: 11/24/2023    Physician Orders: Aquatic Therapy  Medical Diagnosis from Referral:   M15.8 (ICD-10-CM) - Other osteoarthritis involving multiple joints   M48.062 (ICD-10-CM) - Spinal stenosis of lumbar region with neurogenic claudication     Evaluation Date: 9/7/2023  Authorization Period Expiration: 11/2/23  Plan of Care Expiration: 12/2/23  Visit # / Visits authorized: 16/23 + Eval     Precautions: Standard and Fall    Time In: 8:50 am   Time Out:9:55 am   Total Billable Time: 30 minutes    SUBJECTIVE     Pt reports: she is feeling pretty good today.     She was compliant with home exercise program.    Response to previous treatment: improved strength and endurance    Functional change: improved ability to do household chores    Pain: Knee 1 /10 Left LBP 2 /10  Location: bilateral knee      OBJECTIVE     Objective Measures updated at progress report unless specified.     Treatment     Charlotte received aquatic therapeutic exercises to develop strength, endurance, ROM, flexibility, posture, and core stabilization for 65 minutes including:    FUNCTIONAL MOBILITY TRAINING x 2 laps each at beginning and 1 lap each at end of session   Walk forward/backward/lateral    STRETCHES 2 x 30sec  HS -- Replaced w/ hip flexor stretch at stair    LE EX x 30  HS curl    Hip abduction/adduction-with GTB  Hip flex/ext-with GTB  Squat-With GTB  Heel raise-With GTB    Seated on stool  Sit to stand with GTB  Clam with GTB  LAQ with GTB    Step up on 8 inch step x 20 reps  Lateral step up on 8 inch step x 20 reps      Walking marches x 2 laps with Orange dumbbell x 2 laps  Tandem Walking outside of  // bars (no UE) x 2  laps    UE EX/CORE  x 25  Shoulder flex/ext TA activation with Multicolor Dumbbells  Shoulder horizontal abd/add TA activation with Multicolor Dumbbells  Shoulder abd/add with TA activation with Multicolor Dumbbells    Blue kickboard push/pull     ENDURANCE  LTR x 2'  Bicycle in // bars x 3'    Patient Education and Home Exercises     Home Exercises Provided and Patient Education Provided     Education provided:   Role of aquatic therapy  Hydration post therapy    Written Home Exercises Provided: Patient instructed to cont prior HEP.   ASSESSMENT   The patient was able to perform advanced LE exercises (heel raise and LAQ) with resistance today with good quality of execution.    Charlotte Is progressing well towards her goals.     Pt prognosis is Good.     Pt will continue to benefit from skilled outpatient physical therapy to address the deficits listed in the problem list box on initial evaluation, provide pt/family education and to maximize pt's level of independence in the home and community environment.     Pt's spiritual, cultural and educational needs considered and pt agreeable to plan of care and goals.     Anticipated barriers to physical therapy: none at this time.    Goals:   Short Term Goals:4 weeks   1. Patient will be independent in HEP & progressions. MET  2. Patient will demonstrate improved Lumbar AROM to WFL. Appropriate & Ongoing  3. The patient will achieve 5 sit to stand score of 12 seconds to demonstrate improved transfers and endurance. MET    Long Term Goals: 8 weeks   1. The patient will demonstrate independence with extensive HEP.  2. Patient will achieve 5 sit to stand score of 10 seconds to demonstrate improved transfers & endurance.  3. Patent is able to demonstrate MMT 4+/5 B LE without pain reports during testing.        PLAN     Plan of care Certification: 9/7/2023 to 12/2/23.    Outpatient Physical Therapy 1-2 times weekly for 8 weeks to include the following interventions: manual  therapy, aquatic therapy, patient education, therapeutic exercise, therapeutic activities.    Patient may be seen by PTA as part of rehabilitation team.    Mckayla Castellon, PT

## 2023-11-30 ENCOUNTER — CLINICAL SUPPORT (OUTPATIENT)
Dept: REHABILITATION | Facility: HOSPITAL | Age: 73
End: 2023-11-30
Payer: MEDICARE

## 2023-11-30 DIAGNOSIS — M54.16 LUMBAR RADICULOPATHY: Primary | ICD-10-CM

## 2023-11-30 DIAGNOSIS — M25.562 CHRONIC PAIN OF LEFT KNEE: ICD-10-CM

## 2023-11-30 DIAGNOSIS — G89.29 CHRONIC PAIN OF LEFT KNEE: ICD-10-CM

## 2023-11-30 DIAGNOSIS — M47.816 LUMBAR SPONDYLOSIS: ICD-10-CM

## 2023-11-30 PROCEDURE — 97113 AQUATIC THERAPY/EXERCISES: CPT

## 2023-11-30 NOTE — PROGRESS NOTES
[facility]   Physical Therapy Treatment Note     Name: Charlotte Crowder  Clinic Number: [id number]    Therapy Diagnosis:   No diagnosis found.      Physician: Wendy Thompson MD    Visit Date: 11/30/2023    Physician Orders: Aquatic Therapy  Medical Diagnosis from Referral:   M15.8 (ICD-10-CM) - Other osteoarthritis involving multiple joints   M48.062 (ICD-10-CM) - Spinal stenosis of lumbar region with neurogenic claudication     Evaluation Date: 9/7/2023  Authorization Period Expiration: 11/2/23  Plan of Care Expiration: 12/2/23  Visit # / Visits authorized: 17/23 + Eval     Precautions: Standard and Fall    Time In: 10:31 am   Time Out: 11:34 am   Total Billable Time: 55 minutes    SUBJECTIVE     Pt reports: she is feeling pretty good today.     She was compliant with home exercise program.    Response to previous treatment: improved strength and endurance    Functional change: improved ability to do household chores    Pain: Knee 1 /10 Left Hip 4/10  Location: bilateral knee      OBJECTIVE     Objective Measures updated at progress report unless specified.     Treatment     Charlotte received aquatic therapeutic exercises to develop strength, endurance, ROM, flexibility, posture, and core stabilization for 55 minutes including:    FUNCTIONAL MOBILITY TRAINING x 2 laps each at beginning and 1 lap each at end of session   Walk forward/backward/lateral    STRETCHES 2 x 30 sec -- NP d/t time, resume next visit  Hip flexor stretch at stair    LE EX x 20  HS curl    Hip abduction/adduction-with GTB  Hip flex/ext-with GTB  Squat-With GTB  Heel raise-With GTB    Seated on stool x 20  Sit to stand with GTB  Clam with GTB  LAQ with GTB    Step up w/contralateral hip flexion 8 inch step x 20 reps  Lateral step up on 8 inch step x 20 reps    Walking marches x 2 laps with Orange dumbbell x 2 laps -- NP d/t time, resume next visit  Tandem Walking outside of  // bars (no UE) x 2 laps    UE EX/CORE  x  25  Shoulder flex/ext TA activation with Blue Dumbbells  Shoulder horizontal abd/add TA activation with Blue Dumbbells  Shoulder abd/add with TA activation with Blue Dumbbells    Blue kickboard push/pull -- NP d/t time, resume next visit    ENDURANCE  LTR x 2'  Bicycle in // bars x 3'    Patient Education and Home Exercises     Home Exercises Provided and Patient Education Provided     Education provided:   Role of aquatic therapy  Hydration post therapy    Written Home Exercises Provided: Patient instructed to cont prior HEP.     ASSESSMENT   Patient presents with report of persistent left hip pain and notes that she has been doing well with HEP. Continued with proprioceptive awareness and engagement of TrA in squat. Patient displaying moderate control of pelvic tilting. Added single leg balance challenge to forward steps ups, and updated patient's HEP to address TrA proprioceptive deficits. Continue to progress per patient tolerance.     Charlotte Is progressing well towards her goals.     Pt prognosis is Good.     Pt will continue to benefit from skilled outpatient physical therapy to address the deficits listed in the problem list box on initial evaluation, provide pt/family education and to maximize pt's level of independence in the home and community environment.     Pt's spiritual, cultural and educational needs considered and pt agreeable to plan of care and goals.     Anticipated barriers to physical therapy: none at this time.    Goals:   Short Term Goals:4 weeks   1. Patient will be independent in HEP & progressions. MET  2. Patient will demonstrate improved Lumbar AROM to WFL. Appropriate & Ongoing  3. The patient will achieve 5 sit to stand score of 12 seconds to demonstrate improved transfers and endurance. MET    Long Term Goals: 8 weeks   1. The patient will demonstrate independence with extensive HEP.  2. Patient will achieve 5 sit to stand score of 10 seconds to demonstrate improved transfers &  endurance.  3. Patent is able to demonstrate MMT 4+/5 B LE without pain reports during testing.        PLAN     Plan of care Certification: 9/7/2023 to 12/2/23.    Outpatient Physical Therapy 1-2 times weekly for 8 weeks to include the following interventions: manual therapy, aquatic therapy, patient education, therapeutic exercise, therapeutic activities.    Patient may be seen by PTA as part of rehabilitation team.    Moriah Lopez, PT

## 2023-12-05 ENCOUNTER — CLINICAL SUPPORT (OUTPATIENT)
Dept: REHABILITATION | Facility: HOSPITAL | Age: 73
End: 2023-12-05
Payer: MEDICARE

## 2023-12-05 DIAGNOSIS — G89.29 CHRONIC PAIN OF LEFT KNEE: ICD-10-CM

## 2023-12-05 DIAGNOSIS — M54.16 LUMBAR RADICULOPATHY: Primary | ICD-10-CM

## 2023-12-05 DIAGNOSIS — M25.562 CHRONIC PAIN OF LEFT KNEE: ICD-10-CM

## 2023-12-05 DIAGNOSIS — M47.816 LUMBAR SPONDYLOSIS: ICD-10-CM

## 2023-12-05 PROCEDURE — 97113 AQUATIC THERAPY/EXERCISES: CPT

## 2023-12-05 NOTE — PROGRESS NOTES
[facility]   Physical Therapy Treatment Note     Name: Charlotte Crowder  Clinic Number: [id number]    Therapy Diagnosis:   Encounter Diagnoses   Name Primary?    Lumbar radiculopathy Yes    Lumbar spondylosis     Chronic pain of left knee      Physician: Wendy Thompson MD    Visit Date: 12/5/2023    Physician Orders: Aquatic Therapy  Medical Diagnosis from Referral:   M15.8 (ICD-10-CM) - Other osteoarthritis involving multiple joints   M48.062 (ICD-10-CM) - Spinal stenosis of lumbar region with neurogenic claudication     Evaluation Date: 9/7/2023  Authorization Period Expiration: 11/2/23  Plan of Care Expiration: 12/2/23  Visit # / Visits authorized: 18/23 + Eval     Precautions: Standard and Fall    Time In: 2:30 PM   Time Out: 3:34 PM  Total Billable Time: 30 minutes    SUBJECTIVE     Pt reports: she is feeling pretty good today.     She was compliant with home exercise program.    Response to previous treatment: improved strength and endurance    Functional change: improved ability to do household chores    Pain: Knee 1 /10 Left Hip 4/10  Location: bilateral knee      OBJECTIVE     Objective Measures updated at progress report unless specified.     Treatment     Charlotte received aquatic therapeutic exercises to develop strength, endurance, ROM, flexibility, posture, and core stabilization for 60 minutes including:    FUNCTIONAL MOBILITY TRAINING x 2 laps each at beginning and 1 lap each at end of session   Walk forward/backward/lateral    STRETCHES 2 x 30 sec -- NP d/t time, resume next visit  Hip flexor stretch at stair    LE EX x 20  HS curl    Hip abduction/adduction-with GTB  Hip flex/ext-with GTB  Squat-With GTB  Heel raise-With GTB    Seated on stool x 20  Sit to stand with GTB  Clam with GTB  LAQ with GTB    Step up w/contralateral hip flexion 8 inch step x 20 reps  Lateral step up on 8 inch step x 20 reps    Walking marches x 2 laps with Orange dumbbell x 2 laps   Tandem  Walking outside of  // bars (no UE) x 2 laps    UE EX/CORE  x 25  Shoulder flex/ext TA activation with Blue Dumbbells  Shoulder horizontal abd/add TA activation with Blue Dumbbells  Shoulder abd/add with TA activation with Blue Dumbbells    Blue kickboard push/pull     ENDURANCE  LTR x 2'  Bicycle in // bars x 3'    Patient Education and Home Exercises     Home Exercises Provided and Patient Education Provided     Education provided:   Role of aquatic therapy  Hydration post therapy    Written Home Exercises Provided: Patient instructed to cont prior HEP.     ASSESSMENT   Patient displayed difficulty when cued to remove all upper extremity support during step ups, indicating further practice prior to removing all upper extremity support. She also required moderate verbal and visual cueing during squat push-pulls in order to appropriately sequence exercise. Trial single finger for support during forward step ups at next visit to improve patient's balance confidence and adding appropriate challenge. Continue to progress per patient tolerance.     Charlotte Is progressing well towards her goals.     Pt prognosis is Good.     Pt will continue to benefit from skilled outpatient physical therapy to address the deficits listed in the problem list box on initial evaluation, provide pt/family education and to maximize pt's level of independence in the home and community environment.     Pt's spiritual, cultural and educational needs considered and pt agreeable to plan of care and goals.     Anticipated barriers to physical therapy: none at this time.    Goals:   Short Term Goals:4 weeks   1. Patient will be independent in HEP & progressions. MET  2. Patient will demonstrate improved Lumbar AROM to WFL. Appropriate & Ongoing  3. The patient will achieve 5 sit to stand score of 12 seconds to demonstrate improved transfers and endurance. MET    Long Term Goals: 8 weeks   1. The patient will demonstrate independence with  extensive HEP.  2. Patient will achieve 5 sit to stand score of 10 seconds to demonstrate improved transfers & endurance.  3. Patent is able to demonstrate MMT 4+/5 B LE without pain reports during testing.        PLAN     Plan of care Certification: 9/7/2023 to 12/2/23.    Outpatient Physical Therapy 1-2 times weekly for 8 weeks to include the following interventions: manual therapy, aquatic therapy, patient education, therapeutic exercise, therapeutic activities.    Patient may be seen by PTA as part of rehabilitation team.    Moriah Lopez, PT

## 2023-12-07 ENCOUNTER — CLINICAL SUPPORT (OUTPATIENT)
Dept: REHABILITATION | Facility: HOSPITAL | Age: 73
End: 2023-12-07
Payer: MEDICARE

## 2023-12-07 DIAGNOSIS — M54.16 LUMBAR RADICULOPATHY: Primary | ICD-10-CM

## 2023-12-07 DIAGNOSIS — M25.562 CHRONIC PAIN OF LEFT KNEE: ICD-10-CM

## 2023-12-07 DIAGNOSIS — M47.816 LUMBAR SPONDYLOSIS: ICD-10-CM

## 2023-12-07 DIAGNOSIS — G89.29 CHRONIC PAIN OF LEFT KNEE: ICD-10-CM

## 2023-12-07 PROCEDURE — 97113 AQUATIC THERAPY/EXERCISES: CPT

## 2023-12-07 NOTE — PROGRESS NOTES
[facility]   Physical Therapy Treatment Note     Name: Charlotte Crowder  Clinic Number: [id number]    Therapy Diagnosis:   Encounter Diagnoses   Name Primary?    Lumbar radiculopathy Yes    Lumbar spondylosis     Chronic pain of left knee      Physician: Wendy Thompson MD    Visit Date: 12/7/2023    Physician Orders: Aquatic Therapy  Medical Diagnosis from Referral:   M15.8 (ICD-10-CM) - Other osteoarthritis involving multiple joints   M48.062 (ICD-10-CM) - Spinal stenosis of lumbar region with neurogenic claudication     Evaluation Date: 9/7/2023  Authorization Period Expiration: 11/2/23  Plan of Care Expiration: 12/2/23  Visit # / Visits authorized: 19/23 + Eval     Precautions: Standard and Fall    Time In: 9:30 am   Time Out: 10:30 am   Total Billable Time: 60 minutes    SUBJECTIVE     Pt reports: today is a good day. She stated her pain is a 2/10 today.    She was compliant with home exercise program.    Response to previous treatment: improved strength and endurance    Functional change: improved ability to do household chores    Pain: Knee 1 /10 Left Hip 4/10  Location: bilateral knee      OBJECTIVE     Objective Measures updated at progress report unless specified.     Treatment     Charlotte received aquatic therapeutic exercises to develop strength, endurance, ROM, flexibility, posture, and core stabilization for 60 minutes including:    FUNCTIONAL MOBILITY TRAINING x 2 laps each at beginning and 1 lap each at end of session   Walk forward/backward/lateral    STRETCHES 2 x 30 sec -- NP d/t time, resume next visit  Hip flexor stretch at stair    LE EX x 20  HS curl    Hip abduction/adduction with GTB  Hip flex/ext-with GTB  Squat-With GTB  Heel raise-With GTB    Seated on stool x 20  Sit to stand with GTB  Clam with GTB  LAQ with GTB    Step up w/contralateral hip flexion 8 inch step x 20 reps  Lateral step up on 8 inch step x 20 reps    Walking marches x 2 laps with Orange  dumbbell x 2 laps   Tandem Walking outside of  // bars (no UE) x 2 laps    UE EX/CORE  x 25  Shoulder flex/ext TA activation with Blue Dumbbells  Shoulder horizontal abd/add TA activation with Blue Dumbbells  Shoulder abd/add with TA activation with Blue Dumbbells    Blue kickboard push/pull     ENDURANCE  LTR x 2'  Bicycle in // bars x 3'    Patient Education and Home Exercises     Home Exercises Provided and Patient Education Provided     Education provided:   Role of aquatic therapy  Hydration post therapy    Written Home Exercises Provided: Patient instructed to cont prior HEP.     ASSESSMENT   The patient required some VC to reduce speed with walking marches to focus on core control and balance.  Her quality of marches improved following the cueing.  She required 1 hand hold for step ups with added march but performed with no LOB.     Charlotte Is progressing well towards her goals.     Pt prognosis is Good.     Pt will continue to benefit from skilled outpatient physical therapy to address the deficits listed in the problem list box on initial evaluation, provide pt/family education and to maximize pt's level of independence in the home and community environment.     Pt's spiritual, cultural and educational needs considered and pt agreeable to plan of care and goals.     Anticipated barriers to physical therapy: none at this time.    Goals:   Short Term Goals:4 weeks   1. Patient will be independent in HEP & progressions. MET  2. Patient will demonstrate improved Lumbar AROM to WFL. Appropriate & Ongoing  3. The patient will achieve 5 sit to stand score of 12 seconds to demonstrate improved transfers and endurance. MET    Long Term Goals: 8 weeks   1. The patient will demonstrate independence with extensive HEP.  2. Patient will achieve 5 sit to stand score of 10 seconds to demonstrate improved transfers & endurance.  3. Patent is able to demonstrate MMT 4+/5 B LE without pain reports during testing.         PLAN     Plan of care Certification: 9/7/2023 to 12/2/23.    Outpatient Physical Therapy 1-2 times weekly for 8 weeks to include the following interventions: manual therapy, aquatic therapy, patient education, therapeutic exercise, therapeutic activities.    Patient may be seen by PTA as part of rehabilitation team.    Mckayla Castellon, PT

## 2023-12-08 DIAGNOSIS — M62.838 MUSCLE SPASM: ICD-10-CM

## 2023-12-08 RX ORDER — CYCLOBENZAPRINE HCL 5 MG
TABLET ORAL
Qty: 90 TABLET | Refills: 0 | Status: SHIPPED | OUTPATIENT
Start: 2023-12-08

## 2023-12-08 NOTE — TELEPHONE ENCOUNTER
No care due was identified.  [facility] Embedded Care Due Messages. Reference number: [id number].   12/08/2023 10:41:02 AM CST

## 2023-12-08 NOTE — TELEPHONE ENCOUNTER
Refill Encounter    PCP Visits: Recent Visits  Date Type Provider Dept   08/22/23 Office Visit Wendy Thompson MD [facility]   02/08/23 Office Visit Wendy Thompson MD [facility]   Showing recent visits within past 360 days and meeting all other requirements  Future Appointments  No visits were found meeting these conditions.  Showing future appointments within next 720 days and meeting all other requirements     Last 3 Blood Pressure:   BP Readings from Last 3 Encounters:   10/12/23 (!) 165/95   08/30/23 (!) 161/99   08/22/23 122/82     Preferred Pharmacy:   [facility]/pharmacy #[id number] - [place] - [address] [facility]  [address] [facility] [address]  Phone: [phone] Fax: [phone]    Requested RX:  Requested Prescriptions     Pending Prescriptions Disp Refills    cyclobenzaprine (FLEXERIL) 5 MG tablet [Pharmacy Med Name: CYCLOBENZAPRINE 5 MG TABLET] 90 tablet 0     Sig: TAKE 1 TABLET BY MOUTH THREE TIMES A DAY AS NEEDED FOR MUSCLE SPASM      RX Route: Normal

## 2023-12-14 ENCOUNTER — CLINICAL SUPPORT (OUTPATIENT)
Dept: REHABILITATION | Facility: HOSPITAL | Age: 73
End: 2023-12-14
Payer: MEDICARE

## 2023-12-14 DIAGNOSIS — M54.16 LUMBAR RADICULOPATHY: Primary | ICD-10-CM

## 2023-12-14 DIAGNOSIS — M25.562 CHRONIC PAIN OF LEFT KNEE: ICD-10-CM

## 2023-12-14 DIAGNOSIS — M47.816 LUMBAR SPONDYLOSIS: ICD-10-CM

## 2023-12-14 DIAGNOSIS — G89.29 CHRONIC PAIN OF LEFT KNEE: ICD-10-CM

## 2023-12-14 PROCEDURE — 97113 AQUATIC THERAPY/EXERCISES: CPT

## 2023-12-14 NOTE — PROGRESS NOTES
[facility]   Physical Therapy Treatment Note     Name: Charlotte Crowder  Clinic Number: [id number]    Therapy Diagnosis:   Encounter Diagnoses   Name Primary?    Lumbar radiculopathy Yes    Lumbar spondylosis     Chronic pain of left knee      Physician: Wendy Thompson MD    Visit Date: 12/14/2023    Physician Orders: Aquatic Therapy  Medical Diagnosis from Referral:   M15.8 (ICD-10-CM) - Other osteoarthritis involving multiple joints   M48.062 (ICD-10-CM) - Spinal stenosis of lumbar region with neurogenic claudication     Evaluation Date: 9/7/2023  Authorization Period Expiration: 11/2/23  Plan of Care Expiration: 12/2/23  Visit # / Visits authorized: 20/35 + Eval     Precautions: Standard and Fall    Time In: 9:30 am   Time Out: 10:30 am   Total Billable Time: 60 minutes    SUBJECTIVE     Pt reports: she is feeling pretty good and ready to get started today     She was compliant with home exercise program.    Response to previous treatment: improved strength and endurance    Functional change: improved ability to do household chores    Pain: Knee 1 /10 Left Hip 4/10  Location: bilateral knee      OBJECTIVE     Objective Measures updated at progress report unless specified.     Treatment     Charlotte received aquatic therapeutic exercises to develop strength, endurance, ROM, flexibility, posture, and core stabilization for 60 minutes including:    FUNCTIONAL MOBILITY TRAINING x 2 laps each at beginning and 1 lap each at end of session   Walk forward/backward/lateral    STRETCHES 2 x 30 sec   Hip flexor stretch at stair    LE EX x 25  HS curl    Hip abduction/adduction with GTB  Hip flex/ext-with GTB  Squat-With GTB  Heel raise-With GTB    Seated on stool x 20  Sit to stand with GTB  Clam with GTB  LAQ with GTB    Step up w/contralateral hip flexion 8 inch step x 20 reps  Lateral step up on 8 inch step x 20 reps    Walking marches x 2 laps with Orange dumbbell x 2 laps   Tandem Walking  outside of  // bars (no UE) x 2 laps    UE EX/CORE  x 25  Shoulder flex/ext TA activation with Blue Dumbbells  Shoulder horizontal abd/add TA activation with Blue Dumbbells  Shoulder abd/add with TA activation with Blue Dumbbells    Blue kickboard push/pull     ENDURANCE  LTR x 2'  Bicycle in // bars x 3'    Patient Education and Home Exercises     Home Exercises Provided and Patient Education Provided     Education provided:   Role of aquatic therapy  Hydration post therapy    Written Home Exercises Provided: Patient instructed to cont prior HEP.     ASSESSMENT   The patient was able to perform exercises with good quality and reduced speed of motion today without VC required from therapist.  She continues to require 1 finger hold for support with step ups for added support but was able to perform sit to stand without use of hands for support.     Charlotte Is progressing well towards her goals.     Pt prognosis is Good.     Pt will continue to benefit from skilled outpatient physical therapy to address the deficits listed in the problem list box on initial evaluation, provide pt/family education and to maximize pt's level of independence in the home and community environment.     Pt's spiritual, cultural and educational needs considered and pt agreeable to plan of care and goals.     Anticipated barriers to physical therapy: none at this time.    Goals:   Short Term Goals:4 weeks   1. Patient will be independent in HEP & progressions. MET  2. Patient will demonstrate improved Lumbar AROM to WFL. Appropriate & Ongoing  3. The patient will achieve 5 sit to stand score of 12 seconds to demonstrate improved transfers and endurance. MET    Long Term Goals: 8 weeks   1. The patient will demonstrate independence with extensive HEP.  2. Patient will achieve 5 sit to stand score of 10 seconds to demonstrate improved transfers & endurance.  3. Patent is able to demonstrate MMT 4+/5 B LE without pain reports during testing.         PLAN     Plan of care Certification: 9/7/2023 to 12/2/23.    Outpatient Physical Therapy 1-2 times weekly for 8 weeks to include the following interventions: manual therapy, aquatic therapy, patient education, therapeutic exercise, therapeutic activities.    Patient may be seen by PTA as part of rehabilitation team.    Mckayla Castellon, PT

## 2023-12-19 ENCOUNTER — CLINICAL SUPPORT (OUTPATIENT)
Dept: REHABILITATION | Facility: HOSPITAL | Age: 73
End: 2023-12-19
Payer: MEDICARE

## 2023-12-19 DIAGNOSIS — G89.29 CHRONIC PAIN OF LEFT KNEE: ICD-10-CM

## 2023-12-19 DIAGNOSIS — M47.816 LUMBAR SPONDYLOSIS: ICD-10-CM

## 2023-12-19 DIAGNOSIS — M25.562 CHRONIC PAIN OF LEFT KNEE: ICD-10-CM

## 2023-12-19 DIAGNOSIS — M54.16 LUMBAR RADICULOPATHY: Primary | ICD-10-CM

## 2023-12-19 PROCEDURE — 97113 AQUATIC THERAPY/EXERCISES: CPT

## 2023-12-19 NOTE — PROGRESS NOTES
[facility]   Physical Therapy Updated  POC Note      Name: Charlotte Crowder  Clinic Number: [id number]    Therapy Diagnosis:   Encounter Diagnoses   Name Primary?    Lumbar radiculopathy Yes    Lumbar spondylosis     Chronic pain of left knee      Physician: Wendy Thompson MD    Visit Date: 12/19/2023    Physician Orders: Aquatic Therapy  Medical Diagnosis from Referral:   M15.8 (ICD-10-CM) - Other osteoarthritis involving multiple joints   M48.062 (ICD-10-CM) - Spinal stenosis of lumbar region with neurogenic claudication     Evaluation Date: 9/7/2023  Authorization Period Expiration: 11/2/23  Plan of Care Expiration: 2/19/24  Visit # / Visits authorized: 20/35 + Eval     Precautions: Standard and Fall    Time In: 2:35 PM   Time Out: 3:32 PM  Total Billable Time: 55 minutes      SUBJECTIVE     Pt reports: That she is doing much better. She says she can make the bed, vacuum, and perform more cleaning activities than when she started. When she side bends she is able to reach further without stiffness restricting her, and when pain arises its not more than 2/10.     She was compliant with home exercise program.    Response to previous treatment: improved strength and endurance    Functional change: improved ability to do household chores    Pain: Knee 0/10 Left Hip 2/10  Location: bilateral knee      OBJECTIVE     TUG: Initial Evaluation: 9 seconds without AD or signfiicant gait deviations Re-assessment 11/2/31: 9 seconds; Re-assessment 12/19/23: 8 seconds     5 Times Sit to Stand: 15 seconds with out use of hands with pain reports on the L side Re-assessment 11/2/31: 11.3 seconds;  Re-assessment 12/19/23: 10 seconds     Gait: Mild antalgic gait. ; Re-assessment 12/19/23: pain free gait pattern, decreased bilateral heel/toe off & hip extension     Lumbar Range of Motion:      Right Left 12/19/23   Flexion WFL  No pain WFL   Extension WFL No pain WFL   SB R Limited 50% Stretch and pain on L  lumbar region Limited 30%    SB L Limited 75  Pressure on L lumbar region Limited 10%   Rotation L     Limited 10% P! L lumbar region   Rotation R     Limited 10%     11/2/23   WFL   WFL   Limited 40% and pain on L lumbar region   Limited 20%   Limited 30% P! L lumbar region   Limited 20%         Treatment     Charlotte received aquatic therapeutic exercises to develop strength, endurance, ROM, flexibility, posture, and core stabilization for 55 minutes including:    FUNCTIONAL MOBILITY TRAINING x 2 laps each at beginning and 1 lap each at end of session   Walk forward/backward/lateral    STRETCHES 2 x 30 sec   Hip flexor stretch at stair    LE EX x 25  HS curl    Hip abduction/adduction with GTB  Hip flex/ext-with GTB  Squat-With GTB  Heel raise-on ladder    Seated on stool x 20  Sit to stand with GTB  Clam with GTB  LAQ with GTB    Step up w/contralateral hip flexion 8 inch step x 20 reps  Lateral step up on 8 inch step x 20 reps    Walking marches x 2 laps with Orange dumbbell x 2 laps   Tandem Walking outside of  // bars (no UE) x 2 laps    UE EX/CORE  x 25  Shoulder flex/ext TA activation with Blue Dumbbells  Shoulder horizontal abd/add TA activation with Blue Dumbbells  Shoulder abd/add with TA activation with Blue Dumbbells    Blue kickboard push/pull     ENDURANCE  LTR x 2'  Bicycle in // bars x 3'    Patient Education and Home Exercises     Home Exercises Provided and Patient Education Provided     Education provided:   Role of aquatic therapy  Hydration post therapy    Written Home Exercises Provided: Patient instructed to cont prior HEP.     ASSESSMENT   Charlotte Is progressing well towards her goals. Functional limitations have reduced significantly and patient reports that she can now walk further, make the bed, vacuum and perform other household chores that she was unable to perform prior to initiating therapy. Physical re-examination reveals increased lumbar rotation and side bending ROM, however end  range lumbar left rotation and left side bending remains painful; increased TUG and 5X Sit to Stand tests; and a non-antalgic gait pattern. Patient is independent with HEP and tolerating gradual advancements in HEP. She will benefit from continued skilled aquatic therapy to further improve core stability, lumbar ROM and lower quarter strength/endurance for return to maximal functional capacity.     Pt prognosis is Good.     Pt will continue to benefit from skilled outpatient physical therapy to address the deficits listed in the problem list box on initial evaluation, provide pt/family education and to maximize pt's level of independence in the home and community environment.     Pt's spiritual, cultural and educational needs considered and pt agreeable to plan of care and goals.     Anticipated barriers to physical therapy: none at this time.    Goals:   Short Term Goals:4 weeks   1. Patient will be independent in HEP & progressions. MET  2. Patient will demonstrate improved Lumbar AROM to WFL. Appropriate & Ongoing  3. The patient will achieve 5 sit to stand score of 12 seconds to demonstrate improved transfers and endurance. MET    Long Term Goals: 8 weeks   1. The patient will demonstrate independence with extensive HEP. MET  2. Patient will achieve 5 sit to stand score of 10 seconds to demonstrate improved transfers & endurance. MET  3. Patent is able to demonstrate MMT 4+/5 B LE without pain reports during testing. Appropriate & Ongoing  4. Patient demonstrates pain free lumbar ROM       Previous Short Term Goals Status: 50% Met  New Short Term Goals Status: 75% Met   Long Term Goal Status: continue per initial plan of care.  Reasons for Recertification of Therapy: Continuation of aquatic therapy to meet remaining POC goals per patient necessity.    PLAN        Updated Certification Period: 12/2/23 to 2/19/24   Recommended Treatment Plan: 1-2 times per week for 10-12 weeks:  Aquatic Therapy, Neuromuscular  Re-ed, Patient Education, Therapeutic Activities, and Therapeutic Exercise    Moriah Lopez, PT

## 2023-12-19 NOTE — PLAN OF CARE
[facility]   Physical Therapy Updated  POC Note      Name: Charlotte Crowder  Clinic Number: [id number]    Therapy Diagnosis:   Encounter Diagnoses   Name Primary?    Lumbar radiculopathy Yes    Lumbar spondylosis     Chronic pain of left knee      Physician: Wendy Thompson MD    Visit Date: 12/19/2023    Physician Orders: Aquatic Therapy  Medical Diagnosis from Referral:   M15.8 (ICD-10-CM) - Other osteoarthritis involving multiple joints   M48.062 (ICD-10-CM) - Spinal stenosis of lumbar region with neurogenic claudication     Evaluation Date: 9/7/2023  Authorization Period Expiration: 11/2/23  Plan of Care Expiration: 2/19/24  Visit # / Visits authorized: 20/35 + Eval     Precautions: Standard and Fall    Time In: 2:35 PM   Time Out: 3:32 PM  Total Billable Time: 55 minutes      SUBJECTIVE     Pt reports: That she is doing much better. She says she can make the bed, vacuum, and perform more cleaning activities than when she started. When she side bends she is able to reach further without stiffness restricting her, and when pain arises its not more than 2/10.     She was compliant with home exercise program.    Response to previous treatment: improved strength and endurance    Functional change: improved ability to do household chores    Pain: Knee 0/10 Left Hip 2/10  Location: bilateral knee      OBJECTIVE     TUG: Initial Evaluation: 9 seconds without AD or signfiicant gait deviations Re-assessment 11/2/31: 9 seconds; Re-assessment 12/19/23: 8 seconds     5 Times Sit to Stand: 15 seconds with out use of hands with pain reports on the L side Re-assessment 11/2/31: 11.3 seconds;  Re-assessment 12/19/23: 10 seconds     Gait: Mild antalgic gait. ; Re-assessment 12/19/23:      Lumbar Range of Motion:      Right Left 12/19/23   Flexion WFL  No pain WFL   Extension WFL No pain WFL   SB R Limited 50% Stretch and pain on L lumbar region Limited 30%    SB L Limited 75  Pressure on L lumbar  region Limited 10%   Rotation L     Limited 10% P! L lumbar region   Rotation R     Limited 10%     11/2/23   WFL   WFL   Limited 40% and pain on L lumbar region   Limited 20%   Limited 30% P! L lumbar region   Limited 20%       ASSESSMENT   Charlotte Is progressing well towards her goals. Functional limitations have reduced significantly and patient reports that she can now walk further, make the bed, vacuum and perform other household chores that she was unable to perform prior to initiating therapy. Physical re-examination reveals increased lumbar rotation and side bending ROM, however end range lumbar left rotation and left side bending remains painful; increased TUG and 5X Sit to Stand tests; and a non-antalgic gait pattern. Patient is independent with HEP and tolerating gradual advancements in HEP. She will benefit from continued skilled aquatic therapy to further improve core stability, lumbar ROM and lower quarter strength/endurance for return to maximal functional capacity.     Pt prognosis is Good.     Pt will continue to benefit from skilled outpatient physical therapy to address the deficits listed in the problem list box on initial evaluation, provide pt/family education and to maximize pt's level of independence in the home and community environment.     Pt's spiritual, cultural and educational needs considered and pt agreeable to plan of care and goals.     Anticipated barriers to physical therapy: none at this time.    Goals:   Short Term Goals:4 weeks   1. Patient will be independent in HEP & progressions. MET  2. Patient will demonstrate improved Lumbar AROM to WFL. Appropriate & Ongoing  3. The patient will achieve 5 sit to stand score of 12 seconds to demonstrate improved transfers and endurance. MET    Long Term Goals: 8 weeks   1. The patient will demonstrate independence with extensive HEP. MET  2. Patient will achieve 5 sit to stand score of 10 seconds to demonstrate improved transfers &  endurance. MET  3. Patent is able to demonstrate MMT 4+/5 B LE without pain reports during testing. Appropriate & Ongoing  4. Patient demonstrates pain free lumbar ROM       Previous Short Term Goals Status: 50% Met  New Short Term Goals Status: 75% Met   Long Term Goal Status: continue per initial plan of care.  Reasons for Recertification of Therapy: Continuation of aquatic therapy to meet remaining POC goals per patient necessity.    PLAN        Updated Certification Period: 12/2/23 to 2/19/24   Recommended Treatment Plan: 1-2 times per week for 10-12 weeks:  Aquatic Therapy, Neuromuscular Re-ed, Patient Education, Therapeutic Activities, and Therapeutic Exercise    Moriah Lopez, PT

## 2023-12-21 ENCOUNTER — CLINICAL SUPPORT (OUTPATIENT)
Dept: REHABILITATION | Facility: HOSPITAL | Age: 73
End: 2023-12-21
Payer: MEDICARE

## 2023-12-21 DIAGNOSIS — M25.562 CHRONIC PAIN OF LEFT KNEE: ICD-10-CM

## 2023-12-21 DIAGNOSIS — G89.29 CHRONIC PAIN OF LEFT KNEE: ICD-10-CM

## 2023-12-21 DIAGNOSIS — M54.16 LUMBAR RADICULOPATHY: Primary | ICD-10-CM

## 2023-12-21 DIAGNOSIS — M47.816 LUMBAR SPONDYLOSIS: ICD-10-CM

## 2023-12-21 PROCEDURE — 97113 AQUATIC THERAPY/EXERCISES: CPT

## 2023-12-21 NOTE — PROGRESS NOTES
[facility]   Physical Therapy Daily Treatment Note      Name: Charlotte Crowder  Clinic Number: [id number]    Therapy Diagnosis:   No diagnosis found.    Physician: Wendy Thompson MD    Visit Date: 12/21/2023    Physician Orders: Aquatic Therapy  Medical Diagnosis from Referral:   M15.8 (ICD-10-CM) - Other osteoarthritis involving multiple joints   M48.062 (ICD-10-CM) - Spinal stenosis of lumbar region with neurogenic claudication     Evaluation Date: 9/7/2023  Authorization Period Expiration: 11/2/23  Plan of Care Expiration: 2/19/24  Visit # / Visits authorized: 22/35 + Eval     Precautions: Standard and Fall    Time In: 9:30 am    Time Out: 10:30 am   Total Billable Time: 30 minutes      SUBJECTIVE     Pt reports: she is feeling good today.     She was compliant with home exercise program.    Response to previous treatment: improved strength and endurance    Functional change: improved ability to do household chores    Pain: Knee 0/10 Left Hip 2/10  Location: bilateral knee      OBJECTIVE     Re-eval 12/19/23      Treatment     Charlotte received aquatic therapeutic exercises to develop strength, endurance, ROM, flexibility, posture, and core stabilization for 60 minutes including:    FUNCTIONAL MOBILITY TRAINING x 2 laps each at beginning and 1 lap each at end of session   Walk forward/backward/lateral    STRETCHES 2 x 30 sec   Hip flexor stretch at stair    LE EX x 30  HS curl    Hip abduction/adduction with GTB  Hip flex/ext-with GTB  Squat-With GTB  Heel raise-on ladder    Seated on stool x 20  Sit to stand with GTB  Clam with GTB  LAQ with GTB    Step up w/contralateral hip flexion 8 inch step x 20 reps  Lateral step up on 8 inch step x 20 reps    Walking marches x 2 laps with Orange dumbbell x 2 laps   Tandem Walking outside of  // bars (no UE) x 2 laps    UE EX/CORE  x 25  Shoulder flex/ext TA activation with Blue Dumbbells  Shoulder horizontal abd/add TA activation with Blue  Dumbbells  Shoulder abd/add with TA activation with Blue Dumbbells    Blue kickboard push/pull     ENDURANCE  LTR x 2'  Bicycle in // bars x 3'    Patient Education and Home Exercises     Home Exercises Provided and Patient Education Provided     Education provided:   Role of aquatic therapy  Hydration post therapy    Written Home Exercises Provided: Patient instructed to cont prior HEP.     ASSESSMENT   The patient performed advanced reps to 30 for all LE exercises today with no increased pain reports and good quality of execution.     Pt prognosis is Good.     Pt will continue to benefit from skilled outpatient physical therapy to address the deficits listed in the problem list box on initial evaluation, provide pt/family education and to maximize pt's level of independence in the home and community environment.     Pt's spiritual, cultural and educational needs considered and pt agreeable to plan of care and goals.     Anticipated barriers to physical therapy: none at this time.    Goals:   Short Term Goals:4 weeks   1. Patient will be independent in HEP & progressions. MET  2. Patient will demonstrate improved Lumbar AROM to WFL. Appropriate & Ongoing  3. The patient will achieve 5 sit to stand score of 12 seconds to demonstrate improved transfers and endurance. MET    Long Term Goals: 8 weeks   1. The patient will demonstrate independence with extensive HEP. MET  2. Patient will achieve 5 sit to stand score of 10 seconds to demonstrate improved transfers & endurance. MET  3. Patent is able to demonstrate MMT 4+/5 B LE without pain reports during testing. Appropriate & Ongoing  4. Patient demonstrates pain free lumbar ROM       Previous Short Term Goals Status: 50% Met  New Short Term Goals Status: 75% Met   Long Term Goal Status: continue per initial plan of care.  Reasons for Recertification of Therapy: Continuation of aquatic therapy to meet remaining POC goals per patient necessity.    PLAN        Updated  Certification Period: 12/2/23 to 2/19/24   Recommended Treatment Plan: 1-2 times per week for 10-12 weeks:  Aquatic Therapy, Neuromuscular Re-ed, Patient Education, Therapeutic Activities, and Therapeutic Exercise    Mckayla Castellon, PT

## 2023-12-28 ENCOUNTER — CLINICAL SUPPORT (OUTPATIENT)
Dept: REHABILITATION | Facility: HOSPITAL | Age: 73
End: 2023-12-28
Payer: MEDICARE

## 2023-12-28 DIAGNOSIS — M25.562 CHRONIC PAIN OF LEFT KNEE: ICD-10-CM

## 2023-12-28 DIAGNOSIS — G89.29 CHRONIC PAIN OF LEFT KNEE: ICD-10-CM

## 2023-12-28 DIAGNOSIS — M47.816 LUMBAR SPONDYLOSIS: ICD-10-CM

## 2023-12-28 DIAGNOSIS — M54.16 LUMBAR RADICULOPATHY: Primary | ICD-10-CM

## 2023-12-28 PROCEDURE — 97113 AQUATIC THERAPY/EXERCISES: CPT

## 2023-12-28 NOTE — PROGRESS NOTES
[facility]   Physical Therapy Daily Treatment Note      Name: Charlotte Crowder  Clinic Number: [id number]    Therapy Diagnosis:   Encounter Diagnoses   Name Primary?    Lumbar radiculopathy Yes    Lumbar spondylosis     Chronic pain of left knee        Physician: Wendy Thompson MD    Visit Date: 12/28/2023    Physician Orders: Aquatic Therapy  Medical Diagnosis from Referral:   M15.8 (ICD-10-CM) - Other osteoarthritis involving multiple joints   M48.062 (ICD-10-CM) - Spinal stenosis of lumbar region with neurogenic claudication     Evaluation Date: 9/7/2023  Authorization Period Expiration: 11/2/23  Plan of Care Expiration: 2/19/24  Visit # / Visits authorized: 23/35 + Eval     Precautions: Standard and Fall    Time In: 9:20 m    Time Out: 10:40 am   Total Billable Time: 40 minutes      SUBJECTIVE     Pt reports: she was very tired from the holiday and glad she took Tuesday off and moved her appt to Friday.     She was compliant with home exercise program.    Response to previous treatment: improved strength and endurance    Functional change: improved ability to do household chores    Pain: Knee 0/10 Left Hip 2/10  Location: bilateral knee      OBJECTIVE     Re-eval 12/19/23      Treatment     Charlotte received aquatic therapeutic exercises to develop strength, endurance, ROM, flexibility, posture, and core stabilization for 70 minutes including:    FUNCTIONAL MOBILITY TRAINING x 2 laps each at beginning and 1 lap each at end of session   Walk forward/backward/lateral    STRETCHES 2 x 30 sec   Hip flexor stretch at stair    LE EX x 30  HS curl    Hip abduction/adduction with GTB  Hip flex/ext-with GTB  Squat-With GTB  Heel raise-on ladder    Seated on stool x 20  Sit to stand with GTB  Clam with GTB  LAQ with GTB    Step up w/contralateral hip flexion 8 inch step x 20 reps  Lateral step up on 8 inch step x 20 reps    Walking marches x 2 laps with Orange dumbbell x 2 laps   Tandem  Walking outside of  // bars (no UE) x 2 laps    UE EX/CORE  x 25  Shoulder flex/ext TA activation with Blue Dumbbells  Shoulder horizontal abd/add TA activation with Blue Dumbbells  Shoulder abd/add with TA activation with Blue Dumbbells    Blue kickboard push/pull     ENDURANCE  LTR x 2'  Bicycle in // bars x 3'    Patient Education and Home Exercises     Home Exercises Provided and Patient Education Provided     Education provided:   Role of aquatic therapy  Hydration post therapy    Written Home Exercises Provided: Patient instructed to cont prior HEP.     ASSESSMENT   The patient continues to progress well with therapy with reduced VC required for exercise transitions and execution.    Pt prognosis is Good.     Pt will continue to benefit from skilled outpatient physical therapy to address the deficits listed in the problem list box on initial evaluation, provide pt/family education and to maximize pt's level of independence in the home and community environment.     Pt's spiritual, cultural and educational needs considered and pt agreeable to plan of care and goals.     Anticipated barriers to physical therapy: none at this time.    Goals:   Short Term Goals:4 weeks   1. Patient will be independent in HEP & progressions. MET  2. Patient will demonstrate improved Lumbar AROM to WFL. Appropriate & Ongoing  3. The patient will achieve 5 sit to stand score of 12 seconds to demonstrate improved transfers and endurance. MET    Long Term Goals: 8 weeks   1. The patient will demonstrate independence with extensive HEP. MET  2. Patient will achieve 5 sit to stand score of 10 seconds to demonstrate improved transfers & endurance. MET  3. Patent is able to demonstrate MMT 4+/5 B LE without pain reports during testing. Appropriate & Ongoing  4. Patient demonstrates pain free lumbar ROM       Previous Short Term Goals Status: 50% Met  New Short Term Goals Status: 75% Met   Long Term Goal Status: continue per initial plan of  care.  Reasons for Recertification of Therapy: Continuation of aquatic therapy to meet remaining POC goals per patient necessity.    PLAN        Updated Certification Period: 12/2/23 to 2/19/24   Recommended Treatment Plan: 1-2 times per week for 10-12 weeks:  Aquatic Therapy, Neuromuscular Re-ed, Patient Education, Therapeutic Activities, and Therapeutic Exercise    Mckayla Castellon, PT

## 2023-12-28 NOTE — PROGRESS NOTES
[facility]   Physical Therapy Daily Treatment Note      Name: Charlotte Crowder  Clinic Number: [id number]    Therapy Diagnosis:   Encounter Diagnoses   Name Primary?    Lumbar radiculopathy Yes    Lumbar spondylosis     Chronic pain of left knee          Physician: Wendy Thompson MD    Visit Date: 12/29/2023    Physician Orders: Aquatic Therapy  Medical Diagnosis from Referral:   M15.8 (ICD-10-CM) - Other osteoarthritis involving multiple joints   M48.062 (ICD-10-CM) - Spinal stenosis of lumbar region with neurogenic claudication     Evaluation Date: 9/7/2023  Authorization Period Expiration: 11/2/23  Plan of Care Expiration: 2/19/24  Visit # / Visits authorized: 24/35 + Eval     Precautions: Standard and Fall    Time In: 9:00 am    Time Out: 9:55 am   Total Billable Time: 55 minutes      SUBJECTIVE     Pt reports: feeling her usual ache in her hips.    She was compliant with home exercise program.    Response to previous treatment: improved strength and endurance    Functional change: improved ability to do household chores    Pain: Knee 0/10 Left Hip 2/10  Location: bilateral knee      OBJECTIVE     Re-eval 12/19/23      Treatment     Charlotte received aquatic therapeutic exercises to develop strength, endurance, ROM, flexibility, posture, and core stabilization for 55 minutes including:    FUNCTIONAL MOBILITY TRAINING x 2 laps each at beginning and 1 lap each at end of session   Walk forward/backward/lateral    STRETCHES 2 x 30 sec   Hip flexor stretch at stair    LE EX x 30  HS curl  Hip abduction/adduction with GTB  Hip flex/ext-with GTB  Squat-With GTB  Heel raise-on ladder    Seated on stool x 20  Sit to stand with GTB  Clam with GTB  LAQ with GTB    Step up w/contralateral hip flexion 8 inch step x 20 reps  Lateral step up on 8 inch step x 20 reps    Walking marches x 2 laps with Orange dumbbell x 2 laps   Tandem Walking outside of  // bars (no UE) x 2 laps    UE EX/CORE  x  25  Shoulder flex/ext TA activation with Blue Dumbbells  Shoulder horizontal abd/add TA activation with Blue Dumbbells  Shoulder abd/add with TA activation with Blue Dumbbells    Blue kickboard push/pull     ENDURANCE  LTR x 2'  Bicycle in // bars x 3'    Patient Education and Home Exercises     Home Exercises Provided and Patient Education Provided     Education provided:   Role of aquatic therapy  Hydration post therapy    Written Home Exercises Provided: Patient instructed to cont prior HEP.     ASSESSMENT     Charlotte demonstrates good tolerance to treatment session today, seen with no c/o increase in s/s. Min VC's for set up and sequencing throughout session. Plan to continue to progress per pt tolerance.     Pt prognosis is Good.     Pt will continue to benefit from skilled outpatient physical therapy to address the deficits listed in the problem list box on initial evaluation, provide pt/family education and to maximize pt's level of independence in the home and community environment.     Pt's spiritual, cultural and educational needs considered and pt agreeable to plan of care and goals.     Anticipated barriers to physical therapy: none at this time.    Goals:   Short Term Goals:4 weeks   1. Patient will be independent in HEP & progressions. MET  2. Patient will demonstrate improved Lumbar AROM to WFL. Appropriate & Ongoing  3. The patient will achieve 5 sit to stand score of 12 seconds to demonstrate improved transfers and endurance. MET    Long Term Goals: 8 weeks   1. The patient will demonstrate independence with extensive HEP. MET  2. Patient will achieve 5 sit to stand score of 10 seconds to demonstrate improved transfers & endurance. MET  3. Patent is able to demonstrate MMT 4+/5 B LE without pain reports during testing. Appropriate & Ongoing  4. Patient demonstrates pain free lumbar ROM       Previous Short Term Goals Status: 50% Met  New Short Term Goals Status: 75% Met   Long Term Goal Status:  continue per initial plan of care.  Reasons for Recertification of Therapy: Continuation of aquatic therapy to meet remaining POC goals per patient necessity.    PLAN        Updated Certification Period: 12/2/23 to 2/19/24   Recommended Treatment Plan: 1-2 times per week for 10-12 weeks:  Aquatic Therapy, Neuromuscular Re-ed, Patient Education, Therapeutic Activities, and Therapeutic Exercise    Natalia Carranza, PT

## 2023-12-29 ENCOUNTER — CLINICAL SUPPORT (OUTPATIENT)
Dept: REHABILITATION | Facility: HOSPITAL | Age: 73
End: 2023-12-29
Payer: MEDICARE

## 2023-12-29 DIAGNOSIS — G89.29 CHRONIC PAIN OF LEFT KNEE: ICD-10-CM

## 2023-12-29 DIAGNOSIS — M47.816 LUMBAR SPONDYLOSIS: ICD-10-CM

## 2023-12-29 DIAGNOSIS — M25.562 CHRONIC PAIN OF LEFT KNEE: ICD-10-CM

## 2023-12-29 DIAGNOSIS — M54.16 LUMBAR RADICULOPATHY: Primary | ICD-10-CM

## 2023-12-29 PROCEDURE — 97113 AQUATIC THERAPY/EXERCISES: CPT

## 2024-01-04 ENCOUNTER — CLINICAL SUPPORT (OUTPATIENT)
Dept: REHABILITATION | Facility: HOSPITAL | Age: 74
End: 2024-01-04
Payer: MEDICARE

## 2024-01-04 DIAGNOSIS — G89.29 CHRONIC PAIN OF LEFT KNEE: ICD-10-CM

## 2024-01-04 DIAGNOSIS — M25.562 CHRONIC PAIN OF LEFT KNEE: ICD-10-CM

## 2024-01-04 DIAGNOSIS — M54.16 LUMBAR RADICULOPATHY: Primary | ICD-10-CM

## 2024-01-04 DIAGNOSIS — M47.816 LUMBAR SPONDYLOSIS: ICD-10-CM

## 2024-01-04 PROCEDURE — 97113 AQUATIC THERAPY/EXERCISES: CPT

## 2024-01-04 NOTE — PROGRESS NOTES
[facility]   Physical Therapy Daily Treatment Note      Name: Charlotte Crowder  Clinic Number: [id number]    Therapy Diagnosis:   Encounter Diagnoses   Name Primary?    Lumbar radiculopathy Yes    Lumbar spondylosis     Chronic pain of left knee      Physician: Wendy Thompson MD    Visit Date: 1/4/2024    Physician Orders: Aquatic Therapy  Medical Diagnosis from Referral:   M15.8 (ICD-10-CM) - Other osteoarthritis involving multiple joints   M48.062 (ICD-10-CM) - Spinal stenosis of lumbar region with neurogenic claudication     Evaluation Date: 9/7/2023  Authorization Period Expiration: 11/2/23  Plan of Care Expiration: 2/19/24  Visit # / Visits authorized: 1/20  25 visits total    Precautions: Standard and Fall    Time In: 9:25 am  Time Out: 10:30 am   Total Billable Time: 60 minutes      SUBJECTIVE     Pt reports: her left wrist started bothering her Friday.     She was compliant with home exercise program.    Response to previous treatment: improved strength and endurance    Functional change: improved ability to do household chores    Pain: Knee 0/10 Left Hip 2/10  Location: bilateral knee      OBJECTIVE     Re-eval 12/19/23    Treatment     Charlotte received aquatic therapeutic exercises to develop strength, endurance, ROM, flexibility, posture, and core stabilization for 55 minutes including:    FUNCTIONAL MOBILITY TRAINING x 2 laps each at beginning and 1 lap each at end of session   Walk forward/backward/lateral    STRETCHES 2 x 30 sec   Hip flexor stretch at stair    LE EX x 30  Hip abduction/adduction with GTB  Hip flex/ext-with GTB  Squat-With GTB  Heel raise-on ladder    Seated on stool x 20  Sit to stand with GTB  Clam with GTB  LAQ with GTB    Side stepping x 2 laps with GTB  Monster walks x 2 laps with GTB    Step up w/contralateral hip flexion 8 inch step x 20 reps  Lateral step up on 8 inch step x 20 reps    Walking marches x 2 laps with Orange dumbbell x 2 laps    Tandem Walking outside of  // bars (no UE) x 2 laps    UE EX/CORE  x 25  Shoulder flex/ext TA activation with Blue Dumbbells  Shoulder horizontal abd/add TA activation with Blue Dumbbells  Shoulder abd/add with TA activation with Blue Dumbbells    Blue kickboard push/pull     ENDURANCE  LTR x 2'  Bicycle in // bars x 3'    Patient Education and Home Exercises     Home Exercises Provided and Patient Education Provided     Education provided:   Role of aquatic therapy  Hydration post therapy    Written Home Exercises Provided: Patient instructed to cont prior HEP.     ASSESSMENT     The patient reported today felt like a good work out. She performed the advanced resistance side stepping and monster walks with no increased pain and good quality of execution. She reported with moving her wrist around in the water it felt better than when she got in the pool.     Pt prognosis is Good.     Pt will continue to benefit from skilled outpatient physical therapy to address the deficits listed in the problem list box on initial evaluation, provide pt/family education and to maximize pt's level of independence in the home and community environment.     Pt's spiritual, cultural and educational needs considered and pt agreeable to plan of care and goals.     Anticipated barriers to physical therapy: none at this time.    Goals:   Short Term Goals:4 weeks   1. Patient will be independent in HEP & progressions. MET  2. Patient will demonstrate improved Lumbar AROM to WFL. Appropriate & Ongoing  3. The patient will achieve 5 sit to stand score of 12 seconds to demonstrate improved transfers and endurance. MET    Long Term Goals: 8 weeks   1. The patient will demonstrate independence with extensive HEP. MET  2. Patient will achieve 5 sit to stand score of 10 seconds to demonstrate improved transfers & endurance. MET  3. Patent is able to demonstrate MMT 4+/5 B LE without pain reports during testing. Appropriate & Ongoing  4. Patient  demonstrates pain free lumbar ROM       Previous Short Term Goals Status: 50% Met  New Short Term Goals Status: 75% Met   Long Term Goal Status: continue per initial plan of care.  Reasons for Recertification of Therapy: Continuation of aquatic therapy to meet remaining POC goals per patient necessity.    PLAN        Updated Certification Period: 12/2/23 to 2/19/24   Recommended Treatment Plan: 1-2 times per week for 10-12 weeks:  Aquatic Therapy, Neuromuscular Re-ed, Patient Education, Therapeutic Activities, and Therapeutic Exercise    Mckayla Castellon, PT

## 2024-01-09 ENCOUNTER — CLINICAL SUPPORT (OUTPATIENT)
Dept: REHABILITATION | Facility: HOSPITAL | Age: 74
End: 2024-01-09
Payer: MEDICARE

## 2024-01-09 DIAGNOSIS — M54.16 LUMBAR RADICULOPATHY: Primary | ICD-10-CM

## 2024-01-09 DIAGNOSIS — G89.29 CHRONIC PAIN OF LEFT KNEE: ICD-10-CM

## 2024-01-09 DIAGNOSIS — M25.562 CHRONIC PAIN OF LEFT KNEE: ICD-10-CM

## 2024-01-09 DIAGNOSIS — M47.816 LUMBAR SPONDYLOSIS: ICD-10-CM

## 2024-01-09 PROCEDURE — 97113 AQUATIC THERAPY/EXERCISES: CPT

## 2024-01-09 NOTE — PROGRESS NOTES
[facility]   Physical Therapy Daily Treatment Note      Name: Charlotte Crowder  Clinic Number: [id number]    Therapy Diagnosis:   No diagnosis found.    Physician: Wendy Thompson MD    Visit Date: 1/9/2024    Physician Orders: Aquatic Therapy  Medical Diagnosis from Referral:   M15.8 (ICD-10-CM) - Other osteoarthritis involving multiple joints   M48.062 (ICD-10-CM) - Spinal stenosis of lumbar region with neurogenic claudication     Evaluation Date: 9/7/2023  Authorization Period Expiration: 11/2/23  Plan of Care Expiration: 2/19/24  Visit # / Visits authorized: 2/20  25 visits total    Precautions: Standard and Fall    Time In: 2:30 PM  Time Out: 3:34 PM  Total Billable Time: 30 minutes      SUBJECTIVE     Pt reports: her left wrist started bothering her Friday.     She was compliant with home exercise program.    Response to previous treatment: improved strength and endurance    Functional change: improved ability to do household chores    Pain: Knee 0/10 Left Hip 2/10  Location: bilateral knee      OBJECTIVE     Re-eval 12/19/23    Treatment     Charlotte received aquatic therapeutic exercises to develop strength, endurance, ROM, flexibility, posture, and core stabilization for 55 minutes including:    FUNCTIONAL MOBILITY TRAINING x 2 laps each at beginning and 1 lap each at end of session   Walk forward/backward/lateral    STRETCHES 2 x 30 sec   Hip flexor stretch at stair    LE EX x 30  Hip abduction/adduction with GTB  Hip flex/ext-with GTB  Squat-With GTB  Heel raise-on ladder    Seated on stool x 20  Sit to stand with GTB  Clam with GTB  LAQ with GTB    Side stepping x 2 laps with GTB  Monster walks x 2 laps with GTB    Step up w/contralateral hip flexion 8 inch step x 20 reps  Lateral step up on 8 inch step x 20 reps    Walking marches x 2 laps with Orange dumbbell x 2 laps   Tandem Walking outside of  // bars (no UE) x 2 laps    UE EX/CORE  x 25  Shoulder flex/ext TA  activation with Blue Dumbbells  Shoulder horizontal abd/add TA activation with Blue Dumbbells  Shoulder abd/add with TA activation with Blue Dumbbells    Blue kickboard push/pull     ENDURANCE  LTR x 2'  Bicycle in // bars x 3'    Patient Education and Home Exercises     Home Exercises Provided and Patient Education Provided     Education provided:   Role of aquatic therapy  Hydration post therapy    Written Home Exercises Provided: Patient instructed to cont prior HEP.     ASSESSMENT     Patient continues to show improvement in gait stability and endurance with report of walking up to an hour without significant increase in pain. Patient requiring cues to maintain squat with monster walks, along with knee and ankle alignment. Patient will benefit of incorporation of ankle weights at next session to increase weight bearing tolerance and begin transition to more land based exercise. Progress patient as tolerated.    Pt prognosis is Good.     Pt will continue to benefit from skilled outpatient physical therapy to address the deficits listed in the problem list box on initial evaluation, provide pt/family education and to maximize pt's level of independence in the home and community environment.     Pt's spiritual, cultural and educational needs considered and pt agreeable to plan of care and goals.     Anticipated barriers to physical therapy: none at this time.    Goals:   Short Term Goals:4 weeks   1. Patient will be independent in HEP & progressions. MET  2. Patient will demonstrate improved Lumbar AROM to WFL. Appropriate & Ongoing  3. The patient will achieve 5 sit to stand score of 12 seconds to demonstrate improved transfers and endurance. MET    Long Term Goals: 8 weeks   1. The patient will demonstrate independence with extensive HEP. MET  2. Patient will achieve 5 sit to stand score of 10 seconds to demonstrate improved transfers & endurance. MET  3. Patent is able to demonstrate MMT 4+/5 B LE without pain  reports during testing. Appropriate & Ongoing  4. Patient demonstrates pain free lumbar ROM       Previous Short Term Goals Status: 50% Met  New Short Term Goals Status: 75% Met   Long Term Goal Status: continue per initial plan of care.  Reasons for Recertification of Therapy: Continuation of aquatic therapy to meet remaining POC goals per patient necessity.    PLAN        Updated Certification Period: 12/2/23 to 2/19/24   Recommended Treatment Plan: 1-2 times per week for 10-12 weeks:  Aquatic Therapy, Neuromuscular Re-ed, Patient Education, Therapeutic Activities, and Therapeutic Exercise    Moriah Lopez, PT

## 2024-01-11 ENCOUNTER — OFFICE VISIT (OUTPATIENT)
Dept: BARIATRICS | Facility: CLINIC | Age: 74
End: 2024-01-11
Payer: MEDICARE

## 2024-01-11 ENCOUNTER — CLINICAL SUPPORT (OUTPATIENT)
Dept: REHABILITATION | Facility: HOSPITAL | Age: 74
End: 2024-01-11
Payer: MEDICARE

## 2024-01-11 VITALS
HEART RATE: 90 BPM | BODY MASS INDEX: 40.4 KG/M2 | DIASTOLIC BLOOD PRESSURE: 90 MMHG | OXYGEN SATURATION: 99 % | WEIGHT: 250.31 LBS | SYSTOLIC BLOOD PRESSURE: 140 MMHG

## 2024-01-11 DIAGNOSIS — I42.8 NON-ISCHEMIC CARDIOMYOPATHY: ICD-10-CM

## 2024-01-11 DIAGNOSIS — Z71.3 ENCOUNTER FOR WEIGHT LOSS COUNSELING: ICD-10-CM

## 2024-01-11 DIAGNOSIS — M54.16 LUMBAR RADICULOPATHY: Primary | ICD-10-CM

## 2024-01-11 DIAGNOSIS — M47.816 LUMBAR SPONDYLOSIS: ICD-10-CM

## 2024-01-11 DIAGNOSIS — G89.29 CHRONIC PAIN OF LEFT KNEE: ICD-10-CM

## 2024-01-11 DIAGNOSIS — E78.5 DYSLIPIDEMIA: Chronic | ICD-10-CM

## 2024-01-11 DIAGNOSIS — Z00.00 ENCOUNTER FOR MEDICARE ANNUAL WELLNESS EXAM: ICD-10-CM

## 2024-01-11 DIAGNOSIS — M25.562 CHRONIC PAIN OF LEFT KNEE: ICD-10-CM

## 2024-01-11 DIAGNOSIS — G47.33 OSA (OBSTRUCTIVE SLEEP APNEA): ICD-10-CM

## 2024-01-11 DIAGNOSIS — E66.01 CLASS 3 SEVERE OBESITY DUE TO EXCESS CALORIES WITH SERIOUS COMORBIDITY AND BODY MASS INDEX (BMI) OF 40.0 TO 44.9 IN ADULT: Primary | ICD-10-CM

## 2024-01-11 DIAGNOSIS — I10 PRIMARY HYPERTENSION: ICD-10-CM

## 2024-01-11 PROCEDURE — 99213 OFFICE O/P EST LOW 20 MIN: CPT | Mod: S$GLB,,, | Performed by: STUDENT IN AN ORGANIZED HEALTH CARE EDUCATION/TRAINING PROGRAM

## 2024-01-11 PROCEDURE — 97113 AQUATIC THERAPY/EXERCISES: CPT

## 2024-01-11 PROCEDURE — 99999 PR PBB SHADOW E&M-EST. PATIENT-LVL III: CPT | Mod: PBBFAC,,, | Performed by: STUDENT IN AN ORGANIZED HEALTH CARE EDUCATION/TRAINING PROGRAM

## 2024-01-11 RX ORDER — SEMAGLUTIDE 0.68 MG/ML
0.5 INJECTION, SOLUTION SUBCUTANEOUS
Qty: 3 ML | Refills: 2 | Status: SHIPPED | OUTPATIENT
Start: 2024-01-11

## 2024-01-11 NOTE — PROGRESS NOTES
[facility]   Physical Therapy Daily Treatment Note      Name: Charlotte Crowder  Clinic Number: [id number]    Therapy Diagnosis:   No diagnosis found.    Physician: Wendy Thompson MD    Visit Date: 1/11/2024    Physician Orders: Aquatic Therapy  Medical Diagnosis from Referral:   M15.8 (ICD-10-CM) - Other osteoarthritis involving multiple joints   M48.062 (ICD-10-CM) - Spinal stenosis of lumbar region with neurogenic claudication     Evaluation Date: 9/7/2023  Authorization Period Expiration: 11/2/23  Plan of Care Expiration: 2/19/24  Visit # / Visits authorized: 3/20  25 visits total    Precautions: Standard and Fall    Time In: 9:30 AM  Time Out: 10:32 AM  Total Billable Time: 30 minutes      SUBJECTIVE     Pt reports: her right knee is slightly painful above the knee cap, but otherwise her pain is low.    She was compliant with home exercise program.    Response to previous treatment: improved strength and endurance    Functional change: improved ability to do household chores    Pain: Knee 0/10 Left Hip 2/10  Location: bilateral knee      OBJECTIVE     Re-eval 12/19/23    Treatment     Charlotte received aquatic therapeutic exercises to develop strength, endurance, ROM, flexibility, posture, and core stabilization for 60 minutes including:    FUNCTIONAL MOBILITY TRAINING x 2 laps each at beginning and 1 lap each at end of session   Walk forward/backward/lateral    STRETCHES 2 x 30 sec   Hip flexor stretch at stair    LE EX x 30  Hip abduction/adduction with GTB  Hip flex/ext-with GTB  Squat-With GTB  Heel raise-on ladder    Seated on stool x 20  Sit to stand with GTB  Clam with GTB  LAQ with GTB    Side stepping x 2 laps with GTB  Monster walks x 2 laps with GTB    Step up w/contralateral hip flexion 8 inch step x 20 reps  Lateral step up on 8 inch step x 20 reps    Walking marches x 2 laps with Orange dumbbell x 2 laps   Tandem Walking outside of  // bars (no UE) x 2 laps    UE  EX/CORE  x 25  Shoulder flex/ext TA activation with Blue Dumbbells  Shoulder horizontal abd/add TA activation with Blue Dumbbells  Shoulder abd/add with TA activation with Blue Dumbbells    Blue kickboard push/pull     ENDURANCE  LTR x 2'  Bicycle in // bars x 3'    Patient Education and Home Exercises     Home Exercises Provided and Patient Education Provided     Education provided:   Role of aquatic therapy  Hydration post therapy    Written Home Exercises Provided: Patient instructed to cont prior HEP.     ASSESSMENT     Patient presents reporting slight increase in anterior right knee pain. During upper extremity/core exercise patient displays difficulty inhibiting lumbar extensors. Frequent verbal cues improved transverse abdominus recruitment during squatted exercises. Patient will benefit of incorporation of ankle weights at next session to increase weight bearing tolerance and begin transition to more land based exercise. Progress patient as tolerated.    Pt prognosis is Good.     Pt will continue to benefit from skilled outpatient physical therapy to address the deficits listed in the problem list box on initial evaluation, provide pt/family education and to maximize pt's level of independence in the home and community environment.     Pt's spiritual, cultural and educational needs considered and pt agreeable to plan of care and goals.     Anticipated barriers to physical therapy: none at this time.    Goals:   Short Term Goals:4 weeks   1. Patient will be independent in HEP & progressions. MET  2. Patient will demonstrate improved Lumbar AROM to WFL. Appropriate & Ongoing  3. The patient will achieve 5 sit to stand score of 12 seconds to demonstrate improved transfers and endurance. MET    Long Term Goals: 8 weeks   1. The patient will demonstrate independence with extensive HEP. MET  2. Patient will achieve 5 sit to stand score of 10 seconds to demonstrate improved transfers & endurance. MET  3. Patent is  able to demonstrate MMT 4+/5 B LE without pain reports during testing. Appropriate & Ongoing  4. Patient demonstrates pain free lumbar ROM       Previous Short Term Goals Status: 50% Met  New Short Term Goals Status: 75% Met   Long Term Goal Status: continue per initial plan of care.  Reasons for Recertification of Therapy: Continuation of aquatic therapy to meet remaining POC goals per patient necessity.    PLAN        Updated Certification Period: 12/2/23 to 2/19/24   Recommended Treatment Plan: 1-2 times per week for 10-12 weeks:  Aquatic Therapy, Neuromuscular Re-ed, Patient Education, Therapeutic Activities, and Therapeutic Exercise    Moriah Lopez, PT

## 2024-01-11 NOTE — PROGRESS NOTES
Subjective     Patient ID: Charlotte Crowder is a 73 y.o. female.    Chief Complaint: Follow-up, Obesity, and Weight Check    Patient presents for treatment of obesity.   Referred by cardiology    170 lbs about 20 years ago  200 lbs about 10 years ago    Co-morbidities  HTN  HLD  Non-ischemic cardiomyopathy  KIMMIE  CKD 3a  Glaucoma    Negative for thyroid cancer    Current Physical Activity  Water aerobics  Just joined a gym    Current Eating Habits  Breakfast - coffee with sugar and cream  Lunch - eggs, head, grits; egg and rice  Dinner - red beans and rice; spaghetti and meatballs; stewed meats, rice and gravy, brossoli, cauliflower, grees, cabbage  Snacks - chips, homemade smoothie with Greek yogurt  Beverages - soft drinks, water    Medical Weight Loss  10/12/2023: 258.7 lbs, BMI 41.8, BFP 51.6%, .5 lbs, SMM 69 lbs, BMR 1596 kcal  1/11/2024: 250.3 lbs, BMI 40.4, BFP 50.2%, .8 lbs, SMM 69 lbs, BMR 1591 kcal      Review of Systems   Constitutional:  Negative for chills and fever.   Respiratory:  Negative for shortness of breath.    Cardiovascular:  Negative for chest pain.   Gastrointestinal:  Negative for abdominal pain, nausea and vomiting.   Neurological:  Negative for dizziness and light-headedness.   Psychiatric/Behavioral:  The patient is not nervous/anxious.           Objective    Latest Reference Range & Units 08/10/22 08:41 11/07/22 15:10 08/24/23 09:34   WBC 3.90 - 12.70 K/uL 3.46 (L)  3.66 (L)   RBC 4.00 - 5.40 M/uL 4.49  4.48   Hemoglobin 12.0 - 16.0 g/dL 12.9  12.7   Hematocrit 37.0 - 48.5 % 39.9  39.1   MCV 82 - 98 fL 89  87   MCH 27.0 - 31.0 pg 28.7  28.3   MCHC 32.0 - 36.0 g/dL 32.3  32.5   RDW 11.5 - 14.5 % 13.3  13.4   Platelet Count 150 - 450 K/uL 201  194   MPV 9.2 - 12.9 fL 9.3  8.9 (L)   Gran % 38.0 - 73.0 % 38.2  39.7   Lymph % 18.0 - 48.0 % 49.7 (H)  47.0   Mono % 4.0 - 15.0 % 7.5  7.9   Eosinophil % 0.0 - 8.0 % 4.0  4.6   Basophil % 0.0 - 1.9 % 0.6  0.5   Immature Granulocytes  0.0 - 0.5 % 0.0  0.3   Gran # (ANC) 1.8 - 7.7 K/uL 1.3 (L)  1.5 (L)   Lymph # 1.0 - 4.8 K/uL 1.7  1.7   Mono # 0.3 - 1.0 K/uL 0.3  0.3   Eos # 0.0 - 0.5 K/uL 0.1  0.2   Baso # 0.00 - 0.20 K/uL 0.02  0.02   Immature Grans (Abs) 0.00 - 0.04 K/uL 0.00  0.01   nRBC 0 /100 WBC 0  0   Differential Method  Automated  Automated   Sodium 136 - 145 mmol/L 143 141 143   Potassium 3.5 - 5.1 mmol/L 4.7 4.5 4.3   Chloride 95 - 110 mmol/L 111 (H) 110 111 (H)   CO2 23 - 29 mmol/L 24 24 25   Anion Gap 8 - 16 mmol/L 8 7 (L) 7 (L)   BUN 8 - 23 mg/dL 14 18 13   Creatinine 0.5 - 1.4 mg/dL 1.0 1.1 1.1   eGFR >60 mL/min/1.73 m^2 >60 54 ! 53 !   Glucose 70 - 110 mg/dL 101 108 94   Calcium 8.7 - 10.5 mg/dL 10.5 10.4 10.3   Calcium Level Ionized 1.06 - 1.42 mmol/L  1.45 (H)    ALP 55 - 135 U/L 138 (H) 150 (H) 125   PROTEIN TOTAL 6.0 - 8.4 g/dL 6.7 7.5 7.1   Albumin 3.5 - 5.2 g/dL 3.8 4.0 3.8   BILIRUBIN TOTAL 0.1 - 1.0 mg/dL 0.6 0.7 0.6   AST 10 - 40 U/L 13 18 19   ALT 10 - 44 U/L 15 18 14   Cholesterol Total 120 - 199 mg/dL 151  205 (H)   HDL 40 - 75 mg/dL 41  41   HDL/Cholesterol Ratio 20.0 - 50.0 % 27.2  20.0   LDL Cholesterol External 63.0 - 159.0 mg/dL 83.4  123.6   Non-HDL Cholesterol mg/dL 110  164   Total Cholesterol/HDL Ratio 2.0 - 5.0  3.7  5.0   Triglycerides 30 - 150 mg/dL 133  202 (H)   Vit D, 25-Hydroxy 30 - 96 ng/mL  26 (L) 32   Hemoglobin A1C External 4.0 - 5.6 % 5.1  5.1   Estimated Avg Glucose 68 - 131 mg/dL 100  100   TSH 0.400 - 4.000 uIU/mL 1.541  1.848   PTH 9.0 - 77.0 pg/mL  471.5 (H)    (L): Data is abnormally low  (H): Data is abnormally high  !: Data is abnormal    Vitals:    01/11/24 1348   BP: (!) 140/90   Pulse: 90         Physical Exam  Vitals reviewed.   Constitutional:       General: She is not in acute distress.     Appearance: Normal appearance. She is obese. She is not ill-appearing, toxic-appearing or diaphoretic.   HENT:      Head: Normocephalic and atraumatic.   Cardiovascular:      Rate and Rhythm:  Normal rate.   Pulmonary:      Effort: Pulmonary effort is normal. No respiratory distress.   Skin:     General: Skin is warm and dry.   Neurological:      Mental Status: She is alert and oriented to person, place, and time.            Assessment and Plan     1. Class 3 severe obesity due to excess calories with serious comorbidity and body mass index (BMI) of 40.0 to 44.9 in adult  -     semaglutide (OZEMPIC) 0.25 mg or 0.5 mg (2 mg/3 mL) pen injector; Inject 0.5 mg into the skin every 7 days.  Dispense: 3 mL; Refill: 2    2. Severe KIMMIE (obstructive sleep apnea)  -     semaglutide (OZEMPIC) 0.25 mg or 0.5 mg (2 mg/3 mL) pen injector; Inject 0.5 mg into the skin every 7 days.  Dispense: 3 mL; Refill: 2    3. Primary hypertension  -     semaglutide (OZEMPIC) 0.25 mg or 0.5 mg (2 mg/3 mL) pen injector; Inject 0.5 mg into the skin every 7 days.  Dispense: 3 mL; Refill: 2    4. Dyslipidemia  -     semaglutide (OZEMPIC) 0.25 mg or 0.5 mg (2 mg/3 mL) pen injector; Inject 0.5 mg into the skin every 7 days.  Dispense: 3 mL; Refill: 2    5. Non-ischemic cardiomyopathy  Overview:  mild    Orders:  -     semaglutide (OZEMPIC) 0.25 mg or 0.5 mg (2 mg/3 mL) pen injector; Inject 0.5 mg into the skin every 7 days.  Dispense: 3 mL; Refill: 2    6. Encounter for weight loss counseling          - Log all food and beverage intake with a daily calorie goal of 1200 calories per day    - Low to moderate intensity aerobic exercise for 30 minutes 3-5x/week

## 2024-01-15 DIAGNOSIS — J30.2 OTHER SEASONAL ALLERGIC RHINITIS: ICD-10-CM

## 2024-01-15 NOTE — TELEPHONE ENCOUNTER
No care due was identified.  [facility] Embedded Care Due Messages. Reference number: [id number].   1/15/2024 12:26:28 AM CST

## 2024-01-16 RX ORDER — FLUTICASONE PROPIONATE 50 MCG
2 SPRAY, SUSPENSION (ML) NASAL
Qty: 48 ML | Refills: 2 | Status: SHIPPED | OUTPATIENT
Start: 2024-01-16

## 2024-01-16 NOTE — TELEPHONE ENCOUNTER
Charlotte Crowder  is requesting a refill authorization.  Brief Assessment and Rationale for Refill:  Approve     Medication Therapy Plan:         Comments:     Note composed:3:58 AM 01/16/2024

## 2024-01-18 ENCOUNTER — CLINICAL SUPPORT (OUTPATIENT)
Dept: REHABILITATION | Facility: HOSPITAL | Age: 74
End: 2024-01-18
Payer: MEDICARE

## 2024-01-18 DIAGNOSIS — G89.29 CHRONIC PAIN OF LEFT KNEE: ICD-10-CM

## 2024-01-18 DIAGNOSIS — M54.16 LUMBAR RADICULOPATHY: Primary | ICD-10-CM

## 2024-01-18 DIAGNOSIS — M25.562 CHRONIC PAIN OF LEFT KNEE: ICD-10-CM

## 2024-01-18 DIAGNOSIS — M47.816 LUMBAR SPONDYLOSIS: ICD-10-CM

## 2024-01-18 PROCEDURE — 97113 AQUATIC THERAPY/EXERCISES: CPT

## 2024-01-18 NOTE — PROGRESS NOTES
[facility]   Physical Therapy Daily Treatment Note      Name: Charlotte Crowder  Clinic Number: [id number]    Therapy Diagnosis:   Encounter Diagnoses   Name Primary?    Lumbar radiculopathy Yes    Lumbar spondylosis     Chronic pain of left knee        Physician: Wendy Thompson MD    Visit Date: 1/18/2024    Physician Orders: Aquatic Therapy  Medical Diagnosis from Referral:   M15.8 (ICD-10-CM) - Other osteoarthritis involving multiple joints   M48.062 (ICD-10-CM) - Spinal stenosis of lumbar region with neurogenic claudication     Evaluation Date: 9/7/2023  Authorization Period Expiration: 11/2/23  Plan of Care Expiration: 2/19/24  Visit # / Visits authorized: 4/20  28 visits total    Precautions: Standard and Fall    Time In: 9:30 am   Time Out: 10:30 am   Total Billable Time: 30 minutes      SUBJECTIVE     Pt reports:  she is feeling great today.     She was compliant with home exercise program.    Response to previous treatment: improved strength and endurance    Functional change: improved ability to do household chores    Pain: Knee 0/10 Left Hip 2/10  Location: bilateral knee      OBJECTIVE     Re-eval 12/19/23    Treatment     Charlotte received aquatic therapeutic exercises to develop strength, endurance, ROM, flexibility, posture, and core stabilization for 60 minutes including:    FUNCTIONAL MOBILITY TRAINING x 2 laps each at beginning and 1 lap each at end of session   Walk forward/backward/lateral    STRETCHES 2 x 30 sec   Hip flexor stretch at stair    LE EX x 30  Hip abduction/adduction with GTB  Hip flex/ext-with GTB  Squat-with GTB  Heel raise-on ladder    Seated on stool x 20  Sit to stand with GTB  Clam with GTB  LAQ with GTB    Side stepping x 2 laps with GTB  Monster walks x 2 laps with GTB    Step up w/contralateral hip flexion 8 inch step x 20 reps  Lateral step up on 8 inch step x 20 reps    Walking marches x 2 laps with Orange dumbbell x 2 laps   Tandem Walking  outside of  // bars (no UE) x 2 laps    UE EX/CORE  x 25  Shoulder flex/ext TA activation with Blue Dumbbells  Shoulder horizontal abd/add TA activation with Blue Dumbbells  Shoulder abd/add with TA activation with Blue Dumbbells    Blue kickboard push/pull     ENDURANCE  LTR x 2'  Bicycle in // bars x 3'    Patient Education and Home Exercises     Home Exercises Provided and Patient Education Provided     Education provided:   Role of aquatic therapy  Hydration post therapy    Written Home Exercises Provided: Patient instructed to cont prior HEP.     ASSESSMENT     The patient performed all exercises well today with good endurance and no increase in pain levels.  She required some minimal cueing required for monster walks to perform with appropriate technique.     Pt prognosis is Good.     Pt will continue to benefit from skilled outpatient physical therapy to address the deficits listed in the problem list box on initial evaluation, provide pt/family education and to maximize pt's level of independence in the home and community environment.     Pt's spiritual, cultural and educational needs considered and pt agreeable to plan of care and goals.     Anticipated barriers to physical therapy: none at this time.    Goals:   Short Term Goals:4 weeks   1. Patient will be independent in HEP & progressions. MET  2. Patient will demonstrate improved Lumbar AROM to WFL. Appropriate & Ongoing  3. The patient will achieve 5 sit to stand score of 12 seconds to demonstrate improved transfers and endurance. MET    Long Term Goals: 8 weeks   1. The patient will demonstrate independence with extensive HEP. MET  2. Patient will achieve 5 sit to stand score of 10 seconds to demonstrate improved transfers & endurance. MET  3. Patent is able to demonstrate MMT 4+/5 B LE without pain reports during testing. Appropriate & Ongoing  4. Patient demonstrates pain free lumbar ROM         Reasons for Recertification of Therapy: Continuation  of aquatic therapy to meet remaining POC goals per patient necessity.    PLAN   Progress POC as tolerated by the patient.     Updated Certification Period: 12/2/23 to 2/19/24   Recommended Treatment Plan: 1-2 times per week for 10-12 weeks:  Aquatic Therapy, Neuromuscular Re-ed, Patient Education, Therapeutic Activities, and Therapeutic Exercise    Mckayla Castellon, PT

## 2024-01-22 ENCOUNTER — PATIENT MESSAGE (OUTPATIENT)
Dept: BARIATRICS | Facility: CLINIC | Age: 74
End: 2024-01-22
Payer: MEDICARE

## 2024-01-22 ENCOUNTER — TELEPHONE (OUTPATIENT)
Dept: BARIATRICS | Facility: CLINIC | Age: 74
End: 2024-01-22
Payer: MEDICARE

## 2024-01-22 NOTE — TELEPHONE ENCOUNTER
----- Message from Trey Fernando sent at 1/22/2024 10:35 AM CST -----  Regarding: prior auth is needed  Contact: pt @ [phone]  Prior authorization is needed for semaglutide (OZEMPIC) 0.25 mg or 0.5 mg (2 mg/3 mL) pen injector please call [phone]. Please call to further advise thank you for all that you do.       Pt takes injection on Sundays and was not able to on yesterday due to prior auth being needed.

## 2024-01-23 ENCOUNTER — CLINICAL SUPPORT (OUTPATIENT)
Dept: REHABILITATION | Facility: HOSPITAL | Age: 74
End: 2024-01-23
Payer: MEDICARE

## 2024-01-23 DIAGNOSIS — G89.29 CHRONIC PAIN OF LEFT KNEE: ICD-10-CM

## 2024-01-23 DIAGNOSIS — M54.16 LUMBAR RADICULOPATHY: Primary | ICD-10-CM

## 2024-01-23 DIAGNOSIS — M25.562 CHRONIC PAIN OF LEFT KNEE: ICD-10-CM

## 2024-01-23 DIAGNOSIS — M47.816 LUMBAR SPONDYLOSIS: ICD-10-CM

## 2024-01-23 PROCEDURE — 97113 AQUATIC THERAPY/EXERCISES: CPT

## 2024-01-23 NOTE — PROGRESS NOTES
[facility]   Physical Therapy Daily Treatment Note      Name: Charlotte Crowder  Clinic Number: [id number]    Therapy Diagnosis:   No diagnosis found.    Physician: Wendy Thompson MD    Visit Date: 1/23/2024    Physician Orders: Aquatic Therapy  Medical Diagnosis from Referral:   M15.8 (ICD-10-CM) - Other osteoarthritis involving multiple joints   M48.062 (ICD-10-CM) - Spinal stenosis of lumbar region with neurogenic claudication     Evaluation Date: 9/7/2023  Authorization Period Expiration: 11/2/23  Plan of Care Expiration: 2/19/24  Visit # / Visits authorized: 4/20  28 visits total    Precautions: Standard and Fall    Time In: 2:30 PM   Time Out: 3:27 PM  Total Billable Time: 30 minutes      SUBJECTIVE     Pt reports: doing well, however she is getting pain after vacuuming and mopping still.     She was compliant with home exercise program.    Response to previous treatment: improved strength and endurance    Functional change: improved ability to do household chores    Pain: Knee 0/10 Left Hip 2/10  Location: bilateral knee      OBJECTIVE     Re-eval 12/19/23    Treatment     Charlotte received aquatic therapeutic exercises to develop strength, endurance, ROM, flexibility, posture, and core stabilization for 53 minutes including:    FUNCTIONAL MOBILITY TRAINING x 2 laps each at beginning and 1 lap each at end of session   Walk forward/backward/lateral    STRETCHES 2 x 30 sec   Hip flexor stretch at stair    LE EX x 30  Hip abduction/adduction with GTB  Hip flex/ext-with GTB  Squat-with GTB  Heel raise-on ladder    Seated on stool x 20  Sit to stand with GTB  Clam with GTB  LAQ with GTB    Side stepping x 2 laps with GTB  Monster walks x 2 laps with GTB    Step up w/contralateral hip flexion 8 inch step x 20 reps  Lateral step up on 8 inch step x 20 reps    Walking marches x 2 laps with Orange dumbbell x 2 laps   Tandem Walking outside of  // bars (no UE) x 2 laps    UE EX/CORE  x  25  Shoulder flex/ext TA activation with Blue Dumbbells  Shoulder horizontal abd/add TA activation with Blue Dumbbells  Shoulder abd/add with TA activation with Blue Dumbbells    Blue kickboard push/pull     ENDURANCE  LTR x 2'  Bicycle in // bars x 3'    Patient Education and Home Exercises     Home Exercises Provided and Patient Education Provided     Education provided:   Role of aquatic therapy  Hydration post therapy    Written Home Exercises Provided: Patient instructed to cont prior HEP.     ASSESSMENT     Patient reports persistent LBP after performing mopping and vacuuming chores. She required patient education for safe mechanics when performing household chores that require forward bending, including hip hinging, maintaining a wide base of support and avoiding twisting from spine. Modified patients monster walks to recruit gluteals further by maintaining a squat and placing resistance above knee joint. Progress patient as tolerated and add ankle weights at next visit.    Pt prognosis is Good.     Pt will continue to benefit from skilled outpatient physical therapy to address the deficits listed in the problem list box on initial evaluation, provide pt/family education and to maximize pt's level of independence in the home and community environment.     Pt's spiritual, cultural and educational needs considered and pt agreeable to plan of care and goals.     Anticipated barriers to physical therapy: none at this time.    Goals:   Short Term Goals:4 weeks   1. Patient will be independent in HEP & progressions. MET  2. Patient will demonstrate improved Lumbar AROM to WFL. Appropriate & Ongoing  3. The patient will achieve 5 sit to stand score of 12 seconds to demonstrate improved transfers and endurance. MET    Long Term Goals: 8 weeks   1. The patient will demonstrate independence with extensive HEP. MET  2. Patient will achieve 5 sit to stand score of 10 seconds to demonstrate improved transfers & endurance.  MET  3. Patent is able to demonstrate MMT 4+/5 B LE without pain reports during testing. Appropriate & Ongoing  4. Patient demonstrates pain free lumbar ROM         Reasons for Recertification of Therapy: Continuation of aquatic therapy to meet remaining POC goals per patient necessity.    PLAN   Progress POC as tolerated by the patient.     Updated Certification Period: 12/2/23 to 2/19/24   Recommended Treatment Plan: 1-2 times per week for 10-12 weeks:  Aquatic Therapy, Neuromuscular Re-ed, Patient Education, Therapeutic Activities, and Therapeutic Exercise    Moriah Lopez, PT

## 2024-01-25 ENCOUNTER — TELEPHONE (OUTPATIENT)
Dept: BARIATRICS | Facility: CLINIC | Age: 74
End: 2024-01-25
Payer: MEDICARE

## 2024-01-25 ENCOUNTER — CLINICAL SUPPORT (OUTPATIENT)
Dept: REHABILITATION | Facility: HOSPITAL | Age: 74
End: 2024-01-25
Payer: MEDICARE

## 2024-01-25 DIAGNOSIS — M54.16 LUMBAR RADICULOPATHY: Primary | ICD-10-CM

## 2024-01-25 DIAGNOSIS — M47.816 LUMBAR SPONDYLOSIS: ICD-10-CM

## 2024-01-25 DIAGNOSIS — G89.29 CHRONIC PAIN OF LEFT KNEE: ICD-10-CM

## 2024-01-25 DIAGNOSIS — M25.562 CHRONIC PAIN OF LEFT KNEE: ICD-10-CM

## 2024-01-25 PROCEDURE — 97113 AQUATIC THERAPY/EXERCISES: CPT

## 2024-01-25 NOTE — TELEPHONE ENCOUNTER
Called patient and advised. MD. [facility] doesn't have any medication options, going forward with care. Patient verbalized understanding. Appointment in April has been canceled.

## 2024-01-25 NOTE — PROGRESS NOTES
[facility]   Physical Therapy Daily Treatment Note      Name: Charlotte Crowder  Clinic Number: [id number]    Therapy Diagnosis:   Encounter Diagnoses   Name Primary?    Lumbar radiculopathy Yes    Lumbar spondylosis     Chronic pain of left knee        Physician: Wendy Thompson MD    Visit Date: 1/25/2024    Physician Orders: Aquatic Therapy  Medical Diagnosis from Referral:   M15.8 (ICD-10-CM) - Other osteoarthritis involving multiple joints   M48.062 (ICD-10-CM) - Spinal stenosis of lumbar region with neurogenic claudication     Evaluation Date: 9/7/2023  Authorization Period Expiration: 11/2/23  Plan of Care Expiration: 2/19/24  Visit # / Visits authorized: 6/20  30 visits total    Precautions: Standard and Fall    Time In: 9:30 am    Time Out: 10:30 am   Total Billable Time: 30 minutes      SUBJECTIVE     Pt reports: she is feeling great today.     She was compliant with home exercise program.    Response to previous treatment: improved strength and endurance    Functional change: improved ability to do household chores    Pain: Knee 0/10 Left Hip 2/10  Location: bilateral knee      OBJECTIVE     Re-eval 12/19/23    Treatment     Charlotte received aquatic therapeutic exercises to develop strength, endurance, ROM, flexibility, posture, and core stabilization for 55 minutes including:    FUNCTIONAL MOBILITY TRAINING x 2 laps each at beginning and 1 lap each at end of session   Walk forward/backward/lateral    STRETCHES 2 x 30 sec   Hip flexor stretch at stair    LE EX x 30  Hip abduction/adduction with GTB  Hip flex/ext-with GTB  Squat-with GTB  Heel raise-on ladder    Seated on stool x 20  Sit to stand with GTB  Clam with GTB  LAQ with GTB    Side stepping x 2 laps with GTB  Monster walks x 2 laps with GTB    Step up w/contralateral hip flexion 8 inch step x 25 reps  Lateral step up on 8 inch step x 25 reps    Walking marches x 2 laps with Orange dumbbell x 2 laps   Tandem Walking  outside of  // bars (no UE) x 2 laps    UE EX/CORE  x 25  Shoulder flex/ext TA activation with Blue Dumbbells  Shoulder horizontal abd/add TA activation with Blue Dumbbells  Shoulder abd/add with TA activation with Blue Dumbbells    Blue kickboard push/pull     ENDURANCE  LTR x 2'  Bicycle in // bars x 3'    Patient Education and Home Exercises     Home Exercises Provided and Patient Education Provided     Education provided:   Role of aquatic therapy  Hydration post therapy    Written Home Exercises Provided: Patient instructed to cont prior HEP.     ASSESSMENT     The patient opted to perform her heel raises on the ladder first after her warm up as she stated this is the most challenging and likes to get it out the way.     Pt prognosis is Good.     Pt will continue to benefit from skilled outpatient physical therapy to address the deficits listed in the problem list box on initial evaluation, provide pt/family education and to maximize pt's level of independence in the home and community environment.     Pt's spiritual, cultural and educational needs considered and pt agreeable to plan of care and goals.     Anticipated barriers to physical therapy: none at this time.    Goals:   Short Term Goals:4 weeks   1. Patient will be independent in HEP & progressions. MET  2. Patient will demonstrate improved Lumbar AROM to WFL. Appropriate & Ongoing  3. The patient will achieve 5 sit to stand score of 12 seconds to demonstrate improved transfers and endurance. MET    Long Term Goals: 8 weeks   1. The patient will demonstrate independence with extensive HEP. MET  2. Patient will achieve 5 sit to stand score of 10 seconds to demonstrate improved transfers & endurance. MET  3. Patent is able to demonstrate MMT 4+/5 B LE without pain reports during testing. Appropriate & Ongoing  4. Patient demonstrates pain free lumbar ROM         Reasons for Recertification of Therapy: Continuation of aquatic therapy to meet remaining POC  goals per patient necessity.    PLAN   Progress POC as tolerated by the patient.     Updated Certification Period: 12/2/23 to 2/19/24   Recommended Treatment Plan: 1-2 times per week for 10-12 weeks:  Aquatic Therapy, Neuromuscular Re-ed, Patient Education, Therapeutic Activities, and Therapeutic Exercise    Mckayla Castellon, PT

## 2024-01-30 ENCOUNTER — CLINICAL SUPPORT (OUTPATIENT)
Dept: REHABILITATION | Facility: HOSPITAL | Age: 74
End: 2024-01-30
Payer: MEDICARE

## 2024-01-30 DIAGNOSIS — M54.16 LUMBAR RADICULOPATHY: Primary | ICD-10-CM

## 2024-01-30 DIAGNOSIS — M47.816 LUMBAR SPONDYLOSIS: ICD-10-CM

## 2024-01-30 DIAGNOSIS — G89.29 CHRONIC PAIN OF LEFT KNEE: ICD-10-CM

## 2024-01-30 DIAGNOSIS — M25.562 CHRONIC PAIN OF LEFT KNEE: ICD-10-CM

## 2024-01-30 PROCEDURE — 97113 AQUATIC THERAPY/EXERCISES: CPT

## 2024-01-30 NOTE — PROGRESS NOTES
[facility]   Physical Therapy Daily Treatment Note      Name: Charlotte Crowder  Clinic Number: [id number]    Therapy Diagnosis:   Encounter Diagnoses   Name Primary?    Lumbar radiculopathy Yes    Lumbar spondylosis     Chronic pain of left knee      Physician: Wendy Thompson MD    Visit Date: 1/30/2024    Physician Orders: Aquatic Therapy  Medical Diagnosis from Referral:   M15.8 (ICD-10-CM) - Other osteoarthritis involving multiple joints   M48.062 (ICD-10-CM) - Spinal stenosis of lumbar region with neurogenic claudication     Evaluation Date: 9/7/2023  Authorization Period Expiration: 11/2/23  Plan of Care Expiration: 2/19/24  Visit # / Visits authorized: 7/20  30 visits total    Precautions: Standard and Fall    Time In: 2:27 PM   Time Out: 3:30 PM  Total Billable Time: 30 minutes      SUBJECTIVE     Pt reports: that the green Theraband keeps breaking on her when she uses it to do her HEP. She is doing well otherwise.    She was compliant with home exercise program.    Response to previous treatment: improved strength and endurance    Functional change: improved ability to do household chores    Pain: Knee 0/10 Left Hip 2/10  Location: bilateral knee      OBJECTIVE     Re-eval 2/19/24    Treatment     Charlotte received aquatic therapeutic exercises to develop strength, endurance, ROM, flexibility, posture, and core stabilization for 60 minutes including:    FUNCTIONAL MOBILITY TRAINING x 2 laps each at beginning and 1 lap each at end of session   Walk forward/backward/lateral w/GTB    STRETCHES 2 x 30 sec   Hip flexor stretch at stair    LE EX x 30  Hip abduction/adduction with GTB  Hip flex/ext-with GTB   Squat-with GTB  Heel raise-on ladder    Seated on stool x 20  Sit to stand with GTB  Clam with GTB  LAQ with GTB    Side stepping x 2 laps with GTB  Monster walks x 2 laps with GTB    Step up w/contralateral hip flexion 8 inch step x 25 reps  Lateral step up on 8 inch step x 25  reps    Walking marches x 2 laps with 4.4 Mball forward press x 2 laps   Tandem Walking outside of  // bars (no UE) x 2 laps    UE EX/CORE  x 25  Shoulder flex/ext TA activation with Blue Dumbbells  Shoulder horizontal abd/add TA activation with Blue Dumbbells  Shoulder abd/add with TA activation with Blue Dumbbells    Blue kickboard push/pull     ENDURANCE  LTR x 2'  Bicycle in // bars x 3'    Patient Education and Home Exercises     Home Exercises Provided and Patient Education Provided     Education provided:   Role of aquatic therapy  Hydration post therapy    Written Home Exercises Provided: Patient instructed to cont prior HEP.     ASSESSMENT      Patient presents with low level left hip pain, and good HEP adherence. Progressed patient's marching to incorporate a forward press for additional abdominal recruitment and balance perturbation.      Pt prognosis is Good.     Pt will continue to benefit from skilled outpatient physical therapy to address the deficits listed in the problem list box on initial evaluation, provide pt/family education and to maximize pt's level of independence in the home and community environment.     Pt's spiritual, cultural and educational needs considered and pt agreeable to plan of care and goals.     Anticipated barriers to physical therapy: none at this time.    Goals:   Short Term Goals:4 weeks   1. Patient will be independent in HEP & progressions. MET  2. Patient will demonstrate improved Lumbar AROM to WFL. Appropriate & Ongoing  3. The patient will achieve 5 sit to stand score of 12 seconds to demonstrate improved transfers and endurance. MET    Long Term Goals: 8 weeks   1. The patient will demonstrate independence with extensive HEP. MET  2. Patient will achieve 5 sit to stand score of 10 seconds to demonstrate improved transfers & endurance. MET  3. Patent is able to demonstrate MMT 4+/5 B LE without pain reports during testing. Appropriate & Ongoing  4. Patient  demonstrates pain free lumbar ROM         Reasons for Recertification of Therapy: Continuation of aquatic therapy to meet remaining POC goals per patient necessity.    PLAN   Progress POC as tolerated by the patient.     Updated Certification Period: 12/2/23 to 2/19/24   Recommended Treatment Plan: 1-2 times per week for 10-12 weeks:  Aquatic Therapy, Neuromuscular Re-ed, Patient Education, Therapeutic Activities, and Therapeutic Exercise    Moriah Lopez, PT

## 2024-02-01 ENCOUNTER — CLINICAL SUPPORT (OUTPATIENT)
Dept: REHABILITATION | Facility: HOSPITAL | Age: 74
End: 2024-02-01
Payer: MEDICARE

## 2024-02-01 DIAGNOSIS — M25.562 CHRONIC PAIN OF LEFT KNEE: ICD-10-CM

## 2024-02-01 DIAGNOSIS — M54.16 LUMBAR RADICULOPATHY: Primary | ICD-10-CM

## 2024-02-01 DIAGNOSIS — M47.816 LUMBAR SPONDYLOSIS: ICD-10-CM

## 2024-02-01 DIAGNOSIS — G89.29 CHRONIC PAIN OF LEFT KNEE: ICD-10-CM

## 2024-02-01 PROCEDURE — 97113 AQUATIC THERAPY/EXERCISES: CPT

## 2024-02-01 NOTE — PROGRESS NOTES
[facility]   Physical Therapy Daily Treatment Note      Name: Charlotte Crowder  Clinic Number: [id number]    Therapy Diagnosis:   Encounter Diagnoses   Name Primary?    Lumbar radiculopathy Yes    Lumbar spondylosis     Chronic pain of left knee        Physician: Wendy Thompson MD    Visit Date: 2/1/2024    Physician Orders: Aquatic Therapy  Medical Diagnosis from Referral:   M15.8 (ICD-10-CM) - Other osteoarthritis involving multiple joints   M48.062 (ICD-10-CM) - Spinal stenosis of lumbar region with neurogenic claudication     Evaluation Date: 9/7/2023  Authorization Period Expiration: 11/2/23  Plan of Care Expiration: 2/19/24  Visit # / Visits authorized: 8/20  30 visits total    Precautions: Standard and Fall    Time In: 9:40 AM   Time Out: 10:39 AM  Total Billable Time: 55 minutes      SUBJECTIVE     Pt reports:  that she is now walking 3 laps around her house. She also notices that she can go up stairs without getting short of breath.    She was compliant with home exercise program.    Response to previous treatment: improved strength and endurance    Functional change: improved ability to do household chores    Pain: Knee 0/10 Left Hip 2/10  Location: bilateral knee      OBJECTIVE     Re-eval 2/19/24    Treatment     Charlotte received aquatic therapeutic exercises to develop strength, endurance, ROM, flexibility, posture, and core stabilization for 55 minutes including:    FUNCTIONAL MOBILITY TRAINING x 2 laps each at beginning and 1 lap each at end of session   Walk forward/backward/lateral w/GTB    STRETCHES 2 x 30 sec   Hip flexor stretch at stair    LE EX x 30  Hip abduction/adduction with GTB  Hip flex/ext-with GTB   Squat-with GTB  Heel raise-on ladder    Seated on stool x 20  Sit to stand with GTB  Clam with GTB  LAQ with GTB    Side stepping x 2 laps with GTB  Monster walks x 2 laps with GTB    Step up w/contralateral hip flexion 8 inch step x 25 reps  Lateral step up on  8 inch step x 25 reps    Walking marches x 2 laps with 4.4 Mball forward press x 2 laps   Tandem Walking outside of  // bars (no UE) x 2 laps    UE EX/CORE  x 25  Shoulder flex/ext TA activation with Blue Dumbbells  Shoulder horizontal abd/add TA activation with Blue Dumbbells  Shoulder abd/add with TA activation with Blue Dumbbells    Blue kickboard push/pull     ENDURANCE  LTR x 2'  Bicycle in // bars x 3'    Patient Education and Home Exercises     Home Exercises Provided and Patient Education Provided     Education provided:   Role of aquatic therapy  Hydration post therapy    Written Home Exercises Provided: Patient instructed to cont prior HEP.     ASSESSMENT      Patient required minimal cueing for appropriate positioning of dumb bells during core exercises and for activation of hip external rotators during monster walks. While she is able to activate transverse abdominus muscles when cued, her carryover without cueing is inconsistent. Consider adding ankle weights at next treatment to facilitate additional lower quarter strength and conditioning. Continue to progress as tolerated.    Pt prognosis is Good.     Pt will continue to benefit from skilled outpatient physical therapy to address the deficits listed in the problem list box on initial evaluation, provide pt/family education and to maximize pt's level of independence in the home and community environment.     Pt's spiritual, cultural and educational needs considered and pt agreeable to plan of care and goals.     Anticipated barriers to physical therapy: none at this time.    Goals:   Short Term Goals:4 weeks   1. Patient will be independent in HEP & progressions. MET  2. Patient will demonstrate improved Lumbar AROM to WFL. Appropriate & Ongoing  3. The patient will achieve 5 sit to stand score of 12 seconds to demonstrate improved transfers and endurance. MET    Long Term Goals: 8 weeks   1. The patient will demonstrate independence with extensive  HEP. MET  2. Patient will achieve 5 sit to stand score of 10 seconds to demonstrate improved transfers & endurance. MET  3. Patent is able to demonstrate MMT 4+/5 B LE without pain reports during testing. Appropriate & Ongoing  4. Patient demonstrates pain free lumbar ROM         Reasons for Recertification of Therapy: Continuation of aquatic therapy to meet remaining POC goals per patient necessity.    PLAN   Progress POC as tolerated by the patient.     Updated Certification Period: 12/2/23 to 2/19/24   Recommended Treatment Plan: 1-2 times per week for 10-12 weeks:  Aquatic Therapy, Neuromuscular Re-ed, Patient Education, Therapeutic Activities, and Therapeutic Exercise    Moriah Lopez, PT

## 2024-02-15 ENCOUNTER — CLINICAL SUPPORT (OUTPATIENT)
Dept: REHABILITATION | Facility: HOSPITAL | Age: 74
End: 2024-02-15
Payer: MEDICARE

## 2024-02-15 DIAGNOSIS — M47.816 LUMBAR SPONDYLOSIS: ICD-10-CM

## 2024-02-15 DIAGNOSIS — M25.562 CHRONIC PAIN OF LEFT KNEE: ICD-10-CM

## 2024-02-15 DIAGNOSIS — G89.29 CHRONIC PAIN OF LEFT KNEE: ICD-10-CM

## 2024-02-15 DIAGNOSIS — M54.16 LUMBAR RADICULOPATHY: Primary | ICD-10-CM

## 2024-02-15 NOTE — PROGRESS NOTES
[facility]   Physical Therapy Daily Treatment Note      Name: Charlotte Crowder  Clinic Number: [id number]    Therapy Diagnosis:   Encounter Diagnoses   Name Primary?    Lumbar radiculopathy Yes    Lumbar spondylosis     Chronic pain of left knee      Physician: Wendy Thompson MD    Visit Date: 2/15/2024    Physician Orders: Aquatic Therapy  Medical Diagnosis from Referral:   M15.8 (ICD-10-CM) - Other osteoarthritis involving multiple joints   M48.062 (ICD-10-CM) - Spinal stenosis of lumbar region with neurogenic claudication     Evaluation Date: 9/7/2023  Authorization Period Expiration: 11/2/23  Plan of Care Expiration: 2/19/24  Visit # / Visits authorized: 9/20  30 visits total    Precautions: Standard and Fall    Time In: 10:29 AM   Time Out: 11:32 AM  Total Billable Time: 60 minutes      SUBJECTIVE     Pt reports: no pain today, and she did a lot of walking over [place] which didn't cause any increase in back and hip pain.     She was compliant with home exercise program.    Response to previous treatment: improved strength and endurance    Functional change: improved ability to do household chores    Pain: Knee 0/10 Left Hip 2/10  Location: bilateral knee      OBJECTIVE     Re-eval 2/19/24    Treatment     Charlotte received aquatic therapeutic exercises to develop strength, endurance, ROM, flexibility, posture, and core stabilization for 60 minutes including:    FUNCTIONAL MOBILITY TRAINING x 2 laps each at beginning and 1 lap each at end of session   Walk forward/backward/lateral w/GTB    STRETCHES 2 x 30 sec   Hip flexor stretch at stair    LE EX x 30  Hip abduction/adduction with GTB  Hip flex/ext-with GTB   Squat-with GTB  Heel raise-on ladder    Seated on stool x 20  Sit to stand with GTB  Clam with GTB  LAQ with GTB    Side stepping x 2 laps with GTB  Monster walks x 2 laps with GTB    Step up w/contralateral hip flexion 8 inch step x 25 reps  Lateral step up on 8 inch  "step x 25 reps    Walking marches x 2 laps with 4.4 Mball forward press x 2 laps   Tandem Walking outside of  // bars (no UE) x 2 laps    UE EX/CORE  x 25  Shoulder flex/ext TA activation with Blue Dumbbells  Shoulder horizontal abd/add TA activation with Blue Dumbbells  Shoulder abd/add with TA activation with Blue Dumbbells    Blue kickboard push/pull   + Blue kickboard isometric press-down 30" x 2    ENDURANCE  LTR x 2'  Bicycle in // bars x 3'    Patient Education and Home Exercises     Home Exercises Provided and Patient Education Provided     Education provided:   Role of aquatic therapy  Hydration post therapy    Written Home Exercises Provided: Patient instructed to cont prior HEP.     ASSESSMENT      Progressed patients single leg stance standing hip via unilateral upper extremity support and no return to double limb stance in between repetitions. Added additional paddle board press-down isometric exercise in order to increase proprioceptive awareness of posterior pelvic tilt. Continue to progress as tolerated.    Pt prognosis is Good.     Pt will continue to benefit from skilled outpatient physical therapy to address the deficits listed in the problem list box on initial evaluation, provide pt/family education and to maximize pt's level of independence in the home and community environment.     Pt's spiritual, cultural and educational needs considered and pt agreeable to plan of care and goals.     Anticipated barriers to physical therapy: none at this time.    Goals:   Short Term Goals:4 weeks   1. Patient will be independent in HEP & progressions. MET  2. Patient will demonstrate improved Lumbar AROM to WFL. Appropriate & Ongoing  3. The patient will achieve 5 sit to stand score of 12 seconds to demonstrate improved transfers and endurance. MET    Long Term Goals: 8 weeks   1. The patient will demonstrate independence with extensive HEP. MET  2. Patient will achieve 5 sit to stand score of 10 seconds to " demonstrate improved transfers & endurance. MET  3. Patent is able to demonstrate MMT 4+/5 B LE without pain reports during testing. Appropriate & Ongoing  4. Patient demonstrates pain free lumbar ROM         Reasons for Recertification of Therapy: Continuation of aquatic therapy to meet remaining POC goals per patient necessity.    PLAN   Progress POC as tolerated by the patient.     Updated Certification Period: 12/2/23 to 2/19/24   Recommended Treatment Plan: 1-2 times per week for 10-12 weeks:  Aquatic Therapy, Neuromuscular Re-ed, Patient Education, Therapeutic Activities, and Therapeutic Exercise    Moriah Lopez, PT

## 2024-02-27 ENCOUNTER — CLINICAL SUPPORT (OUTPATIENT)
Dept: REHABILITATION | Facility: HOSPITAL | Age: 74
End: 2024-02-27
Payer: MEDICARE

## 2024-02-27 DIAGNOSIS — M47.816 LUMBAR SPONDYLOSIS: ICD-10-CM

## 2024-02-27 DIAGNOSIS — M25.562 CHRONIC PAIN OF LEFT KNEE: ICD-10-CM

## 2024-02-27 DIAGNOSIS — G89.29 CHRONIC PAIN OF LEFT KNEE: ICD-10-CM

## 2024-02-27 DIAGNOSIS — M54.16 LUMBAR RADICULOPATHY: Primary | ICD-10-CM

## 2024-02-27 PROCEDURE — 97113 AQUATIC THERAPY/EXERCISES: CPT

## 2024-02-27 NOTE — PROGRESS NOTES
[facility]   Physical Therapy Daily Treatment Note      Name: Charlotte Crowder  Clinic Number: [id number]    Therapy Diagnosis:   Encounter Diagnoses   Name Primary?    Lumbar radiculopathy Yes    Lumbar spondylosis     Chronic pain of left knee      Physician: Wendy Thompson MD    Visit Date: 2/27/2024    Physician Orders: Aquatic Therapy  Medical Diagnosis from Referral:   M15.8 (ICD-10-CM) - Other osteoarthritis involving multiple joints   M48.062 (ICD-10-CM) - Spinal stenosis of lumbar region with neurogenic claudication     Evaluation Date: 9/7/2023  Authorization Period Expiration: 11/2/23  Plan of Care Expiration: 2/19/24  Visit # / Visits authorized: 10/20  30 visits total    Precautions: Standard and Fall    Time In: 2:30 PM  Time Out: 3:26 PM  Total Billable Time: 30 minutes      SUBJECTIVE     Pt reports: That she had a sinus infection that lasted 3 days and she couldn't do anything at all, even get out bed.     She was compliant with home exercise program.    Response to previous treatment: improved strength and endurance    Functional change: improved ability to do household chores    Pain: Knee 0/10 Left Hip 0/10  Location: bilateral knee      OBJECTIVE     Re-eval 2/19/24    Treatment     Charlotte received aquatic therapeutic exercises to develop strength, endurance, ROM, flexibility, posture, and core stabilization for 55 minutes including:    FUNCTIONAL MOBILITY TRAINING x 2 laps each at beginning and 1 lap each at end of session   Walk forward/backward/lateral w/GTB    STRETCHES 2 x 30 sec   Hip flexor stretch at stair    LE EX x 30  Hip abduction/adduction with GTB  Hip flex/ext-with GTB   Squat-with GTB  Heel raise-on ladder    Seated on stool x 20  Sit to stand with GTB  Clam with GTB  LAQ with GTB    Side stepping x 2 laps with GTB  Monster walks x 2 laps with GTB    Step up w/contralateral hip flexion 8 inch step x 25 reps  Lateral step up on 8 inch step x 25  "reps    Walking marches x 2 laps with 4.4 Mball forward press x 2 laps   Tandem Walking outside of  // bars (no UE) x 2 laps    UE EX/CORE  x 25  Shoulder flex/ext TA activation with Blue Dumbbells  Shoulder horizontal abd/add TA activation with Blue Dumbbells  Shoulder abd/add with TA activation with Blue Dumbbells    Blue kickboard push/pull   Blue kickboard isometric press-down 30" x 2    ENDURANCE  LTR x 2'  Bicycle in // bars x 3'    Patient Education and Home Exercises     Home Exercises Provided and Patient Education Provided     Education provided:   Role of aquatic therapy  Hydration post therapy    Written Home Exercises Provided: Patient instructed to cont prior HEP.     ASSESSMENT      Patient presents after lapse in treatment due to sinus infection therefore continued treatment per previous session. Patient displaying greater single leg stance stability with advancement of single leg step ups to no upper extremity support. Continue to progress as tolerated.    Pt prognosis is Good.     Pt will continue to benefit from skilled outpatient physical therapy to address the deficits listed in the problem list box on initial evaluation, provide pt/family education and to maximize pt's level of independence in the home and community environment.     Pt's spiritual, cultural and educational needs considered and pt agreeable to plan of care and goals.     Anticipated barriers to physical therapy: none at this time.    Goals:   Short Term Goals:4 weeks   1. Patient will be independent in HEP & progressions. MET  2. Patient will demonstrate improved Lumbar AROM to WFL. Appropriate & Ongoing  3. The patient will achieve 5 sit to stand score of 12 seconds to demonstrate improved transfers and endurance. MET    Long Term Goals: 8 weeks   1. The patient will demonstrate independence with extensive HEP. MET  2. Patient will achieve 5 sit to stand score of 10 seconds to demonstrate improved transfers & endurance. MET  3. " Patent is able to demonstrate MMT 4+/5 B LE without pain reports during testing. Appropriate & Ongoing  4. Patient demonstrates pain free lumbar ROM         Reasons for Recertification of Therapy: Continuation of aquatic therapy to meet remaining POC goals per patient necessity.    PLAN   Progress POC as tolerated by the patient.     Updated Certification Period: 12/2/23 to 2/19/24   Recommended Treatment Plan: 1-2 times per week for 10-12 weeks:  Aquatic Therapy, Neuromuscular Re-ed, Patient Education, Therapeutic Activities, and Therapeutic Exercise    Moriah Lopez, PT

## 2024-02-29 ENCOUNTER — CLINICAL SUPPORT (OUTPATIENT)
Dept: REHABILITATION | Facility: HOSPITAL | Age: 74
End: 2024-02-29
Payer: MEDICARE

## 2024-02-29 DIAGNOSIS — M54.16 LUMBAR RADICULOPATHY: Primary | ICD-10-CM

## 2024-02-29 DIAGNOSIS — M25.562 CHRONIC PAIN OF LEFT KNEE: ICD-10-CM

## 2024-02-29 DIAGNOSIS — M47.816 LUMBAR SPONDYLOSIS: ICD-10-CM

## 2024-02-29 DIAGNOSIS — G89.29 CHRONIC PAIN OF LEFT KNEE: ICD-10-CM

## 2024-02-29 PROCEDURE — 97113 AQUATIC THERAPY/EXERCISES: CPT

## 2024-02-29 NOTE — PROGRESS NOTES
[facility]   Physical Therapy Updated  POC Note      Name: Charlotte Crowder  Clinic Number: [id number]    Therapy Diagnosis:   Encounter Diagnoses   Name Primary?    Lumbar radiculopathy Yes    Lumbar spondylosis     Chronic pain of left knee      Physician: Wendy Thompson MD    Visit Date: 2/29/2024    Physician Orders: Aquatic Therapy  Medical Diagnosis from Referral:   M15.8 (ICD-10-CM) - Other osteoarthritis involving multiple joints   M48.062 (ICD-10-CM) - Spinal stenosis of lumbar region with neurogenic claudication      Evaluation Date: 9/7/2023  Authorization Period Expiration: 11/2/23  Plan of Care Expiration: 3/30/2024  Visit # / Visits authorized: 11/20      31 visits total     Precautions: Standard and Fall     Time In: 12:30 PM  Time Out: 1:32 PM  Total Billable Time: 30 minutes    SUBJECTIVE      Pt reports: That she is doing much better. She says she can make the bed, vacuum, and perform more cleaning activities than when she started. When she side bends she is able to reach further without stiffness restricting her, and when pain arises its not more than 2/10.      She was compliant with home exercise program.     Response to previous treatment: improved strength and endurance     Functional change: improved ability to do household chores     Pain: Current 0/10 At best 0/10 At worst 2/10  Location: bilateral knee       OBJECTIVE      TUG: Initial Evaluation: 9 seconds without AD or signfiicant gait deviations Re-assessment 11/2/31: 9 seconds; Re-assessment 12/19/23: 8 seconds; Re-assessment 2/29/2024: 8 seconds     5 Times Sit to Stand: 15 seconds with out use of hands with pain reports on the L side Re-assessment 11/2/31: 11.3 seconds;  Re-assessment 12/19/23: 10 seconds; Re-assessment 2/29/2024: 8 seconds     Gait: Mild antalgic gait. ; Re-assessment 2/29/2024: Normal gait pattern, wide DAISY      Lumbar Range of Motion:      Right Left 12/19/23 2/29/24   Flexion WFL  No  "pain WFL WFL   Extension WFL No pain WFL WFL   SB R Limited 50% Stretch and pain on L lumbar region Limited 30%  Limited 15%    SB L Limited 75  Pressure on L lumbar region Limited 10% Limited 10%    Rotation L     Limited 10% P! L lumbar region Limited 10% P! L lumbar   Rotation R     Limited 10% Limited 10%       Lower Extremity Strength  Right LE   Left LE     Knee extension: 4/5 Knee extension: 4/5   Knee flexion: 4/5 Knee flexion: 4/5   Hip flexion: 4-/5 Hip flexion: 4-/5   Hip abduction: 4/5 Hip abduction: 4/5     TREATMENT      Charlotte received aquatic therapeutic exercises to develop strength, endurance, ROM, flexibility, posture, and core stabilization for 60 minutes including:     FUNCTIONAL MOBILITY TRAINING x 2 laps each at beginning and 1 lap each at end of session   Walk forward/backward/lateral w/GTB     STRETCHES 2 x 30 sec   Hip flexor stretch at stair     LE EX x 30  Hip abduction/adduction with GTB  Hip flex/ext-with GTB   Squat-with GTB  Heel raise-on ladder     Seated on stool x 20  Sit to stand with GTB  Clam with GTB  LAQ with GTB     Side stepping x 2 laps with GTB  Monster walks x 2 laps with GTB     Step up w/contralateral hip flexion 8 inch step x 25 reps  Lateral step up on 8 inch step x 25 reps     Walking marches x 2 laps with 4.4 Mball forward press x 2 laps   Tandem Walking outside of  // bars (no UE) x 2 laps     UE EX/CORE  x 25  Shoulder flex/ext TA activation with Blue Dumbbells  Shoulder horizontal abd/add TA activation with Blue Dumbbells  Shoulder abd/add with TA activation with Blue Dumbbells     Blue kickboard push/pull   Blue kickboard isometric press-down 30" x 2     ENDURANCE  LTR x 2'  Bicycle in // bars x 3'    ASSESSMENT   Patient presents reporting continued reduction in low back and hip pain. She states that she no longer has any pain when walking and is doing better applying good mechanics to household chores and therefore experiencing less low back pain when " performing said activities. She is however still having low back pain provocation when making the bed, and when sweeping. Patient has met all short-term goals at this time, and is progressing well towards remaining long-term goal. She demonstrates global lumbar range of motion that is no more limited than 15%, however pain provocation remains with left lumbar rotation. Patient's lower quarter strength also remains partially below 4+/5 likely due to the lack of a weight bearing environment within aquatic therapy. Re-assessment shows improvement in patient's 5X Sit to Stand and a gait pattern that is no longer antalgic. Patient is appropriate for transfer to [facility] program to address remaining deficits including activity limitations with household chores, and will benefit from aquatic therapy continuation until transfer occurs.     Previous Short Term Goals Status:   < 60%  New Short Term Goals Status:   100% Met  Long Term Goal Status: Continue per initial POC.   Reasons for Recertification of Therapy: Continue to progress patient towards fulfilling remaining LTG until transfer occurs.    Anticipated barriers to physical therapy: none at this time.     Goals:   Short Term Goals:4 weeks   1. Patient will be independent in HEP & progressions. MET  2. Patient will demonstrate improved Lumbar AROM to WFL. Met  3. The patient will achieve 5 sit to stand score of 12 seconds to demonstrate improved transfers and endurance. MET     Long Term Goals: 8 weeks   1. The patient will demonstrate independence with extensive HEP. MET  2. Patient will achieve 5 sit to stand score of 10 seconds to demonstrate improved transfers & endurance. MET  3. Patent is able to demonstrate MMT 4+/5 B LE without pain reports during testing. Appropriate & Ongoing  4. Patient demonstrates pain free lumbar ROM. Appropriate & Ongoing        Reasons for Recertification of Therapy: Continuation of aquatic therapy to meet remaining POC goals per  patient necessity.    PLAN      Updated Certification Period: 2/19/24  to 3/30/2024   Recommended Treatment Plan: 3-4 times per week for 2-3 weeks:  Aquatic Therapy  Other Recommendations: Transfer to Healthy Back program upon completion of updated POC or earlier    Moriah Lopze, PT

## 2024-03-07 ENCOUNTER — HOSPITAL ENCOUNTER (EMERGENCY)
Facility: HOSPITAL | Age: 74
Discharge: HOME | End: 2024-03-07
Attending: EMERGENCY MEDICINE
Payer: MEDICARE

## 2024-03-07 VITALS
DIASTOLIC BLOOD PRESSURE: 82 MMHG | WEIGHT: 250 LBS | SYSTOLIC BLOOD PRESSURE: 135 MMHG | HEIGHT: 66 IN | BODY MASS INDEX: 40.18 KG/M2 | HEART RATE: 64 BPM | OXYGEN SATURATION: 100 % | RESPIRATION RATE: 20 BRPM | TEMPERATURE: 99 F

## 2024-03-07 DIAGNOSIS — R07.89 CHEST WALL PAIN: ICD-10-CM

## 2024-03-07 DIAGNOSIS — R05.8 DRY COUGH: ICD-10-CM

## 2024-03-07 DIAGNOSIS — J30.2 OTHER SEASONAL ALLERGIC RHINITIS: ICD-10-CM
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
## 2024-03-07 LAB
ALBUMIN SERPL BCP-MCNC: 3.7 G/DL (ref 3.5–5.2)
ALP SERPL-CCNC: 111 U/L (ref 55–135)
ALT SERPL W/O P-5'-P-CCNC: 13 U/L (ref 10–44)
ANION GAP SERPL CALC-SCNC: 8 MMOL/L (ref 8–16)
ANISOCYTOSIS BLD QL SMEAR: SLIGHT
AST SERPL-CCNC: 18 U/L (ref 10–40)
BASOPHILS # BLD AUTO: 0.02 K/UL (ref 0–0.2)
BASOPHILS NFR BLD: 0.5 % (ref 0–1.9)
BILIRUB SERPL-MCNC: 0.5 MG/DL (ref 0.1–1)
BUN SERPL-MCNC: 15 MG/DL (ref 8–23)
CALCIUM SERPL-MCNC: 10.8 MG/DL (ref 8.7–10.5)
CHLORIDE SERPL-SCNC: 111 MMOL/L (ref 95–110)
CO2 SERPL-SCNC: 24 MMOL/L (ref 23–29)
CREAT SERPL-MCNC: 1.3 MG/DL (ref 0.5–1.4)
DIFFERENTIAL METHOD BLD: ABNORMAL
EOSINOPHIL # BLD AUTO: 0.1 K/UL (ref 0–0.5)
EOSINOPHIL NFR BLD: 2.5 % (ref 0–8)
ERYTHROCYTE [DISTWIDTH] IN BLOOD BY AUTOMATED COUNT: 13 % (ref 11.5–14.5)
EST. GFR  (NO RACE VARIABLE): 43.4 ML/MIN/1.73 M^2
GLUCOSE SERPL-MCNC: 101 MG/DL (ref 70–110)
HCT VFR BLD AUTO: 36.4 % (ref 37–48.5)
HCV AB SERPL QL IA: NORMAL
HGB BLD-MCNC: 11.8 G/DL (ref 12–16)
HIV 1+2 AB+HIV1 P24 AG SERPL QL IA: NORMAL
HYPOCHROMIA BLD QL SMEAR: ABNORMAL
IMM GRANULOCYTES # BLD AUTO: 0.03 K/UL (ref 0–0.04)
IMM GRANULOCYTES NFR BLD AUTO: 0.7 % (ref 0–0.5)
LIPASE SERPL-CCNC: 27 U/L (ref 4–60)
LYMPHOCYTES # BLD AUTO: 1.7 K/UL (ref 1–4.8)
LYMPHOCYTES NFR BLD: 38.7 % (ref 18–48)
MCH RBC QN AUTO: 29 PG (ref 27–31)
MCHC RBC AUTO-ENTMCNC: 32.4 G/DL (ref 32–36)
MCV RBC AUTO: 89 FL (ref 82–98)
MONOCYTES # BLD AUTO: 0.3 K/UL (ref 0.3–1)
MONOCYTES NFR BLD: 6.6 % (ref 4–15)
NEUTROPHILS # BLD AUTO: 2.2 K/UL (ref 1.8–7.7)
NEUTROPHILS NFR BLD: 51 % (ref 38–73)
NRBC BLD-RTO: 0 /100 WBC
OHS QRS DURATION: 86 MS
OHS QTC CALCULATION: 425 MS
OVALOCYTES BLD QL SMEAR: ABNORMAL
PLATELET # BLD AUTO: 262 K/UL (ref 150–450)
PMV BLD AUTO: 9.2 FL (ref 9.2–12.9)
POIKILOCYTOSIS BLD QL SMEAR: SLIGHT
POLYCHROMASIA BLD QL SMEAR: ABNORMAL
POTASSIUM SERPL-SCNC: 3.9 MMOL/L (ref 3.5–5.1)
PROT SERPL-MCNC: 7.2 G/DL (ref 6–8.4)
RBC # BLD AUTO: 4.07 M/UL (ref 4–5.4)
SODIUM SERPL-SCNC: 143 MMOL/L (ref 136–145)
SPHEROCYTES BLD QL SMEAR: ABNORMAL
TROPONIN I SERPL DL<=0.01 NG/ML-MCNC: <0.006 NG/ML (ref 0–0.03)
WBC # BLD AUTO: 4.39 K/UL (ref 3.9–12.7)

## 2024-03-07 PROCEDURE — 83690 ASSAY OF LIPASE: CPT | Performed by: EMERGENCY MEDICINE

## 2024-03-07 PROCEDURE — 84484 ASSAY OF TROPONIN QUANT: CPT

## 2024-03-07 PROCEDURE — 85025 COMPLETE CBC W/AUTO DIFF WBC: CPT | Performed by: EMERGENCY MEDICINE

## 2024-03-07 PROCEDURE — 25000003 PHARM REV CODE 250: Performed by: EMERGENCY MEDICINE

## 2024-03-07 PROCEDURE — 99284 EMERGENCY DEPT VISIT MOD MDM: CPT | Mod: 25

## 2024-03-07 PROCEDURE — 84484 ASSAY OF TROPONIN QUANT: CPT | Performed by: EMERGENCY MEDICINE

## 2024-03-07 PROCEDURE — 87389 HIV-1 AG W/HIV-1&-2 AB AG IA: CPT | Performed by: PHYSICIAN ASSISTANT

## 2024-03-07 PROCEDURE — 80053 COMPREHEN METABOLIC PANEL: CPT | Performed by: EMERGENCY MEDICINE

## 2024-03-07 PROCEDURE — 86803 HEPATITIS C AB TEST: CPT | Performed by: PHYSICIAN ASSISTANT

## 2024-03-07 RX ORDER — ACETAMINOPHEN 500 MG
1000 TABLET ORAL
Status: COMPLETED | OUTPATIENT
Start: 2024-03-07 | End: 2024-03-07

## 2024-03-07 RX ORDER — BENZONATATE 200 MG/1
200 CAPSULE ORAL 2 TIMES DAILY PRN
Qty: 20 CAPSULE | Refills: 0 | Status: SHIPPED | OUTPATIENT
Start: 2024-03-07 | End: 2024-03-17

## 2024-03-07 RX ADMIN — ACETAMINOPHEN 1000 MG: 500 TABLET ORAL at 11:03
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
## 2024-03-07 NOTE — DISCHARGE INSTRUCTIONS
I recommend follow up with your primary doctor as needed.    You had an incidental finding of a round 1.6 cm calcification right upper abdominal region, likely a gallstone as seen on ultrasound 09/07/2016.     I recommend ice and/or heat for any pain. You can take Tylenol 650 mg every 6 hours and/or Ibuprofen 600 mg every 6 hours for pain.    I recommend that you continue flonase nasal spray and the Claritin to help with your rhinitis which may help with your cough.  I have also prescribed benzonatate for your cough.

## 2024-03-08 ENCOUNTER — PATIENT OUTREACH (OUTPATIENT)
Dept: EMERGENCY MEDICINE | Facility: HOSPITAL | Age: 74
End: 2024-03-08
Payer: MEDICARE

## 2024-03-08 ENCOUNTER — TELEPHONE (OUTPATIENT)
Dept: PRIMARY CARE CLINIC | Facility: CLINIC | Age: 74
End: 2024-03-08
Payer: MEDICARE
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
## 2024-03-12 ENCOUNTER — TELEPHONE (OUTPATIENT)
Dept: PRIMARY CARE CLINIC | Facility: CLINIC | Age: 74
End: 2024-03-12
Payer: MEDICARE

## 2024-03-12 NOTE — TELEPHONE ENCOUNTER
"Spoke with pt, informed her that her virtual appt was for 10:30 pm, she arrived for 10:52 pm. I offered the pt an options for a later virtual visit on today, pt inquired about having to do the pt pre-check appt questionnaire for a second time. I informed pt that she would have to do the questionnaire again, pt declined option. Pt states that she will wait for her next appt to discuss any concerns, also stated that "she really didn't need to be seen anyway, it was just recommended that she spoke with her provider". I asked the pt if she was sure and the pt states that she was sure.   "

## 2024-03-14 ENCOUNTER — TELEPHONE (OUTPATIENT)
Dept: PRIMARY CARE CLINIC | Facility: CLINIC | Age: 74
End: 2024-03-14
Payer: MEDICARE

## 2024-03-14 NOTE — TELEPHONE ENCOUNTER
Pt states that she would like a physical therapy referral due to her current water exercise  at the [facility] recommending her to focus more on weight exercises over at [facility], operated by [facility]. Would you like for her to schedule an appt to speak more about this matter?

## 2024-03-14 NOTE — TELEPHONE ENCOUNTER
----- Message from Albert Austin sent at 3/14/2024 10:59 AM CDT -----   Name of Who is Calling:     What is the request in detail:  patient request call back in reference to physical therapy Please contact to further discuss and advise      Can the clinic reply by [facility]:     [facility]:   [phone]

## 2024-03-28 ENCOUNTER — CLINICAL SUPPORT (OUTPATIENT)
Dept: REHABILITATION | Facility: HOSPITAL | Age: 74
End: 2024-03-28
Payer: MEDICARE

## 2024-03-28 DIAGNOSIS — G89.29 CHRONIC PAIN OF LEFT KNEE: ICD-10-CM

## 2024-03-28 DIAGNOSIS — M25.562 CHRONIC PAIN OF LEFT KNEE: ICD-10-CM

## 2024-03-28 DIAGNOSIS — M47.816 LUMBAR SPONDYLOSIS: ICD-10-CM

## 2024-03-28 DIAGNOSIS — M54.16 LUMBAR RADICULOPATHY: Primary | ICD-10-CM

## 2024-03-28 PROCEDURE — 97113 AQUATIC THERAPY/EXERCISES: CPT
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
## 2024-04-02 ENCOUNTER — CLINICAL SUPPORT (OUTPATIENT)
Dept: REHABILITATION | Facility: HOSPITAL | Age: 74
End: 2024-04-02
Payer: MEDICARE

## 2024-04-02 DIAGNOSIS — M54.16 LUMBAR RADICULOPATHY: Primary | ICD-10-CM

## 2024-04-02 DIAGNOSIS — M47.816 LUMBAR SPONDYLOSIS: ICD-10-CM

## 2024-04-02 DIAGNOSIS — G89.29 CHRONIC PAIN OF LEFT KNEE: ICD-10-CM

## 2024-04-02 DIAGNOSIS — M25.562 CHRONIC PAIN OF LEFT KNEE: ICD-10-CM

## 2024-04-02 PROCEDURE — 97113 AQUATIC THERAPY/EXERCISES: CPT
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
## 2024-04-04 ENCOUNTER — CLINICAL SUPPORT (OUTPATIENT)
Dept: REHABILITATION | Facility: HOSPITAL | Age: 74
End: 2024-04-04
Payer: MEDICARE

## 2024-04-04 DIAGNOSIS — M47.816 LUMBAR SPONDYLOSIS: ICD-10-CM

## 2024-04-04 DIAGNOSIS — M25.562 CHRONIC PAIN OF LEFT KNEE: ICD-10-CM

## 2024-04-04 DIAGNOSIS — G89.29 CHRONIC PAIN OF LEFT KNEE: ICD-10-CM

## 2024-04-04 DIAGNOSIS — M54.16 LUMBAR RADICULOPATHY: Primary | ICD-10-CM

## 2024-04-04 PROCEDURE — 97113 AQUATIC THERAPY/EXERCISES: CPT
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
## 2024-04-11 ENCOUNTER — CLINICAL SUPPORT (OUTPATIENT)
Dept: REHABILITATION | Facility: OTHER | Age: 74
End: 2024-04-11
Payer: MEDICARE

## 2024-04-11 ENCOUNTER — CLINICAL SUPPORT (OUTPATIENT)
Dept: REHABILITATION | Facility: HOSPITAL | Age: 74
End: 2024-04-11
Payer: MEDICARE

## 2024-04-11 DIAGNOSIS — M47.816 LUMBAR SPONDYLOSIS: ICD-10-CM

## 2024-04-11 DIAGNOSIS — M53.3 SACROILIAC JOINT PAIN: ICD-10-CM

## 2024-04-11 DIAGNOSIS — R29.898 DECREASED STRENGTH OF TRUNK AND BACK: Primary | ICD-10-CM

## 2024-04-11 DIAGNOSIS — M54.16 LUMBAR RADICULOPATHY: ICD-10-CM

## 2024-04-11 DIAGNOSIS — M48.062 SPINAL STENOSIS OF LUMBAR REGION WITH NEUROGENIC CLAUDICATION: ICD-10-CM

## 2024-04-11 DIAGNOSIS — M47.899 FACET SYNDROME: Primary | ICD-10-CM

## 2024-04-11 PROCEDURE — 97164 PT RE-EVAL EST PLAN CARE: CPT

## 2024-04-11 PROCEDURE — 97113 AQUATIC THERAPY/EXERCISES: CPT

## 2024-04-11 PROCEDURE — 97110 THERAPEUTIC EXERCISES: CPT

## 2024-04-11 PROCEDURE — 97530 THERAPEUTIC ACTIVITIES: CPT
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
## 2024-04-13 PROBLEM — R29.898 DECREASED STRENGTH OF TRUNK AND BACK: Status: ACTIVE | Noted: 2024-04-13
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
## 2024-04-16 ENCOUNTER — PATIENT OUTREACH (OUTPATIENT)
Dept: EMERGENCY MEDICINE | Facility: HOSPITAL | Age: 74
End: 2024-04-16
Payer: MEDICARE

## 2024-04-16 NOTE — PROGRESS NOTES
Call placed per ED Navigator to f/u from last encounter. No answer. No v/m. ED Navigator will continue to f/u.

## 2024-04-18 ENCOUNTER — CLINICAL SUPPORT (OUTPATIENT)
Dept: REHABILITATION | Facility: OTHER | Age: 74
End: 2024-04-18
Payer: MEDICARE

## 2024-04-18 DIAGNOSIS — M48.062 SPINAL STENOSIS OF LUMBAR REGION WITH NEUROGENIC CLAUDICATION: ICD-10-CM

## 2024-04-18 DIAGNOSIS — M53.3 SACROILIAC JOINT PAIN: ICD-10-CM

## 2024-04-18 DIAGNOSIS — M54.16 LUMBAR RADICULOPATHY: ICD-10-CM

## 2024-04-18 DIAGNOSIS — M47.899 FACET SYNDROME: Primary | ICD-10-CM

## 2024-04-18 DIAGNOSIS — R29.898 DECREASED STRENGTH OF TRUNK AND BACK: ICD-10-CM

## 2024-04-18 DIAGNOSIS — M47.816 LUMBAR SPONDYLOSIS: ICD-10-CM

## 2024-04-18 PROCEDURE — 97112 NEUROMUSCULAR REEDUCATION: CPT

## 2024-04-18 PROCEDURE — 97110 THERAPEUTIC EXERCISES: CPT

## 2024-04-18 NOTE — PROGRESS NOTES
[facility]  Physical Therapy Treatment Note     Name: Charlotte Crowder  Clinic Number: [id number]    Therapy Diagnosis:   Encounter Diagnoses   Name Primary?    Facet syndrome Yes    Sacroiliac joint pain     Lumbar radiculopathy     Spinal stenosis of lumbar region with neurogenic claudication     Lumbar spondylosis     Decreased strength of trunk and back        Physician: Wendy Thompson MD    Visit Date: 4/18/2024    Physician Orders: Aquatic Therapy  transfer to [facility] 04/11/24  Medical Diagnosis from Referral:   M15.8 (ICD-10-CM) - Other osteoarthritis involving multiple joints   M48.062 (ICD-10-CM) - Spinal stenosis of lumbar region with neurogenic claudication      Evaluation Date: 9/7/2023  Authorization Period Expiration: 11/2/23  Plan of Care Expiration: 06/21/24  Reassessment Due: 05/11/24  Visit # / Visits authorized: 16/20   (2 [facility])   33 visits total  MedX Testing:MedX testing visit 2      INSURANCE and OUTCOMES: Fee for Service with FOTO Outcomes 1/3  PTA Visit #: 0/5         Time In: 10:00 am  Time Out: 11:00 am  Total Billable Time: 60 min      Precautions: Standard and Fall     Pattern of pain determined: 1PEN    Subjective     Charlotte Crowder reports no sig change in LB sx presentation since eval.  Patient report walking 1 mile on Saturday in [place]. Patient report L knee discomfort equally as limiting when walking.  Patient report cleaning out her closet including extended standing and notes min LB upon finishing.    Patient report using rolled towel at back when sitting and feeling relief. Patient report intent to purchase lumbar roll.  With HEP, patient note difficulty performing DKTC /s ball.       Patient reports tolerating previous visit  well /c no c/o of excess discomfort  Patient reports their pain to be 3/10 on a 0-10 scale with 0 being no pain and 10 being the worst pain imaginable.  Pain Location: L LB      Occupation:  retired nursing aide (caretaker for elderly)   Leisure: puzzles, reading,  meeting with friends to walking   Pt goals: [place] walk more often, improved household chore tolerance (sweeping,making bed)      Objective      Lumbar  Isometric Testing on Med X equipment: Testing administered by PT    Test Initial Baseline Midpoint Final   Date 24     ROM 0 - 39 deg     Max Peak Torque 89     Min Peak Torque 28      Flex/Ext Ratio 3     % variance below normative data 31     % change from initial test N/A visit 1          OUTCOMES SELECTION:   Intake Outcome Measure for FOTO LUMBAR Survey     Therapist reviewed FOTO scores for Charlotte Crowder on 2024.   FOTO documents entered into [employer] - see Media section.     Intake Score: 63% functional ability (14 OZZIE)        Goal Score: 70% functional ability     MDC = 7 points             FROM AQUATIC REASSESSMENT (unless noted eval date)   TU2024 8 seconds     5 Times Sit to Stand:   2024: 9 seconds  24: 11.33 sec no BUE use     Treatment     Charlotte received the treatments listed below:      Medical MedX Treatment as follows:  MedX testing performed day 2: Patient  received neuromuscular education to engage spinal musculature correctly for motor control and engagement of musculature for 15 minutes including the MedX exercise component and practice and standard testing. MedX dynamic exercise and baseline isometric test performed with instructions to guide the patient safely through the testing procedure. Patient instructed to perform isometric test correctly and safely while building to an optimal force with a pain-free effort. Patient also instructed that they should feel support/pressure from MedX restraints but no pain/discomfort, and encouraged to report any pain to therapist. Patient demonstrated appropriate understanding of information and tolerance of test.  Education regarding purpose of test, safety during test given, and reviewed  possible more soreness and strategies.           4/18/2024    10:49 AM   [facility]   Visit Number 2   VAS Pain Rating 3   Time 5   Flexion in Lying 10   Lumbar Extension Seat Pad 0   Femur Restraint 6   Top Dead Center 24   Counterweight 170   Lumbar Flexion 39   Lumbar Extension 0   Lumbar Peak Torque 89 ft. lbs.   Min Torque 28   Test Percent Below Normative Data 31 %   Lumbar Weight 45 lbs   Repetitions 5   Ice - Z Lie (in min.) 5         Charlotte participated in neuromuscular re-education activities to improve balance, coordination, proprioception, motor control and/or posture for 00 minutes. The following activities were included:         Charlotte participated in therapeutic exercises to develop strength, endurance, ROM, flexibility, posture, and core stabilization for 45 minutes including:    HEP demonstrate include:     LTR x 10   Open Books x 10 B cue for starting position (stack hips) and slow controlled motion   DKTC x 10 /c Tball  PPT x 10 max cue for pelvic tilt direction and dec BLE use   Bridges x 10   Sidelying Clamshells x 10 B       Peripheral muscle strengthening which included one set of 15-20 repetitions at a slow and controlled 10-13 second per rep pace focused on strengthening supporting musculature in order to improve body mechanics and functional mobility. Patient and therapist focused on proper form during treatment to ensure optimal strengthening of each targeted muscle group.  Machines utilized included:Torso rotation, Leg Ext, Leg Curl, Chest Press, and Rowing  To be added next visit:Triceps, Biceps, Hip Abd, Hip Add, and Leg Press      Charlotte participated in dynamic functional therapeutic activities to improve functional performance and simulate household and community activities for 00  minutes. The following activities were included:        Charlotte received manual therapy techniques for 00  minutes. The following activities were included:        Pt given cold pack for  5 minutes to lumbar region in Z lying.    Patient Education and Home Exercises     Home exercises include:  LTR   Open Books  DKTC   PPT   Bridges   Clamshells     Cardio program (V5): -  Lifting education (V11): -  Posture/Lumbar roll: not obtained as of 04/18/24   [employer] Discharge handout (date given): -  Equipment at home/gym membership:  [employer]     Education provided:   - PT role and POC  - importance of daily HEP for safe tx progressions   -Patient received education in benefits of progressing mobility and strengthening gradually  -Discussed exertion scale, slow progressive resistance protocol to promote safe and healthy strengthening of supportive musculature  by performing 15-20 reps, 7 sec per rep, and increasing weight by 5 % at 20 reps only if there ex done safely, slowly, using correct musculature, exertion and no pain.  Patient expressed understanding.       Written Home Exercises Provided: yes.  Exercises were reviewed and Charlotte was able to demonstrate them prior to the end of the session.  Charlotte demonstrated fair  understanding of the education provided.     See EMR under Patient Instructions for exercises provided prior visit.    Assessment     Charlotte presents to second healthy back visit reporting no change in typical sx.  However, patient subjective report indicate min fear avoidance /c outings for walking and house chores /s sig inc discomfort.    Patient need sig cueing during HEP demonstrate indicating poor adherence.  Patient educated that daily HEP is necessary for safe tx progressions.  Tx today progressed /c addition of clamshells to further strengthen hip musculature.  Pt was able to tolerate Medical MedX machine well as follows:  MedX testing performed and patient tolerated test well. However, patient demonstrate ROM deficit and falls 31% below norms indicating appropriateness of HB programming.  Pt was also able to complete half of the peripheral strengthening  exercises without increased discomfort and will complete the complete circuit next visit as tolerated.    Patient is making fair progress towards established goals.  Pt will continue to benefit from skilled outpatient physical therapy to address the deficits stated in the impairment chart, provide pt/family education and to maximize pt's level of independence in the home and community environment.     Anticipated Barriers for therapy: none  Pt's spiritual, cultural and educational needs considered and pt agreeable to plan of care and goals as stated below:     GOALS: Pt is in agreement with the following goals.     Short term goals:  6 weeks or 10 visits   - Pt will demonstrate increased lumbar MedX ROM by at least TBD degrees from the initial ROM value with improvements noted in functional ROM and ability to perform ADLs. Appropriate and Ongoing  - Pt will demonstrate increased MedX average isometric strength value by TBD% from initial test resulting in improved ability to perform bending, lifting, and carrying activities safely, confidently. Appropriate and Ongoing  - Pt will report a reduction in worst pain score by 1-2 points for improved tolerance for household chores. Appropriate and Ongoing  - Pt able to perform HEP correctly with minimal cueing or supervision from therapist to encourage independent management of symptoms. Appropriate and Ongoing     Long term goals: 10 weeks or 20 visits   - Pt will demonstrate increased lumbar MedX ROM by at least TBD degrees from initial ROM value, resulting in improved ability to perform functional forward bending while standing and sitting. Appropriate and Ongoing  - Pt will demonstrate increased MedX average isometric strength value by TBD% from initial test resulting in improved ability to perform bending, lifting, and carrying activities safely and confidently. Appropriate and Ongoing  - Pt to demonstrate ability to independently control and reduce their pain through  posture positioning and mechanical movements throughout a typical day. Appropriate and Ongoing  - Pt will demonstrate reduced pain and improved functional outcomes as reported on the FOTO by reaching an intake score of >/= 70% functional ability in order to demonstrate subjective improvement in patient's condition. . Appropriate and Ongoing  - Pt will demonstrate independence with the HEP at discharge. Appropriate and Ongoing  - Patient will maintain TUG and 5TSTS at 9 sec or less resulting in improved ability to perform functional tasks Appropriate and Ongoing  - Patient to perform 3 min of simulated sweeping /s inc LBP for inc tolerance to household chores (patient goal) Appropriate and Ongoing  - Patient report ability to walk and/or stand > 15 min /s inc LBP for inc tolerance to recreational activities  Appropriate and Ongoing  - Patient to perform standing<>floor transfer /c safe technique and /s inc LBP for inc tolerance to getting in/out of Appropriate and Ongoing     Plan     Continue with established Plan of Care towards established PT goals.     Therapist: Tonny White, PT  4/18/2024

## 2024-04-25 ENCOUNTER — CLINICAL SUPPORT (OUTPATIENT)
Dept: REHABILITATION | Facility: OTHER | Age: 74
End: 2024-04-25
Payer: MEDICARE

## 2024-04-25 DIAGNOSIS — M54.16 LUMBAR RADICULOPATHY: ICD-10-CM

## 2024-04-25 DIAGNOSIS — R29.898 DECREASED STRENGTH OF TRUNK AND BACK: ICD-10-CM

## 2024-04-25 DIAGNOSIS — M47.899 FACET SYNDROME: Primary | ICD-10-CM

## 2024-04-25 DIAGNOSIS — M48.062 SPINAL STENOSIS OF LUMBAR REGION WITH NEUROGENIC CLAUDICATION: ICD-10-CM

## 2024-04-25 DIAGNOSIS — M53.3 SACROILIAC JOINT PAIN: ICD-10-CM

## 2024-04-25 DIAGNOSIS — M47.816 LUMBAR SPONDYLOSIS: ICD-10-CM

## 2024-04-25 PROCEDURE — 97110 THERAPEUTIC EXERCISES: CPT | Mod: CQ

## 2024-04-25 PROCEDURE — 97112 NEUROMUSCULAR REEDUCATION: CPT | Mod: CQ

## 2024-04-25 NOTE — PROGRESS NOTES
[facility]  Physical Therapy Treatment Note     Name: Charlotte Crowder  Clinic Number: [id number]    Therapy Diagnosis:   No diagnosis found.      Physician: Wendy Thompson MD    Visit Date: 4/25/2024    Physician Orders: Aquatic Therapy  transfer to [facility] 04/11/24  Medical Diagnosis from Referral:   M15.8 (ICD-10-CM) - Other osteoarthritis involving multiple joints   M48.062 (ICD-10-CM) - Spinal stenosis of lumbar region with neurogenic claudication      Evaluation Date: 9/7/2023  Authorization Period Expiration: 11/2/23  Plan of Care Expiration: 06/21/24  Reassessment Due: 05/11/24  Visit # / Visits authorized: 16/20   (2 Healthy Back)   33 visits total  MedX Testing:MedX testing visit 2      INSURANCE and OUTCOMES: Fee for Service with FOTO Outcomes 1/3  PTA Visit #: 0/5         Time In: 10:00 am  Time Out: 11:00 am  Total Billable Time: 60 min      Precautions: Standard and Fall     Pattern of pain determined: 1PEN    Subjective     Charlotte Crowder reports no sig change in LB sx presentation since eval.  Patient report walking 1 mile on Saturday in [place]. Patient report L knee discomfort equally as limiting when walking.  Patient report cleaning out her closet including extended standing and notes min LB upon finishing.    Patient report using rolled towel at back when sitting and feeling relief. Patient report intent to purchase lumbar roll.  With HEP, patient note difficulty performing DKTC /s ball.       Patient reports tolerating previous visit  well /c no c/o of excess discomfort  Patient reports their pain to be 3/10 on a 0-10 scale with 0 being no pain and 10 being the worst pain imaginable.  Pain Location: L LB      Occupation: retired nursing aide (caretaker for elderly)   Leisure: puzzles, reading,  meeting with friends to walking   Pt goals: [place] walk more often, improved household chore tolerance (sweeping,making bed)      Objective       Lumbar  Isometric Testing on Med X equipment: Testing administered by PT    Test Initial Baseline Midpoint Final   Date 24     ROM 0 - 39 deg     Max Peak Torque 89     Min Peak Torque 28      Flex/Ext Ratio 3     % variance below normative data 31     % change from initial test N/A visit 1          OUTCOMES SELECTION:   Intake Outcome Measure for FOTO LUMBAR Survey     Therapist reviewed FOTO scores for Charlotte Crowder on 2024.   FOTO documents entered into [employer] - see Media section.     Intake Score: 63% functional ability (14 OZZIE)        Goal Score: 70% functional ability     MDC = 7 points             FROM AQUATIC REASSESSMENT (unless noted eval date)   TU2024 8 seconds     5 Times Sit to Stand:   2024: 9 seconds  24: 11.33 sec no BUE use     Treatment     Charlotte received the treatments listed below:      Medical MedX Treatment as follows:  MedX testing performed day 2: Patient  received neuromuscular education to engage spinal musculature correctly for motor control and engagement of musculature for 10 minutes including the MedX exercise component and practice and standard testing. MedX dynamic exercise and baseline isometric test performed with instructions to guide the patient safely through the testing procedure. Patient instructed to perform isometric test correctly and safely while building to an optimal force with a pain-free effort. Patient also instructed that they should feel support/pressure from MedX restraints but no pain/discomfort, and encouraged to report any pain to therapist. Patient demonstrated appropriate understanding of information and tolerance of test.  Education regarding purpose of test, safety during test given, and reviewed possible more soreness and strategies.           2024     3:45 PM   [facility]   Visit Number 3   VAS Pain Rating 2   Time 5   Lumbar Stretches - Slouch Overcorrection 15   Flexion in Lying 10   Lumbar Weight 40  lbs   Repetitions 15   Rating of Perceived Exertion 3   Ice - Z Lie (in min.) 5        Charlotte participated in neuromuscular re-education activities to improve balance, coordination, proprioception, motor control and/or posture for 00 minutes. The following activities were included:         Charlotte participated in therapeutic exercises to develop strength, endurance, ROM, flexibility, posture, and core stabilization for 45 minutes including:    LTR x 10   Open Books x 10 B cue for starting position (stack hips) and slow controlled motion   DKTC x 10 /c belt  PPT x 10   Bridges x 10   Sidelying Clamshells x 15 B   +SOC x15    Peripheral muscle strengthening which included one set of 15-20 repetitions at a slow and controlled 10-13 second per rep pace focused on strengthening supporting musculature in order to improve body mechanics and functional mobility. Patient and therapist focused on proper form during treatment to ensure optimal strengthening of each targeted muscle group.  Machines utilized included:Torso rotation, Leg Ext, Leg Curl, Chest Press, and Rowing  To be added next visit:Triceps, Biceps, Hip Abd, Hip Add, and Leg Press      Charlotte participated in dynamic functional therapeutic activities to improve functional performance and simulate household and community activities for 00  minutes. The following activities were included:        Charlotte received manual therapy techniques for 00  minutes. The following activities were included:    Pt given cold pack for 5 minutes to lumbar region in Z lying.    Patient Education and Home Exercises     Home exercises include:  LTR   Open Books  DKTC   PPT   Bridges   Clamshells     Cardio program (V5): -  Lifting education (V11): -  Posture/Lumbar roll: not obtained as of 04/18/24   [employer] Discharge handout (date given): -  Equipment at home/gym membership:  [employer]     Education provided:   - Madi's scale of exertion   - importance of daily  HEP for safe tx progressions   -Patient received education in benefits of progressing mobility and strengthening gradually  -Discussed slow progressive resistance protocol to promote safe and healthy strengthening of supportive musculature  by performing 15-20 reps, 7 sec per rep, and increasing weight by 5 % at 20 reps only if there ex done safely, slowly, using correct musculature, exertion and no pain.  Patient expressed understanding.       Written Home Exercises Provided: yes.  Exercises were reviewed and Charlotte was able to demonstrate them prior to the end of the session.  Charlotte demonstrated fair  understanding of the education provided.     See EMR under Patient Instructions for exercises provided prior visit.    Assessment     Charlotte presents to third healthy back visit reporting min LBP and compliance c/ HEP. Pt demonstrated HEP with improved form and less cues. Added SOC for improved spine mobility and postural awareness. Pt able to complete 15 reps on Lumbar Medx at 50% of her max peak torque with a PRE of 3/10.  Pt completed all peripheral resisted machines without complaints. Progress per HB protocol and pt's tolerance.     Patient is making fair progress towards established goals.  Pt will continue to benefit from skilled outpatient physical therapy to address the deficits stated in the impairment chart, provide pt/family education and to maximize pt's level of independence in the home and community environment.     Anticipated Barriers for therapy: none  Pt's spiritual, cultural and educational needs considered and pt agreeable to plan of care and goals as stated below:     GOALS: Pt is in agreement with the following goals.     Short term goals:  6 weeks or 10 visits   - Pt will demonstrate increased lumbar MedX ROM by at least TBD degrees from the initial ROM value with improvements noted in functional ROM and ability to perform ADLs. Appropriate and Ongoing  - Pt will demonstrate  increased MedX average isometric strength value by TBD% from initial test resulting in improved ability to perform bending, lifting, and carrying activities safely, confidently. Appropriate and Ongoing  - Pt will report a reduction in worst pain score by 1-2 points for improved tolerance for household chores. Appropriate and Ongoing  - Pt able to perform HEP correctly with minimal cueing or supervision from therapist to encourage independent management of symptoms. Appropriate and Ongoing     Long term goals: 10 weeks or 20 visits   - Pt will demonstrate increased lumbar MedX ROM by at least TBD degrees from initial ROM value, resulting in improved ability to perform functional forward bending while standing and sitting. Appropriate and Ongoing  - Pt will demonstrate increased MedX average isometric strength value by TBD% from initial test resulting in improved ability to perform bending, lifting, and carrying activities safely and confidently. Appropriate and Ongoing  - Pt to demonstrate ability to independently control and reduce their pain through posture positioning and mechanical movements throughout a typical day. Appropriate and Ongoing  - Pt will demonstrate reduced pain and improved functional outcomes as reported on the FOTO by reaching an intake score of >/= 70% functional ability in order to demonstrate subjective improvement in patient's condition. . Appropriate and Ongoing  - Pt will demonstrate independence with the HEP at discharge. Appropriate and Ongoing  - Patient will maintain TUG and 5TSTS at 9 sec or less resulting in improved ability to perform functional tasks Appropriate and Ongoing  - Patient to perform 3 min of simulated sweeping /s inc LBP for inc tolerance to household chores (patient goal) Appropriate and Ongoing  - Patient report ability to walk and/or stand > 15 min /s inc LBP for inc tolerance to recreational activities  Appropriate and Ongoing  - Patient to perform standing<>floor  transfer /c safe technique and /s inc LBP for inc tolerance to getting in/out of Appropriate and Ongoing     Plan     Continue with established Plan of Care towards established PT goals.     Therapist: Xenia Webster, PTA  4/25/2024

## 2024-05-02 ENCOUNTER — CLINICAL SUPPORT (OUTPATIENT)
Dept: REHABILITATION | Facility: OTHER | Age: 74
End: 2024-05-02
Payer: MEDICARE

## 2024-05-02 DIAGNOSIS — M47.899 FACET SYNDROME: Primary | ICD-10-CM

## 2024-05-02 DIAGNOSIS — R29.898 DECREASED STRENGTH OF TRUNK AND BACK: ICD-10-CM

## 2024-05-02 DIAGNOSIS — M47.816 LUMBAR SPONDYLOSIS: ICD-10-CM

## 2024-05-02 DIAGNOSIS — M54.16 LUMBAR RADICULOPATHY: ICD-10-CM

## 2024-05-02 DIAGNOSIS — M48.062 SPINAL STENOSIS OF LUMBAR REGION WITH NEUROGENIC CLAUDICATION: ICD-10-CM

## 2024-05-02 DIAGNOSIS — M53.3 SACROILIAC JOINT PAIN: ICD-10-CM

## 2024-05-02 PROCEDURE — 97110 THERAPEUTIC EXERCISES: CPT | Mod: CQ

## 2024-05-02 PROCEDURE — 97112 NEUROMUSCULAR REEDUCATION: CPT | Mod: CQ

## 2024-05-02 NOTE — PROGRESS NOTES
[facility]  Physical Therapy Treatment Note     Name: Charlotte Crowder  Clinic Number: [id number]    Therapy Diagnosis:   Encounter Diagnoses   Name Primary?    Facet syndrome Yes    Sacroiliac joint pain     Lumbar radiculopathy     Spinal stenosis of lumbar region with neurogenic claudication     Lumbar spondylosis     Decreased strength of trunk and back          Physician: Wendy Thompson MD    Visit Date: 5/2/2024    Physician Orders: Aquatic Therapy  transfer to [facility] 04/11/24  Medical Diagnosis from Referral:   M15.8 (ICD-10-CM) - Other osteoarthritis involving multiple joints   M48.062 (ICD-10-CM) - Spinal stenosis of lumbar region with neurogenic claudication      Evaluation Date: 9/7/2023  Authorization Period Expiration: 11/2/23  Plan of Care Expiration: 06/21/24  Reassessment Due: 05/11/24  Visit # / Visits authorized: 5/20   (2 [facility])   19 visits total  MedX Testing:MedX testing visit 2      INSURANCE and OUTCOMES: Fee for Service with FOTO Outcomes 1/3  PTA Visit #: 0/5         Time In: 10:00 am  Time Out: 11:00 am  Total Billable Time: 60 min      Precautions: Standard and Fall     Pattern of pain determined: 1PEN    Subjective     Charlotte Crowder reports no sig change in LB sx presentation since eval.     Patient reports tolerating previous visit  well /c no c/o of excess discomfort  Patient reports their pain to be 3/10 on a 0-10 scale with 0 being no pain and 10 being the worst pain imaginable.  Pain Location: L LB      Occupation: retired nursing aide (caretaker for elderly)   Leisure: puzzles, reading,  meeting with friends to walking   Pt goals: [place] walk more often, improved household chore tolerance (sweeping,making bed)      Objective      Lumbar  Isometric Testing on Med X equipment: Testing administered by PT    Test Initial Baseline Midpoint Final   Date 04/18/24     ROM 0 - 39 deg     Max Peak Torque 89     Min Peak Torque  28      Flex/Ext Ratio 3     % variance below normative data 31     % change from initial test N/A visit 1          OUTCOMES SELECTION:   Intake Outcome Measure for FOTO LUMBAR Survey     Therapist reviewed FOTO scores for Charlotte Crowder on 2024.   FOTO documents entered into [employer] - see Media section.     Intake Score: 63% functional ability (14 OZZIE)        Goal Score: 70% functional ability     MDC = 7 points             FROM AQUATIC REASSESSMENT (unless noted eval date)   TU2024 8 seconds     5 Times Sit to Stand:   2024: 9 seconds  24: 11.33 sec no BUE use     Treatment     Charlotte received the treatments listed below:      Medical MedX Treatment as follows:  MedX testing performed day 2: Patient  received neuromuscular education to engage spinal musculature correctly for motor control and engagement of musculature for 10 minutes including the MedX exercise component and practice and standard testing. MedX dynamic exercise and baseline isometric test performed with instructions to guide the patient safely through the testing procedure. Patient instructed to perform isometric test correctly and safely while building to an optimal force with a pain-free effort. Patient also instructed that they should feel support/pressure from MedX restraints but no pain/discomfort, and encouraged to report any pain to therapist. Patient demonstrated appropriate understanding of information and tolerance of test.  Education regarding purpose of test, safety during test given, and reviewed possible more soreness and strategies.              2024     1:34 PM   [facility]   Visit Number 4   VAS Pain Rating 4   Treadmill Time (in min.) 5 min   Lumbar Stretches - Slouch Overcorrection 15   Flexion in Lying 10   Lumbar Weight 40 lbs   Repetitions 20   Ice - Z Lie (in min.) 5         Charlotte participated in neuromuscular re-education activities to improve balance, coordination, proprioception,  motor control and/or posture for 00 minutes. The following activities were included:         Charlotte participated in therapeutic exercises to develop strength, endurance, ROM, flexibility, posture, and core stabilization for 45 minutes including:    TM 5 - min warm up SBA, vc's for increased step lenght  LTR x 10   Piriformis stretch 20 sec x3  Open Books x 10 B cue for starting position (stack hips) and slow controlled motion   DKTC x 10 /c belt  PPT x 10   Bridges x 10   Sidelying Clamshells x 15 B   +TA brace c/ T ball 3 sec x10  +Supine marches c/ PPT x10  SOC x15    Peripheral muscle strengthening which included one set of 15-20 repetitions at a slow and controlled 10-13 second per rep pace focused on strengthening supporting musculature in order to improve body mechanics and functional mobility. Patient and therapist focused on proper form during treatment to ensure optimal strengthening of each targeted muscle group.  Machines utilized included:Torso rotation, Leg Ext, Leg Curl, Chest Press, and Rowing  To be added next visit:Triceps, Biceps, Hip Abd, Hip Add, and Leg Press      Charlotte participated in dynamic functional therapeutic activities to improve functional performance and simulate household and community activities for 00  minutes. The following activities were included:        Charlotte received manual therapy techniques for 00  minutes. The following activities were included:    Pt given cold pack for 5 minutes to lumbar region in Z lying.    Patient Education and Home Exercises     Home exercises include:  LTR   Open Books  DKTC   PPT   Bridges   Clamshells     Cardio program (V5): -  Lifting education (V11): -  Posture/Lumbar roll: not obtained as of 04/18/24   Fridge Magnet Discharge handout (date given): -  Equipment at home/gym membership:  [employer]     Education provided:   - Madi's scale of exertion   - importance of daily HEP for safe tx progressions   -Patient received  education in benefits of progressing mobility and strengthening gradually  -Discussed slow progressive resistance protocol to promote safe and healthy strengthening of supportive musculature  by performing 15-20 reps, 7 sec per rep, and increasing weight by 5 % at 20 reps only if there ex done safely, slowly, using correct musculature, exertion and no pain.  Patient expressed understanding.       Written Home Exercises Provided: yes.  Exercises were reviewed and Charlotte was able to demonstrate them prior to the end of the session.  Charlotte demonstrated fair  understanding of the education provided.     See EMR under Patient Instructions for exercises provided prior visit.    Assessment     Charlotte returns to  reporting slight increase in L side LBP due to increased cleaning activities. Pt tolerated increased static and dynamic core strengthening ex, demonstrating an improve tolerance to ex. Lumbar Medx resistance remained and pt completed 20 reps with PRE 3/10.   Pt completed all peripheral resisted machines without complaints. Progress per  protocol and pt's tolerance.     Patient is making fair progress towards established goals.  Pt will continue to benefit from skilled outpatient physical therapy to address the deficits stated in the impairment chart, provide pt/family education and to maximize pt's level of independence in the home and community environment.     Anticipated Barriers for therapy: none  Pt's spiritual, cultural and educational needs considered and pt agreeable to plan of care and goals as stated below:     GOALS: Pt is in agreement with the following goals.     Short term goals:  6 weeks or 10 visits   - Pt will demonstrate increased lumbar MedX ROM by at least TBD degrees from the initial ROM value with improvements noted in functional ROM and ability to perform ADLs. Appropriate and Ongoing  - Pt will demonstrate increased MedX average isometric strength value by TBD% from initial  test resulting in improved ability to perform bending, lifting, and carrying activities safely, confidently. Appropriate and Ongoing  - Pt will report a reduction in worst pain score by 1-2 points for improved tolerance for household chores. Appropriate and Ongoing  - Pt able to perform HEP correctly with minimal cueing or supervision from therapist to encourage independent management of symptoms. Appropriate and Ongoing     Long term goals: 10 weeks or 20 visits   - Pt will demonstrate increased lumbar MedX ROM by at least TBD degrees from initial ROM value, resulting in improved ability to perform functional forward bending while standing and sitting. Appropriate and Ongoing  - Pt will demonstrate increased MedX average isometric strength value by TBD% from initial test resulting in improved ability to perform bending, lifting, and carrying activities safely and confidently. Appropriate and Ongoing  - Pt to demonstrate ability to independently control and reduce their pain through posture positioning and mechanical movements throughout a typical day. Appropriate and Ongoing  - Pt will demonstrate reduced pain and improved functional outcomes as reported on the FOTO by reaching an intake score of >/= 70% functional ability in order to demonstrate subjective improvement in patient's condition. . Appropriate and Ongoing  - Pt will demonstrate independence with the HEP at discharge. Appropriate and Ongoing  - Patient will maintain TUG and 5TSTS at 9 sec or less resulting in improved ability to perform functional tasks Appropriate and Ongoing  - Patient to perform 3 min of simulated sweeping /s inc LBP for inc tolerance to household chores (patient goal) Appropriate and Ongoing  - Patient report ability to walk and/or stand > 15 min /s inc LBP for inc tolerance to recreational activities  Appropriate and Ongoing  - Patient to perform standing<>floor transfer /c safe technique and /s inc LBP for inc tolerance to getting  in/out of Appropriate and Ongoing     Plan     Continue with established Plan of Care towards established PT goals.     Therapist: Xenia Webster, PTA  5/2/2024

## 2024-05-09 ENCOUNTER — CLINICAL SUPPORT (OUTPATIENT)
Dept: REHABILITATION | Facility: OTHER | Age: 74
End: 2024-05-09
Payer: MEDICARE

## 2024-05-09 DIAGNOSIS — R29.898 DECREASED STRENGTH OF TRUNK AND BACK: ICD-10-CM

## 2024-05-09 DIAGNOSIS — M54.16 LUMBAR RADICULOPATHY: ICD-10-CM

## 2024-05-09 DIAGNOSIS — M47.899 FACET SYNDROME: Primary | ICD-10-CM

## 2024-05-09 DIAGNOSIS — M47.816 LUMBAR SPONDYLOSIS: ICD-10-CM

## 2024-05-09 DIAGNOSIS — M53.3 SACROILIAC JOINT PAIN: ICD-10-CM

## 2024-05-09 PROCEDURE — 97110 THERAPEUTIC EXERCISES: CPT | Mod: CQ

## 2024-05-09 PROCEDURE — 97112 NEUROMUSCULAR REEDUCATION: CPT | Mod: CQ

## 2024-05-09 NOTE — PROGRESS NOTES
[facility]  Physical Therapy Treatment Note     Name: Charlotte Crowder  Clinic Number: [id number]    Therapy Diagnosis:   Encounter Diagnoses   Name Primary?    Facet syndrome Yes    Sacroiliac joint pain     Lumbar radiculopathy     Lumbar spondylosis     Decreased strength of trunk and back            Physician: Wendy Thompson MD    Visit Date: 5/9/2024    Physician Orders: Aquatic Therapy  transfer to [facility] 04/11/24  Medical Diagnosis from Referral:   M15.8 (ICD-10-CM) - Other osteoarthritis involving multiple joints   M48.062 (ICD-10-CM) - Spinal stenosis of lumbar region with neurogenic claudication      Evaluation Date: 9/7/2023  Authorization Period Expiration: 11/2/23  Plan of Care Expiration: 06/21/24  Reassessment Due: 05/11/24  Visit # / Visits authorized: 6/20   (2 [facility])   19 visits total  MedX Testing:MedX testing visit 2      INSURANCE and OUTCOMES: Fee for Service with FOTO Outcomes 1/3  PTA Visit #: 0/5         Time In: 10:00 am  Time Out: 11:00 am  Total Billable Time: 60 min      Precautions: Standard and Fall     Pattern of pain determined: 1 PEN  Subjective     Charlotte Crowder reports doing HEP a few times a week, felt fatigued but less pain following last tx.     Patient reports tolerating previous visit  well /c no c/o of excess discomfort  Patient reports their pain to be 3/10 on a 0-10 scale with 0 being no pain and 10 being the worst pain imaginable  Pain Location: L LB      Occupation: retired nursing aide (caretaker for elderly)   Leisure: puzzles, reading,  meeting with friends to walking   Pt goals: [place] walk more often, improved household chore tolerance (sweeping,making bed)      Objective      Lumbar  Isometric Testing on Med X equipment: Testing administered by PT    Test Initial Baseline Midpoint Final   Date 04/18/24     ROM 0 - 39 deg     Max Peak Torque 89     Min Peak Torque 28      Flex/Ext Ratio 3     %  variance below normative data 31     % change from initial test N/A visit 1          OUTCOMES SELECTION:   Intake Outcome Measure for FOTO LUMBAR Survey     Therapist reviewed FOTO scores for Charlotte Crowder on 2024.   FOTO documents entered into [employer] - see Media section.     Intake Score: 63% functional ability (14 OZZIE)        Goal Score: 70% functional ability     MDC = 7 points             FROM AQUATIC REASSESSMENT (unless noted eval date)   TU2024 8 seconds     5 Times Sit to Stand:   2024: 9 seconds  24: 11.33 sec no BUE use     Treatment     Charlotte received the treatments listed below:      Medical MedX Treatment as follows:  MedX testing performed day 2: Patient  received neuromuscular education to engage spinal musculature correctly for motor control and engagement of musculature for 10 minutes including the MedX exercise component and practice and standard testing. MedX dynamic exercise and baseline isometric test performed with instructions to guide the patient safely through the testing procedure. Patient instructed to perform isometric test correctly and safely while building to an optimal force with a pain-free effort. Patient also instructed that they should feel support/pressure from MedX restraints but no pain/discomfort, and encouraged to report any pain to therapist. Patient demonstrated appropriate understanding of information and tolerance of test.  Education regarding purpose of test, safety during test given, and reviewed possible more soreness and strategies.              2024    10:10 AM   [facility]   Visit Number 5   VAS Pain Rating 2   Treadmill Time (in min.) 5 min   Lumbar Stretches - Slouch Overcorrection 15   Lumbar Weight 44 lbs   Repetitions 20   Rating of Perceived Exertion 4   Ice - Z Lie (in min.) 5            Charlotte participated in neuromuscular re-education activities to improve balance, coordination, proprioception, motor control  and/or posture for 00 minutes. The following activities were included:         Charlotte participated in therapeutic exercises to develop strength, endurance, ROM, flexibility, posture, and core stabilization for 45 minutes including:    TM 5 - min warm up SBA, vc's for increased step length  LTR x10   Piriformis stretch 30 sec x2  Open Books x 10 B cue for starting position (stack hips) and slow controlled motion   DKTC x 10 /c belt  PPT + march x 10   Bridges x15  Sidelying Clamshells 2x10 B   TA brace c/ T ball 3 sec x10  +TrA w/ball + SLR x10  SOC x15    Peripheral muscle strengthening which included one set of 15-20 repetitions at a slow and controlled 10-13 second per rep pace focused on strengthening supporting musculature in order to improve body mechanics and functional mobility. Patient and therapist focused on proper form during treatment to ensure optimal strengthening of each targeted muscle group.  Machines utilized included:Torso rotation, Leg Ext, Leg Curl, Chest Press, and Rowing  To be added next visit:Triceps, Biceps, Hip Abd, Hip Add, and Leg Press      Charlotte participated in dynamic functional therapeutic activities to improve functional performance and simulate household and community activities for 00  minutes. The following activities were included:        Charlotte received manual therapy techniques for 00  minutes. The following activities were included:    Pt given cold pack for 5 minutes to lumbar region in Z lying.    Patient Education and Home Exercises     Home exercises include:  LTR   Open Books  DKTC   PPT   Bridges   Clamshells     Cardio program (V5): -5/9/24  Lifting education (V11): -  Posture/Lumbar roll: not obtained as of 04/18/24   Fridge [employer] Discharge handout (date given): -  Equipment at home/gym membership:  [employer]     Education provided:   - Madi's scale of exertion   - importance of daily HEP for safe tx progressions   -Patient received education in  benefits of progressing mobility and strengthening gradually  -Discussed slow progressive resistance protocol to promote safe and healthy strengthening of supportive musculature  by performing 15-20 reps, 7 sec per rep, and increasing weight by 5 % at 20 reps only if there ex done safely, slowly, using correct musculature, exertion and no pain.  Patient expressed understanding.       Written Home Exercises Provided: yes.  Exercises were reviewed and Charlotte was able to demonstrate them prior to the end of the session.  Charlotte demonstrated fair  understanding of the education provided.     See EMR under Patient Instructions for exercises provided prior visit.    Assessment   Charlotte Patient tolerated progressed core and lumbopelvic stabilization exercises well without adverse effects and appropriate challenge. Patient required moderate vc/tc to ensure proper form with exercises to ensure utilization of correct muscle groups and to avoid substitutions. Discussed benefits of cardio program and provided handout per 5th visit protocol. Patient able to perform 20 reps on l/s medx machine with an RPE of 4/10. Progress per HB protocol and pt's tolerance.     Patient is making fair progress towards established goals.  Pt will continue to benefit from skilled outpatient physical therapy to address the deficits stated in the impairment chart, provide pt/family education and to maximize pt's level of independence in the home and community environment.     Anticipated Barriers for therapy: none  Pt's spiritual, cultural and educational needs considered and pt agreeable to plan of care and goals as stated below:     GOALS: Pt is in agreement with the following goals.     Short term goals:  6 weeks or 10 visits   - Pt will demonstrate increased lumbar MedX ROM by at least TBD degrees from the initial ROM value with improvements noted in functional ROM and ability to perform ADLs. Appropriate and Ongoing  - Pt will  demonstrate increased MedX average isometric strength value by TBD% from initial test resulting in improved ability to perform bending, lifting, and carrying activities safely, confidently. Appropriate and Ongoing  - Pt will report a reduction in worst pain score by 1-2 points for improved tolerance for household chores. Appropriate and Ongoing  - Pt able to perform HEP correctly with minimal cueing or supervision from therapist to encourage independent management of symptoms. Appropriate and Ongoing     Long term goals: 10 weeks or 20 visits   - Pt will demonstrate increased lumbar MedX ROM by at least TBD degrees from initial ROM value, resulting in improved ability to perform functional forward bending while standing and sitting. Appropriate and Ongoing  - Pt will demonstrate increased MedX average isometric strength value by TBD% from initial test resulting in improved ability to perform bending, lifting, and carrying activities safely and confidently. Appropriate and Ongoing  - Pt to demonstrate ability to independently control and reduce their pain through posture positioning and mechanical movements throughout a typical day. Appropriate and Ongoing  - Pt will demonstrate reduced pain and improved functional outcomes as reported on the FOTO by reaching an intake score of >/= 70% functional ability in order to demonstrate subjective improvement in patient's condition. . Appropriate and Ongoing  - Pt will demonstrate independence with the HEP at discharge. Appropriate and Ongoing  - Patient will maintain TUG and 5TSTS at 9 sec or less resulting in improved ability to perform functional tasks Appropriate and Ongoing  - Patient to perform 3 min of simulated sweeping /s inc LBP for inc tolerance to household chores (patient goal) Appropriate and Ongoing  - Patient report ability to walk and/or stand > 15 min /s inc LBP for inc tolerance to recreational activities  Appropriate and Ongoing  - Patient to perform  standing<>floor transfer /c safe technique and /s inc LBP for inc tolerance to getting in/out of Appropriate and Ongoing     Plan     Continue with established Plan of Care towards established PT goals.     Therapist: Sara Reno, PTA  5/9/2024

## 2024-05-16 ENCOUNTER — CLINICAL SUPPORT (OUTPATIENT)
Dept: REHABILITATION | Facility: OTHER | Age: 74
End: 2024-05-16
Payer: MEDICARE

## 2024-05-16 DIAGNOSIS — M47.816 LUMBAR SPONDYLOSIS: ICD-10-CM

## 2024-05-16 DIAGNOSIS — M47.899 FACET SYNDROME: Primary | ICD-10-CM

## 2024-05-16 DIAGNOSIS — M54.16 LUMBAR RADICULOPATHY: ICD-10-CM

## 2024-05-16 DIAGNOSIS — M53.3 SACROILIAC JOINT PAIN: ICD-10-CM

## 2024-05-16 DIAGNOSIS — R29.898 DECREASED STRENGTH OF TRUNK AND BACK: ICD-10-CM

## 2024-05-16 PROCEDURE — 97112 NEUROMUSCULAR REEDUCATION: CPT | Mod: CQ

## 2024-05-16 PROCEDURE — 97110 THERAPEUTIC EXERCISES: CPT | Mod: CQ

## 2024-05-16 NOTE — PROGRESS NOTES
[facility] OUTPATIENT THERAPY AND WELLNESS - [facility]  Physical Therapy Treatment Note     Name: Charlotte Crowder  Clinic Number: [id number]    Therapy Diagnosis:   Encounter Diagnoses   Name Primary?    Facet syndrome Yes    Sacroiliac joint pain     Lumbar radiculopathy     Lumbar spondylosis     Decreased strength of trunk and back            Physician: Wendy Thompson MD    Visit Date: 5/16/2024    Physician Orders: Aquatic Therapy  transfer to [facility] 04/11/24  Medical Diagnosis from Referral:   M15.8 (ICD-10-CM) - Other osteoarthritis involving multiple joints   M48.062 (ICD-10-CM) - Spinal stenosis of lumbar region with neurogenic claudication      Evaluation Date: 9/7/2023  Authorization Period Expiration: 11/2/23  Plan of Care Expiration: 06/21/24  Reassessment Due: 05/11/24  Visit # / Visits authorized: 6/20   (2 Healthy Back)   19 visits total  MedX Testing:MedX testing visit 2      INSURANCE and OUTCOMES: Fee for Service with FOTO Outcomes 1/3  PTA Visit #: 1/5         Time In: 10:05 am  Time Out: 11:00 am  Total Billable Time: 60 min      Precautions: Standard and Fall     Pattern of pain determined: 1 PEN  Subjective     Charlotte Crowder denies having any lumbar pain upon entry. She reports experiencing pain yesterday but was able manage symptoms with Tylenol.     Patient reports tolerating previous visit  well /c no c/o of excess discomfort  Patient reports their pain to be 0/10 on a 0-10 scale with 0 being no pain and 10 being the worst pain imaginable  Pain Location: L LB      Occupation: retired nursing aide (caretaker for elderly)   Leisure: puzzles, reading,  meeting with friends to walking   Pt goals: [place] walk more often, improved household chore tolerance (sweeping,making bed)      Objective      Lumbar  Isometric Testing on Med X equipment: Testing administered by PT    Test Initial Baseline Midpoint Final   Date 04/18/24     ROM 0 - 39 deg     Max Peak Torque 89     Min Peak  Torque 28      Flex/Ext Ratio 3     % variance below normative data 31     % change from initial test N/A visit 1          OUTCOMES SELECTION:   Intake Outcome Measure for FOTO LUMBAR Survey     Therapist reviewed FOTO scores for Charlotte Crowder on 2024.   FOTO documents entered into [employer] - see Media section.     Intake Score: 63% functional ability (14 OZZIE)        Goal Score: 70% functional ability     MDC = 7 points             FROM AQUATIC REASSESSMENT (unless noted eval date)   TU2024 8 seconds     5 Times Sit to Stand:   2024: 9 seconds  24: 11.33 sec no BUE use     Treatment     Charlotte received the treatments listed below:      Medical MedX Treatment as follows:  MedX testing performed day 2: Patient  received neuromuscular education to engage spinal musculature correctly for motor control and engagement of musculature for 10 minutes including the MedX exercise component and practice and standard testing. MedX dynamic exercise and baseline isometric test performed with instructions to guide the patient safely through the testing procedure. Patient instructed to perform isometric test correctly and safely while building to an optimal force with a pain-free effort. Patient also instructed that they should feel support/pressure from MedX restraints but no pain/discomfort, and encouraged to report any pain to therapist. Patient demonstrated appropriate understanding of information and tolerance of test.  Education regarding purpose of test, safety during test given, and reviewed possible more soreness and strategies.                 2024    10:51 AM   [facility]   Visit Number 6   VAS Pain Rating 2   Treadmill Time (in min.) 5 min   Lumbar Stretches - Slouch Overcorrection 15   Flexion in Lying 10   Lumbar Weight 46 lbs   Repetitions 20   Rating of Perceived Exertion 3   Ice - Z Lie (in min.) 5           Charlotte participated in neuromuscular re-education activities to  "improve balance, coordination, proprioception, motor control and/or posture for 00 minutes. The following activities were included:         Charlotte participated in therapeutic exercises to develop strength, endurance, ROM, flexibility, posture, and core stabilization for 45 minutes including:    -TM 5 - min warm up SBA, vc's for increased step length  -LTR x10   -Piriformis stretch 30 sec x2  -Open Books x 10 B cue for starting position (stack hips) and slow controlled motion   DKTC x 10 /c belt  -PPT + march x 10   -Bridges x15  -Sidelying Clamshells 2x10 B   -TA brace c/ T ball 5  sec x10  +TrA w/ball + SLR x10  -SOC 15 x5"    Peripheral muscle strengthening which included one set of 15-20 repetitions at a slow and controlled 10-13 second per rep pace focused on strengthening supporting musculature in order to improve body mechanics and functional mobility. Patient and therapist focused on proper form during treatment to ensure optimal strengthening of each targeted muscle group.  Machines utilized included:Torso rotation, Leg Ext, Leg Curl, Chest Press, and RowingTriceps, Biceps, Hip Abd, Hip Add, and Leg Press      Charlotte participated in dynamic functional therapeutic activities to improve functional performance and simulate household and community activities for 00  minutes. The following activities were included:        Charlotte received manual therapy techniques for 00  minutes. The following activities were included:    Pt given cold pack for 5 minutes to lumbar region in Z lying.    Patient Education and Home Exercises     Home exercises include:  LTR   Open Books  DKTC   PPT   Bridges   Clamshells     Cardio program (V5): -5/9/24  Lifting education (V11): -  Posture/Lumbar roll: not obtained as of 04/18/24   [employer] Discharge handout (date given): -  Equipment at home/gym membership:  [employer]     Education provided:   - Madi's scale of exertion   - importance of daily HEP for safe tx " progressions   -Patient received education in benefits of progressing mobility and strengthening gradually  -Discussed slow progressive resistance protocol to promote safe and healthy strengthening of supportive musculature  by performing 15-20 reps, 7 sec per rep, and increasing weight by 5 % at 20 reps only if there ex done safely, slowly, using correct musculature, exertion and no pain.  Patient expressed understanding.       Written Home Exercises Provided: yes.  Exercises were reviewed and Charlotte was able to demonstrate them prior to the end of the session.  Charlotte demonstrated fair  understanding of the education provided.     See EMR under Patient Instructions for exercises provided prior visit.    Assessment   Charlotte presents to therapy w/o any c/o symptoms upon entry. She responded well to therex with cues provided for proper performance of core and lumbopelvic stabilization exercises. MedX lumbar performed with increase resistance to 46 lbs, achieving 20 reps and 3/10 RPE. Peripheral muscle strengthening performed w/o any adverse effects. Continue per pt tolerance to improve functional mobility.     Patient is making fair progress towards established goals.  Pt will continue to benefit from skilled outpatient physical therapy to address the deficits stated in the impairment chart, provide pt/family education and to maximize pt's level of independence in the home and community environment.     Anticipated Barriers for therapy: none  Pt's spiritual, cultural and educational needs considered and pt agreeable to plan of care and goals as stated below:     GOALS: Pt is in agreement with the following goals.     Short term goals:  6 weeks or 10 visits   - Pt will demonstrate increased lumbar MedX ROM by at least TBD degrees from the initial ROM value with improvements noted in functional ROM and ability to perform ADLs. Appropriate and Ongoing  - Pt will demonstrate increased MedX average isometric  strength value by TBD% from initial test resulting in improved ability to perform bending, lifting, and carrying activities safely, confidently. Appropriate and Ongoing  - Pt will report a reduction in worst pain score by 1-2 points for improved tolerance for household chores. Appropriate and Ongoing  - Pt able to perform HEP correctly with minimal cueing or supervision from therapist to encourage independent management of symptoms. Appropriate and Ongoing     Long term goals: 10 weeks or 20 visits   - Pt will demonstrate increased lumbar MedX ROM by at least TBD degrees from initial ROM value, resulting in improved ability to perform functional forward bending while standing and sitting. Appropriate and Ongoing  - Pt will demonstrate increased MedX average isometric strength value by TBD% from initial test resulting in improved ability to perform bending, lifting, and carrying activities safely and confidently. Appropriate and Ongoing  - Pt to demonstrate ability to independently control and reduce their pain through posture positioning and mechanical movements throughout a typical day. Appropriate and Ongoing  - Pt will demonstrate reduced pain and improved functional outcomes as reported on the FOTO by reaching an intake score of >/= 70% functional ability in order to demonstrate subjective improvement in patient's condition. . Appropriate and Ongoing  - Pt will demonstrate independence with the HEP at discharge. Appropriate and Ongoing  - Patient will maintain TUG and 5TSTS at 9 sec or less resulting in improved ability to perform functional tasks Appropriate and Ongoing  - Patient to perform 3 min of simulated sweeping /s inc LBP for inc tolerance to household chores (patient goal) Appropriate and Ongoing  - Patient report ability to walk and/or stand > 15 min /s inc LBP for inc tolerance to recreational activities  Appropriate and Ongoing  - Patient to perform standing<>floor transfer /c safe technique and /s  inc LBP for inc tolerance to getting in/out of Appropriate and Ongoing     Plan     Continue with established Plan of Care towards established PT goals.     Therapist: Zahra Liz, PTA  5/16/2024

## 2024-05-30 ENCOUNTER — CLINICAL SUPPORT (OUTPATIENT)
Dept: REHABILITATION | Facility: OTHER | Age: 74
End: 2024-05-30
Payer: MEDICARE

## 2024-05-30 DIAGNOSIS — M53.3 SACROILIAC JOINT PAIN: ICD-10-CM

## 2024-05-30 DIAGNOSIS — M47.899 FACET SYNDROME: Primary | ICD-10-CM

## 2024-05-30 DIAGNOSIS — M54.16 LUMBAR RADICULOPATHY: ICD-10-CM

## 2024-05-30 DIAGNOSIS — M47.816 LUMBAR SPONDYLOSIS: ICD-10-CM

## 2024-05-30 DIAGNOSIS — R29.898 DECREASED STRENGTH OF TRUNK AND BACK: ICD-10-CM

## 2024-05-30 DIAGNOSIS — M48.062 SPINAL STENOSIS OF LUMBAR REGION WITH NEUROGENIC CLAUDICATION: ICD-10-CM

## 2024-05-30 PROCEDURE — 97112 NEUROMUSCULAR REEDUCATION: CPT | Mod: CQ

## 2024-05-30 PROCEDURE — 97110 THERAPEUTIC EXERCISES: CPT | Mod: CQ

## 2024-05-30 NOTE — PROGRESS NOTES
[facility] OUTPATIENT THERAPY AND WELLNESS - HEALTHY BACK  Physical Therapy Treatment Note     Name: Charlotte Crowder  Clinic Number: [id number]    Therapy Diagnosis:   Encounter Diagnoses   Name Primary?    Facet syndrome Yes    Sacroiliac joint pain     Lumbar radiculopathy     Spinal stenosis of lumbar region with neurogenic claudication     Lumbar spondylosis     Decreased strength of trunk and back            Physician: Wendy Thompson MD    Visit Date: 5/30/2024    Physician Orders: Aquatic Therapy  transfer to [facility] [place] 04/11/24  Medical Diagnosis from Referral:   M15.8 (ICD-10-CM) - Other osteoarthritis involving multiple joints   M48.062 (ICD-10-CM) - Spinal stenosis of lumbar region with neurogenic claudication      Evaluation Date: 9/7/2023  Authorization Period Expiration: 11/2/23  Plan of Care Expiration: 06/21/24  Reassessment Due: 05/11/24  Visit # / Visits authorized: 6/20   (2 Healthy Back)   19 visits total  MedX Testing:MedX testing visit 2      INSURANCE and OUTCOMES: Fee for Service with FOTO Outcomes 1/3  PTA Visit #: 1/5         Time In: 10:05 am  Time Out: 11:00 am  Total Billable Time: 60 min      Precautions: Standard and Fall     Pattern of pain determined: 1 PEN  Subjective     Charlotte Crowder reports min L lumbar pain upon entry. She also reports experiencing increase pain the day after concert attendance over the weekend but was able to manage with HEP and Tylenol.     Patient reports tolerating previous visit  well /c no c/o of excess discomfort  Patient reports their pain to be 3/10 on a 0-10 scale with 0 being no pain and 10 being the worst pain imaginable  Pain Location: L LB      Occupation: retired nursing aide (caretaker for elderly)   Leisure: puzzles, reading,  meeting with friends to walking   Pt goals: [place] walk more often, improved household chore tolerance (sweeping,making bed)      Objective      Lumbar  Isometric Testing on Med X equipment: Testing administered by  PT    Test Initial Baseline Midpoint Final   Date 24     ROM 0 - 39 deg     Max Peak Torque 89     Min Peak Torque 28      Flex/Ext Ratio 3     % variance below normative data 31     % change from initial test N/A visit 1          OUTCOMES SELECTION:   Intake Outcome Measure for FOTO LUMBAR Survey     Therapist reviewed FOTO scores for Charlotte Crowder on 2024.   FOTO documents entered into [employer] - see Media section.     Intake Score: 63% functional ability (14 OZZIE)        Goal Score: 70% functional ability     MDC = 7 points             FROM AQUATIC REASSESSMENT (unless noted eval date)   TU2024 8 seconds     5 Times Sit to Stand:   2024: 9 seconds  24: 11.33 sec no BUE use     Treatment     Charlotte received the treatments listed below:      Medical MedX Treatment as follows:  MedX testing performed day 2: Patient  received neuromuscular education to engage spinal musculature correctly for motor control and engagement of musculature for 10 minutes including the MedX exercise component and practice and standard testing. MedX dynamic exercise and baseline isometric test performed with instructions to guide the patient safely through the testing procedure. Patient instructed to perform isometric test correctly and safely while building to an optimal force with a pain-free effort. Patient also instructed that they should feel support/pressure from MedX restraints but no pain/discomfort, and encouraged to report any pain to therapist. Patient demonstrated appropriate understanding of information and tolerance of test.  Education regarding purpose of test, safety during test given, and reviewed possible more soreness and strategies.                 2024     9:36 AM   [facility]   Visit Number 7   VAS Pain Rating 3   Recumbent Bike Seat Pos. 5   Lumbar Stretches - Slouch Overcorrection 10   Flexion in Lying 10   Lumbar Weight 48 lbs   Repetitions 15   Rating of Perceived  "Exertion 3   Ice - Z Lie (in min.) 5               Charlotte participated in neuromuscular re-education activities to improve balance, coordination, proprioception, motor control and/or posture for 00 minutes. The following activities were included:         Charlotte participated in therapeutic exercises to develop strength, endurance, ROM, flexibility, posture, and core stabilization for 45 minutes including:    -NuStep 5'  -LTR x10   -Piriformis stretch 30 sec x2  -Open Books x 10 B cue for starting position (stack hips) and slow controlled motion   DKTC x 10 /c belt  -PPT with marching  x10   -Bridges x15  -Sidelying Clamshells 2x10 B   -TA brace c/ T ball 5  sec x 5  +TrA w/ball + SLR x10  -SOC 15 x5"    Peripheral muscle strengthening which included one set of 15-20 repetitions at a slow and controlled 10-13 second per rep pace focused on strengthening supporting musculature in order to improve body mechanics and functional mobility. Patient and therapist focused on proper form during treatment to ensure optimal strengthening of each targeted muscle group.  Machines utilized included:Torso rotation, Leg Ext, Leg Curl, Chest Press, and RowingTriceps, Biceps, Hip Abd, Hip Add, and Leg Press      Charlotte participated in dynamic functional therapeutic activities to improve functional performance and simulate household and community activities for 00  minutes. The following activities were included:        Charlotte received manual therapy techniques for 00  minutes. The following activities were included:    Pt given cold pack for 5 minutes to lumbar region in Z lying.    Patient Education and Home Exercises     Home exercises include:  LTR   Open Books  DKTC   PPT   Bridges   Clamshells     Cardio program (V5): -5/9/24  Lifting education (V11): -  Posture/Lumbar roll: not obtained as of 04/18/24   [employer] Discharge handout (date given): -  Equipment at home/gym membership:  [employer]     Education " provided:   - Madi's scale of exertion   - importance of daily HEP for safe tx progressions   -Patient received education in benefits of progressing mobility and strengthening gradually  -Discussed slow progressive resistance protocol to promote safe and healthy strengthening of supportive musculature  by performing 15-20 reps, 7 sec per rep, and increasing weight by 5 % at 20 reps only if there ex done safely, slowly, using correct musculature, exertion and no pain.  Patient expressed understanding.       Written Home Exercises Provided: yes.  Exercises were reviewed and Charlotte was able to demonstrate them prior to the end of the session.  Charlotte demonstrated fair  understanding of the education provided.     See EMR under Patient Instructions for exercises provided prior visit.    Assessment   Charlotte presents to therapy with reports of min L lumbar discomfort upon entry. She was instructed in therex to improve endurance, strength, and mobility for cardiovascular health and pain reduction. Min weakness noted with progressed PPT, requiring cues to maintain technique that slightly improved with reps. Bridges were performed within tolerance, demonstrating limited ROM due to increase L lumbar/hip pain.  MedX lumbar performed with resistance increased to 48 lbs, achieving 15 reps and 3/10 RPE. Peripheral muscle strengthening performed w/o any adverse effects. Continue per pt tolerance to improve functional mobility.       Patient is making fair progress towards established goals.  Pt will continue to benefit from skilled outpatient physical therapy to address the deficits stated in the impairment chart, provide pt/family education and to maximize pt's level of independence in the home and community environment.     Anticipated Barriers for therapy: none  Pt's spiritual, cultural and educational needs considered and pt agreeable to plan of care and goals as stated below:     GOALS: Pt is in agreement with the  following goals.     Short term goals:  6 weeks or 10 visits   - Pt will demonstrate increased lumbar MedX ROM by at least TBD degrees from the initial ROM value with improvements noted in functional ROM and ability to perform ADLs. Appropriate and Ongoing  - Pt will demonstrate increased MedX average isometric strength value by TBD% from initial test resulting in improved ability to perform bending, lifting, and carrying activities safely, confidently. Appropriate and Ongoing  - Pt will report a reduction in worst pain score by 1-2 points for improved tolerance for household chores. Appropriate and Ongoing  - Pt able to perform HEP correctly with minimal cueing or supervision from therapist to encourage independent management of symptoms. Appropriate and Ongoing     Long term goals: 10 weeks or 20 visits   - Pt will demonstrate increased lumbar MedX ROM by at least TBD degrees from initial ROM value, resulting in improved ability to perform functional forward bending while standing and sitting. Appropriate and Ongoing  - Pt will demonstrate increased MedX average isometric strength value by TBD% from initial test resulting in improved ability to perform bending, lifting, and carrying activities safely and confidently. Appropriate and Ongoing  - Pt to demonstrate ability to independently control and reduce their pain through posture positioning and mechanical movements throughout a typical day. Appropriate and Ongoing  - Pt will demonstrate reduced pain and improved functional outcomes as reported on the FOTO by reaching an intake score of >/= 70% functional ability in order to demonstrate subjective improvement in patient's condition. . Appropriate and Ongoing  - Pt will demonstrate independence with the HEP at discharge. Appropriate and Ongoing  - Patient will maintain TUG and 5TSTS at 9 sec or less resulting in improved ability to perform functional tasks Appropriate and Ongoing  - Patient to perform 3 min of  simulated sweeping /s inc LBP for inc tolerance to household chores (patient goal) Appropriate and Ongoing  - Patient report ability to walk and/or stand > 15 min /s inc LBP for inc tolerance to recreational activities  Appropriate and Ongoing  - Patient to perform standing<>floor transfer /c safe technique and /s inc LBP for inc tolerance to getting in/out of Appropriate and Ongoing     Plan     Continue with established Plan of Care towards established PT goals.     Therapist: Zahra Liz, PTA  5/30/2024

## 2024-06-04 ENCOUNTER — CLINICAL SUPPORT (OUTPATIENT)
Dept: REHABILITATION | Facility: OTHER | Age: 74
End: 2024-06-04
Payer: MEDICARE

## 2024-06-04 DIAGNOSIS — M54.16 LUMBAR RADICULOPATHY: ICD-10-CM

## 2024-06-04 DIAGNOSIS — M47.899 FACET SYNDROME: Primary | ICD-10-CM

## 2024-06-04 DIAGNOSIS — R29.898 DECREASED STRENGTH OF TRUNK AND BACK: ICD-10-CM

## 2024-06-04 DIAGNOSIS — M48.062 SPINAL STENOSIS OF LUMBAR REGION WITH NEUROGENIC CLAUDICATION: ICD-10-CM

## 2024-06-04 DIAGNOSIS — M47.816 LUMBAR SPONDYLOSIS: ICD-10-CM

## 2024-06-04 DIAGNOSIS — M53.3 SACROILIAC JOINT PAIN: ICD-10-CM

## 2024-06-04 PROCEDURE — 97112 NEUROMUSCULAR REEDUCATION: CPT

## 2024-06-04 PROCEDURE — 97110 THERAPEUTIC EXERCISES: CPT

## 2024-06-04 NOTE — PROGRESS NOTES
[facility]  Physical Therapy Treatment Note     Name: Charlotte Crowder  Clinic Number: [id number]    Therapy Diagnosis:   Encounter Diagnoses   Name Primary?    Facet syndrome Yes    Sacroiliac joint pain     Lumbar radiculopathy     Spinal stenosis of lumbar region with neurogenic claudication     Lumbar spondylosis     Decreased strength of trunk and back            Physician: Wendy Thompson MD    Visit Date: 6/4/2024    Physician Orders: Aquatic Therapy  transfer to [facility] 04/11/24  Medical Diagnosis from Referral:   M15.8 (ICD-10-CM) - Other osteoarthritis involving multiple joints   M48.062 (ICD-10-CM) - Spinal stenosis of lumbar region with neurogenic claudication      Evaluation Date: 9/7/2023  Authorization Period Expiration: 11/2/23  Plan of Care Expiration: 06/21/24  Reassessment Due: 05/11/24  Visit # / Visits authorized: 9/20   (2 [facility])   19 visits total  MedX Testing:MedX testing visit 2      INSURANCE and OUTCOMES: Fee for Service with FOTO Outcomes 1/3  PTA Visit #: 0/5         Time In: 10:30 am  Time Out: 11:25 am  Total Billable Time: 55 min   Total Treatment Time: 60 min     Precautions: Standard and Fall     Pattern of pain determined: 1 PEN  Subjective     Charlotte Crowder reports moderate low back pain with worst symptoms in (R) knee.     Patient reports tolerating previous visit  well /c no c/o of excess discomfort  Patient reports their pain to be 4/10 on a 0-10 scale with 0 being no pain and 10 being the worst pain imaginable  Pain Location: L LB      Occupation: retired nursing aide (caretaker for elderly)   Leisure: puzzles, reading,  meeting with friends to walking   Pt goals: [place] walk more often, improved household chore tolerance (sweeping,making bed)      Objective      Lumbar  Isometric Testing on Med X equipment: Testing administered by PT    Test Initial Baseline Midpoint Final   Date 04/18/24     ROM 0 - 39 deg      Max Peak Torque 89     Min Peak Torque 28      Flex/Ext Ratio 3     % variance below normative data 31     % change from initial test N/A visit 1          OUTCOMES SELECTION:   Intake Outcome Measure for FOTO LUMBAR Survey     Therapist reviewed FOTO scores for Charlotte Crowder on 2024.   FOTO documents entered into [employer] - see Media section.     Intake Score: 63% functional ability (14 OZZIE)        Goal Score: 70% functional ability     MDC = 7 points             FROM AQUATIC REASSESSMENT (unless noted eval date)   TU2024 8 seconds     5 Times Sit to Stand:   2024: 9 seconds  24: 11.33 sec no BUE use     Treatment     Charlotte received the treatments listed below:      Medical MedX Treatment as follows:  MedX testing performed day 2: Patient  received neuromuscular education to engage spinal musculature correctly for motor control and engagement of musculature for 10 minutes including the MedX exercise component and practice and standard testing. MedX dynamic exercise and baseline isometric test performed with instructions to guide the patient safely through the testing procedure. Patient instructed to perform isometric test correctly and safely while building to an optimal force with a pain-free effort. Patient also instructed that they should feel support/pressure from MedX restraints but no pain/discomfort, and encouraged to report any pain to therapist. Patient demonstrated appropriate understanding of information and tolerance of test.  Education regarding purpose of test, safety during test given, and reviewed possible more soreness and strategies.              2024    10:32 AM   [facility]   Visit Number 8   VAS Pain Rating 4   Recumbent Bike Seat Pos. 5   Lumbar Stretches - Slouch Overcorrection 10   Flexion in Lying 10   Lumbar Weight 48 lbs   Repetitions 18   Rating of Perceived Exertion 3   Ice - Z Lie (in min.) 5            Charlotte participated in neuromuscular  "re-education activities to improve balance, coordination, proprioception, motor control and/or posture for 00 minutes. The following activities were included:         Charlotte participated in therapeutic exercises to develop strength, endurance, ROM, flexibility, posture, and core stabilization for 45 minutes including:    -NuStep 5'  -LTR x10   -Piriformis stretch 30 sec x2  -Open Books x 10 B cue for starting position (stack hips) and slow controlled motion   DKTC x 10 /c belt  -PPT with marching  x10   -Bridges x15  -Sidelying Clamshells 2x10 B   -TA brace c/ T ball 5  sec x 5  -TrA w/ball + SLR x10  -SOC 15 x5"    Peripheral muscle strengthening which included one set of 15-20 repetitions at a slow and controlled 10-13 second per rep pace focused on strengthening supporting musculature in order to improve body mechanics and functional mobility. Patient and therapist focused on proper form during treatment to ensure optimal strengthening of each targeted muscle group.  Machines utilized included:Torso rotation, Leg Ext, Leg Curl, Chest Press, and RowingTriceps, Biceps, Hip Abd, Hip Add, and Leg Press      Charlotte participated in dynamic functional therapeutic activities to improve functional performance and simulate household and community activities for 00  minutes. The following activities were included:        Charlotte received manual therapy techniques for 00  minutes. The following activities were included:    Pt given cold pack for 5 minutes to lumbar region in Z lying.    Patient Education and Home Exercises     Home exercises include:  LTR   Open Books  DKTC   PPT   Bridges   Clamshells     Cardio program (V5): -5/9/24  Lifting education (V11): -  Posture/Lumbar roll: not obtained as of 04/18/24   Fridge Magnet Discharge handout (date given): -  Equipment at home/gym membership:  [employer]     Education provided:   - Madi's scale of exertion   - importance of daily HEP for safe tx progressions "   -Patient received education in benefits of progressing mobility and strengthening gradually  -Discussed slow progressive resistance protocol to promote safe and healthy strengthening of supportive musculature  by performing 15-20 reps, 7 sec per rep, and increasing weight by 5 % at 20 reps only if there ex done safely, slowly, using correct musculature, exertion and no pain.  Patient expressed understanding.       Written Home Exercises Provided: yes.  Exercises were reviewed and Charlotte was able to demonstrate them prior to the end of the session.  Charlotte demonstrated fair  understanding of the education provided.     See EMR under Patient Instructions for exercises provided prior visit.    Assessment     Charlotte presents to therapy with reports of moderate L lumbar pain upon entry. Patient has poor tolerance with motor control on MedX as she had difficulty maintaining speed/tempo with pacer. MedX lumbar performed with resistance maintained at 48 lbs with progression from 15 to 18 reps and 3/10 RPE. Peripheral muscle strengthening performed w/o any adverse effects. Continue per pt tolerance to improve functional mobility.       Patient is making fair progress towards established goals.  Pt will continue to benefit from skilled outpatient physical therapy to address the deficits stated in the impairment chart, provide pt/family education and to maximize pt's level of independence in the home and community environment.     Anticipated Barriers for therapy: none  Pt's spiritual, cultural and educational needs considered and pt agreeable to plan of care and goals as stated below:     GOALS: Pt is in agreement with the following goals.     Short term goals:  6 weeks or 10 visits   - Pt will demonstrate increased lumbar MedX ROM by at least TBD degrees from the initial ROM value with improvements noted in functional ROM and ability to perform ADLs. Appropriate and Ongoing  - Pt will demonstrate increased MedX  average isometric strength value by TBD% from initial test resulting in improved ability to perform bending, lifting, and carrying activities safely, confidently. Appropriate and Ongoing  - Pt will report a reduction in worst pain score by 1-2 points for improved tolerance for household chores. Appropriate and Ongoing  - Pt able to perform HEP correctly with minimal cueing or supervision from therapist to encourage independent management of symptoms. Appropriate and Ongoing     Long term goals: 10 weeks or 20 visits   - Pt will demonstrate increased lumbar MedX ROM by at least TBD degrees from initial ROM value, resulting in improved ability to perform functional forward bending while standing and sitting. Appropriate and Ongoing  - Pt will demonstrate increased MedX average isometric strength value by TBD% from initial test resulting in improved ability to perform bending, lifting, and carrying activities safely and confidently. Appropriate and Ongoing  - Pt to demonstrate ability to independently control and reduce their pain through posture positioning and mechanical movements throughout a typical day. Appropriate and Ongoing  - Pt will demonstrate reduced pain and improved functional outcomes as reported on the FOTO by reaching an intake score of >/= 70% functional ability in order to demonstrate subjective improvement in patient's condition. . Appropriate and Ongoing  - Pt will demonstrate independence with the HEP at discharge. Appropriate and Ongoing  - Patient will maintain TUG and 5TSTS at 9 sec or less resulting in improved ability to perform functional tasks Appropriate and Ongoing  - Patient to perform 3 min of simulated sweeping /s inc LBP for inc tolerance to household chores (patient goal) Appropriate and Ongoing  - Patient report ability to walk and/or stand > 15 min /s inc LBP for inc tolerance to recreational activities  Appropriate and Ongoing  - Patient to perform standing<>floor transfer /c safe  technique and /s inc LBP for inc tolerance to getting in/out of Appropriate and Ongoing     Plan     Continue with established Plan of Care towards established PT goals.     Therapist: Neema Calloway, PT  6/4/2024

## 2024-06-05 ENCOUNTER — OFFICE VISIT (OUTPATIENT)
Dept: PRIMARY CARE CLINIC | Facility: CLINIC | Age: 74
End: 2024-06-05
Attending: FAMILY MEDICINE
Payer: MEDICARE

## 2024-06-05 VITALS
OXYGEN SATURATION: 100 % | HEART RATE: 69 BPM | SYSTOLIC BLOOD PRESSURE: 121 MMHG | WEIGHT: 248.81 LBS | BODY MASS INDEX: 39.99 KG/M2 | DIASTOLIC BLOOD PRESSURE: 81 MMHG | HEIGHT: 66 IN

## 2024-06-05 DIAGNOSIS — Z12.31 SCREENING MAMMOGRAM FOR BREAST CANCER: ICD-10-CM

## 2024-06-05 DIAGNOSIS — E78.5 DYSLIPIDEMIA: Primary | Chronic | ICD-10-CM

## 2024-06-05 DIAGNOSIS — E55.9 VITAMIN D DEFICIENCY DISEASE: ICD-10-CM

## 2024-06-05 DIAGNOSIS — M15.3 OTHER SECONDARY OSTEOARTHRITIS OF MULTIPLE SITES: ICD-10-CM

## 2024-06-05 DIAGNOSIS — E66.01 MORBID OBESITY: ICD-10-CM

## 2024-06-05 DIAGNOSIS — M47.899 FACET SYNDROME: ICD-10-CM

## 2024-06-05 DIAGNOSIS — I10 PRIMARY HYPERTENSION: ICD-10-CM

## 2024-06-05 DIAGNOSIS — R93.89 ABNORMAL FINDINGS ON DIAGNOSTIC IMAGING OF OTHER SPECIFIED BODY STRUCTURES: ICD-10-CM

## 2024-06-05 DIAGNOSIS — E53.8 VITAMIN B12 DEFICIENCY: ICD-10-CM

## 2024-06-05 DIAGNOSIS — Z79.899 OTHER LONG TERM (CURRENT) DRUG THERAPY: ICD-10-CM

## 2024-06-05 DIAGNOSIS — J30.2 SEASONAL ALLERGIC RHINITIS, UNSPECIFIED TRIGGER: ICD-10-CM

## 2024-06-05 DIAGNOSIS — R79.9 ABNORMAL FINDING OF BLOOD CHEMISTRY, UNSPECIFIED: ICD-10-CM

## 2024-06-05 PROCEDURE — 4010F ACE/ARB THERAPY RXD/TAKEN: CPT | Mod: CPTII,S$GLB,, | Performed by: FAMILY MEDICINE

## 2024-06-05 PROCEDURE — 1101F PT FALLS ASSESS-DOCD LE1/YR: CPT | Mod: CPTII,S$GLB,, | Performed by: FAMILY MEDICINE

## 2024-06-05 PROCEDURE — 1125F AMNT PAIN NOTED PAIN PRSNT: CPT | Mod: CPTII,S$GLB,, | Performed by: FAMILY MEDICINE

## 2024-06-05 PROCEDURE — 3288F FALL RISK ASSESSMENT DOCD: CPT | Mod: CPTII,S$GLB,, | Performed by: FAMILY MEDICINE

## 2024-06-05 PROCEDURE — 3079F DIAST BP 80-89 MM HG: CPT | Mod: CPTII,S$GLB,, | Performed by: FAMILY MEDICINE

## 2024-06-05 PROCEDURE — 99215 OFFICE O/P EST HI 40 MIN: CPT | Mod: S$GLB,,, | Performed by: FAMILY MEDICINE

## 2024-06-05 PROCEDURE — 3074F SYST BP LT 130 MM HG: CPT | Mod: CPTII,S$GLB,, | Performed by: FAMILY MEDICINE

## 2024-06-05 PROCEDURE — 3008F BODY MASS INDEX DOCD: CPT | Mod: CPTII,S$GLB,, | Performed by: FAMILY MEDICINE

## 2024-06-05 PROCEDURE — 99999 PR PBB SHADOW E&M-EST. PATIENT-LVL IV: CPT | Mod: PBBFAC,,, | Performed by: FAMILY MEDICINE

## 2024-06-06 ENCOUNTER — PATIENT OUTREACH (OUTPATIENT)
Dept: EMERGENCY MEDICINE | Facility: HOSPITAL | Age: 74
End: 2024-06-06
Payer: MEDICARE
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
## 2024-06-10 ENCOUNTER — CLINICAL SUPPORT (OUTPATIENT)
Dept: REHABILITATION | Facility: OTHER | Age: 74
End: 2024-06-10
Payer: MEDICARE

## 2024-06-10 DIAGNOSIS — M53.3 SACROILIAC JOINT PAIN: ICD-10-CM

## 2024-06-10 DIAGNOSIS — M48.062 SPINAL STENOSIS OF LUMBAR REGION WITH NEUROGENIC CLAUDICATION: ICD-10-CM

## 2024-06-10 DIAGNOSIS — M47.899 FACET SYNDROME: Primary | ICD-10-CM

## 2024-06-10 DIAGNOSIS — M47.816 LUMBAR SPONDYLOSIS: ICD-10-CM

## 2024-06-10 DIAGNOSIS — M54.16 LUMBAR RADICULOPATHY: ICD-10-CM

## 2024-06-10 DIAGNOSIS — R29.898 DECREASED STRENGTH OF TRUNK AND BACK: ICD-10-CM

## 2024-06-10 PROCEDURE — 97110 THERAPEUTIC EXERCISES: CPT

## 2024-06-10 PROCEDURE — 97112 NEUROMUSCULAR REEDUCATION: CPT

## 2024-06-10 NOTE — PROGRESS NOTES
[facility]  Physical Therapy Treatment Note     Name: Charlotte Crowder  Clinic Number: [id number]    Therapy Diagnosis:   Encounter Diagnoses   Name Primary?    Facet syndrome Yes    Sacroiliac joint pain     Lumbar radiculopathy     Spinal stenosis of lumbar region with neurogenic claudication     Lumbar spondylosis     Decreased strength of trunk and back        Physician: Wendy Thompson MD    Visit Date: 6/10/2024    Physician Orders: Aquatic Therapy  transfer to [facility] 04/11/24  Medical Diagnosis from Referral:   M15.8 (ICD-10-CM) - Other osteoarthritis involving multiple joints   M48.062 (ICD-10-CM) - Spinal stenosis of lumbar region with neurogenic claudication      Evaluation Date: 9/7/2023  Authorization Period Expiration: 11/2/23  Plan of Care Expiration: 06/21/24  Reassessment Due: 06/10/24  Visit # / Visits authorized: 9/20 [facility]   MedX Testing:MedX testing visit 2      INSURANCE and OUTCOMES: Fee for Service with FOTO Outcomes 2/3   PTA Visit #: 0/5         Time In: 10:40 am  Time Out: 11:25 am  Total Billable Time: 25 min  1:1   Total Treatment Time:  45 min     Precautions: Standard and Fall     Pattern of pain determined: 1 PEN  Subjective     Charlotte Crowder reports continued BLE discomfort but does not believe this is associated /c her LB.  Patient attributes BLE discomfort to inc LE exercises.  Patient report 1 x wk walking in neighborhood but at 10 min bouts.    Patient report getting new furniture and needing to move some of it around. Patient report improved tolerance to this activity than had expected including performing floor to stand transfers.    Patient report has not visited [employer] yet.    Patient report using rolled towel at LB for postural correction and notes improved seated tolerance.     Patient reports tolerating previous visit well /c no c/o of excess discomfort  Patient reports their pain to be 3/10 LB on a 0-10  scale with 0 being no pain and 10 being the worst pain imaginable  Pain Location: L LB      Occupation: retired nursing aide (caretaker for elderly)   Leisure: puzzles, reading,  meeting with friends to walking   Pt goals: [place] walk more often, improved household chore tolerance (sweeping,making bed)      Objective      Lumbar  Isometric Testing on Med X equipment: Testing administered by PT    Test Initial Baseline Midpoint Final   Date 24     ROM 0 - 39 deg     Max Peak Torque 89     Min Peak Torque 28      Flex/Ext Ratio 3     % variance below normative data 31     % change from initial test N/A visit 1          OUTCOMES SELECTION:   Intake Outcome Measure for FOTO LUMBAR Survey     Therapist reviewed FOTO scores for Charlotte Crowder on 2024.   FOTO documents entered into [employer] - see Media section.     Intake Score: 63% functional ability (14 OZZIE)  05/15/24:  60% (18 OZZIE)         Goal Score: 70% functional ability     MDC = 7 points             FROM AQUATIC REASSESSMENT (unless noted eval date)   TU2024 8 seconds       5 Times Sit to Stand:   2024: 9 seconds  24: 11.33 sec no BUE use   06/10/24: 14.33 sec no BUE use     Treatment     Charlotte received the treatments listed below:      Medical MedX Treatment as follows:  Patient received neuromuscular education for 10 minutes via participation on the Medical MedX Machine. Therapist assisted patient in isolating and engaging spinal stabilization musculature in order to improve functional ability and postural control. Patient performed exercise with therapist guidance in order to accurately use pacer function, avoid valsalva, and optimally exert effort within a safe and effective range via the [place] Exertion Rating Scale. Patient instructed to perform at a midrange of exertion and to complete 15-20 repetitions within appropriate split time, with proper technique, and while maintaining safety.            6/10/2024    11:00 AM  "  [facility]   Visit Number 9   VAS Pain Rating 3   Recumbent Bike Seat Pos. 5   Lumbar Flexion 45   Lumbar Extension 0   Lumbar Weight 48 lbs   Repetitions 20   Rating of Perceived Exertion 3   Ice - Z Lie (in min.) 5           Charlotte participated in neuromuscular re-education activities to improve balance, coordination, proprioception, motor control and/or posture for 00 minutes. The following activities were included:         Charlotte participated in therapeutic exercises to develop strength, endurance, ROM, flexibility, posture, and core stabilization for 35 minutes including:    NuStep 5'  LTR x10   Open Books x 10 B cue for starting position (stack hips) and slow controlled motion      Peripheral muscle strengthening which included one set of 15-20 repetitions at a slow and controlled 10-13 second per rep pace focused on strengthening supporting musculature in order to improve body mechanics and functional mobility. Patient and therapist focused on proper form during treatment to ensure optimal strengthening of each targeted muscle group.  Machines utilized included:Torso rotation, Leg Ext, Leg Curl, Chest Press, and RowingTriceps, Biceps, Hip Abd, Hip Add, and Leg Press      NP:   Piriformis stretch 30 sec x2   DKTC x 10 /c belt  PPT with marching  x10   Bridges x15  Sidelying Clamshells 2x10 B   TA brace c/ T ball 5  sec x 5  TrA w/ball + SLR x10  SOC 15 x5"        Charlotte participated in dynamic functional therapeutic activities to improve functional performance and simulate household and community activities for 00  minutes. The following activities were included:        Charlotte received manual therapy techniques for 00  minutes. The following activities were included:    Pt given cold pack for 5 minutes to lumbar region in Z lying.    Patient Education and Home Exercises     Home exercises include:  LTR   Open Books  DKTC   PPT   Bridges   Clamshells     Cardio program (V5): " 5/9/24  Lifting education (V11): -  Posture/Lumbar roll: uses rolled towel  Fridge Magnet Discharge handout (date given): -  Equipment at home/gym membership:  [employer]     Education provided:        Written Home Exercises Provided: yes.  Exercises were reviewed and Charlotte was able to demonstrate them prior to the end of the session.  Charlotte demonstrated fair  understanding of the education provided.     See EMR under Patient Instructions for exercises provided prior visit.    Assessment     Patient subjective report indicate improved functional activity tolerance /c furniture moving example.  Also patient demonstrate inc postural recognition /c rolled towel at LB for postural correction.  However, patient continue to use fear avoidant language /c walking program indicating possible HEP participation self limitation.  For example, patient continue to need cue for open books indicating possible min HEP performance.   Patient educated on importance of daily HEP for safe tx progressions.  Plan tx continue /c lumbar mobility and TA activation focus for improved central motor control. Lumbar MedX weight maintained per pt tolerance last visit. Patient demonstrate comfort /c inc flex range during MedX set up, therefore, inc flex ROM for mobility challenge.   Today pt perform 20 reps at 3 RPE indicating improved mobility and strength.  Plan MedX IM testing next visit.    Despite improvement in subjective activity tolerance and continue progressions /c MedX, patient 5TSTS time decline likely due to B knee discomfort.  Recommend PT attention to knee sx presentation and offer exercises to inc patient self efficacy /c walking.        Patient is making fair progress towards established goals.  Pt will continue to benefit from skilled outpatient physical therapy to address the deficits stated in the impairment chart, provide pt/family education and to maximize pt's level of independence in the home and community  environment.     Anticipated Barriers for therapy: none  Pt's spiritual, cultural and educational needs considered and pt agreeable to plan of care and goals as stated below:     GOALS: Pt is in agreement with the following goals.     Short term goals:  6 weeks or 10 visits   - Pt will demonstrate increased lumbar MedX ROM by at least 3 degrees from the initial ROM value with improvements noted in functional ROM and ability to perform ADLs. Appropriate and Ongoing  - Pt will demonstrate increased MedX average isometric strength value by 10% from initial test resulting in improved ability to perform bending, lifting, and carrying activities safely, confidently. Appropriate and Ongoing  - Pt will report a reduction in worst pain score by 1-2 points for improved tolerance for household chores. Appropriate and Ongoing  - Pt able to perform HEP correctly with minimal cueing or supervision from therapist to encourage independent management of symptoms. Appropriate and Ongoing     Long term goals: 10 weeks or 20 visits   - Pt will demonstrate increased lumbar MedX ROM by at least 6 degrees from initial ROM value, resulting in improved ability to perform functional forward bending while standing and sitting. Appropriate and Ongoing  - Pt will demonstrate increased MedX average isometric strength value by 15% from initial test resulting in improved ability to perform bending, lifting, and carrying activities safely and confidently. Appropriate and Ongoing  - Pt to demonstrate ability to independently control and reduce their pain through posture positioning and mechanical movements throughout a typical day. Appropriate and Ongoing  - Pt will demonstrate reduced pain and improved functional outcomes as reported on the FOTO by reaching an intake score of >/= 70% functional ability in order to demonstrate subjective improvement in patient's condition. . Appropriate and Ongoing  - Pt will demonstrate independence with the HEP at  discharge. Appropriate and Ongoing  - Patient will maintain TUG and 5TSTS at 9 sec or less resulting in improved ability to perform functional tasks Appropriate and Ongoing  - Patient to perform 3 min of simulated sweeping /s inc LBP for inc tolerance to household chores (patient goal) Appropriate and Ongoing  - Patient report ability to walk and/or stand > 15 min /s inc LBP for inc tolerance to recreational activities  Appropriate and Ongoing  - Patient to perform standing<>floor transfer /c safe technique and /s inc LBP for inc tolerance to getting in/out of Appropriate and Ongoing     Plan     Continue with established Plan of Care towards established PT goals.     Therapist: Tonny White, PT  6/10/2024

## 2024-06-13 ENCOUNTER — OFFICE VISIT (OUTPATIENT)
Dept: HOME HEALTH SERVICES | Facility: CLINIC | Age: 74
End: 2024-06-13
Payer: MEDICARE

## 2024-06-13 VITALS
HEIGHT: 66 IN | BODY MASS INDEX: 39.86 KG/M2 | WEIGHT: 248 LBS | SYSTOLIC BLOOD PRESSURE: 104 MMHG | DIASTOLIC BLOOD PRESSURE: 85 MMHG | HEART RATE: 76 BPM

## 2024-06-13 DIAGNOSIS — Z00.00 ENCOUNTER FOR PREVENTIVE HEALTH EXAMINATION: Primary | ICD-10-CM

## 2024-06-13 DIAGNOSIS — E21.3 HYPERPARATHYROIDISM: ICD-10-CM

## 2024-06-13 DIAGNOSIS — I10 PRIMARY HYPERTENSION: ICD-10-CM

## 2024-06-13 DIAGNOSIS — N18.32 STAGE 3B CHRONIC KIDNEY DISEASE: ICD-10-CM

## 2024-06-13 DIAGNOSIS — M85.89 OSTEOPENIA OF MULTIPLE SITES: ICD-10-CM

## 2024-06-13 DIAGNOSIS — E78.5 DYSLIPIDEMIA: Chronic | ICD-10-CM

## 2024-06-13 DIAGNOSIS — I42.8 NON-ISCHEMIC CARDIOMYOPATHY: ICD-10-CM

## 2024-06-13 DIAGNOSIS — G47.33 OSA (OBSTRUCTIVE SLEEP APNEA): ICD-10-CM

## 2024-06-13 DIAGNOSIS — E66.01 MORBID OBESITY: ICD-10-CM

## 2024-06-13 PROCEDURE — 1160F RVW MEDS BY RX/DR IN RCRD: CPT | Mod: CPTII,S$GLB,, | Performed by: NURSE PRACTITIONER

## 2024-06-13 PROCEDURE — 1101F PT FALLS ASSESS-DOCD LE1/YR: CPT | Mod: CPTII,S$GLB,, | Performed by: NURSE PRACTITIONER

## 2024-06-13 PROCEDURE — 3079F DIAST BP 80-89 MM HG: CPT | Mod: CPTII,S$GLB,, | Performed by: NURSE PRACTITIONER

## 2024-06-13 PROCEDURE — 1158F ADVNC CARE PLAN TLK DOCD: CPT | Mod: CPTII,S$GLB,, | Performed by: NURSE PRACTITIONER

## 2024-06-13 PROCEDURE — 4010F ACE/ARB THERAPY RXD/TAKEN: CPT | Mod: CPTII,S$GLB,, | Performed by: NURSE PRACTITIONER

## 2024-06-13 PROCEDURE — 1159F MED LIST DOCD IN RCRD: CPT | Mod: CPTII,S$GLB,, | Performed by: NURSE PRACTITIONER

## 2024-06-13 PROCEDURE — 3074F SYST BP LT 130 MM HG: CPT | Mod: CPTII,S$GLB,, | Performed by: NURSE PRACTITIONER

## 2024-06-13 PROCEDURE — 3288F FALL RISK ASSESSMENT DOCD: CPT | Mod: CPTII,S$GLB,, | Performed by: NURSE PRACTITIONER

## 2024-06-13 PROCEDURE — 1126F AMNT PAIN NOTED NONE PRSNT: CPT | Mod: CPTII,S$GLB,, | Performed by: NURSE PRACTITIONER

## 2024-06-13 PROCEDURE — G0439 PPPS, SUBSEQ VISIT: HCPCS | Mod: S$GLB,,, | Performed by: NURSE PRACTITIONER

## 2024-06-13 NOTE — PATIENT INSTRUCTIONS
Counseling and Referral of Other Preventative  (Italic type indicates deductible and co-insurance are waived)    Patient Name: Charlotte Crowder  Today's Date: 6/13/2024    Health Maintenance       Date Due Completion Date    COVID-19 Vaccine (8 - 2023-24 season) 12/02/2023 10/7/2023    Mammogram 12/14/2024 12/14/2022    Colorectal Cancer Screening 03/09/2025 3/9/2020    DEXA Scan 11/01/2025 11/1/2022    Lipid Panel 08/24/2028 8/24/2023    TETANUS VACCINE 11/26/2031 11/26/2021        No orders of the defined types were placed in this encounter.    The following information is provided to all patients.  This information is to help you find resources for any of the problems found today that may be affecting your health:                  Living healthy guide: www.[facility].[place].gov      Understanding Diabetes: www.diabetes.org      Eating healthy: www.cdc.gov/healthyweight      CDC home safety checklist: www.cdc.gov/steadi/patient.html      Agency on Aging: www.goea.[place].[facility]      Alcoholics anonymous (AA): www.aa.org      Physical Activity: www.shreya.nih.gov/zl8saxb      Tobacco use: www.quitwithusla.org

## 2024-06-13 NOTE — PROGRESS NOTES
"  Charlotte Crowder presented for an initial Medicare AWV today. The following components were reviewed and updated:    Medical history  Family History  Social history  Allergies and Current Medications  Health Risk Assessment  Health Maintenance  Care Team    **See Completed Assessments for Annual Wellness visit with in the encounter summary    The following assessments were completed:  Depression Screening  Cognitive function Screening    Timed Get Up Test  Whisper Test      Opioid documentation:      Patient does not have a current opioid prescription.          Vitals:    06/13/24 1152   BP: 104/85   Pulse: 76   Weight: 112.5 kg (248 lb)   Height: 5' 6" (1.676 m)     Body mass index is 40.03 kg/m².       Physical Exam  Constitutional:       Appearance: She is obese.   HENT:      Head: Normocephalic and atraumatic.      Nose: Nose normal.      Mouth/Throat:      Mouth: Mucous membranes are moist.   Eyes:      Extraocular Movements: Extraocular movements intact.   Cardiovascular:      Rate and Rhythm: Normal rate and regular rhythm.      Heart sounds: Normal heart sounds.   Pulmonary:      Effort: Pulmonary effort is normal. No respiratory distress.      Breath sounds: Normal breath sounds.   Abdominal:      General: Bowel sounds are normal. There is no distension.      Palpations: Abdomen is soft.   Musculoskeletal:         General: No swelling. Normal range of motion.      Cervical back: Normal range of motion.   Skin:     General: Skin is warm and dry.   Neurological:      General: No focal deficit present.      Mental Status: She is alert and oriented to person, place, and time.   Psychiatric:         Mood and Affect: Mood normal.         Behavior: Behavior normal.           Diagnoses and health risks identified today and associated recommendations/orders:  1. Encounter for preventive health examination  Assessments completed. Preventive measures, health maintenance, and immunizations reviewed with patient.    2. " Morbid obesity  Stable, followed by PCP and Bariatrics. Healthy diet and tolerable exercises encouraged.    3. Stage 3b chronic kidney disease  Stable, followed by PCP.    4. Non-ischemic cardiomyopathy  Stable, followed by Cardiology.    5. Hyperparathyroidism  Stable, followed by PCP.    6. Primary hypertension  Stable, patient on Amlodipine, Coreg, and Avapro. Followed by PCP.    7. Dyslipidemia  Stable, patient on Lipitor. Followed by PCP.    8. Osteopenia of multiple sites  Stable, followed by PCP.    9. Severe KIMMIE (obstructive sleep apnea)  Stable, followed by Sleep Medicine.       Provided Charlotte with a 5-10 year written screening schedule and personal prevention plan. Recommendations were developed using the USPSTF age appropriate recommendations. Education, counseling, and referrals were provided as needed.  After Visit Summary printed and given to patient which includes a list of additional screenings\tests needed.    Follow up in about 1 year (around 6/13/2025) for your next annual wellness visit.      Judith Nunez, NP  I offered to discuss advanced care planning, including how to pick a person who would make decisions for you if you were unable to make them for yourself, called a health care power of , and what kind of decisions you might make such as use of life sustaining treatments such as ventilators and tube feeding when faced with a life limiting illness recorded on a living will that they will need to know. (How you want to be cared for as you near the end of your natural life)     X Patient is interested in learning more about how to make advanced directives.  I provided them paperwork and offered to discuss this with them.

## 2024-06-17 ENCOUNTER — CLINICAL SUPPORT (OUTPATIENT)
Dept: REHABILITATION | Facility: OTHER | Age: 74
End: 2024-06-17
Payer: MEDICARE

## 2024-06-17 ENCOUNTER — TELEPHONE (OUTPATIENT)
Dept: PRIMARY CARE CLINIC | Facility: CLINIC | Age: 74
End: 2024-06-17
Payer: MEDICARE

## 2024-06-17 DIAGNOSIS — M53.3 SACROILIAC JOINT PAIN: ICD-10-CM

## 2024-06-17 DIAGNOSIS — M48.062 SPINAL STENOSIS OF LUMBAR REGION WITH NEUROGENIC CLAUDICATION: ICD-10-CM

## 2024-06-17 DIAGNOSIS — M47.899 FACET SYNDROME: Primary | ICD-10-CM

## 2024-06-17 DIAGNOSIS — M54.16 LUMBAR RADICULOPATHY: ICD-10-CM

## 2024-06-17 DIAGNOSIS — M47.816 LUMBAR SPONDYLOSIS: ICD-10-CM

## 2024-06-17 DIAGNOSIS — R29.898 DECREASED STRENGTH OF TRUNK AND BACK: ICD-10-CM

## 2024-06-17 PROCEDURE — 97110 THERAPEUTIC EXERCISES: CPT

## 2024-06-17 PROCEDURE — 97112 NEUROMUSCULAR REEDUCATION: CPT

## 2024-06-17 NOTE — PROGRESS NOTES
[facility]  Physical Therapy Treatment Note     Name: Charlotte Crowder  Clinic Number: [id number]    Therapy Diagnosis:   Encounter Diagnoses   Name Primary?    Facet syndrome Yes    Sacroiliac joint pain     Lumbar radiculopathy     Spinal stenosis of lumbar region with neurogenic claudication     Lumbar spondylosis     Decreased strength of trunk and back        Physician: Wendy Thompson MD    Visit Date: 6/17/2024    Physician Orders: Aquatic Therapy  transfer to [facility] 04/11/24  Medical Diagnosis from Referral:   M15.8 (ICD-10-CM) - Other osteoarthritis involving multiple joints   M48.062 (ICD-10-CM) - Spinal stenosis of lumbar region with neurogenic claudication      Evaluation Date: 9/7/2023  Authorization Period Expiration: 11/2/23  Plan of Care Expiration: 06/21/24  Reassessment Due: 06/10/24  Visit # / Visits authorized: 10/20 [facility]   MedX Testing:MedX testing visit 2      INSURANCE and OUTCOMES: Fee for Service with FOTO Outcomes 2/3   PTA Visit #: 0/5         Time In: 2:30 pm  Time Out: 3:23 pm  Total Billable Time: 53 min   Total Treatment Time:  58 min     Precautions: Standard and Fall     Pattern of pain determined: 1 PEN  Subjective      Charlotte Crowder reports 4/10 tailbone pain and 2/10 (L) sided back pain. She has been compliant with HEP without difficulty.     Patient reports tolerating previous visit well /c no c/o of excess discomfort  Patient reports their pain to be 3/10 LB on a 0-10 scale with 0 being no pain and 10 being the worst pain imaginable  Pain Location: L LB      Occupation: retired nursing aide (caretaker for elderly)   Leisure: puzzles, reading,  meeting with friends to walking   Pt goals: [place] walk more often, improved household chore tolerance (sweeping,making bed)      Objective      Lumbar  Isometric Testing on Med X equipment: Testing administered by PT    Test Initial Baseline Midpoint Final   Date 04/18/24  24    ROM 0 - 39 deg 0-48 deg    Max Peak Torque 89 74     Min Peak Torque 28  8     Flex/Ext Ratio 3 9.25:1     % variance below normative data 31 52%    % change from initial test N/A visit 1 -58%          OUTCOMES SELECTION:   Intake Outcome Measure for FOTO LUMBAR Survey     Therapist reviewed FOTO scores for Charlotte Crowder on 2024.   FOTO documents entered into [employer] - see Media section.     Intake Score: 63% functional ability (14 OZZIE)  05/15/24:  60% (18 OZZIE)   24: 62% functional ability (16 OZZIE)         Goal Score: 70% functional ability     MDC = 7 points             FROM AQUATIC REASSESSMENT (unless noted eval date)   TU2024 8 seconds       5 Times Sit to Stand:   2024: 9 seconds  24: 11.33 sec no BUE use   06/10/24: 14.33 sec no BUE use     Treatment     Charlotte received the treatments listed below:      Medical MedX Treatment as follows:  Patient received neuromuscular education for 13 minutes via participation on the Medical MedX Machine. Therapist assisted patient in isolating and engaging spinal stabilization musculature in order to improve functional ability and postural control. Patient performed exercise with therapist guidance in order to accurately use pacer function, avoid valsalva, and optimally exert effort within a safe and effective range via the [place]illo Exertion Rating Scale. Patient instructed to perform at a midrange of exertion and to complete 15-20 repetitions within appropriate split time, with proper technique, and while maintaining safety.            2024     2:36 PM   [facility]   Visit Number 11   Recumbent Bike Seat Pos. 5   Lumbar Stretches - Slouch Overcorrection 10   Flexion in Lying 10   Lumbar Extension Seat Pad 0   Femur Restraint 6   Top Dead Center 24   Counterweight 170   Lumbar Flexion 48   Lumbar Extension 0   Lumbar Peak Torque 74 ft. lbs.   Min Torque 8   Test Percent Below Normative Data 52 %   Ice - Z Lie (in min.)  "5             Charlotte participated in neuromuscular re-education activities to improve balance, coordination, proprioception, motor control and/or posture for 00 minutes. The following activities were included:         Charlotte participated in therapeutic exercises to develop strength, endurance, ROM, flexibility, posture, and core stabilization for 40 minutes including:    NuStep 5'  LTR x10   Open Books x 10 B cue for starting position (stack hips) and slow controlled motion  PPT with marching  x10   Bridges x15  Sidelying Clamshells 2x10 B     Peripheral muscle strengthening which included one set of 15-20 repetitions at a slow and controlled 10-13 second per rep pace focused on strengthening supporting musculature in order to improve body mechanics and functional mobility. Patient and therapist focused on proper form during treatment to ensure optimal strengthening of each targeted muscle group.  Machines utilized included:Torso rotation, Leg Ext, Leg Curl, Chest Press, and RowingTriceps, Biceps, Hip Abd, Hip Add, and Leg Press      NP:   Piriformis stretch 30 sec x2   DKTC x 10 /c belt  TA brace c/ T ball 5  sec x 5  TrA w/ball + SLR x10  SOC 15 x5"        Charlotte participated in dynamic functional therapeutic activities to improve functional performance and simulate household and community activities for 00  minutes. The following activities were included:        Charlotte received manual therapy techniques for 00  minutes. The following activities were included:    Pt given cold pack for 5 minutes to lumbar region in Z lying.    Patient Education and Home Exercises     Home exercises include:  LTR   Open Books  DKTC   PPT   Bridges   Clamshells     Cardio program (V5): 5/9/24  Lifting education (V11): -  Posture/Lumbar roll: uses rolled towel  Fridge Magnet Discharge handout (date given): -  Equipment at home/gym membership:  [employer]     Education provided:        Written Home Exercises " Provided: yes.  Exercises were reviewed and Charlotte was able to demonstrate them prior to the end of the session.  Charlotte demonstrated fair  understanding of the education provided.     See EMR under Patient Instructions for exercises provided prior visit.    Assessment     Patient presented to clinic with minimal symptoms. She performed exercises without exhibiting further exacerbation of symptoms or difficulty. Educated patient on proper diaphragmatic breathing technique to avoid valsalva during exercises. Retest performed this visit. Noted slight reduction in strength during MedX testing with results slightly less than IE possibly due to LE compensation during testing. Patient exhibited less LE compensation this session. Noted slight increase in lumbar range of motion on MedX; however, limitation appeared to be due to girth. FOTO slightly improved with reduced outcome measure score indicating slight improvement with overall functional mobility of lumbar region. Plan to increase resistance on MedX NV as tolerated by patient. Increased resistance with reduced reps during peripheral circuit. No issues with peripherals. Will continue to monitor and progress HB protocol as tolerated by patient.       Patient is making fair progress towards established goals.  Pt will continue to benefit from skilled outpatient physical therapy to address the deficits stated in the impairment chart, provide pt/family education and to maximize pt's level of independence in the home and community environment.     Anticipated Barriers for therapy: none  Pt's spiritual, cultural and educational needs considered and pt agreeable to plan of care and goals as stated below:     GOALS: Pt is in agreement with the following goals.     Short term goals:  6 weeks or 10 visits   - Pt will demonstrate increased lumbar MedX ROM by at least 3 degrees from the initial ROM value with improvements noted in functional ROM and ability to perform ADLs.  Appropriate and Ongoing  - Pt will demonstrate increased MedX average isometric strength value by 10% from initial test resulting in improved ability to perform bending, lifting, and carrying activities safely, confidently. Appropriate and Ongoing  - Pt will report a reduction in worst pain score by 1-2 points for improved tolerance for household chores. Appropriate and Ongoing  - Pt able to perform HEP correctly with minimal cueing or supervision from therapist to encourage independent management of symptoms. Appropriate and Ongoing     Long term goals: 10 weeks or 20 visits   - Pt will demonstrate increased lumbar MedX ROM by at least 6 degrees from initial ROM value, resulting in improved ability to perform functional forward bending while standing and sitting. Appropriate and Ongoing  - Pt will demonstrate increased MedX average isometric strength value by 15% from initial test resulting in improved ability to perform bending, lifting, and carrying activities safely and confidently. Appropriate and Ongoing  - Pt to demonstrate ability to independently control and reduce their pain through posture positioning and mechanical movements throughout a typical day. Appropriate and Ongoing  - Pt will demonstrate reduced pain and improved functional outcomes as reported on the FOTO by reaching an intake score of >/= 70% functional ability in order to demonstrate subjective improvement in patient's condition. . Appropriate and Ongoing  - Pt will demonstrate independence with the HEP at discharge. Appropriate and Ongoing  - Patient will maintain TUG and 5TSTS at 9 sec or less resulting in improved ability to perform functional tasks Appropriate and Ongoing  - Patient to perform 3 min of simulated sweeping /s inc LBP for inc tolerance to household chores (patient goal) Appropriate and Ongoing  - Patient report ability to walk and/or stand > 15 min /s inc LBP for inc tolerance to recreational activities  Appropriate and  Ongoing  - Patient to perform standing<>floor transfer /c safe technique and /s inc LBP for inc tolerance to getting in/out of Appropriate and Ongoing     Plan     Continue with established Plan of Care towards established PT goals.     Therapist: Neema Calloway, PT  6/17/2024

## 2024-06-17 NOTE — TELEPHONE ENCOUNTER
----- Message from Inés Leonard sent at 6/17/2024  1:10 PM CDT -----  Regarding: call back  Type: Patient Call Back    Who called: pt     What is the request in detail: requesting call to discuss persistent back pain with provider     Can the clinic reply by [facility]?no    Would the patient rather a call back or a response via [facility]? call    Best call back number: [phone]      Additional Information:

## 2024-06-18 ENCOUNTER — TELEPHONE (OUTPATIENT)
Dept: PRIMARY CARE CLINIC | Facility: CLINIC | Age: 74
End: 2024-06-18
Payer: MEDICARE

## 2024-06-18 NOTE — TELEPHONE ENCOUNTER
----- Message from Stella rGeen MA sent at 6/18/2024 11:05 AM CDT -----  Name of Who is Calling:MADELEINE RAMSAY [[id number]]                What is the request in detail: Pt is requesting a call back to discuss her still taking therapy. Pt states that the  isx trying to get her to do therapy somewhere else. Please assist.                Can the clinic reply by [facility]: No                What Number to Call Back [facility]: [phone]

## 2024-06-19 ENCOUNTER — CLINICAL SUPPORT (OUTPATIENT)
Dept: REHABILITATION | Facility: OTHER | Age: 74
End: 2024-06-19
Payer: MEDICARE

## 2024-06-19 DIAGNOSIS — R29.898 DECREASED STRENGTH OF TRUNK AND BACK: ICD-10-CM

## 2024-06-19 DIAGNOSIS — M47.816 LUMBAR SPONDYLOSIS: ICD-10-CM

## 2024-06-19 DIAGNOSIS — M54.16 LUMBAR RADICULOPATHY: ICD-10-CM

## 2024-06-19 DIAGNOSIS — M47.899 FACET SYNDROME: Primary | ICD-10-CM

## 2024-06-19 DIAGNOSIS — M48.062 SPINAL STENOSIS OF LUMBAR REGION WITH NEUROGENIC CLAUDICATION: ICD-10-CM

## 2024-06-19 DIAGNOSIS — M53.3 SACROILIAC JOINT PAIN: ICD-10-CM

## 2024-06-19 PROCEDURE — 97110 THERAPEUTIC EXERCISES: CPT

## 2024-06-19 PROCEDURE — 97112 NEUROMUSCULAR REEDUCATION: CPT

## 2024-06-19 NOTE — PROGRESS NOTES
[facility]  Physical Therapy Treatment Note     Name: Charlotte Crowder  Clinic Number: [id number]    Therapy Diagnosis:   Encounter Diagnoses   Name Primary?    Facet syndrome Yes    Sacroiliac joint pain     Lumbar radiculopathy     Spinal stenosis of lumbar region with neurogenic claudication     Lumbar spondylosis     Decreased strength of trunk and back          Physician: Wendy Thompson MD    Visit Date: 6/19/2024    Physician Orders: Aquatic Therapy  transfer to [facility] 04/11/24  Medical Diagnosis from Referral:   M15.8 (ICD-10-CM) - Other osteoarthritis involving multiple joints   M48.062 (ICD-10-CM) - Spinal stenosis of lumbar region with neurogenic claudication      Evaluation Date: 9/7/2023  Authorization Period Expiration: 11/2/23  Plan of Care Expiration: 06/21/24  Reassessment Due: 06/10/24  Visit # / Visits authorized: 12/20 [facility]   MedX Testing:MedX testing visit 2      INSURANCE and OUTCOMES: Fee for Service with FOTO Outcomes 2/3   PTA Visit #: 0/5         Time In: 10:00 am  Time Out: 10:58 am  Total Billable Time: 53 min   Total Treatment Time:  58 min     Precautions: Standard and Fall     Pattern of pain determined: 1 PEN  Subjective      Charlotte Crowder reports minimal low back pain.     Patient reports tolerating previous visit well /c no c/o of excess discomfort  Patient reports their pain to be 3/10 LB on a 0-10 scale with 0 being no pain and 10 being the worst pain imaginable  Pain Location: L LB      Occupation: retired nursing aide (caretaker for elderly)   Leisure: puzzles, reading,  meeting with friends to walking   Pt goals: [place] walk more often, improved household chore tolerance (sweeping,making bed)      Objective      Lumbar  Isometric Testing on Med X equipment: Testing administered by PT    Test Initial Baseline Midpoint Final   Date 04/18/24 06/17/24    ROM 0 - 39 deg 0-48 deg    Max Peak Torque 89 74     Min Peak  Torque 28  8     Flex/Ext Ratio 3 9.25:1     % variance below normative data 31 52%    % change from initial test N/A visit 1 -58%          OUTCOMES SELECTION:   Intake Outcome Measure for FOTO LUMBAR Survey     Therapist reviewed FOTO scores for Charlotte Crowder on 2024.   FOTO documents entered into [employer] - see Media section.     Intake Score: 63% functional ability (14 OZZIE)  05/15/24:  60% (18 OZZIE)   24: 62% functional ability (16 OZZIE)         Goal Score: 70% functional ability     MDC = 7 points             FROM AQUATIC REASSESSMENT (unless noted eval date)   TU2024 8 seconds       5 Times Sit to Stand:   2024: 9 seconds  24: 11.33 sec no BUE use   06/10/24: 14.33 sec no BUE use     Treatment     Charlotte received the treatments listed below:      Medical MedX Treatment as follows:  Patient received neuromuscular education for 13 minutes via participation on the Medical MedX Machine. Therapist assisted patient in isolating and engaging spinal stabilization musculature in order to improve functional ability and postural control. Patient performed exercise with therapist guidance in order to accurately use pacer function, avoid valsalva, and optimally exert effort within a safe and effective range via the Portillo Exertion Rating Scale. Patient instructed to perform at a midrange of exertion and to complete 15-20 repetitions within appropriate split time, with proper technique, and while maintaining safety.          2024    10:06 AM   [facility]   Visit Number 11   VAS Pain Rating 3   Recumbent Bike Seat Pos. 5   Lumbar Stretches - Slouch Overcorrection 10   Flexion in Lying 10   Lumbar Weight 53 lbs   Repetitions 15   Rating of Perceived Exertion 3   Ice - Z Lie (in min.) 5         Charlotte participated in neuromuscular re-education activities to improve balance, coordination, proprioception, motor control and/or posture for 00 minutes. The following activities were  "included:         Charlotte participated in therapeutic exercises to develop strength, endurance, ROM, flexibility, posture, and core stabilization for 40 minutes including:    Recumbent bike 5'  LTR x10   Open Books x 10 B cue for starting position (stack hips) and slow controlled motion  PPT with marching  x10   Bridges x15  Sidelying Clamshells 2x10 B     Peripheral muscle strengthening which included one set of 15-20 repetitions at a slow and controlled 10-13 second per rep pace focused on strengthening supporting musculature in order to improve body mechanics and functional mobility. Patient and therapist focused on proper form during treatment to ensure optimal strengthening of each targeted muscle group.  Machines utilized included:Torso rotation, Leg Ext, Leg Curl, Chest Press, and RowingTriceps, Biceps, Hip Abd, Hip Add, and Leg Press      NP:   Piriformis stretch 30 sec x2   DKTC x 10 /c belt  TA brace c/ T ball 5  sec x 5  TrA w/ball + SLR x10  SOC 15 x5"        Charlotte participated in dynamic functional therapeutic activities to improve functional performance and simulate household and community activities for 00  minutes. The following activities were included:        Charlotte received manual therapy techniques for 00  minutes. The following activities were included:    Pt given cold pack for 5 minutes to lumbar region in Z lying.    Patient Education and Home Exercises     Home exercises include:  LTR   Open Books  DKTC   PPT   Bridges   Clamshells     Cardio program (V5): 5/9/24  Lifting education (V11): - 6/19/2024  Posture/Lumbar roll: uses rolled towel  Fridge Magnet Discharge handout (date given): -  Equipment at home/gym membership:  [employer]     Education provided:        Written Home Exercises Provided: yes.  Exercises were reviewed and Charlotte was able to demonstrate them prior to the end of the session.  Charlotte demonstrated fair  understanding of the education provided. "     See EMR under Patient Instructions for exercises provided prior visit.    Assessment     Patient presented to clinic with minimal symptoms. She performed exercises without exhibiting further exacerbation of symptoms or difficulty. Issued/reviewed lifting handout with patient understanding. MedX resistance increase to 53 ft/lbs with completion of 15 reps at 3/10 RPE. Increased reps with reduced reps during peripheral circuit. No issues with peripherals. Will continue to monitor and progress HB protocol as tolerated by patient.       Patient is making fair progress towards established goals.  Pt will continue to benefit from skilled outpatient physical therapy to address the deficits stated in the impairment chart, provide pt/family education and to maximize pt's level of independence in the home and community environment.     Anticipated Barriers for therapy: none  Pt's spiritual, cultural and educational needs considered and pt agreeable to plan of care and goals as stated below:     GOALS: Pt is in agreement with the following goals.     Short term goals:  6 weeks or 10 visits   - Pt will demonstrate increased lumbar MedX ROM by at least 3 degrees from the initial ROM value with improvements noted in functional ROM and ability to perform ADLs. Appropriate and Ongoing  - Pt will demonstrate increased MedX average isometric strength value by 10% from initial test resulting in improved ability to perform bending, lifting, and carrying activities safely, confidently. Appropriate and Ongoing  - Pt will report a reduction in worst pain score by 1-2 points for improved tolerance for household chores. Appropriate and Ongoing  - Pt able to perform HEP correctly with minimal cueing or supervision from therapist to encourage independent management of symptoms. Appropriate and Ongoing     Long term goals: 10 weeks or 20 visits   - Pt will demonstrate increased lumbar MedX ROM by at least 6 degrees from initial ROM value,  resulting in improved ability to perform functional forward bending while standing and sitting. Appropriate and Ongoing  - Pt will demonstrate increased MedX average isometric strength value by 15% from initial test resulting in improved ability to perform bending, lifting, and carrying activities safely and confidently. Appropriate and Ongoing  - Pt to demonstrate ability to independently control and reduce their pain through posture positioning and mechanical movements throughout a typical day. Appropriate and Ongoing  - Pt will demonstrate reduced pain and improved functional outcomes as reported on the FOTO by reaching an intake score of >/= 70% functional ability in order to demonstrate subjective improvement in patient's condition. . Appropriate and Ongoing  - Pt will demonstrate independence with the HEP at discharge. Appropriate and Ongoing  - Patient will maintain TUG and 5TSTS at 9 sec or less resulting in improved ability to perform functional tasks Appropriate and Ongoing  - Patient to perform 3 min of simulated sweeping /s inc LBP for inc tolerance to household chores (patient goal) Appropriate and Ongoing  - Patient report ability to walk and/or stand > 15 min /s inc LBP for inc tolerance to recreational activities  Appropriate and Ongoing  - Patient to perform standing<>floor transfer /c safe technique and /s inc LBP for inc tolerance to getting in/out of Appropriate and Ongoing     Plan     Continue with established Plan of Care towards established PT goals.     Therapist: Neema Calloway, PT  6/19/2024

## 2024-06-20 ENCOUNTER — HOSPITAL ENCOUNTER (OUTPATIENT)
Dept: RADIOLOGY | Facility: OTHER | Age: 74
Discharge: HOME | End: 2024-06-20
Attending: FAMILY MEDICINE
Payer: MEDICARE

## 2024-06-20 DIAGNOSIS — Z12.31 SCREENING MAMMOGRAM FOR BREAST CANCER: ICD-10-CM

## 2024-06-20 PROCEDURE — 77067 SCR MAMMO BI INCL CAD: CPT | Mod: TC

## 2024-06-21 PROBLEM — N18.32 STAGE 3B CHRONIC KIDNEY DISEASE: Status: ACTIVE | Noted: 2024-06-21

## 2024-06-21 PROBLEM — D70.0 CONGENITAL NEUTROPENIA: Status: RESOLVED | Noted: 2017-02-09 | Resolved: 2024-06-21

## 2024-06-21 PROBLEM — N18.31 STAGE 3A CHRONIC KIDNEY DISEASE: Status: RESOLVED | Noted: 2021-10-15 | Resolved: 2024-06-21

## 2024-06-24 ENCOUNTER — CLINICAL SUPPORT (OUTPATIENT)
Dept: REHABILITATION | Facility: OTHER | Age: 74
End: 2024-06-24
Payer: MEDICARE

## 2024-06-24 ENCOUNTER — OFFICE VISIT (OUTPATIENT)
Dept: PRIMARY CARE CLINIC | Facility: CLINIC | Age: 74
End: 2024-06-24
Attending: FAMILY MEDICINE
Payer: MEDICARE

## 2024-06-24 DIAGNOSIS — M47.899 FACET SYNDROME: Primary | ICD-10-CM

## 2024-06-24 DIAGNOSIS — M47.816 LUMBAR SPONDYLOSIS: ICD-10-CM

## 2024-06-24 DIAGNOSIS — R29.898 DECREASED STRENGTH OF TRUNK AND BACK: ICD-10-CM

## 2024-06-24 DIAGNOSIS — M53.3 SACROILIAC JOINT PAIN: ICD-10-CM

## 2024-06-24 DIAGNOSIS — M48.062 SPINAL STENOSIS OF LUMBAR REGION WITH NEUROGENIC CLAUDICATION: ICD-10-CM

## 2024-06-24 DIAGNOSIS — M54.16 LUMBAR RADICULOPATHY: ICD-10-CM

## 2024-06-24 PROCEDURE — 99214 OFFICE O/P EST MOD 30 MIN: CPT | Mod: 95,,, | Performed by: FAMILY MEDICINE

## 2024-06-24 PROCEDURE — 97110 THERAPEUTIC EXERCISES: CPT

## 2024-06-24 PROCEDURE — 4010F ACE/ARB THERAPY RXD/TAKEN: CPT | Mod: CPTII,95,, | Performed by: FAMILY MEDICINE

## 2024-06-24 NOTE — PROGRESS NOTES
[facility]  Physical Therapy Treatment Note     Name: Charlotte Crowder  Clinic Number: [id number]    Therapy Diagnosis:   Encounter Diagnoses   Name Primary?    Facet syndrome Yes    Sacroiliac joint pain     Lumbar radiculopathy     Spinal stenosis of lumbar region with neurogenic claudication     Lumbar spondylosis     Decreased strength of trunk and back          Physician: Wendy Thompson MD    Visit Date: 6/24/2024    Physician Orders: Aquatic Therapy  transfer to [facility] 04/11/24  Medical Diagnosis from Referral:   M15.8 (ICD-10-CM) - Other osteoarthritis involving multiple joints   M48.062 (ICD-10-CM) - Spinal stenosis of lumbar region with neurogenic claudication      Evaluation Date: 9/7/2023  Authorization Period Expiration: 11/2/23  Plan of Care Expiration: 06/21/24  Reassessment Due: 06/10/24  Visit # / Visits authorized: 13/20 [facility]     INSURANCE and OUTCOMES: Fee for Service with FOTO Outcomes 2/3       PTA Visit #: 0/5         Time In: 3:00  Time Out: 3:25  Total Billable Time: 25 min   Total Treatment Time: 25 min     Precautions: Standard and Fall     Pattern of pain determined: 1 PEN  Subjective      Charlotte Crowder reports minimal low back pain about 3/10. She still has some discomfort in the back with prolonged standing (sweeping and mopping). *Needs to leave early to make MD appt.    Patient reports tolerating previous visit well /c no c/o of excess discomfort  Patient reports their pain to be 3/10 LB on a 0-10 scale with 0 being no pain and 10 being the worst pain imaginable  Pain Location: L LB      Occupation: retired nursing aide (caretaker for elderly)   Leisure: puzzles, reading,  meeting with friends to walking   Pt goals: [place] walk more often, improved household chore tolerance (sweeping,making bed)      Objective      Lumbar  Isometric Testing on Med X equipment: Testing administered by PT    Test Initial Baseline Midpoint  Final   Date 24    ROM 0 - 39 deg 0-48 deg    Max Peak Torque 89 74     Min Peak Torque 28  8     Flex/Ext Ratio 3 9.25:1     % variance below normative data 31 52%    % change from initial test N/A visit 1 -58%          OUTCOMES SELECTION:   Intake Outcome Measure for FOTO LUMBAR Survey     Therapist reviewed FOTO scores for Charlotte Crowder on 2024.   FOTO documents entered into [employer] - see Media section.     Intake Score: 63% functional ability (14 OZZIE)  05/15/24:  60% (18 OZZIE)   24: 62% functional ability (16 OZZIE)         Goal Score: 70% functional ability     MDC = 7 points             FROM AQUATIC REASSESSMENT (unless noted eval date)   TU2024 8 seconds       5 Times Sit to Stand:   2024: 9 seconds  24: 11.33 sec no BUE use   06/10/24: 14.33 sec no BUE use     Treatment     Charlotte received the treatments listed below:      Medical MedX Treatment as follows:  Patient received neuromuscular education for 00 minutes via participation on the Medical MedX Machine. Therapist assisted patient in isolating and engaging spinal stabilization musculature in order to improve functional ability and postural control. Patient performed exercise with therapist guidance in order to accurately use pacer function, avoid valsalva, and optimally exert effort within a safe and effective range via the Portillo Exertion Rating Scale. Patient instructed to perform at a midrange of exertion and to complete 15-20 repetitions within appropriate split time, with proper technique, and while maintaining safety.          Charlotte participated in neuromuscular re-education activities to improve balance, coordination, proprioception, motor control and/or posture for 00 minutes. The following activities were included:         Charlotte participated in therapeutic exercises to develop strength, endurance, ROM, flexibility, posture, and core stabilization for 25 minutes including:    Recumbent bike  "5'  LTR x10   Open Books x 10 B cue for starting position (stack hips) and slow controlled motion  PPT with marching  x10   Bridges x15  Sidelying Clamshells 2x10 B         6/24/2024     3:24 PM   [facility]   Visit Number 12   VAS Pain Rating 3   Recumbent Bike Seat Pos. 5   Lumbar Stretches - Slouch Overcorrection 10   Flexion in Lying 10   Lumbar Weight 53 lbs   Repetitions 18   Rating of Perceived Exertion 3   Ice - Z Lie (in min.) 5         Peripheral muscle strengthening which included one set of 15-20 repetitions at a slow and controlled 10-13 second per rep pace focused on strengthening supporting musculature in order to improve body mechanics and functional mobility. Patient and therapist focused on proper form during treatment to ensure optimal strengthening of each targeted muscle group.  Machines utilized included:Torso rotation, Leg Ext, Leg Curl, Chest Press, and RowingTriceps, Biceps, Hip Abd, Hip Add, and Leg Press      NP:   Piriformis stretch 30 sec x2   DKTC x 10 /c belt  TA brace c/ T ball 5  sec x 5  TrA w/ball + SLR x10  SOC 15 x5"        Charlotte participated in dynamic functional therapeutic activities to improve functional performance and simulate household and community activities for 00  minutes. The following activities were included:        Charlotte received manual therapy techniques for 00  minutes. The following activities were included:    Pt given cold pack for 5 minutes to lumbar region in Z lying.    Patient Education and Home Exercises     Home exercises include:  LTR   Open Books  DKTC   PPT   Bridges   Clamshells     Cardio program (V5): 5/9/24  Lifting education (V11): - 6/24/2024  Posture/Lumbar roll: uses rolled towel  Fridge Magnet Discharge handout (date given): -  Equipment at home/gym membership:  [employer]     Education provided:        Written Home Exercises Provided: yes.  Exercises were reviewed and Charlotte was able to demonstrate them prior to the " end of the session.  Charlotte demonstrated fair  understanding of the education provided.     See EMR under Patient Instructions for exercises provided prior visit.    Assessment     Patient presented to clinic with minimal symptoms and reports of discomfort with sweeping and mopping, plan to add functional standing exercise. Pt requested to leave early because of a MD appt. The exercises she did complete she required fur for proper execution for PPT with march as well as pacing for LTR. MedX resistance  maintained at 53 ft/lbs with completion of 15 reps at 3/10 RPE. Pt requires cues for pacing. No issues with peripherals. Will continue to monitor and progress HB protocol as tolerated by patient.       Patient is making fair progress towards established goals.  Pt will continue to benefit from skilled outpatient physical therapy to address the deficits stated in the impairment chart, provide pt/family education and to maximize pt's level of independence in the home and community environment.     Anticipated Barriers for therapy: none  Pt's spiritual, cultural and educational needs considered and pt agreeable to plan of care and goals as stated below:     GOALS: Pt is in agreement with the following goals.     Short term goals:  6 weeks or 10 visits   - Pt will demonstrate increased lumbar MedX ROM by at least 3 degrees from the initial ROM value with improvements noted in functional ROM and ability to perform ADLs. Appropriate and Ongoing  - Pt will demonstrate increased MedX average isometric strength value by 10% from initial test resulting in improved ability to perform bending, lifting, and carrying activities safely, confidently. Appropriate and Ongoing  - Pt will report a reduction in worst pain score by 1-2 points for improved tolerance for household chores. Appropriate and Ongoing  - Pt able to perform HEP correctly with minimal cueing or supervision from therapist to encourage independent management of  symptoms. Appropriate and Ongoing     Long term goals: 10 weeks or 20 visits   - Pt will demonstrate increased lumbar MedX ROM by at least 6 degrees from initial ROM value, resulting in improved ability to perform functional forward bending while standing and sitting. Appropriate and Ongoing  - Pt will demonstrate increased MedX average isometric strength value by 15% from initial test resulting in improved ability to perform bending, lifting, and carrying activities safely and confidently. Appropriate and Ongoing  - Pt to demonstrate ability to independently control and reduce their pain through posture positioning and mechanical movements throughout a typical day. Appropriate and Ongoing  - Pt will demonstrate reduced pain and improved functional outcomes as reported on the FOTO by reaching an intake score of >/= 70% functional ability in order to demonstrate subjective improvement in patient's condition. . Appropriate and Ongoing  - Pt will demonstrate independence with the HEP at discharge. Appropriate and Ongoing  - Patient will maintain TUG and 5TSTS at 9 sec or less resulting in improved ability to perform functional tasks Appropriate and Ongoing  - Patient to perform 3 min of simulated sweeping /s inc LBP for inc tolerance to household chores (patient goal) Appropriate and Ongoing  - Patient report ability to walk and/or stand > 15 min /s inc LBP for inc tolerance to recreational activities  Appropriate and Ongoing  - Patient to perform standing<>floor transfer /c safe technique and /s inc LBP for inc tolerance to getting in/out of Appropriate and Ongoing     Plan     Continue with established Plan of Care towards established PT goals.     Therapist: Lindsey Diallo, PT  6/24/2024

## 2024-06-24 NOTE — Clinical Note
Evette can you please tell pt to print out my note. At end it states what she wants to show her . Thanks, PV

## 2024-06-24 NOTE — PROGRESS NOTES
Answers submitted by the patient for this visit:  Back Pain Questionnaire (Submitted on 6/24/2024)  Chief Complaint: Back pain  Chronicity: chronic  Onset: more than 1 year ago  Frequency: constantly  Progression since onset: gradually improving  Pain location: gluteal, lumbar spine  Pain quality: aching  Radiates to: does not radiate  Pain is: worse during the night  Aggravated by: bending, position  Stiffness is present: in the morning  abdominal pain: Yes  bladder incontinence: No  bowel incontinence: No  chest pain: No  fever: No  headaches: No  leg pain: No  numbness: No  paresis: No  paresthesias: No  pelvic pain: No  perianal numbness: No  tingling: No  weight loss: No  genital pain: No  hematuria: No  Pain severity: mild  Improvement on treatment: moderate
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
## 2024-06-25 ENCOUNTER — TELEPHONE (OUTPATIENT)
Dept: PRIMARY CARE CLINIC | Facility: CLINIC | Age: 74
End: 2024-06-25
Payer: MEDICARE

## 2024-06-25 NOTE — TELEPHONE ENCOUNTER
----- Message from Wendy Thompson MD sent at 6/24/2024  5:16 PM CDT -----  Evette can you please tell pt to print out my note. At end it states what she wants to show her . Thanks, PV

## 2024-06-25 NOTE — TELEPHONE ENCOUNTER
Spoke with patient to inform her that the providers note is towards the bottom of her progress note, patient verbalized understanding.

## 2024-06-26 ENCOUNTER — CLINICAL SUPPORT (OUTPATIENT)
Dept: REHABILITATION | Facility: OTHER | Age: 74
End: 2024-06-26
Payer: MEDICARE

## 2024-06-26 DIAGNOSIS — R29.898 DECREASED STRENGTH OF TRUNK AND BACK: ICD-10-CM

## 2024-06-26 DIAGNOSIS — M54.16 LUMBAR RADICULOPATHY: ICD-10-CM

## 2024-06-26 DIAGNOSIS — M47.899 FACET SYNDROME: Primary | ICD-10-CM

## 2024-06-26 DIAGNOSIS — M47.816 LUMBAR SPONDYLOSIS: ICD-10-CM

## 2024-06-26 DIAGNOSIS — M48.062 SPINAL STENOSIS OF LUMBAR REGION WITH NEUROGENIC CLAUDICATION: ICD-10-CM

## 2024-06-26 DIAGNOSIS — M53.3 SACROILIAC JOINT PAIN: ICD-10-CM

## 2024-06-26 PROCEDURE — 97110 THERAPEUTIC EXERCISES: CPT | Mod: CQ

## 2024-06-26 PROCEDURE — 97112 NEUROMUSCULAR REEDUCATION: CPT | Mod: CQ

## 2024-06-26 NOTE — PROGRESS NOTES
[facility]  Physical Therapy Treatment Note     Name: Charlotte Crowder  Clinic Number: [id number]    Therapy Diagnosis:   Encounter Diagnoses   Name Primary?    Facet syndrome Yes    Sacroiliac joint pain     Lumbar radiculopathy     Spinal stenosis of lumbar region with neurogenic claudication     Lumbar spondylosis     Decreased strength of trunk and back          Physician: Wendy Thompson MD    Visit Date: 6/26/2024    Physician Orders: Aquatic Therapy  transfer to [facility] 04/11/24  Medical Diagnosis from Referral:   M15.8 (ICD-10-CM) - Other osteoarthritis involving multiple joints   M48.062 (ICD-10-CM) - Spinal stenosis of lumbar region with neurogenic claudication      Evaluation Date: 9/7/2023  Authorization Period Expiration: 11/2/23  Plan of Care Expiration: 06/21/24  Reassessment Due: 06/10/24  Visit # / Visits authorized: 14/20 [facility]     INSURANCE and OUTCOMES: Fee for Service with FOTO Outcomes 2/3       PTA Visit #: 1/5         Time In: 1100  Time Out: 1200  Total Billable Time: 55 min   Total Treatment Time: 55 min     Precautions: Standard and Fall     Pattern of pain determined: 1 PEN  Subjective      Charlotte Crowder she is doing well. Pain on her left side low back     Patient reports tolerating previous visit well /c no c/o of excess discomfort  Patient reports their pain to be 3/10 LB on a 0-10 scale with 0 being no pain and 10 being the worst pain imaginable  Pain Location: L LB      Occupation: retired nursing aide (caretaker for elderly)   Leisure: puzzles, reading,  meeting with friends to walking   Pt goals: [place] walk more often, improved household chore tolerance (sweeping,making bed)      Objective      Lumbar  Isometric Testing on Med X equipment: Testing administered by PT    Test Initial Baseline Midpoint Final   Date 04/18/24 06/17/24    ROM 0 - 39 deg 0-48 deg    Max Peak Torque 89 74     Min Peak Torque 28  8     Flex/Ext  Ratio 3 9.25:1     % variance below normative data 31 52%    % change from initial test N/A visit 1 -58%          OUTCOMES SELECTION:   Intake Outcome Measure for FOTO LUMBAR Survey     Therapist reviewed FOTO scores for Charlotte Crowder on 2024.   FOTO documents entered into [employer] - see Media section.     Intake Score: 63% functional ability (14 OZZIE)  05/15/24:  60% (18 OZZIE)   24: 62% functional ability (16 OZZIE)         Goal Score: 70% functional ability     MDC = 7 points             FROM AQUATIC REASSESSMENT (unless noted eval date)   TU2024 8 seconds       5 Times Sit to Stand:   2024: 9 seconds  24: 11.33 sec no BUE use   06/10/24: 14.33 sec no BUE use     Treatment     Charlotte received the treatments listed below:      Medical MedX Treatment as follows:  Patient received neuromuscular education for 00 minutes via participation on the Medical MedX Machine. Therapist assisted patient in isolating and engaging spinal stabilization musculature in order to improve functional ability and postural control. Patient performed exercise with therapist guidance in order to accurately use pacer function, avoid valsalva, and optimally exert effort within a safe and effective range via the Portillo Exertion Rating Scale. Patient instructed to perform at a midrange of exertion and to complete 15-20 repetitions within appropriate split time, with proper technique, and while maintaining safety.          Charlotte participated in neuromuscular re-education activities to improve balance, coordination, proprioception, motor control and/or posture for 00 minutes. The following activities were included:         Charlotte participated in therapeutic exercises to develop strength, endurance, ROM, flexibility, posture, and core stabilization for 25 minutes including:    Recumbent bike 5'  LTR x10   Open Books x 10 B cue for starting position (stack hips) and slow controlled motion  PPT with marching  x10  "  Bridges x15 +RTB  Sidelying Clamshells 2x10 B +RTB        6/26/2024    12:39 PM   [facility]   Visit Number 13   VAS Pain Rating 2   Recumbent Bike Seat Pos. 5   Flexion in Lying 10   Lumbar Weight 58 lbs   Repetitions 18   Rating of Perceived Exertion 3   Ice - Z Lie (in min.) 5             Peripheral muscle strengthening which included one set of 15-20 repetitions at a slow and controlled 10-13 second per rep pace focused on strengthening supporting musculature in order to improve body mechanics and functional mobility. Patient and therapist focused on proper form during treatment to ensure optimal strengthening of each targeted muscle group.  Machines utilized included:Torso rotation, Leg Ext, Leg Curl, Chest Press, and RowingTriceps, Biceps, Hip Abd, Hip Add, and Leg Press      NP:   Piriformis stretch 30 sec x2   DKTC x 10 /c belt  TA brace c/ T ball 5  sec x 5  TrA w/ball + SLR x10  SOC 15 x5"        Charlotte participated in dynamic functional therapeutic activities to improve functional performance and simulate household and community activities for 00  minutes. The following activities were included:        Charlotte received manual therapy techniques for 00  minutes. The following activities were included:    Pt given cold pack for 5 minutes to lumbar region in Z lying.    Patient Education and Home Exercises     Home exercises include:  LTR   Open Books  DKTC   PPT   Bridges   Clamshells     Cardio program (V5): 5/9/24  Lifting education (V11): - 6/26/2024  Posture/Lumbar roll: uses rolled towel  Fridge Magnet Discharge handout (date given): -  Equipment at home/gym membership:  [employer]     Education provided:        Written Home Exercises Provided: yes.  Exercises were reviewed and Charlotte was able to demonstrate them prior to the end of the session.  Charlotte demonstrated fair  understanding of the education provided.     See EMR under Patient Instructions for exercises provided " prior visit.    Assessment     Patient presented to clinic with minimal symptoms. Was able to progress resistance exercise prior to MedX and machines with no increase in symptoms. Cuing required with side lying mat exercises for decreased hip rotation. Pt able to progress resistance with medX lumbar machine this visit.        Patient is making fair progress towards established goals.  Pt will continue to benefit from skilled outpatient physical therapy to address the deficits stated in the impairment chart, provide pt/family education and to maximize pt's level of independence in the home and community environment.     Anticipated Barriers for therapy: none  Pt's spiritual, cultural and educational needs considered and pt agreeable to plan of care and goals as stated below:     GOALS: Pt is in agreement with the following goals.     Short term goals:  6 weeks or 10 visits   - Pt will demonstrate increased lumbar MedX ROM by at least 3 degrees from the initial ROM value with improvements noted in functional ROM and ability to perform ADLs. Appropriate and Ongoing  - Pt will demonstrate increased MedX average isometric strength value by 10% from initial test resulting in improved ability to perform bending, lifting, and carrying activities safely, confidently. Appropriate and Ongoing  - Pt will report a reduction in worst pain score by 1-2 points for improved tolerance for household chores. Appropriate and Ongoing  - Pt able to perform HEP correctly with minimal cueing or supervision from therapist to encourage independent management of symptoms. Appropriate and Ongoing     Long term goals: 10 weeks or 20 visits   - Pt will demonstrate increased lumbar MedX ROM by at least 6 degrees from initial ROM value, resulting in improved ability to perform functional forward bending while standing and sitting. Appropriate and Ongoing  - Pt will demonstrate increased MedX average isometric strength value by 15% from initial test  resulting in improved ability to perform bending, lifting, and carrying activities safely and confidently. Appropriate and Ongoing  - Pt to demonstrate ability to independently control and reduce their pain through posture positioning and mechanical movements throughout a typical day. Appropriate and Ongoing  - Pt will demonstrate reduced pain and improved functional outcomes as reported on the FOTO by reaching an intake score of >/= 70% functional ability in order to demonstrate subjective improvement in patient's condition. . Appropriate and Ongoing  - Pt will demonstrate independence with the HEP at discharge. Appropriate and Ongoing  - Patient will maintain TUG and 5TSTS at 9 sec or less resulting in improved ability to perform functional tasks Appropriate and Ongoing  - Patient to perform 3 min of simulated sweeping /s inc LBP for inc tolerance to household chores (patient goal) Appropriate and Ongoing  - Patient report ability to walk and/or stand > 15 min /s inc LBP for inc tolerance to recreational activities  Appropriate and Ongoing  - Patient to perform standing<>floor transfer /c safe technique and /s inc LBP for inc tolerance to getting in/out of Appropriate and Ongoing     Plan     Continue with established Plan of Care towards established PT goals.     Therapist: Raul Triplett, PTA  6/26/2024

## 2024-06-30 NOTE — PROGRESS NOTES
Subjective:       Patient ID: Charlotte Crowder is a 74 y.o. female.    Chief Complaint: Annual Exam    Pt feels well,no complaints. Needs annual exam      Review of Systems   Constitutional:  Negative for activity change, appetite change, chills, fatigue and fever.   HENT:  Negative for congestion, ear pain, sinus pressure, sore throat and trouble swallowing.    Eyes:  Negative for photophobia, pain and visual disturbance.   Respiratory:  Negative for apnea, cough, chest tightness, shortness of breath and wheezing.    Cardiovascular:  Negative for chest pain, palpitations and leg swelling.   Gastrointestinal:  Negative for abdominal distention, abdominal pain, constipation, diarrhea, nausea and vomiting.   Genitourinary: Negative.    Musculoskeletal:  Negative for back pain, joint swelling and neck pain.   Skin: Negative.    Neurological:  Negative for dizziness, tremors, weakness, numbness and headaches.   Psychiatric/Behavioral:  Negative for behavioral problems, decreased concentration and sleep disturbance. The patient is not nervous/anxious.    All other systems reviewed and are negative.        Objective:      Physical Exam  Vitals reviewed.   Constitutional:       General: She is not in acute distress.     Appearance: Normal appearance. She is well-developed and normal weight. She is not ill-appearing, toxic-appearing or diaphoretic.   HENT:      Head: Normocephalic and atraumatic.      Right Ear: Tympanic membrane, ear canal and external ear normal. There is no impacted cerumen.      Left Ear: Tympanic membrane, ear canal and external ear normal. There is no impacted cerumen.      Nose: Nose normal.      Mouth/Throat:      Pharynx: No oropharyngeal exudate or posterior oropharyngeal erythema.   Eyes:      Extraocular Movements: Extraocular movements intact.      Conjunctiva/sclera: Conjunctivae normal.      Pupils: Pupils are equal, round, and reactive to light.   Neck:      Thyroid: No thyromegaly.    Cardiovascular:      Rate and Rhythm: Normal rate and regular rhythm.      Pulses: Normal pulses.      Heart sounds: Normal heart sounds. No murmur heard.  Pulmonary:      Effort: Pulmonary effort is normal. No respiratory distress.      Breath sounds: Normal breath sounds. No stridor. No rhonchi or rales.   Chest:      Chest wall: No tenderness.   Abdominal:      General: Bowel sounds are normal. There is no distension.      Palpations: Abdomen is soft. There is no mass.      Tenderness: There is no abdominal tenderness. There is no guarding or rebound.   Musculoskeletal:         General: No tenderness. Normal range of motion.      Cervical back: Normal range of motion and neck supple.      Right lower leg: No edema.      Left lower leg: No edema.   Lymphadenopathy:      Cervical: No cervical adenopathy.   Skin:     General: Skin is warm and dry.      Findings: No erythema.   Neurological:      General: No focal deficit present.      Mental Status: She is alert and oriented to person, place, and time. Mental status is at baseline.      Cranial Nerves: No cranial nerve deficit.      Sensory: No sensory deficit.      Motor: No weakness.      Coordination: Coordination normal.      Gait: Gait normal.      Deep Tendon Reflexes: Reflexes are normal and symmetric. Reflexes normal.   Psychiatric:         Mood and Affect: Mood normal.         Behavior: Behavior normal.         Thought Content: Thought content normal.         Judgment: Judgment normal.         Assessment:       1. Screening mammogram for breast cancer    2. Abnormal finding of blood chemistry, unspecified    3. Abnormal findings on diagnostic imaging of other specified body structures    4. Other long term (current) drug therapy      2. Annual exam  3. Lesions LE  Plan:   1. Screening mammogram for breast cancer  -     Mammo Digital Screening [facility] w/ [facility]; Future; Expected date: 06/05/2024    2. Abnormal finding of blood chemistry, unspecified  -      Lipid Panel; Future; Expected date: 06/05/2024  -     Hemoglobin A1C; Future; Expected date: 06/05/2024  -     TSH; Future; Expected date: 06/05/2024  -     Vitamin D; Future; Expected date: 06/05/2024    3. Abnormal findings on diagnostic imaging of other specified body structures  -     TSH; Future; Expected date: 06/05/2024    4. Other long term (current) drug therapy  -     Vitamin D; Future; Expected date: 06/05/2024    Lifestyle mods d/w pt  HTN BP stable on meds. Also followed by [facility]      Health Maintenance reviewed/updated.  RTC 6 mos f/u BP

## 2024-07-01 ENCOUNTER — CLINICAL SUPPORT (OUTPATIENT)
Dept: REHABILITATION | Facility: OTHER | Age: 74
End: 2024-07-01
Payer: MEDICARE

## 2024-07-01 DIAGNOSIS — M54.16 LUMBAR RADICULOPATHY: ICD-10-CM

## 2024-07-01 DIAGNOSIS — M47.816 LUMBAR SPONDYLOSIS: ICD-10-CM

## 2024-07-01 DIAGNOSIS — M53.3 SACROILIAC JOINT PAIN: ICD-10-CM

## 2024-07-01 DIAGNOSIS — R29.898 DECREASED STRENGTH OF TRUNK AND BACK: ICD-10-CM

## 2024-07-01 DIAGNOSIS — M47.899 FACET SYNDROME: Primary | ICD-10-CM

## 2024-07-01 PROCEDURE — 97110 THERAPEUTIC EXERCISES: CPT | Mod: CQ

## 2024-07-01 NOTE — PROGRESS NOTES
[facility]  Physical Therapy Treatment Note     Name: Charlotte Crowder  Clinic Number: [id number]    Therapy Diagnosis:   Encounter Diagnoses   Name Primary?    Facet syndrome Yes    Sacroiliac joint pain     Lumbar radiculopathy     Lumbar spondylosis     Decreased strength of trunk and back          Physician: Wendy Thompson MD    Visit Date: 7/1/2024    Physician Orders: Aquatic Therapy  transfer to [facility] 04/11/24  Medical Diagnosis from Referral:   M15.8 (ICD-10-CM) - Other osteoarthritis involving multiple joints   M48.062 (ICD-10-CM) - Spinal stenosis of lumbar region with neurogenic claudication      Evaluation Date: 9/7/2023  Authorization Period Expiration: 11/2/23  Plan of Care Expiration: 06/21/24  Reassessment Due: 06/10/24  Visit # / Visits authorized: 14/20 [facility]     INSURANCE and OUTCOMES: Fee for Service with FOTO Outcomes 2/3       PTA Visit #: 2/5         Time In: 12:00 pm   Time Out: 1:00 pm   Total Billable Time: 53 min   Total Treatment Time: 60 min     Precautions: Standard and Fall     Pattern of pain determined: 1 PEN  Subjective      Charlotte Crowder reports reduced lumbar symptoms today compared to the weekend after housework involving a lot of bending. She was able to manage pain with a muscle relaxer and daily stretches.     Patient reports tolerating previous visit well /c no c/o of excess discomfort  Patient reports their pain to be 3/10 LB on a 0-10 scale with 0 being no pain and 10 being the worst pain imaginable  Pain Location: L LB      Occupation: retired nursing aide (caretaker for elderly)   Leisure: puzzles, reading,  meeting with friends to walking   Pt goals: [place] walk more often, improved household chore tolerance (sweeping,making bed)      Objective      Lumbar  Isometric Testing on Med X equipment: Testing administered by PT    Test Initial Baseline Midpoint Final   Date 04/18/24 06/17/24    ROM 0 - 39 deg  0-48 deg    Max Peak Torque 89 74     Min Peak Torque 28  8     Flex/Ext Ratio 3 9.25:1     % variance below normative data 31 52%    % change from initial test N/A visit 1 -58%          OUTCOMES SELECTION:   Intake Outcome Measure for FOTO LUMBAR Survey     Therapist reviewed FOTO scores for Charlotte Crowder on 2024.   FOTO documents entered into [email or web address] - see Media section.     Intake Score: 63% functional ability (14 OZZIE)  05/15/24:  60% (18 OZZIE)   24: 62% functional ability (16 OZZIE)         Goal Score: 70% functional ability     MDC = 7 points             FROM AQUATIC REASSESSMENT (unless noted eval date)   TU2024 8 seconds       5 Times Sit to Stand:   2024: 9 seconds  24: 11.33 sec no BUE use   06/10/24: 14.33 sec no BUE use     Treatment     Charlotte received the treatments listed below:      Medical MedX Treatment as follows:  Patient received neuromuscular education for 8minutes via participation on the Medical MedX Machine. Therapist assisted patient in isolating and engaging spinal stabilization musculature in order to improve functional ability and postural control. Patient performed exercise with therapist guidance in order to accurately use pacer function, avoid valsalva, and optimally exert effort within a safe and effective range via the Portillo Exertion Rating Scale. Patient instructed to perform at a midrange of exertion and to complete 15-20 repetitions within appropriate split time, with proper technique, and while maintaining safety.               Charlotte participated in neuromuscular re-education activities to improve balance, coordination, proprioception, motor control and/or posture for 00 minutes. The following activities were included:         Charlotte participated in therapeutic exercises to develop strength, endurance, ROM, flexibility, posture, and core stabilization for 45 minutes including:    Recumbent bike 5'  LTR x10   Open Books x 10 B cue for starting  "position (stack hips) and slow controlled motion  PPT with marching  x10   Bridges with hip abd  x15 +RTB  Sidelying Clamshells 2x10 B +RTB  TrA w/ball + SLR x10   +prone HF stretches 1" ea        7/1/2024     4:30 PM   [facility]   Visit Number 14   VAS Pain Rating 3   Recumbent Bike Seat Pos. 5   Flexion in Lying 10   Lumbar Weight 58 lbs   Repetitions 20   Rating of Perceived Exertion 3   Ice - Z Lie (in min.) 5               Peripheral muscle strengthening which included one set of 15-20 repetitions at a slow and controlled 10-13 second per rep pace focused on strengthening supporting musculature in order to improve body mechanics and functional mobility. Patient and therapist focused on proper form during treatment to ensure optimal strengthening of each targeted muscle group.  Machines utilized included:Torso rotation, Leg Ext, Leg Curl, Chest Press, and RowingTriceps, Biceps, Hip Abd, Hip Add, and Leg Press      NP:   Piriformis stretch 30 sec x2   DKTC x 10 /c belt  TA brace c/ T ball 5  sec x 5    SOC 15 x5"        Charlotte participated in dynamic functional therapeutic activities to improve functional performance and simulate household and community activities for 00  minutes. The following activities were included:        Charlotte received manual therapy techniques for 00  minutes. The following activities were included:    Pt given cold pack for 5 minutes to lumbar region in Z lying.    Patient Education and Home Exercises     Home exercises include:  LTR   Open Books  DKTC   PPT   Bridges   Clamshells     Cardio program (V5): 5/9/24  Lifting education (V11): -   Posture/Lumbar roll: uses rolled towel  Fridge Magnet Discharge handout (date given): -  Equipment at home/gym membership:  [employer]     Education provided:        Written Home Exercises Provided: yes.  Exercises were reviewed and Charlotte was able to demonstrate them prior to the end of the session.  Charlotte demonstrated " fair  understanding of the education provided.     See EMR under Patient Instructions for exercises provided prior visit.    Assessment     Patient presents to therapy with mod lumbar symptoms upon entry. She was instructed in lumbopelvis therex to improve core strength and stability for improved functional mobility, requiring cues for technique. She responded well to introduced prone HF stretches despite tightness noted. MedX lumbar performed with resistance maintained at 58 lbs, achieving 20 reps at 3/10 RPE. Peripheral muscle strengthening completed with good response to slight increase in resistance/reps. Continue HB protocol per pt tolerance.      Patient is making fair progress towards established goals.  Pt will continue to benefit from skilled outpatient physical therapy to address the deficits stated in the impairment chart, provide pt/family education and to maximize pt's level of independence in the home and community environment.     Anticipated Barriers for therapy: none  Pt's spiritual, cultural and educational needs considered and pt agreeable to plan of care and goals as stated below:     GOALS: Pt is in agreement with the following goals.     Short term goals:  6 weeks or 10 visits   - Pt will demonstrate increased lumbar MedX ROM by at least 3 degrees from the initial ROM value with improvements noted in functional ROM and ability to perform ADLs. Appropriate and Ongoing  - Pt will demonstrate increased MedX average isometric strength value by 10% from initial test resulting in improved ability to perform bending, lifting, and carrying activities safely, confidently. Appropriate and Ongoing  - Pt will report a reduction in worst pain score by 1-2 points for improved tolerance for household chores. Appropriate and Ongoing  - Pt able to perform HEP correctly with minimal cueing or supervision from therapist to encourage independent management of symptoms. Appropriate and Ongoing     Long term goals:  10 weeks or 20 visits   - Pt will demonstrate increased lumbar MedX ROM by at least 6 degrees from initial ROM value, resulting in improved ability to perform functional forward bending while standing and sitting. Appropriate and Ongoing  - Pt will demonstrate increased MedX average isometric strength value by 15% from initial test resulting in improved ability to perform bending, lifting, and carrying activities safely and confidently. Appropriate and Ongoing  - Pt to demonstrate ability to independently control and reduce their pain through posture positioning and mechanical movements throughout a typical day. Appropriate and Ongoing  - Pt will demonstrate reduced pain and improved functional outcomes as reported on the FOTO by reaching an intake score of >/= 70% functional ability in order to demonstrate subjective improvement in patient's condition. . Appropriate and Ongoing  - Pt will demonstrate independence with the HEP at discharge. Appropriate and Ongoing  - Patient will maintain TUG and 5TSTS at 9 sec or less resulting in improved ability to perform functional tasks Appropriate and Ongoing  - Patient to perform 3 min of simulated sweeping /s inc LBP for inc tolerance to household chores (patient goal) Appropriate and Ongoing  - Patient report ability to walk and/or stand > 15 min /s inc LBP for inc tolerance to recreational activities  Appropriate and Ongoing  - Patient to perform standing<>floor transfer /c safe technique and /s inc LBP for inc tolerance to getting in/out of Appropriate and Ongoing     Plan     Continue with established Plan of Care towards established PT goals.     Therapist: Zahra Liz, ISABELLA  7/1/2024

## 2024-07-08 ENCOUNTER — CLINICAL SUPPORT (OUTPATIENT)
Dept: REHABILITATION | Facility: OTHER | Age: 74
End: 2024-07-08
Payer: MEDICARE

## 2024-07-08 DIAGNOSIS — M47.899 FACET SYNDROME: Primary | ICD-10-CM

## 2024-07-08 DIAGNOSIS — M48.062 SPINAL STENOSIS OF LUMBAR REGION WITH NEUROGENIC CLAUDICATION: ICD-10-CM

## 2024-07-08 DIAGNOSIS — M53.3 SACROILIAC JOINT PAIN: ICD-10-CM

## 2024-07-08 DIAGNOSIS — M47.816 LUMBAR SPONDYLOSIS: ICD-10-CM

## 2024-07-08 DIAGNOSIS — R29.898 DECREASED STRENGTH OF TRUNK AND BACK: ICD-10-CM

## 2024-07-08 DIAGNOSIS — M54.16 LUMBAR RADICULOPATHY: ICD-10-CM

## 2024-07-08 PROCEDURE — 97112 NEUROMUSCULAR REEDUCATION: CPT | Mod: CQ

## 2024-07-08 PROCEDURE — 97110 THERAPEUTIC EXERCISES: CPT | Mod: CQ

## 2024-07-08 NOTE — PROGRESS NOTES
[facility] OUTPATIENT THERAPY AND WELLNESS - HEALTHY BACK  Physical Therapy Treatment Note     Name: Charlotte Crowder  Clinic Number: [id number]    Therapy Diagnosis:   Encounter Diagnoses   Name Primary?    Facet syndrome Yes    Sacroiliac joint pain     Lumbar radiculopathy     Spinal stenosis of lumbar region with neurogenic claudication     Lumbar spondylosis     Decreased strength of trunk and back            Physician: Wendy Thompson MD    Visit Date: 7/8/2024    Physician Orders: Aquatic Therapy  transfer to [facility] 04/11/24  Medical Diagnosis from Referral:   M15.8 (ICD-10-CM) - Other osteoarthritis involving multiple joints   M48.062 (ICD-10-CM) - Spinal stenosis of lumbar region with neurogenic claudication      Evaluation Date: 9/7/2023  Authorization Period Expiration: 11/2/23  Plan of Care Expiration: 06/21/24  Reassessment Due: 06/10/24  Visit # / Visits authorized: 15/20 Healthy Back     INSURANCE and OUTCOMES: Fee for Service with FOTO Outcomes 2/3       PTA Visit #: 2/5         Time In: 1230 PM   Time Out: 125 PM   Total Billable Time: 55 min   Total Treatment Time: 40 min     Precautions: Standard and Fall     Pattern of pain determined: 1 PEN  Subjective      Charlotte Crowder reports she is doing fine today.     Patient reports tolerating previous visit well /c no c/o of excess discomfort  Patient reports their pain to be 3/10 LB on a 0-10 scale with 0 being no pain and 10 being the worst pain imaginable  Pain Location: L LB      Occupation: retired nursing aide (caretaker for elderly)   Leisure: puzzles, reading,  meeting with friends to walking   Pt goals: [place] walk more often, improved household chore tolerance (sweeping,making bed)      Objective      Lumbar  Isometric Testing on Med X equipment: Testing administered by PT    Test Initial Baseline Midpoint Final   Date 04/18/24 06/17/24    ROM 0 - 39 deg 0-48 deg    Max Peak Torque 89 74     Min Peak Torque 28  8     Flex/Ext Ratio 3  9.25:1     % variance below normative data 31 52%    % change from initial test N/A visit 1 -58%          OUTCOMES SELECTION:   Intake Outcome Measure for FOTO LUMBAR Survey     Therapist reviewed FOTO scores for Charlotte Crowder on 2024.   FOTO documents entered into [email or web address] - see Media section.     Intake Score: 63% functional ability (14 OZZIE)  05/15/24:  60% (18 OZZIE)   24: 62% functional ability (16 OZZIE)         Goal Score: 70% functional ability     MDC = 7 points             FROM AQUATIC REASSESSMENT (unless noted eval date)   TU2024 8 seconds       5 Times Sit to Stand:   2024: 9 seconds  24: 11.33 sec no BUE use   06/10/24: 14.33 sec no BUE use     Treatment     Charlotte received the treatments listed below:      Medical MedX Treatment as follows:  Patient received neuromuscular education for 8 minutes via participation on the Medical MedX Machine. Therapist assisted patient in isolating and engaging spinal stabilization musculature in order to improve functional ability and postural control. Patient performed exercise with therapist guidance in order to accurately use pacer function, avoid valsalva, and optimally exert effort within a safe and effective range via the Portillo Exertion Rating Scale. Patient instructed to perform at a midrange of exertion and to complete 15-20 repetitions within appropriate split time, with proper technique, and while maintaining safety.          2024    12:49 PM   [facility]   Visit Number 15   VAS Pain Rating 3   Recumbent Bike Seat Pos. 5   Lumbar Stretches - Slouch Overcorrection 10   Lumbar Weight 63 lbs   Repetitions 18   Rating of Perceived Exertion 4   Ice - Z Lie (in min.) 5              Charlotte participated in neuromuscular re-education activities to improve balance, coordination, proprioception, motor control and/or posture for 00 minutes. The following activities were included:         Charlotte participated in therapeutic  "exercises to develop strength, endurance, ROM, flexibility, posture, and core stabilization for 47 minutes including:    Recumbent bike 5'  LTR x10   Open Books x 10 B cue for starting position (stack hips) and slow controlled motion  PPT with marching  x10   Bridges with hip abd  x15 +RTB  Sidelying Clamshells 2x10 B +RTB  TrA w/ball + SLR x10   prone HF stretches 1" ea  SOC 15 x5"          Peripheral muscle strengthening which included one set of 15-20 repetitions at a slow and controlled 10-13 second per rep pace focused on strengthening supporting musculature in order to improve body mechanics and functional mobility. Patient and therapist focused on proper form during treatment to ensure optimal strengthening of each targeted muscle group.  Machines utilized included:Torso rotation, Leg Ext, Leg Curl, Chest Press, and RowingTriceps, Biceps, Hip Abd, Hip Add, and Leg Press        Charlotte participated in dynamic functional therapeutic activities to improve functional performance and simulate household and community activities for 00  minutes. The following activities were included:        Charlotte received manual therapy techniques for 00  minutes. The following activities were included:    Pt given cold pack for 5 minutes to lumbar region in Z lying.    Patient Education and Home Exercises     Home exercises include:  LTR   Open Books  DKTC   PPT   Bridges   Clamshells     Cardio program (V5): 5/9/24  Lifting education (V11): -   Posture/Lumbar roll: uses rolled towel  Fridge Magnet Discharge handout (date given): -  Equipment at home/gym membership:  [employer]     Education provided:        Written Home Exercises Provided: yes.  Exercises were reviewed and Charlotte was able to demonstrate them prior to the end of the session.  Charlotte demonstrated fair  understanding of the education provided.     See EMR under Patient Instructions for exercises provided prior visit.    Assessment     Patient " presents to therapy with mod levels of pain. Continued lumbar and core stability. Pt requires mod cues for form and to engage proper musculature. MedX was performed at 63ft lbs with 18 reps performed at an exertion rating of 4/10.  Pt requires mod cues on MedX for speed and tempo.           Patient is making fair progress towards established goals.  Pt will continue to benefit from skilled outpatient physical therapy to address the deficits stated in the impairment chart, provide pt/family education and to maximize pt's level of independence in the home and community environment.     Anticipated Barriers for therapy: none  Pt's spiritual, cultural and educational needs considered and pt agreeable to plan of care and goals as stated below:     GOALS: Pt is in agreement with the following goals.     Short term goals:  6 weeks or 10 visits   - Pt will demonstrate increased lumbar MedX ROM by at least 3 degrees from the initial ROM value with improvements noted in functional ROM and ability to perform ADLs. Appropriate and Ongoing  - Pt will demonstrate increased MedX average isometric strength value by 10% from initial test resulting in improved ability to perform bending, lifting, and carrying activities safely, confidently. Appropriate and Ongoing  - Pt will report a reduction in worst pain score by 1-2 points for improved tolerance for household chores. Appropriate and Ongoing  - Pt able to perform HEP correctly with minimal cueing or supervision from therapist to encourage independent management of symptoms. Appropriate and Ongoing     Long term goals: 10 weeks or 20 visits   - Pt will demonstrate increased lumbar MedX ROM by at least 6 degrees from initial ROM value, resulting in improved ability to perform functional forward bending while standing and sitting. Appropriate and Ongoing  - Pt will demonstrate increased MedX average isometric strength value by 15% from initial test resulting in improved ability to  perform bending, lifting, and carrying activities safely and confidently. Appropriate and Ongoing  - Pt to demonstrate ability to independently control and reduce their pain through posture positioning and mechanical movements throughout a typical day. Appropriate and Ongoing  - Pt will demonstrate reduced pain and improved functional outcomes as reported on the FOTO by reaching an intake score of >/= 70% functional ability in order to demonstrate subjective improvement in patient's condition. . Appropriate and Ongoing  - Pt will demonstrate independence with the HEP at discharge. Appropriate and Ongoing  - Patient will maintain TUG and 5TSTS at 9 sec or less resulting in improved ability to perform functional tasks Appropriate and Ongoing  - Patient to perform 3 min of simulated sweeping /s inc LBP for inc tolerance to household chores (patient goal) Appropriate and Ongoing  - Patient report ability to walk and/or stand > 15 min /s inc LBP for inc tolerance to recreational activities  Appropriate and Ongoing  - Patient to perform standing<>floor transfer /c safe technique and /s inc LBP for inc tolerance to getting in/out of Appropriate and Ongoing     Plan     Continue with established Plan of Care towards established PT goals.     Therapist: Chepe Mayes, PTA  7/8/2024

## 2024-07-22 ENCOUNTER — CLINICAL SUPPORT (OUTPATIENT)
Dept: REHABILITATION | Facility: OTHER | Age: 74
End: 2024-07-22
Payer: MEDICARE

## 2024-07-22 DIAGNOSIS — M53.3 SACROILIAC JOINT PAIN: ICD-10-CM

## 2024-07-22 DIAGNOSIS — R29.898 DECREASED STRENGTH OF TRUNK AND BACK: ICD-10-CM

## 2024-07-22 DIAGNOSIS — M54.16 LUMBAR RADICULOPATHY: ICD-10-CM

## 2024-07-22 DIAGNOSIS — M47.899 FACET SYNDROME: Primary | ICD-10-CM

## 2024-07-22 DIAGNOSIS — M47.816 LUMBAR SPONDYLOSIS: ICD-10-CM

## 2024-07-22 PROCEDURE — 97110 THERAPEUTIC EXERCISES: CPT | Mod: CQ

## 2024-07-22 NOTE — PROGRESS NOTES
[facility]  Physical Therapy Treatment Note     Name: Charlotte Crowder  Clinic Number: [id number]    Therapy Diagnosis:   Encounter Diagnoses   Name Primary?    Facet syndrome Yes    Sacroiliac joint pain     Lumbar radiculopathy     Lumbar spondylosis     Decreased strength of trunk and back            Physician: Wendy Thompson MD    Visit Date: 7/22/2024    Physician Orders: Aquatic Therapy  transfer to [facility] 04/11/24  Medical Diagnosis from Referral:   M15.8 (ICD-10-CM) - Other osteoarthritis involving multiple joints   M48.062 (ICD-10-CM) - Spinal stenosis of lumbar region with neurogenic claudication      Evaluation Date: 9/7/2023  Authorization Period Expiration: 11/2/23  Plan of Care Expiration: 06/21/24  Reassessment Due: 06/10/24  Visit # / Visits authorized: 15/20 Healthy Back     INSURANCE and OUTCOMES: Fee for Service with FOTO Outcomes 2/3       PTA Visit #: 4/5         Time In: 12:00 PM   Time Out: 1:00 PM   Total Billable Time: 50 min   Total Treatment Time: 55 min     Precautions: Standard and Fall     Pattern of pain determined: 1 PEN  Subjective      Charlotte Crowder reports lapse in Rx due to tending to her brother in CCU. She mainly c/o coccyx region pain since yesterday, rating pain at 4/10.      Patient reports tolerating previous visit well /c no c/o of excess discomfort  Patient reports their pain to be 4/10 LB on a 0-10 scale with 0 being no pain and 10 being the worst pain imaginable  Pain Location: L LB      Occupation: retired nursing aide (caretaker for elderly)   Leisure: puzzles, reading,  meeting with friends to walking   Pt goals: [place] walk more often, improved household chore tolerance (sweeping,making bed)      Objective      Lumbar  Isometric Testing on Med X equipment: Testing administered by PT    Test Initial Baseline Midpoint Final   Date 04/18/24 06/17/24    ROM 0 - 39 deg 0-48 deg    Max Peak Torque 89 74     Min  Peak Torque 28  8     Flex/Ext Ratio 3 9.25:1     % variance below normative data 31 52%    % change from initial test N/A visit 1 -58%          OUTCOMES SELECTION:   Intake Outcome Measure for FOTO LUMBAR Survey     Therapist reviewed FOTO scores for Charlotte Crowder on 2024.   FOTO documents entered into [employer] - see Media section.     Intake Score: 63% functional ability (14 OZZIE)  05/15/24:  60% (18 OZZIE)   24: 62% functional ability (16 OZZIE)         Goal Score: 70% functional ability     MDC = 7 points             FROM AQUATIC REASSESSMENT (unless noted eval date)   TU2024 8 seconds       5 Times Sit to Stand:   2024: 9 seconds  24: 11.33 sec no BUE use   06/10/24: 14.33 sec no BUE use     Treatment     Charlotte received the treatments listed below:      Medical MedX Treatment as follows:  Patient received neuromuscular education for 0 minutes via participation on the Medical MedX Machine. Therapist assisted patient in isolating and engaging spinal stabilization musculature in order to improve functional ability and postural control. Patient performed exercise with therapist guidance in order to accurately use pacer function, avoid valsalva, and optimally exert effort within a safe and effective range via the Portillo Exertion Rating Scale. Patient instructed to perform at a midrange of exertion and to complete 15-20 repetitions within appropriate split time, with proper technique, and while maintaining safety.                 Charlotte participated in neuromuscular re-education activities to improve balance, coordination, proprioception, motor control and/or posture for 00 minutes. The following activities were included:         Charlotte participated in therapeutic exercises to develop strength, endurance, ROM, flexibility, posture, and core stabilization for 47 minutes includin/22/2024    12:52 PM   [facility]   Visit Number 16   VAS Pain Rating 4   Recumbent Bike  "Seat Pos. 5   Flexion in Lying 10   Lumbar Weight 63 lbs   Repetitions 13   Rating of Perceived Exertion 5   Ice - Z Lie (in min.) 5       Recumbent bike 5'  LTR x10   Open Books x 10 B cue for starting position (stack hips) and slow controlled motion  PPT with marching  x10   Bridges with hip abd  x15 +GTB  Sidelying Clamshells 2x10 B +GTB  TrA w/ball + SLR x10   prone HF stretches 1" ea  SOC 15 x5"-NP    Peripheral muscle strengthening which included one set of 15-20 repetitions at a slow and controlled 10-13 second per rep pace focused on strengthening supporting musculature in order to improve body mechanics and functional mobility. Patient and therapist focused on proper form during treatment to ensure optimal strengthening of each targeted muscle group.  Machines utilized included:Torso rotation, Leg Ext, Leg Curl, Chest Press, and RowingTriceps, Biceps, Hip Abd, Hip Add, and Leg Press        Charlotte participated in dynamic functional therapeutic activities to improve functional performance and simulate household and community activities for 00  minutes. The following activities were included:        Charlotte received manual therapy techniques for 00  minutes. The following activities were included:    Pt given cold pack for 5 minutes to lumbar region in Z lying.    Patient Education and Home Exercises     Home exercises include:  LTR   Open Books  DKTC   PPT   Bridges   Clamshells     Cardio program (V5): 5/9/24  Lifting education (V11): -   Posture/Lumbar roll: uses rolled towel  Fridge Magnet Discharge handout (date given): -  Equipment at home/gym membership:  [employer]     Education provided:        Written Home Exercises Provided: yes.  Exercises were reviewed and Charlotte was able to demonstrate them prior to the end of the session.  Charlotte demonstrated fair  understanding of the education provided.     See EMR under Patient Instructions for exercises provided prior visit.    Assessment " "    Patient presents to therapy with mod levels of pain. She was instructed in lumbopelvis therex to increase strength, stability, and flexibility for pain reduction to improved functional mobility.  Patient required min cues to prevent substitution patterns with SLY clams due to min trunk rotation noted. Lumbar MedX was performed with resistance maintained at 63 lbs/ft with difficulty experienced. She was unable to completed recommended reps of 15 due to increase pain/"pressure" at L lower lumbar region, rating RPE at 5/10. Peripheral muscle strengthening performed w/o adverse effects.           Patient is making fair progress towards established goals.  Pt will continue to benefit from skilled outpatient physical therapy to address the deficits stated in the impairment chart, provide pt/family education and to maximize pt's level of independence in the home and community environment.     Anticipated Barriers for therapy: none  Pt's spiritual, cultural and educational needs considered and pt agreeable to plan of care and goals as stated below:     GOALS: Pt is in agreement with the following goals.     Short term goals:  6 weeks or 10 visits   - Pt will demonstrate increased lumbar MedX ROM by at least 3 degrees from the initial ROM value with improvements noted in functional ROM and ability to perform ADLs. Appropriate and Ongoing  - Pt will demonstrate increased MedX average isometric strength value by 10% from initial test resulting in improved ability to perform bending, lifting, and carrying activities safely, confidently. Appropriate and Ongoing  - Pt will report a reduction in worst pain score by 1-2 points for improved tolerance for household chores. Appropriate and Ongoing  - Pt able to perform HEP correctly with minimal cueing or supervision from therapist to encourage independent management of symptoms. Appropriate and Ongoing     Long term goals: 10 weeks or 20 visits   - Pt will demonstrate increased " lumbar MedX ROM by at least 6 degrees from initial ROM value, resulting in improved ability to perform functional forward bending while standing and sitting. Appropriate and Ongoing  - Pt will demonstrate increased MedX average isometric strength value by 15% from initial test resulting in improved ability to perform bending, lifting, and carrying activities safely and confidently. Appropriate and Ongoing  - Pt to demonstrate ability to independently control and reduce their pain through posture positioning and mechanical movements throughout a typical day. Appropriate and Ongoing  - Pt will demonstrate reduced pain and improved functional outcomes as reported on the FOTO by reaching an intake score of >/= 70% functional ability in order to demonstrate subjective improvement in patient's condition. . Appropriate and Ongoing  - Pt will demonstrate independence with the HEP at discharge. Appropriate and Ongoing  - Patient will maintain TUG and 5TSTS at 9 sec or less resulting in improved ability to perform functional tasks Appropriate and Ongoing  - Patient to perform 3 min of simulated sweeping /s inc LBP for inc tolerance to household chores (patient goal) Appropriate and Ongoing  - Patient report ability to walk and/or stand > 15 min /s inc LBP for inc tolerance to recreational activities  Appropriate and Ongoing  - Patient to perform standing<>floor transfer /c safe technique and /s inc LBP for inc tolerance to getting in/out of Appropriate and Ongoing     Plan     Continue with established Plan of Care towards established PT goals.     Therapist: Zahra Liz, PTA  7/22/2024

## 2024-07-31 ENCOUNTER — CLINICAL SUPPORT (OUTPATIENT)
Dept: REHABILITATION | Facility: OTHER | Age: 74
End: 2024-07-31
Payer: MEDICARE

## 2024-07-31 DIAGNOSIS — M54.16 LUMBAR RADICULOPATHY: ICD-10-CM

## 2024-07-31 DIAGNOSIS — M47.816 LUMBAR SPONDYLOSIS: ICD-10-CM

## 2024-07-31 DIAGNOSIS — M53.3 SACROILIAC JOINT PAIN: ICD-10-CM

## 2024-07-31 DIAGNOSIS — R29.898 DECREASED STRENGTH OF TRUNK AND BACK: ICD-10-CM

## 2024-07-31 DIAGNOSIS — M47.899 FACET SYNDROME: Primary | ICD-10-CM

## 2024-07-31 PROCEDURE — 97110 THERAPEUTIC EXERCISES: CPT | Mod: CQ

## 2024-07-31 NOTE — PROGRESS NOTES
"    [facility] OUTPATIENT THERAPY AND WELLNESS - HEALTHY BACK  Physical Therapy Treatment Note     Name: Charlotte Crowder  Clinic Number: [id number]    Therapy Diagnosis:   Encounter Diagnoses   Name Primary?    Facet syndrome Yes    Sacroiliac joint pain     Lumbar radiculopathy     Lumbar spondylosis     Decreased strength of trunk and back            Physician: Wendy Thompson MD    Visit Date: 7/31/2024    Physician Orders: Aquatic Therapy  transfer to [facility] 04/11/24  Medical Diagnosis from Referral:   M15.8 (ICD-10-CM) - Other osteoarthritis involving multiple joints   M48.062 (ICD-10-CM) - Spinal stenosis of lumbar region with neurogenic claudication      Evaluation Date: 9/7/2023  Authorization Period Expiration: 11/2/23  Plan of Care Expiration: 06/21/24  Reassessment Due: 06/10/24  Visit # / Visits authorized: 15/20 Healthy Back     INSURANCE and OUTCOMES: Fee for Service with FOTO Outcomes 2/3       PTA Visit #: 5/5         Time In: 10:00 AM   Time Out: 11:00  AM   Total Billable Time: 30 min   Total Treatment Time: 55 min     Precautions: Standard and Fall     Pattern of pain determined: 1 PEN  Subjective      Charlotte Crowder reports min "pressure"/ soreness at "L side and tailbone".     Patient reports tolerating previous visit well /c no c/o of excess discomfort  Patient reports their pain to be 3/10 LB on a 0-10 scale with 0 being no pain and 10 being the worst pain imaginable  Pain Location: L LB      Occupation: retired nursing aide (caretaker for elderly)   Leisure: puzzles, reading,  meeting with friends to walking   Pt goals: [place] walk more often, improved household chore tolerance (sweeping,making bed)      Objective      Lumbar  Isometric Testing on Med X equipment: Testing administered by PT    Test Initial Baseline Midpoint Final   Date 04/18/24 06/17/24    ROM 0 - 39 deg 0-48 deg    Max Peak Torque 89 74     Min Peak Torque 28  8     Flex/Ext Ratio 3 9.25:1     % variance below " normative data 31 52%    % change from initial test N/A visit 1 -58%          OUTCOMES SELECTION:   Intake Outcome Measure for FOTO LUMBAR Survey     Therapist reviewed FOTO scores for Charlotte Crowder on 2024.   FOTO documents entered into [employer] - see Media section.     Intake Score: 63% functional ability (14 OZZIE)  05/15/24:  60% (18 OZZIE)   24: 62% functional ability (16 OZZIE)         Goal Score: 70% functional ability     MDC = 7 points             FROM AQUATIC REASSESSMENT (unless noted eval date)   TU2024 8 seconds       5 Times Sit to Stand:   2024: 9 seconds  24: 11.33 sec no BUE use   06/10/24: 14.33 sec no BUE use     Treatment     Charlotte received the treatments listed below:      Medical MedX Treatment as follows:  Patient received neuromuscular education for 0 minutes via participation on the Medical MedX Machine. Therapist assisted patient in isolating and engaging spinal stabilization musculature in order to improve functional ability and postural control. Patient performed exercise with therapist guidance in order to accurately use pacer function, avoid valsalva, and optimally exert effort within a safe and effective range via the Portillo Exertion Rating Scale. Patient instructed to perform at a midrange of exertion and to complete 15-20 repetitions within appropriate split time, with proper technique, and while maintaining safety.                 Charlotte participated in neuromuscular re-education activities to improve balance, coordination, proprioception, motor control and/or posture for 00 minutes. The following activities were included:         Charlotte participated in therapeutic exercises to develop strength, endurance, ROM, flexibility, posture, and core stabilization for 47 minutes includin/31/2024    10:25 AM   [facility]   Visit Number 17   VAS Pain Rating 3   Recumbent Bike Seat Pos. 5   Flexion in Lying 10   Lumbar Weight 63 lbs  "  Repetitions 20   Rating of Perceived Exertion 3             Recumbent bike 5'  LTR x10   Open Books x 10 B cue for starting position (stack hips) and slow controlled motion  PPT with marching  x10 -np  Bridges with hip abd  x15 +GTB  Sidelying Clamshells 2x10 B +GTB  TrA w/ball + SLR x10   prone HF stretches 1" ea-np  SOC 15 x5"-NP    Peripheral muscle strengthening which included one set of 15-20 repetitions at a slow and controlled 10-13 second per rep pace focused on strengthening supporting musculature in order to improve body mechanics and functional mobility. Patient and therapist focused on proper form during treatment to ensure optimal strengthening of each targeted muscle group.  Machines utilized included:Torso rotation, Leg Ext, Leg Curl, Chest Press, and RowingTriceps, Biceps, Hip Abd, Hip Add, and Leg Press        Charlotte participated in dynamic functional therapeutic activities to improve functional performance and simulate household and community activities for 00  minutes. The following activities were included:        Hcarlotte received manual therapy techniques for 00  minutes. The following activities were included:    Pt given cold pack for 0 minutes to lumbar region in Z lying.    Patient Education and Home Exercises     Home exercises include:  LTR   Open Books  DKTC   PPT   Bridges   Clamshells     Cardio program (V5): 5/9/24  Lifting education (V11): -   Posture/Lumbar roll: uses rolled towel  Fridge Magnet Discharge handout (date given): -  Equipment at home/gym membership:  [employer]     Education provided:        Written Home Exercises Provided: yes.  Exercises were reviewed and Charlotte was able to demonstrate them prior to the end of the session.  Charlotte demonstrated fair  understanding of the education provided.     See EMR under Patient Instructions for exercises provided prior visit.    Assessment     Patient presents to therapy with mod levels of pain. She completed " therex w/o adverse effects, demonstrating min fatigue/weakness with lumbopelvis strength training. She required cues for proper techniques during PPT with SLRs for max recruitment to improve stability. Lumbar MedX was performed with resistance maintained at 63 lbs/ft with completion of 20 reps at 3/10 RPE. Peripheral muscle strengthening performed w/o adverse effects. Continue program per pt tolerance.          Patient is making fair progress towards established goals.  Pt will continue to benefit from skilled outpatient physical therapy to address the deficits stated in the impairment chart, provide pt/family education and to maximize pt's level of independence in the home and community environment.     Anticipated Barriers for therapy: none  Pt's spiritual, cultural and educational needs considered and pt agreeable to plan of care and goals as stated below:     GOALS: Pt is in agreement with the following goals.     Short term goals:  6 weeks or 10 visits   - Pt will demonstrate increased lumbar MedX ROM by at least 3 degrees from the initial ROM value with improvements noted in functional ROM and ability to perform ADLs. Appropriate and Ongoing  - Pt will demonstrate increased MedX average isometric strength value by 10% from initial test resulting in improved ability to perform bending, lifting, and carrying activities safely, confidently. Appropriate and Ongoing  - Pt will report a reduction in worst pain score by 1-2 points for improved tolerance for household chores. Appropriate and Ongoing  - Pt able to perform HEP correctly with minimal cueing or supervision from therapist to encourage independent management of symptoms. Appropriate and Ongoing     Long term goals: 10 weeks or 20 visits   - Pt will demonstrate increased lumbar MedX ROM by at least 6 degrees from initial ROM value, resulting in improved ability to perform functional forward bending while standing and sitting. Appropriate and Ongoing  - Pt  will demonstrate increased MedX average isometric strength value by 15% from initial test resulting in improved ability to perform bending, lifting, and carrying activities safely and confidently. Appropriate and Ongoing  - Pt to demonstrate ability to independently control and reduce their pain through posture positioning and mechanical movements throughout a typical day. Appropriate and Ongoing  - Pt will demonstrate reduced pain and improved functional outcomes as reported on the FOTO by reaching an intake score of >/= 70% functional ability in order to demonstrate subjective improvement in patient's condition. . Appropriate and Ongoing  - Pt will demonstrate independence with the HEP at discharge. Appropriate and Ongoing  - Patient will maintain TUG and 5TSTS at 9 sec or less resulting in improved ability to perform functional tasks Appropriate and Ongoing  - Patient to perform 3 min of simulated sweeping /s inc LBP for inc tolerance to household chores (patient goal) Appropriate and Ongoing  - Patient report ability to walk and/or stand > 15 min /s inc LBP for inc tolerance to recreational activities  Appropriate and Ongoing  - Patient to perform standing<>floor transfer /c safe technique and /s inc LBP for inc tolerance to getting in/out of Appropriate and Ongoing     Plan     Continue with established Plan of Care towards established PT goals.     Therapist: aZhra Liz, PTA  7/31/2024

## 2024-08-08 ENCOUNTER — CLINICAL SUPPORT (OUTPATIENT)
Dept: REHABILITATION | Facility: OTHER | Age: 74
End: 2024-08-08
Payer: MEDICARE

## 2024-08-08 DIAGNOSIS — M48.062 SPINAL STENOSIS OF LUMBAR REGION WITH NEUROGENIC CLAUDICATION: ICD-10-CM

## 2024-08-08 DIAGNOSIS — M54.16 LUMBAR RADICULOPATHY: ICD-10-CM

## 2024-08-08 DIAGNOSIS — M47.816 LUMBAR SPONDYLOSIS: ICD-10-CM

## 2024-08-08 DIAGNOSIS — R29.898 DECREASED STRENGTH OF TRUNK AND BACK: ICD-10-CM

## 2024-08-08 DIAGNOSIS — M53.3 SACROILIAC JOINT PAIN: ICD-10-CM

## 2024-08-08 DIAGNOSIS — M47.899 FACET SYNDROME: Primary | ICD-10-CM

## 2024-08-08 PROCEDURE — 97112 NEUROMUSCULAR REEDUCATION: CPT

## 2024-08-08 PROCEDURE — 97110 THERAPEUTIC EXERCISES: CPT

## 2024-08-12 ENCOUNTER — CLINICAL SUPPORT (OUTPATIENT)
Dept: REHABILITATION | Facility: OTHER | Age: 74
End: 2024-08-12
Payer: MEDICARE

## 2024-08-12 DIAGNOSIS — R29.898 DECREASED STRENGTH OF TRUNK AND BACK: ICD-10-CM

## 2024-08-12 DIAGNOSIS — M48.062 SPINAL STENOSIS OF LUMBAR REGION WITH NEUROGENIC CLAUDICATION: ICD-10-CM

## 2024-08-12 DIAGNOSIS — M54.16 LUMBAR RADICULOPATHY: ICD-10-CM

## 2024-08-12 DIAGNOSIS — I10 PRIMARY HYPERTENSION: ICD-10-CM

## 2024-08-12 DIAGNOSIS — I10 HYPERTENSION, UNSPECIFIED TYPE: ICD-10-CM

## 2024-08-12 DIAGNOSIS — M47.816 LUMBAR SPONDYLOSIS: ICD-10-CM

## 2024-08-12 DIAGNOSIS — E78.5 DYSLIPIDEMIA: Chronic | ICD-10-CM

## 2024-08-12 DIAGNOSIS — I10 ESSENTIAL HYPERTENSION: ICD-10-CM

## 2024-08-12 DIAGNOSIS — M47.899 FACET SYNDROME: Primary | ICD-10-CM

## 2024-08-12 DIAGNOSIS — M53.3 SACROILIAC JOINT PAIN: ICD-10-CM

## 2024-08-12 PROCEDURE — 97110 THERAPEUTIC EXERCISES: CPT

## 2024-08-12 PROCEDURE — 97112 NEUROMUSCULAR REEDUCATION: CPT

## 2024-08-12 NOTE — PROGRESS NOTES
[facility]  Physical Therapy Treatment Note     Name: Charlotte Crowder  Clinic Number: [id number]    Therapy Diagnosis:   Encounter Diagnoses   Name Primary?    Facet syndrome Yes    Sacroiliac joint pain     Lumbar radiculopathy     Spinal stenosis of lumbar region with neurogenic claudication     Lumbar spondylosis     Decreased strength of trunk and back        Physician: Wendy Thompson MD    Visit Date: 8/12/2024    Physician Orders: Aquatic Therapy  transfer to [facility] 04/11/24  Medical Diagnosis from Referral:   M15.8 (ICD-10-CM) - Other osteoarthritis involving multiple joints   M48.062 (ICD-10-CM) - Spinal stenosis of lumbar region with neurogenic claudication      Evaluation Date: 9/7/2023  Authorization Period Expiration: 11/2/23  Plan of Care Expiration: 06/21/24  Reassessment Due: 06/10/24  Visit # / Visits authorized: 19/20 [facility]     INSURANCE and OUTCOMES: Fee for Service with FOTO Outcomes 2/3       PTA Visit #: 0/5         Time In: 2:30 PM   Time Out: 3:20 AM   Total Billable Time: 30 min   Total Treatment Time: 50 min     Precautions: Standard and Fall     Pattern of pain determined: 1 PEN  Subjective      Charlotte Crowder reports minima low back pain. Patient notices therapy is helping as she no longer has pain in her tailbone especially with bridges.     Patient reports tolerating previous visit well /c no c/o of excess discomfort  Patient reports their pain to be 3/10 LB on a 0-10 scale with 0 being no pain and 10 being the worst pain imaginable  Pain Location: L LB      Occupation: retired nursing aide (caretaker for elderly)   Leisure: puzzles, reading,  meeting with friends to walking   Pt goals: [place] walk more often, improved household chore tolerance (sweeping,making bed)      Objective      Lumbar  Isometric Testing on Med X equipment: Testing administered by PT    Test Initial Baseline Midpoint Final   Date 04/18/24 06/17/24  2024   ROM 0 - 39 deg 0-48 deg 0-54   Max Peak Torque 89 74  129   Min Peak Torque 28  8  54   Flex/Ext Ratio 3 9.25:1  2.3:1   % variance below normative data 31 52% +6   % change from initial test N/A visit 1 -58%  +55        OUTCOMES SELECTION:   Intake Outcome Measure for FOTO LUMBAR Survey     Therapist reviewed FOTO scores for Charlotte Crowder on 2024.   FOTO documents entered into [employer] - see Media section.     Intake Score: 63% functional ability (14 OZZIE)  05/15/24:  60% (18 OZZIE)   24: 62% functional ability (16 OZZIE)   2024: 82 (2 OZZIE)     Goal Score: 70% functional ability     MDC = 7 points             FROM AQUATIC REASSESSMENT (unless noted eval date)   TU2024 8 seconds       5 Times Sit to Stand:   2024: 9 seconds  24: 11.33 sec no BUE use   06/10/24: 14.33 sec no BUE use     Treatment     Charlotte received the treatments listed below:      Medical MedX Treatment as follows:  Patient received neuromuscular education for 10 minutes via participation on the Medical MedX Machine. Therapist assisted patient in isolating and engaging spinal stabilization musculature in order to improve functional ability and postural control. Patient performed exercise with therapist guidance in order to accurately use pacer function, avoid valsalva, and optimally exert effort within a safe and effective range via the Portillo Exertion Rating Scale. Patient instructed to perform at a midrange of exertion and to complete 15-20 repetitions within appropriate split time, with proper technique, and while maintaining safety.            2024     2:50 PM   [facility]   Visit Number 19   VAS Pain Rating 3   Recumbent Bike Seat Pos. 5   Lumbar Flexion 54   Lumbar Extension 0   Lumbar Peak Torque 129 ft. lbs.   Min Torque 54   Test Percent Gain in Strength from Initial  55 %   Ice - Z Lie (in min.) 5                 Charlotte participated in neuromuscular re-education activities to  "improve balance, coordination, proprioception, motor control and/or posture for 00 minutes. The following activities were included:         Charlotte participated in therapeutic exercises to develop strength, endurance, ROM, flexibility, posture, and core stabilization for 45 minutes including:           Recumbent bike 5'  LTR x10   Open Books x 10 B cue for starting position (stack hips) and slow controlled motion  PPT with marching  x10 -np  Bridges with hip abd  x15, GTB  Sidelying Clamshells 2x10 B, GTB  TrA w/ball + SLR x10   prone HF stretches 1" ea-np  SOC 15 x5"-NP    Peripheral muscle strengthening which included one set of 15-20 repetitions at a slow and controlled 10-13 second per rep pace focused on strengthening supporting musculature in order to improve body mechanics and functional mobility. Patient and therapist focused on proper form during treatment to ensure optimal strengthening of each targeted muscle group.  Machines utilized included:Torso rotation, Leg Ext, Leg Curl, Chest Press, and RowingTriceps, Biceps, Hip Abd, Hip Add, and Leg Press        Charlotte participated in dynamic functional therapeutic activities to improve functional performance and simulate household and community activities for 00  minutes. The following activities were included:        Charlotte received manual therapy techniques for 00  minutes. The following activities were included:    Pt given cold pack for 0 minutes to lumbar region in Z lying.    Patient Education and Home Exercises     Home exercises include:  LTR   Open Books  DKTC   PPT   Bridges   Clamshells     Cardio program (V5): 5/9/24  Lifting education (V11): -   Posture/Lumbar roll: uses rolled towel  Fridge Magnet Discharge handout (date given): - 8/12/2024  Equipment at home/gym membership:  [employer]     Education provided:        Written Home Exercises Provided: yes.  Exercises were reviewed and Charlotte was able to demonstrate them prior to the " end of the session.  Charlotte demonstrated fair  understanding of the education provided.     See EMR under Patient Instructions for exercises provided prior visit.    Assessment     Patient has attended 20 visits of the Healthy Back program focusing aerobic training, isometric testing with dynamic strengthening on MedX machine for spine, whole body strengthening on peripheral strengthening equipment, HEP, and patient education. Patient has completed the Healthy Back Program and is ready to be transitioned into wellness program. Educated on the importance of wellness program and attending weekly in order to maintain strength. Stressed the importance of continuing exercise and maintaining proper body mechanics and ergonomics in order to fully participate in activities of daily living, work, and leisure activities. At this time, patient demonstrates improvement in ability to reduce symptoms, improved posture, improved spinal ROM and improved strength. Discharge handout with HEP given and reviewed all information given. Patient able to demonstrate and verbalize understanding. Patient does not plan to attend wellness and is appropriate for transition.     -Improved posture and is using lumbar roll.  -Improved lumbar ROM, initially on MedX test 0-39 and currently 0-54.  -Improved strength at each test point on lumbar med ex IM test with 55% average improvement with reduced pain noted by patient.  -Initial outcome tool score 62 and current outcome tool score 83% indicating reduced pain and improved function.        Anticipated Barriers for therapy: none  Pt's spiritual, cultural and educational needs considered and pt agreeable to plan of care and goals as stated below:     GOALS: Pt is in agreement with the following goals.     Short term goals:  6 weeks or 10 visits   - Pt will demonstrate increased lumbar MedX ROM by at least 3 degrees from the initial ROM value with improvements noted in functional ROM and ability to  perform ADLs. Met 8/12/2024  - Pt will demonstrate increased MedX average isometric strength value by 10% from initial test resulting in improved ability to perform bending, lifting, and carrying activities safely, confidently. Met 8/12/2024  - Pt will report a reduction in worst pain score by 1-2 points for improved tolerance for household chores.Met 8/12/2024  - Pt able to perform HEP correctly with minimal cueing or supervision from therapist to encourage independent management of symptoms. Met 8/12/2024     Long term goals: 10 weeks or 20 visits   - Pt will demonstrate increased lumbar MedX ROM by at least 6 degrees from initial ROM value, resulting in improved ability to perform functional forward bending while standing and sitting. Met 8/12/2024  - Pt will demonstrate increased MedX average isometric strength value by 15% from initial test resulting in improved ability to perform bending, lifting, and carrying activities safely and confidently. Met 8/12/2024  - Pt to demonstrate ability to independently control and reduce their pain through posture positioning and mechanical movements throughout a typical day. Met 8/12/2024  - Pt will demonstrate reduced pain and improved functional outcomes as reported on the FOTO by reaching an intake score of >/= 70% functional ability in order to demonstrate subjective improvement in patient's condition. . Appropriate and Ongoing  - Pt will demonstrate independence with the HEP at discharge. Met 8/12/2024  - Patient will maintain TUG and 5TSTS at 9 sec or less resulting in improved ability to perform functional tasks Partially Met  - Patient to perform 3 min of simulated sweeping /s inc LBP for inc tolerance to household chores (patient goal) Met 8/12/2024  - Patient report ability to walk and/or stand > 15 min /s inc LBP for inc tolerance to recreational activities  Met 8/12/2024  - Patient to perform standing<>floor transfer /c safe technique and /s inc LBP for inc  tolerance to getting in/out of Met 8/12/2024    Plan     D/C to HEP and Gym    Therapist: Lindsey Diallo, PT  8/12/2024

## 2024-08-13 RX ORDER — ATORVASTATIN CALCIUM 40 MG/1
40 TABLET, FILM COATED ORAL
Qty: 90 TABLET | Refills: 0 | Status: SHIPPED | OUTPATIENT
Start: 2024-08-13

## 2024-08-13 RX ORDER — CARVEDILOL 25 MG/1
25 TABLET ORAL 2 TIMES DAILY WITH MEALS
Qty: 180 TABLET | Refills: 3 | Status: SHIPPED | OUTPATIENT
Start: 2024-08-13

## 2024-08-13 RX ORDER — AMLODIPINE BESYLATE 5 MG/1
5 TABLET ORAL
Qty: 90 TABLET | Refills: 3 | Status: SHIPPED | OUTPATIENT
Start: 2024-08-13

## 2024-08-13 RX ORDER — IRBESARTAN 300 MG/1
300 TABLET ORAL NIGHTLY
Qty: 90 TABLET | Refills: 1 | Status: SHIPPED | OUTPATIENT
Start: 2024-08-13

## 2024-08-13 NOTE — TELEPHONE ENCOUNTER
Refill Decision Note   Charlotte Crowder  is requesting a refill authorization.  Brief Assessment and Rationale for Refill:  Approve     Medication Therapy Plan:         Comments:     Note composed:9:23 AM 08/13/2024

## 2024-08-13 NOTE — TELEPHONE ENCOUNTER
No care due was identified.  Health [facility] Embedded Care Due Messages. Reference number: [id number].   8/13/2024 9:16:01 AM CDT

## 2024-08-26 ENCOUNTER — LAB VISIT (OUTPATIENT)
Dept: LAB | Facility: OTHER | Age: 74
End: 2024-08-26
Attending: FAMILY MEDICINE
Payer: MEDICARE

## 2024-08-26 DIAGNOSIS — R79.9 ABNORMAL FINDING OF BLOOD CHEMISTRY, UNSPECIFIED: ICD-10-CM

## 2024-08-26 DIAGNOSIS — R93.89 ABNORMAL FINDINGS ON DIAGNOSTIC IMAGING OF OTHER SPECIFIED BODY STRUCTURES: ICD-10-CM

## 2024-08-26 DIAGNOSIS — Z79.899 OTHER LONG TERM (CURRENT) DRUG THERAPY: ICD-10-CM

## 2024-08-26 LAB
25(OH)D3+25(OH)D2 SERPL-MCNC: 34 NG/ML (ref 30–96)
CHOLEST SERPL-MCNC: 259 MG/DL (ref 120–199)
CHOLEST/HDLC SERPL: 6.3 {RATIO} (ref 2–5)
ESTIMATED AVG GLUCOSE: 100 MG/DL (ref 68–131)
HBA1C MFR BLD: 5.1 % (ref 4–5.6)
HDLC SERPL-MCNC: 41 MG/DL (ref 40–75)
HDLC SERPL: 15.8 % (ref 20–50)
LDLC SERPL CALC-MCNC: 188.6 MG/DL (ref 63–159)
NONHDLC SERPL-MCNC: 218 MG/DL
TRIGL SERPL-MCNC: 147 MG/DL (ref 30–150)
TSH SERPL DL<=0.005 MIU/L-ACNC: 1.22 UIU/ML (ref 0.4–4)

## 2024-08-26 PROCEDURE — 36415 COLL VENOUS BLD VENIPUNCTURE: CPT | Performed by: FAMILY MEDICINE

## 2024-08-26 PROCEDURE — 80061 LIPID PANEL: CPT | Performed by: FAMILY MEDICINE

## 2024-08-26 PROCEDURE — 82306 VITAMIN D 25 HYDROXY: CPT | Performed by: FAMILY MEDICINE

## 2024-08-26 PROCEDURE — 84443 ASSAY THYROID STIM HORMONE: CPT | Performed by: FAMILY MEDICINE

## 2024-08-26 PROCEDURE — 83036 HEMOGLOBIN GLYCOSYLATED A1C: CPT | Performed by: FAMILY MEDICINE

## 2024-08-29 ENCOUNTER — PATIENT OUTREACH (OUTPATIENT)
Dept: EMERGENCY MEDICINE | Facility: HOSPITAL | Age: 74
End: 2024-08-29
Payer: MEDICARE

## 2024-08-29 NOTE — PROGRESS NOTES
Call placed to f/u from last encounter and remind of Cardio appt on 9-3-24. Pt verbalized understanding and states everything is going good. ED navigator will continue to follow-up with patient and assist as needed.

## 2024-08-29 NOTE — PROGRESS NOTES
Pt reminded of appt with, [facility], Cardiology at 8:30. Pt verbalized understanding.

## 2024-09-03 ENCOUNTER — OFFICE VISIT (OUTPATIENT)
Dept: CARDIOLOGY | Facility: CLINIC | Age: 74
End: 2024-09-03
Payer: MEDICARE

## 2024-09-03 VITALS
HEART RATE: 88 BPM | SYSTOLIC BLOOD PRESSURE: 104 MMHG | OXYGEN SATURATION: 96 % | WEIGHT: 246.94 LBS | BODY MASS INDEX: 39.69 KG/M2 | DIASTOLIC BLOOD PRESSURE: 65 MMHG | HEIGHT: 66 IN

## 2024-09-03 DIAGNOSIS — I10 PRIMARY HYPERTENSION: ICD-10-CM

## 2024-09-03 DIAGNOSIS — E66.01 MORBID OBESITY: ICD-10-CM

## 2024-09-03 DIAGNOSIS — N18.32 STAGE 3B CHRONIC KIDNEY DISEASE: ICD-10-CM

## 2024-09-03 DIAGNOSIS — G47.33 OSA (OBSTRUCTIVE SLEEP APNEA): ICD-10-CM

## 2024-09-03 DIAGNOSIS — E78.5 DYSLIPIDEMIA: Chronic | ICD-10-CM

## 2024-09-03 DIAGNOSIS — I42.8 NON-ISCHEMIC CARDIOMYOPATHY: Primary | ICD-10-CM

## 2024-09-03 PROCEDURE — 3288F FALL RISK ASSESSMENT DOCD: CPT | Mod: CPTII,S$GLB,, | Performed by: PHYSICIAN ASSISTANT

## 2024-09-03 PROCEDURE — 3074F SYST BP LT 130 MM HG: CPT | Mod: CPTII,S$GLB,, | Performed by: PHYSICIAN ASSISTANT

## 2024-09-03 PROCEDURE — 4010F ACE/ARB THERAPY RXD/TAKEN: CPT | Mod: CPTII,S$GLB,, | Performed by: PHYSICIAN ASSISTANT

## 2024-09-03 PROCEDURE — 99214 OFFICE O/P EST MOD 30 MIN: CPT | Mod: S$GLB,,, | Performed by: PHYSICIAN ASSISTANT

## 2024-09-03 PROCEDURE — 99999 PR PBB SHADOW E&M-EST. PATIENT-LVL IV: CPT | Mod: PBBFAC,,, | Performed by: PHYSICIAN ASSISTANT

## 2024-09-03 PROCEDURE — 1160F RVW MEDS BY RX/DR IN RCRD: CPT | Mod: CPTII,S$GLB,, | Performed by: PHYSICIAN ASSISTANT

## 2024-09-03 PROCEDURE — 1126F AMNT PAIN NOTED NONE PRSNT: CPT | Mod: CPTII,S$GLB,, | Performed by: PHYSICIAN ASSISTANT

## 2024-09-03 PROCEDURE — 1101F PT FALLS ASSESS-DOCD LE1/YR: CPT | Mod: CPTII,S$GLB,, | Performed by: PHYSICIAN ASSISTANT

## 2024-09-03 PROCEDURE — 3044F HG A1C LEVEL LT 7.0%: CPT | Mod: CPTII,S$GLB,, | Performed by: PHYSICIAN ASSISTANT

## 2024-09-03 PROCEDURE — 3078F DIAST BP <80 MM HG: CPT | Mod: CPTII,S$GLB,, | Performed by: PHYSICIAN ASSISTANT

## 2024-09-03 PROCEDURE — 3008F BODY MASS INDEX DOCD: CPT | Mod: CPTII,S$GLB,, | Performed by: PHYSICIAN ASSISTANT

## 2024-09-03 PROCEDURE — 1159F MED LIST DOCD IN RCRD: CPT | Mod: CPTII,S$GLB,, | Performed by: PHYSICIAN ASSISTANT

## 2024-09-03 RX ORDER — ATORVASTATIN CALCIUM 80 MG/1
80 TABLET, FILM COATED ORAL DAILY
Qty: 90 TABLET | Refills: 3 | Status: SHIPPED | OUTPATIENT
Start: 2024-09-03 | End: 2025-08-29

## 2024-09-03 NOTE — PROGRESS NOTES
General Cardiology Clinic Note  Reason for Visit: Annual follow up   Last Clinic Visit: 7/20/2023    HPI:   Charlotte Crowder is a 74 y.o. female who presents for annual follow up.     Problems:   Non-ischemic cardiomyopathy  -EF 40-45% in 2015 improved to 60% in 2018  -normal coronary arteries in 2016  HTN  HLD  CKD Stage 3  Severe KIMMIE, using cpap    Interval HPI   Patient presents for annual follow up. She denies symptoms of CAD, CHF, arrhythmia, hypotension, TIA. She has been going to [employer] once a week and does a variety of exercises. She has some dyspnea with moderate exercise occasionally, but resolves quickly with rest. She still checks her BP at home and it has been controlled. She uses her CPAP for 4 hours every night.     7/20/2023 HPI   Patient presents for annual follow up.  Patient denies symptoms of CAD, CHF, arrhythmia, hypotension, TIA, claudication. BP at home is 110s-120s/60s-70s. Only complaint is of some arthritic pain in her side relieved with tylenol.    7/15/2022 HPI (Tanisha Rodriguez)  Ms. Crowder is in clinic today for routine follow up.  Patient denies chest pain with exertion or at rest, palpitations, SOB, FUCHS, dizziness, syncope, edema, orthopnea, PND, or claudication.  Reports walking 10 minutes daily.  She uses hand weights with her arms.  Denies swelling with the amlodipine.  Patient has history of obstructive sleep apnea.  Reports nightly use of CPAP machine and denies any associated sx of KIMMIE.    ROS:      Review of Systems   Constitutional: Negative for diaphoresis, malaise/fatigue, weight gain and weight loss.   HENT:  Negative for nosebleeds.    Eyes:  Negative for vision loss in left eye, vision loss in right eye and visual disturbance.   Cardiovascular:  Negative for chest pain, claudication, dyspnea on exertion, irregular heartbeat, leg swelling, near-syncope, orthopnea, palpitations, paroxysmal nocturnal dyspnea and syncope.   Respiratory:  Negative for cough,  shortness of breath, sleep disturbances due to breathing, snoring and wheezing.    Hematologic/Lymphatic: Negative for bleeding problem. Does not bruise/bleed easily.   Skin:  Negative for poor wound healing and rash.   Musculoskeletal:  Positive for joint pain. Negative for muscle cramps and myalgias.   Gastrointestinal:  Negative for bloating, abdominal pain, diarrhea, heartburn, melena, nausea and vomiting.   Genitourinary:  Negative for hematuria and nocturia.   Neurological:  Negative for brief paralysis, dizziness, headaches, light-headedness, numbness and weakness.   Psychiatric/Behavioral:  Negative for depression.    Allergic/Immunologic: Negative for hives.       PMH:     Past Medical History:   Diagnosis Date    Abnormal Pap smear of cervix     Allergy     Hx of colonic polyps 2016    Hyperlipidemia     Hypertension     Morbid obesity     Nuclear sclerosis of both eyes 10/18/2018    OA (osteoarthritis)      Past Surgical History:   Procedure Laterality Date     SECTION      none      SKIN BIOPSY      TUBAL LIGATION       Allergies:   Review of patient's allergies indicates:  No Known Allergies  Medications:     Current Outpatient Medications on File Prior to Visit   Medication Sig Dispense Refill    amLODIPine (NORVASC) 5 MG tablet TAKE 1 TABLET BY MOUTH EVERY DAY 90 tablet 3    atorvastatin (LIPITOR) 40 MG tablet TAKE 1 TABLET BY MOUTH EVERY DAY 90 tablet 0    carvediloL (COREG) 25 MG tablet TAKE 1 TABLET BY MOUTH TWICE A DAY WITH FOOD 180 tablet 3    cyclobenzaprine (FLEXERIL) 5 MG tablet TAKE 1 TABLET BY MOUTH THREE TIMES A DAY AS NEEDED FOR MUSCLE SPASM 90 tablet 0    fluticasone propionate (FLONASE) 50 mcg/actuation nasal spray SPRAY 2 SPRAYS BY EACH NOSTRIL ROUTE ONCE DAILY. 48 mL 2    irbesartan (AVAPRO) 300 MG tablet TAKE 1 TABLET BY MOUTH EVERY EVENING. 90 tablet 1    loratadine (CLARITIN) 10 mg tablet Take 1 tablet (10 mg total) by mouth once daily. 30 tablet 11     "MULTIVIT-IRON-MIN-FOLIC ACID 3,500-18-0.4 UNIT-MG-MG ORAL CHEW Take 1 tablet by mouth once daily.      omega-3 fatty acids/fish oil (FISH OIL-OMEGA-3 FATTY ACIDS) 300-1,000 mg capsule Take by mouth once daily.      semaglutide (OZEMPIC) 0.25 mg or 0.5 mg (2 mg/3 mL) pen injector Inject 0.5 mg into the skin every 7 days. 3 mL 2     No current facility-administered medications on file prior to visit.     Social History:     Social History     Tobacco Use    Smoking status: Never     Passive exposure: Never    Smokeless tobacco: Never   Substance Use Topics    Alcohol use: Yes     Alcohol/week: 1.0 standard drink of alcohol     Types: 1 Glasses of wine per week     Comment: rarely/once a month     Family History:     Family History   Problem Relation Name Age of Onset    Glaucoma Father Ant Jones.     Hypertension Father Ant Jones.     Hypercalcemia Son      No Known Problems Other      Heart attack Neg Hx      Heart disease Neg Hx      Breast cancer Neg Hx      Colon cancer Neg Hx      Ovarian cancer Neg Hx      Calcium disorder Neg Hx       Physical Exam:   /65   Pulse 88   Ht 5' 6" (1.676 m)   Wt 112 kg (246 lb 14.6 oz)   SpO2 96%   BMI 39.85 kg/m²        Physical Exam  Vitals and nursing note reviewed.   Constitutional:       General: She is not in acute distress.     Appearance: Normal appearance. She is not ill-appearing.   HENT:      Head: Normocephalic and atraumatic.   Eyes:      General: No scleral icterus.     Conjunctiva/sclera: Conjunctivae normal.   Neck:      Thyroid: No thyromegaly.      Vascular: No carotid bruit or JVD.   Cardiovascular:      Rate and Rhythm: Normal rate and regular rhythm.      Pulses: Normal pulses.      Heart sounds: Normal heart sounds. No murmur heard.     No friction rub. No gallop.   Pulmonary:      Effort: Pulmonary effort is normal.      Breath sounds: Normal breath sounds. No wheezing, rhonchi or rales.   Chest:      Chest wall: No tenderness. " "  Abdominal:      General: Bowel sounds are normal. There is no distension.      Palpations: Abdomen is soft.      Tenderness: There is no abdominal tenderness.   Musculoskeletal:         General: No swelling.      Cervical back: Neck supple.      Right lower leg: No edema.      Left lower leg: No edema.   Skin:     General: Skin is warm and dry.      Coloration: Skin is not pale.      Findings: No erythema or rash.      Nails: There is no clubbing.   Neurological:      Mental Status: She is alert and oriented to person, place, and time. Mental status is at baseline.   Psychiatric:         Mood and Affect: Mood and affect normal.         Behavior: Behavior normal.          Labs:     Lab Results   Component Value Date     03/07/2024    K 3.9 03/07/2024     (H) 03/07/2024    CO2 24 03/07/2024    BUN 15 03/07/2024    CREATININE 1.3 03/07/2024    ANIONGAP 8 03/07/2024     Lab Results   Component Value Date    HGBA1C 5.1 08/26/2024     No results found for: "BNP", "BNPTRIAGEBLO" Lab Results   Component Value Date    WBC 4.39 03/07/2024    HGB 11.8 (L) 03/07/2024    HCT 36.4 (L) 03/07/2024     03/07/2024    GRAN 2.2 03/07/2024    GRAN 51.0 03/07/2024     Lab Results   Component Value Date    CHOL 259 (H) 08/26/2024    HDL 41 08/26/2024    LDLCALC 188.6 (H) 08/26/2024    TRIG 147 08/26/2024          Imaging:   Echocardiograms:   TTE 12/10/2018  Normal left ventricular systolic function. The estimated ejection fraction is 60%  Normal right ventricular systolic function.  Normal LV diastolic function.  The estimated PA systolic pressure is 17 mm Hg  Normal central venous pressure (3 mm Hg).    Stress Tests:   Stress echo 11/5/2015   1 - Technically difficult study.     2 - Mildly to moderately depressed left ventricular systolic function (EF 40-45%).     3 - Low normal to mildly depressed right ventricular systolic function .     4 - Left ventricular diastolic dysfunction.     Positive stress " echocardiographic study demonstrating an ischemic response involving the apical lateral wall, mid anterolateral wall, apical anterior wall, mid anterior wall.     Caths:   [facility] 7/6/2016  Normal coronary arteries.     LVEDP=5mmHg.     Other:  Carotid ultrasound 5/18/2020  1. No sonographic evidence of hemodynamically significant stenosis of the bilateral extracranial internal carotid arteries.       Assessment:     1. Non-ischemic cardiomyopathy    2. Primary hypertension    3. Dyslipidemia    4. Stage 3b chronic kidney disease    5. Severe KIMMIE (obstructive sleep apnea)    6. Morbid obesity      Plan:     Nonischemic cardiomyopathy  EF recovered. No signs of ADHF. Continue BB and ARB    Hypertension  Well controlled  Continue Amlodipine 5 mg daily  Continue Coreg 25 mg bid  Continue Irbesartan 300 mg daily     Dyslipidemia  Uncontrolled. Increase Lipitor to 80 mg. Recheck in 3 months.     Stage 3 CKD  Stable    Morbid obesity  She was seeing bariatric medicine and prescribed Ozempic, but insurance stopped paying for it. She is trying to lose weight through exercise now.     Severe KIMMIE  Compliant with CPAP     Follow up in one year.     Signed:  Jocelyne Alexandre PA-C  Cardiology   [phone] - General

## 2024-09-04 ENCOUNTER — TELEPHONE (OUTPATIENT)
Dept: PRIMARY CARE CLINIC | Facility: CLINIC | Age: 74
End: 2024-09-04
Payer: MEDICARE

## 2024-09-04 DIAGNOSIS — M62.838 MUSCLE SPASM: ICD-10-CM

## 2024-09-04 NOTE — TELEPHONE ENCOUNTER
----- Message from Bryant Pinon sent at 9/4/2024  2:55 PM CDT -----  Regarding: Muscle Spasma  Name of Who is Calling:  Patient          What is the request in detail:  Please contact patient she  would like to speak with Dr. Thompson about her muscle spasm Rx             Can the clinic reply by [facility]: No            What Number to Call Back if not in [facility]: [phone]

## 2024-09-04 NOTE — TELEPHONE ENCOUNTER
No care due was identified.  Health [facility] Embedded Care Due Messages. Reference number: [id number].   9/04/2024 3:17:10 PM CDT

## 2024-09-04 NOTE — TELEPHONE ENCOUNTER
Spoke with patient regarding message, she reports she would like to resume flexeril 10 mg for muscle spasm    Patient confirmed [facility] on [address] as her pharmacy    Message sent to PCP

## 2024-09-04 NOTE — TELEPHONE ENCOUNTER
Spoke to pt who states her cardiologist went over her lab results with her on her previous visit, and she states she does not know how to work MyChart.

## 2024-09-04 NOTE — TELEPHONE ENCOUNTER
----- Message from Wendy Thompson MD sent at 9/4/2024  1:13 PM CDT -----  Please contact the patient and schedule telemed to review lipids RULA romero.

## 2024-09-05 RX ORDER — CYCLOBENZAPRINE HCL 5 MG
10 TABLET ORAL 3 TIMES DAILY PRN
Qty: 90 TABLET | Refills: 0 | Status: SHIPPED | OUTPATIENT
Start: 2024-09-05

## 2024-10-22 ENCOUNTER — OFFICE VISIT (OUTPATIENT)
Dept: OPTOMETRY | Facility: CLINIC | Age: 74
End: 2024-10-22
Payer: COMMERCIAL

## 2024-10-22 DIAGNOSIS — H52.203 MYOPIA WITH ASTIGMATISM AND PRESBYOPIA, BILATERAL: ICD-10-CM

## 2024-10-22 DIAGNOSIS — H04.123 DRY EYE SYNDROME OF BOTH EYES: ICD-10-CM

## 2024-10-22 DIAGNOSIS — H52.4 MYOPIA WITH ASTIGMATISM AND PRESBYOPIA, BILATERAL: ICD-10-CM

## 2024-10-22 DIAGNOSIS — H52.13 MYOPIA WITH ASTIGMATISM AND PRESBYOPIA, BILATERAL: ICD-10-CM

## 2024-10-22 DIAGNOSIS — H25.13 SENILE NUCLEAR SCLEROSIS, BILATERAL: ICD-10-CM

## 2024-10-22 DIAGNOSIS — H40.013 OAG (OPEN ANGLE GLAUCOMA) SUSPECT, LOW RISK, BILATERAL: Primary | ICD-10-CM

## 2024-10-22 PROCEDURE — 99999 PR PBB SHADOW E&M-EST. PATIENT-LVL II: CPT | Mod: PBBFAC,,, | Performed by: OPTOMETRIST

## 2024-10-22 PROCEDURE — 92015 DETERMINE REFRACTIVE STATE: CPT | Mod: S$GLB,,, | Performed by: OPTOMETRIST

## 2024-10-22 PROCEDURE — 92014 COMPRE OPH EXAM EST PT 1/>: CPT | Mod: S$GLB,,, | Performed by: OPTOMETRIST

## 2024-10-24 NOTE — PROGRESS NOTES
LIBBY    REGINA: [birth year]  Chief complaint (CC): Patient is here for annual eye exam.  Patient hasn't   not iced any vision changes since the last exam.  Wears OTC readers, seems   to work fine. Distance vision seems fine without glasses. Patient had   prescription glasses made but could never see with them.   Eyes feel dry   and AT's help.  Glasses? +3.25 or +3.50 OTC  Contacts? -  H/o eye surgery, injections or laser: -  H/o eye injury: -  Known eye conditions? See above  Family h/o eye conditions? -  Eye gtts? AT's once a day      (-) Flashes (-)  Floaters (-) Mucous   (-)  Tearing (-) Itching (-) Burning   (-) Headaches (-) Eye Pain/discomfort (-) Irritation   (-)  Redness (-) Double vision (-) Blurry vision    Diabetic? -  A1c? -      Last edited by Tabitha Jacobs on 10/22/2024  2:19 PM.            Assessment /Plan     For exam results, see Encounter Report.      OAG (open angle glaucoma) suspect, low risk, bilateral  -     Posterior Segment OCT Optic Nerve- Both eyes; Future  -     Guzman Visual Field - OU - Extended - Both Eyes; Future   (-) FHx. IOP 16 OD, OS. Last 15 OD, OS. C/d 0.65/0.7 OD, 0.6/0.65 OS. Pt reports history of previous testing for glaucoma. Pachy 566 OD, 574 OS.   10/31/2022 OCT WNL OU  10/31/2022 HVFOD low reliability due to fixation losses and excessive high false positives.                           OS WNL  Educated the pt on findings. Pt shows understanding. Cont to monitor.   RTC OCT/24-2 felix faster. Will message w/results. Retest every 2 years.     Senile nuclear sclerosis, bilateral  Nuclear sclerotic cataract - not visually significant. Observe.    Dry eye syndrome of both eyes  Recommend iVizia, Systane Ultra or Refresh Optive tID-QID OU to aid with symptoms of dry eyes.    Myopia with astigmatism and presbyopia, bilateral  SRx released to patient. Patient educated on lens options. Normal ocular health. RTC 1 year for routine exam.

## 2024-11-22 ENCOUNTER — CLINICAL SUPPORT (OUTPATIENT)
Dept: OPHTHALMOLOGY | Facility: CLINIC | Age: 74
End: 2024-11-22
Payer: MEDICARE

## 2024-11-22 DIAGNOSIS — H40.013 OAG (OPEN ANGLE GLAUCOMA) SUSPECT, LOW RISK, BILATERAL: ICD-10-CM

## 2024-11-22 NOTE — PROGRESS NOTES
VISUAL FIELD TEST 24-2 LEXIESTER-OU-DONE/AB  OU-REL-FIX-COOP-GOOD/AB    PT HAS KNOWN ADHESIVES TO LATEX. USED PIRATE PATCH./AB

## 2024-11-24 ENCOUNTER — PATIENT MESSAGE (OUTPATIENT)
Dept: OPTOMETRY | Facility: CLINIC | Age: 74
End: 2024-11-24
Payer: MEDICARE

## 2024-12-03 ENCOUNTER — LAB VISIT (OUTPATIENT)
Dept: LAB | Facility: OTHER | Age: 74
End: 2024-12-03
Attending: PHYSICIAN ASSISTANT
Payer: MEDICARE

## 2024-12-03 DIAGNOSIS — E78.5 DYSLIPIDEMIA: Chronic | ICD-10-CM

## 2024-12-03 LAB
CHOLEST SERPL-MCNC: 261 MG/DL (ref 120–199)
CHOLEST/HDLC SERPL: 6.5 {RATIO} (ref 2–5)
HDLC SERPL-MCNC: 40 MG/DL (ref 40–75)
HDLC SERPL: 15.3 % (ref 20–50)
LDLC SERPL CALC-MCNC: 142.8 MG/DL (ref 63–159)
NONHDLC SERPL-MCNC: 221 MG/DL
TRIGL SERPL-MCNC: 391 MG/DL (ref 30–150)

## 2024-12-03 PROCEDURE — 80061 LIPID PANEL: CPT | Performed by: PHYSICIAN ASSISTANT

## 2024-12-03 PROCEDURE — 36415 COLL VENOUS BLD VENIPUNCTURE: CPT | Performed by: PHYSICIAN ASSISTANT

## 2024-12-10 ENCOUNTER — OFFICE VISIT (OUTPATIENT)
Dept: SLEEP MEDICINE | Facility: CLINIC | Age: 74
End: 2024-12-10
Payer: MEDICARE

## 2024-12-10 VITALS
HEART RATE: 91 BPM | DIASTOLIC BLOOD PRESSURE: 86 MMHG | HEIGHT: 66 IN | SYSTOLIC BLOOD PRESSURE: 134 MMHG | WEIGHT: 253.5 LBS | BODY MASS INDEX: 40.74 KG/M2

## 2024-12-10 DIAGNOSIS — M62.838 MUSCLE SPASM: ICD-10-CM

## 2024-12-10 DIAGNOSIS — G47.33 OSA (OBSTRUCTIVE SLEEP APNEA): Primary | ICD-10-CM

## 2024-12-10 PROCEDURE — 1101F PT FALLS ASSESS-DOCD LE1/YR: CPT | Mod: CPTII,S$GLB,, | Performed by: NURSE PRACTITIONER

## 2024-12-10 PROCEDURE — 99999 PR PBB SHADOW E&M-EST. PATIENT-LVL III: CPT | Mod: PBBFAC,,, | Performed by: NURSE PRACTITIONER

## 2024-12-10 PROCEDURE — 3079F DIAST BP 80-89 MM HG: CPT | Mod: CPTII,S$GLB,, | Performed by: NURSE PRACTITIONER

## 2024-12-10 PROCEDURE — 3075F SYST BP GE 130 - 139MM HG: CPT | Mod: CPTII,S$GLB,, | Performed by: NURSE PRACTITIONER

## 2024-12-10 PROCEDURE — 3008F BODY MASS INDEX DOCD: CPT | Mod: CPTII,S$GLB,, | Performed by: NURSE PRACTITIONER

## 2024-12-10 PROCEDURE — 3044F HG A1C LEVEL LT 7.0%: CPT | Mod: CPTII,S$GLB,, | Performed by: NURSE PRACTITIONER

## 2024-12-10 PROCEDURE — 99214 OFFICE O/P EST MOD 30 MIN: CPT | Mod: S$GLB,,, | Performed by: NURSE PRACTITIONER

## 2024-12-10 PROCEDURE — 3288F FALL RISK ASSESSMENT DOCD: CPT | Mod: CPTII,S$GLB,, | Performed by: NURSE PRACTITIONER

## 2024-12-10 PROCEDURE — 1159F MED LIST DOCD IN RCRD: CPT | Mod: CPTII,S$GLB,, | Performed by: NURSE PRACTITIONER

## 2024-12-10 PROCEDURE — 4010F ACE/ARB THERAPY RXD/TAKEN: CPT | Mod: CPTII,S$GLB,, | Performed by: NURSE PRACTITIONER

## 2024-12-10 NOTE — TELEPHONE ENCOUNTER
Refill Encounter    PCP Visits: Recent Visits  Date Type Provider Dept   06/24/24 Office Visit Wendy Thompson MD [facility]   06/05/24 Office Visit Wendy Thompson MD [facility]   Showing recent visits within past 360 days and meeting all other requirements  Future Appointments  No visits were found meeting these conditions.  Showing future appointments within next 720 days and meeting all other requirements     Last 3 Blood Pressure:   BP Readings from Last 3 Encounters:   09/03/24 104/65   06/13/24 104/85   06/05/24 121/81     Preferred Pharmacy:   [facility]/pharmacy #[id number] - [place], [place] - [address] [facility]  [address] [facility] [address]  Phone: [phone] Fax: [phone]    Requested RX:  Requested Prescriptions     Pending Prescriptions Disp Refills    cyclobenzaprine (FLEXERIL) 5 MG tablet [Pharmacy Med Name: CYCLOBENZAPRINE 5 MG TABLET] 90 tablet 0     Sig: TAKE 2 TABLETS (10 MG TOTAL) BY MOUTH 3 (THREE) TIMES DAILY AS NEEDED FOR MUSCLE SPASMS.      RX Route: Normal

## 2024-12-10 NOTE — TELEPHONE ENCOUNTER
Care Due:                  Date            Visit Type   Department     Provider  --------------------------------------------------------------------------------                                ESTABLISHED                              PATIENT -    [facility]  Last Visit: 06-      VIRTUAL      CARE           Wendy Thompson  Next Visit: None Scheduled  None         None Found                                                            Last  Test          Frequency    Reason                     Performed    Due Date  --------------------------------------------------------------------------------    CMP.........  12 months..  irbesartan...............  03- 03-    [facility] Embedded Care Due Messages. Reference number: [id number].   12/10/2024 2:15:56 PM CST

## 2024-12-10 NOTE — PROGRESS NOTES
"Cc: KIMMIE    Continues to use apap 6-14cm with DS2 machine. Using wisp mask over nose/mouth may be dry. Using it is irritating to her and she has bothersome sinus drip. Flonase NS use helps. Taking claritin.  Sleep consolidated when using    HTN stable    Interrogation:  8/21 days>4hr. Avg 1:22h/n AHI 3.1 past 30d      /86 (BP Location: Left arm, Patient Position: Sitting)   Pulse 91   Ht 5' 6" (1.676 m)   Wt 115 kg (253 lb 8.5 oz)   BMI 40.92 kg/m²       PSG (262#) 3/16/116: AHI was 40.3 with an oxygen yenny of 85.0%. The supine AHI was 60.7 and the REM AHI was 77.6. Due to poor sleep efficiency, the patient did not qualify for a split night study in the first half of the study.     ASSESSMENT:   1. KIMMIE, severe. She remains adherent with APAP, benefits from therapy AHI<5. Limited recent due to irritation nature/sinus symptoms  Medical comorbidities include:dilated cardiomyopathy, HTN    PLAN:   1. continue apap 6-14cm, switch to FFM,  DME [facility]  Discussed control of KIMMIE with adherence  See cards as advised re: cardiomyopathy/continue meds  2. She does not drive. See pcp re: HTN mgt/continue meds  3. RTC annually      "

## 2024-12-11 RX ORDER — CYCLOBENZAPRINE HCL 5 MG
10 TABLET ORAL 3 TIMES DAILY PRN
Qty: 90 TABLET | Refills: 1 | Status: SHIPPED | OUTPATIENT
Start: 2024-12-11

## 2024-12-21 DIAGNOSIS — E78.5 DYSLIPIDEMIA: Chronic | ICD-10-CM

## 2024-12-21 RX ORDER — ATORVASTATIN CALCIUM 40 MG/1
40 TABLET, FILM COATED ORAL
Qty: 90 TABLET | Refills: 0 | OUTPATIENT
Start: 2024-12-21

## 2024-12-21 NOTE — TELEPHONE ENCOUNTER
Refill Decision Note   Charlotte Crowder  is requesting a refill authorization.  Brief Assessment and Rationale for Refill:  Quick Discontinue     Medication Therapy Plan:  Dosage increased to 80 mg; QDC      Comments:     Note composed:5:50 PM 12/21/2024

## 2024-12-21 NOTE — TELEPHONE ENCOUNTER
No care due was identified.  [facility] Embedded Care Due Messages. Reference number: [id number].   12/21/2024 7:37:03 AM CST

## 2025-02-07 DIAGNOSIS — J30.2 OTHER SEASONAL ALLERGIC RHINITIS: ICD-10-CM

## 2025-02-07 RX ORDER — FLUTICASONE PROPIONATE 50 MCG
SPRAY, SUSPENSION (ML) NASAL
Qty: 48 G | Refills: 1 | Status: SHIPPED | OUTPATIENT
Start: 2025-02-07

## 2025-02-07 NOTE — TELEPHONE ENCOUNTER
Refill Decision Note   Charlotte Crowder  is requesting a refill authorization.  Brief Assessment and Rationale for Refill:  Approve     Medication Therapy Plan:         Comments:     Note composed:4:42 PM 02/07/2025

## 2025-02-07 NOTE — TELEPHONE ENCOUNTER
No care due was identified.  [facility] Embedded Care Due Messages. Reference number: [id number].   2/07/2025 2:18:11 PM CST

## 2025-02-27 DIAGNOSIS — I10 HYPERTENSION, UNSPECIFIED TYPE: ICD-10-CM

## 2025-02-27 RX ORDER — IRBESARTAN 300 MG/1
300 TABLET ORAL NIGHTLY
Qty: 90 TABLET | Refills: 0 | Status: SHIPPED | OUTPATIENT
Start: 2025-02-27

## 2025-02-27 NOTE — TELEPHONE ENCOUNTER
Care Due:                  Date            Visit Type   Department     Provider  --------------------------------------------------------------------------------                                ESTABLISHED                              PATIENT -    [facility] PRIMARY  Last Visit: 06-      VIRTUAL      CARE           Wendy Thompson  Next Visit: None Scheduled  None         None Found                                                            Last  Test          Frequency    Reason                     Performed    Due Date  --------------------------------------------------------------------------------    CMP.........  12 months..  irbesartan...............  03- 03-    [facility] Embedded Care Due Messages. Reference number: [id number].   2/27/2025 2:12:40 AM CST

## 2025-02-27 NOTE — TELEPHONE ENCOUNTER
Provider Staff:  Action required for this patient    Requires labs      Please see care gap opportunities below in Care Due Message.    Thanks!  [facility]     Appointments      Date Provider   Last Visit   6/24/2024 Wendy Thompson MD   Next Visit   Visit date not found Wendy Thompson MD     Refill Decision Note   Charlotte Crowder  is requesting a refill authorization.  Brief Assessment and Rationale for Refill:  Approve     Medication Therapy Plan:         Comments:     Note composed:7:34 AM 02/27/2025

## 2025-03-04 DIAGNOSIS — M62.838 MUSCLE SPASM: ICD-10-CM

## 2025-03-04 NOTE — TELEPHONE ENCOUNTER
No care due was identified.  [facility] Embedded Care Due Messages. Reference number: [id number].   3/04/2025 5:49:42 PM CST

## 2025-03-05 RX ORDER — CYCLOBENZAPRINE HCL 5 MG
10 TABLET ORAL 3 TIMES DAILY PRN
Qty: 90 TABLET | Refills: 0 | Status: SHIPPED | OUTPATIENT
Start: 2025-03-05

## 2025-03-05 NOTE — TELEPHONE ENCOUNTER
Refill Encounter    PCP Visits: Recent Visits  Date Type Provider Dept   06/24/24 Office Visit Wendy Thompson MD [facility]   06/05/24 Office Visit Wendy Thompson MD [facility]   Showing recent visits within past 360 days and meeting all other requirements  Future Appointments  No visits were found meeting these conditions.  Showing future appointments within next 720 days and meeting all other requirements      Last 3 Blood Pressure:   BP Readings from Last 3 Encounters:   12/10/24 134/86   09/03/24 104/65   06/13/24 104/85     Preferred Pharmacy:   [facility]/pharmacy [id number] - [place], [place] - [address] [facility]  [address] [facility] [address]  Phone: [phone] Fax: [phone]    Requested RX:  Requested Prescriptions     Pending Prescriptions Disp Refills    cyclobenzaprine (FLEXERIL) 5 MG tablet [Pharmacy Med Name: CYCLOBENZAPRINE 5 MG TABLET] 90 tablet 1     Sig: TAKE 2 TABLETS (10 MG TOTAL) BY MOUTH 3 (THREE) TIMES DAILY AS NEEDED FOR MUSCLE SPASMS.      RX Route: Normal

## 2025-05-26 DIAGNOSIS — Z00.00 ENCOUNTER FOR MEDICARE ANNUAL WELLNESS EXAM: ICD-10-CM

## 2025-05-27 DIAGNOSIS — I10 HYPERTENSION, UNSPECIFIED TYPE: ICD-10-CM

## 2025-05-27 RX ORDER — IRBESARTAN 300 MG/1
300 TABLET ORAL NIGHTLY
Qty: 90 TABLET | Refills: 0 | Status: SHIPPED | OUTPATIENT
Start: 2025-05-27

## 2025-05-27 NOTE — TELEPHONE ENCOUNTER
No care due was identified.  [facility] Embedded Care Due Messages. Reference number: [id number].   5/27/2025 12:06:11 AM CDT

## 2025-05-27 NOTE — TELEPHONE ENCOUNTER
Refill Routing Note   Medication(s) are not appropriate for processing by [facility] for the following reason(s):        Required labs outdated    [facility] action(s):  Defer               Appointments  past 12m or future 3m with PCP    Date Provider   Last Visit   6/24/2024 Wendy Thompson MD   Next Visit   Visit date not found Wendy Thompson MD   ED visits in past 90 days: 0        Note composed:5:18 AM 05/27/2025

## 2025-06-09 ENCOUNTER — TELEPHONE (OUTPATIENT)
Dept: HOME HEALTH SERVICES | Facility: CLINIC | Age: 75
End: 2025-06-09
Payer: MEDICARE

## 2025-06-09 ENCOUNTER — TELEPHONE (OUTPATIENT)
Dept: ADMINISTRATIVE | Facility: CLINIC | Age: 75
End: 2025-06-09
Payer: MEDICARE

## 2025-06-09 NOTE — TELEPHONE ENCOUNTER
Called pt; no answer; left message informing pt we need to cancel her 6/10/25 eawv appt due to her last eawv being completed on 6/13/24; left my name and number for pt to return my call to reschedule appt on or after 6/15/25

## 2025-07-22 ENCOUNTER — TELEPHONE (OUTPATIENT)
Dept: PRIMARY CARE CLINIC | Facility: CLINIC | Age: 75
End: 2025-07-22

## 2025-07-22 ENCOUNTER — OFFICE VISIT (OUTPATIENT)
Dept: PRIMARY CARE CLINIC | Facility: CLINIC | Age: 75
End: 2025-07-22
Payer: MEDICARE

## 2025-07-22 VITALS — DIASTOLIC BLOOD PRESSURE: 73 MMHG | SYSTOLIC BLOOD PRESSURE: 129 MMHG

## 2025-07-22 DIAGNOSIS — Z86.0100 HISTORY OF COLONIC POLYPS: ICD-10-CM

## 2025-07-22 DIAGNOSIS — I10 PRIMARY HYPERTENSION: Primary | ICD-10-CM

## 2025-07-22 DIAGNOSIS — G47.33 OSA (OBSTRUCTIVE SLEEP APNEA): ICD-10-CM

## 2025-07-22 DIAGNOSIS — M85.89 OSTEOPENIA OF MULTIPLE SITES: ICD-10-CM

## 2025-07-22 DIAGNOSIS — I42.8 NON-ISCHEMIC CARDIOMYOPATHY: ICD-10-CM

## 2025-07-22 DIAGNOSIS — E55.9 VITAMIN D DEFICIENCY DISEASE: ICD-10-CM

## 2025-07-22 DIAGNOSIS — R00.2 PALPITATION: ICD-10-CM

## 2025-07-22 DIAGNOSIS — E66.01 MORBID OBESITY: ICD-10-CM

## 2025-07-22 DIAGNOSIS — E78.5 DYSLIPIDEMIA: Chronic | ICD-10-CM

## 2025-07-22 DIAGNOSIS — R30.0 DYSURIA: ICD-10-CM

## 2025-07-22 DIAGNOSIS — Z13.820 SCREENING FOR OSTEOPOROSIS: ICD-10-CM

## 2025-07-22 DIAGNOSIS — Z13.1 SCREENING FOR DIABETES MELLITUS: ICD-10-CM

## 2025-07-22 DIAGNOSIS — Z12.31 SCREENING MAMMOGRAM FOR BREAST CANCER: ICD-10-CM

## 2025-07-22 DIAGNOSIS — N18.32 STAGE 3B CHRONIC KIDNEY DISEASE: ICD-10-CM

## 2025-07-22 DIAGNOSIS — E53.8 VITAMIN B12 DEFICIENCY: ICD-10-CM

## 2025-07-22 PROCEDURE — 3078F DIAST BP <80 MM HG: CPT | Mod: CPTII,95,,

## 2025-07-22 PROCEDURE — 4010F ACE/ARB THERAPY RXD/TAKEN: CPT | Mod: CPTII,95,,

## 2025-07-22 PROCEDURE — 98007 SYNCH AUDIO-VIDEO EST HI 40: CPT | Mod: 25,95,,

## 2025-07-22 PROCEDURE — 3074F SYST BP LT 130 MM HG: CPT | Mod: CPTII,95,,

## 2025-07-22 RX ORDER — IRBESARTAN 300 MG/1
300 TABLET ORAL NIGHTLY
Qty: 90 TABLET | Refills: 0 | Status: SHIPPED | OUTPATIENT
Start: 2025-07-22

## 2025-07-22 RX ORDER — CARVEDILOL 25 MG/1
25 TABLET ORAL 2 TIMES DAILY WITH MEALS
Qty: 180 TABLET | Refills: 0 | Status: SHIPPED | OUTPATIENT
Start: 2025-07-22

## 2025-07-22 NOTE — TELEPHONE ENCOUNTER
----- Message from KALEIGH Mayo sent at 7/22/2025  1:44 PM CDT -----  Please assist with scheduling in-person annual exam with Dr. Thompson in 2 months or earliest availability. TY!

## 2025-07-22 NOTE — ASSESSMENT & PLAN NOTE
- Evaluated urinary symptoms, considering possible UTI or yeast infection.  - Patient reports stinging sensation when urinating.  - Noted urine is not dark and does not smell, but there is irritation possibly due to new body wash products.  - Ordered urine sample for UTI screening to check for bacteria and determine appropriate treatment.  - Recommend OTC Azo for symptom relief, explaining it can help with symptoms but will not treat underlying bacteria.  - Will prescribe antibiotic therapy if UTI is confirmed.  - If normal UA, recommend in-person exam for Vaginosis probe.  
- complicated by HTN and KIMMIE  - counseling on recommendations for diet and cardiovascular exercise  - recommend continued weight monitoring  
- counseling on recommendations for fall prevention and increasing bone density  - followed by endocrinology and recommend follow-up with Dr. Lynn  - routine DXA scan ordered  
- established with cardiology and scheduling follow-up this Fall  - she does report new onset of intermittent palpitations about once weekly  - recommend labs, EKG, and evaluation by cardiology  
- well controlled  - avoid HCTZ, chlorthalidone, other thiazide agents due to hypercalcemia   
Lab Results   Component Value Date    CREATININE 1.3 03/07/2024    BUN 15 03/07/2024     03/07/2024    K 3.9 03/07/2024     (H) 03/07/2024    CO2 24 03/07/2024   - updating labs  - recommend low NA diet  - recommend renally dose medications  - recommend no NSAIDS  
128

## 2025-07-22 NOTE — Clinical Note
Please assist with scheduling in-person annual exam with Dr. Thompson in 2 months or earliest availability. TY!

## 2025-07-22 NOTE — PROGRESS NOTES
INTERNAL MEDICINE  [facility]    The patient location is: [place]  The chief complaint leading to consultation is: Medication Refill; Health Maintenance; Dysuria    Visit type: audiovisual    Face to Face time with patient: 25 minutes  43 minutes of total time spent on the encounter, which includes face to face time and non-face to face time preparing to see the patient (eg, review of tests), Obtaining and/or reviewing separately obtained history, Documenting clinical information in the electronic or other health record, Independently interpreting results (not separately reported) and communicating results to the patient/family/caregiver, or Care coordination (not separately reported).     Each patient to whom he or she provides medical services by telemedicine is:  (1) informed of the relationship between the physician and patient and the respective role of any other health care provider with respect to management of the patient; and (2) notified that he or she may decline to receive medical services by telemedicine and may withdraw from such care at any time.    Notes:     Reason for visit:   Chief Complaint   Patient presents with    Medication Refill    Health Maintenance     HPI: Charlotte Crowder is a 75 y.o. female presenting today for routine health maintenance, medication refills, and concern for dysuria.    Patient is an established patient of PCP, Dr. Wendy Thompson MD. This patient is new to me.    URINARY SYMPTOMS:  She reports stinging sensation and pain when urinating with a small stain or spot in undergarments. She experiences vaginal discharge but denies increased urinary frequency, urgency, dark urine, unusual odor, or itching. She recently changed body wash due to irritation from a different product. Recommend UA to rule out UTI and if no abnormal findings recommend clinic visit for vaginosis probe.    CARDIOVASCULAR:  She experiences episodes of palpitations described as  fluttering or beat skipping sensations occurring approximately every 4 months. These symptoms began in 0041-5359 and have become more regular, occurring even while using CPAP machine. She plans to follow up with her cardiologist.    She self-monitors blood pressure every other day. Recent reading was 123/79, with previous readings in March and February at 129/89.     PREVENTIVE CARE:  She completed colonoscopy within the past five years. She completed the shingles vaccination series with first dose in September 2020 and second dose in November 2020.        Answers submitted by the patient for this visit:  Review of Systems Questionnaire (Submitted on 7/22/2025)  activity change: No  unexpected weight change: No  rhinorrhea: No  trouble swallowing: No  visual disturbance: No  chest tightness: No  polyuria: Yes  difficulty urinating: No  menstrual problem: No  joint swelling: No  arthralgias: Yes  confusion: No  dysphoric mood: No      Review of Systems   HENT:  Negative for hearing loss.    Eyes:  Negative for discharge.   Respiratory:  Negative for wheezing.    Cardiovascular:  Positive for palpitations. Negative for chest pain.   Gastrointestinal:  Negative for blood in stool, constipation, diarrhea and vomiting.   Genitourinary:  Negative for dysuria and hematuria.   Musculoskeletal:  Negative for neck pain.   Neurological:  Negative for weakness and headaches.   Endo/Heme/Allergies:  Negative for polydipsia.       Social History     Social History Narrative    ** Merged History Encounter **            ALLERGIES:   Review of patient's allergies indicates:  No Known Allergies    MEDS:   Current Outpatient Medications on File Prior to Visit   Medication Sig Dispense Refill Last Dose/Taking    amLODIPine (NORVASC) 5 MG tablet TAKE 1 TABLET BY MOUTH EVERY DAY 90 tablet 3     atorvastatin (LIPITOR) 80 MG tablet Take 1 tablet (80 mg total) by mouth once daily. 90 tablet 3     cyclobenzaprine (FLEXERIL) 5 MG tablet TAKE  2 TABLETS (10 MG TOTAL) BY MOUTH 3 (THREE) TIMES DAILY AS NEEDED FOR MUSCLE SPASMS. 90 tablet 0     fluticasone propionate (FLONASE) 50 mcg/actuation nasal spray SPRAY 2 SPRAYS INTO EACH NOSTRIL ONCE DAILY 48 g 1     loratadine (CLARITIN) 10 mg tablet Take 1 tablet (10 mg total) by mouth once daily. 30 tablet 11     MULTIVIT-IRON-MIN-FOLIC ACID 3,500-18-0.4 UNIT-MG-MG ORAL CHEW Take 1 tablet by mouth once daily.       omega-3 fatty acids/fish oil (FISH OIL-OMEGA-3 FATTY ACIDS) 300-1,000 mg capsule Take by mouth once daily.       [DISCONTINUED] carvediloL (COREG) 25 MG tablet TAKE 1 TABLET BY MOUTH TWICE A DAY WITH FOOD 180 tablet 0     [DISCONTINUED] irbesartan (AVAPRO) 300 MG tablet TAKE 1 TABLET BY MOUTH EVERY DAY IN THE EVENING 90 tablet 0        Vital signs:   Vitals:    07/22/25 1308   BP: 129/73     There is no height or weight on file to calculate BMI.    PHYSICAL EXAM:     Physical Exam  Vitals reviewed.   Constitutional:       General: She is not in acute distress.     Appearance: Normal appearance. She is not ill-appearing or diaphoretic.   HENT:      Head: Normocephalic and atraumatic.   Pulmonary:      Effort: Pulmonary effort is normal. No respiratory distress.   Skin:     Coloration: Skin is not pale.   Neurological:      Mental Status: She is alert and oriented to person, place, and time.   Psychiatric:         Mood and Affect: Mood normal.         Behavior: Behavior normal.         Thought Content: Thought content normal.         Judgment: Judgment normal.         PERTINENT RESULTS:   No visits with results within 1 Week(s) from this visit.   Latest known visit with results is:   Lab Visit on 12/03/2024   Component Date Value Ref Range Status    Cholesterol 12/03/2024 261 (H)  120 - 199 mg/dL Final    Comment: The National Cholesterol Education Program (NCEP) has set the  following guidelines (reference ranges) for Cholesterol:  Optimal.....................<200 mg/dL  Borderline  High.............200-239 mg/dL  High........................> or = 240 mg/dL      Triglycerides 12/03/2024 391 (H)  30 - 150 mg/dL Final    Comment: The National Cholesterol Education Program (NCEP) has set the  following guidelines (reference values) for triglycerides:  Normal......................<150 mg/dL  Borderline High.............150-199 mg/dL  High........................200-499 mg/dL      HDL 12/03/2024 40  40 - 75 mg/dL Final    Comment: The National Cholesterol Education Program (NCEP) has set the  following guidelines (reference values) for HDL Cholesterol:  Low...............<40 mg/dL  Optimal...........>60 mg/dL      LDL Cholesterol 12/03/2024 142.8  63.0 - 159.0 mg/dL Final    Comment: The National Cholesterol Education Program (NCEP) has set the  following guidelines (reference values) for LDL Cholesterol:  Optimal.......................<130 mg/dL  Borderline High...............130-159 mg/dL  High..........................160-189 mg/dL  Very High.....................>190 mg/dL      HDL/Cholesterol Ratio 12/03/2024 15.3 (L)  20.0 - 50.0 % Final    Total Cholesterol/HDL Ratio 12/03/2024 6.5 (H)  2.0 - 5.0 Final    Non-HDL Cholesterol 12/03/2024 221  mg/dL Final    Comment: Risk category and Non-HDL cholesterol goals:  Coronary heart disease (CHD)or equivalent (10-year risk of CHD >20%):  Non-HDL cholesterol goal     <130 mg/dL  Two or more CHD risk factors and 10-year risk of CHD <= 20%:  Non-HDL cholesterol goal     <160 mg/dL  0 to 1 CHD risk factor:  Non-HDL cholesterol goal     <190 mg/dL         ASSESSMENT/PLAN:      1. Primary hypertension  Assessment & Plan:  - well controlled  - avoid HCTZ, chlorthalidone, other thiazide agents due to hypercalcemia     Orders:  -     CBC Auto Differential; Future; Expected date: 07/22/2025  -     Comprehensive Metabolic Panel; Future; Expected date: 07/22/2025  -     irbesartan (AVAPRO) 300 MG tablet; Take 1 tablet (300 mg total) by mouth every evening.   Dispense: 90 tablet; Refill: 0  -     carvediloL (COREG) 25 MG tablet; Take 1 tablet (25 mg total) by mouth 2 (two) times daily with meals.  Dispense: 180 tablet; Refill: 0    2. Dysuria  Assessment & Plan:  - Evaluated urinary symptoms, considering possible UTI or yeast infection.  - Patient reports stinging sensation when urinating.  - Noted urine is not dark and does not smell, but there is irritation possibly due to new body wash products.  - Ordered urine sample for UTI screening to check for bacteria and determine appropriate treatment.  - Recommend OTC Azo for symptom relief, explaining it can help with symptoms but will not treat underlying bacteria.  - Will prescribe antibiotic therapy if UTI is confirmed.  - If normal UA, recommend in-person exam for Vaginosis probe.    Orders:  -     Urinalysis, Reflex to Urine Culture Urine, Clean Catch; Future; Expected date: 07/22/2025    3. Osteopenia of multiple sites  Assessment & Plan:  - counseling on recommendations for fall prevention and increasing bone density  - followed by endocrinology and recommend follow-up with Dr. Lynn  - routine DXA scan ordered    Orders:  -     DXA Bone Density Axial Skeleton 1 or more sites; Future; Expected date: 07/22/2025    4. History of colonic polyps  Overview:  - recent colonoscopy 2025 completed at [facility] and reported no abnormal findings  - request records      5. Screening mammogram for breast cancer  -     Mammo Digital Screening Bilat w/ David (XPD); Future; Expected date: 07/22/2025    6. Screening for osteoporosis  -     DXA Bone Density Axial Skeleton 1 or more sites; Future; Expected date: 07/22/2025    7. Vitamin B12 deficiency  -     Vitamin B12; Future; Expected date: 07/22/2025    8. Vitamin D deficiency disease  -     Vitamin D; Future; Expected date: 07/22/2025    9. Palpitation  -     CBC Auto Differential; Future; Expected date: 07/22/2025  -     Comprehensive Metabolic Panel; Future; Expected date:  07/22/2025  -     TSH; Future; Expected date: 07/22/2025  -     SCHEDULED EKG 12-LEAD (to Muse); Future    10. Dyslipidemia  -     Lipid Panel; Future; Expected date: 07/22/2025    11. Stage 3b chronic kidney disease  Assessment & Plan:  Lab Results   Component Value Date    CREATININE 1.3 03/07/2024    BUN 15 03/07/2024     03/07/2024    K 3.9 03/07/2024     (H) 03/07/2024    CO2 24 03/07/2024   - updating labs  - recommend low NA diet  - recommend renally dose medications  - recommend no NSAIDS      12. Morbid obesity  Assessment & Plan:  - complicated by HTN and KIMMIE  - counseling on recommendations for diet and cardiovascular exercise  - recommend continued weight monitoring    Orders:  -     Hemoglobin A1C; Future; Expected date: 07/22/2025    13. Severe KIMMIE (obstructive sleep apnea)    14. Non-ischemic cardiomyopathy  Overview:  mild    Assessment & Plan:  - established with cardiology and scheduling follow-up this Fall  - she does report new onset of intermittent palpitations about once weekly  - recommend labs, EKG, and evaluation by cardiology      15. Screening for diabetes mellitus  -     Hemoglobin A1C; Future; Expected date: 07/22/2025    Health maintenance addressed: Routine labs, cancer screenings, and bone density screening addressed  Vaccines recommended: UTD    Follow up in about 2 months (around 9/22/2025) for routine health maintenance with PCP.     TODD Mayo-C   Internal Medicine

## 2025-07-25 ENCOUNTER — APPOINTMENT (OUTPATIENT)
Dept: LAB | Facility: OTHER | Age: 75
End: 2025-07-25
Payer: MEDICARE

## 2025-07-25 DIAGNOSIS — M62.838 MUSCLE SPASM: ICD-10-CM

## 2025-07-25 RX ORDER — CYCLOBENZAPRINE HCL 5 MG
10 TABLET ORAL 3 TIMES DAILY PRN
Qty: 90 TABLET | Refills: 0 | Status: SHIPPED | OUTPATIENT
Start: 2025-07-25

## 2025-07-25 NOTE — TELEPHONE ENCOUNTER
Refill Encounter    PCP Visits: Recent Visits  Date Type Provider Dept   07/22/25 Office Visit Tyesha Rodriguez FNP-C [facility]   07/22/25 Appointment Tyesha Rodriguez FNP-C [facility]   Showing recent visits within past 360 days and meeting all other requirements  Future Appointments  Date Type Provider Dept   09/24/25 Appointment Wendy Thompson MD [facility]   Showing future appointments within next 720 days and meeting all other requirements      Last 3 Blood Pressure:   BP Readings from Last 3 Encounters:   07/22/25 129/73   12/10/24 134/86   09/03/24 104/65     Preferred Pharmacy:   [facility]/pharmacy #[id number] - [place] - [address]  [address]  [place] [address]  Phone: [phone] Fax: [phone]    Requested RX:  Requested Prescriptions     Pending Prescriptions Disp Refills    cyclobenzaprine (FLEXERIL) 5 MG tablet 90 tablet 0     Sig: Take 2 tablets (10 mg total) by mouth 3 (three) times daily as needed for Muscle spasms.      RX Route: Normal

## 2025-07-28 PROBLEM — N30.00 ACUTE CYSTITIS WITHOUT HEMATURIA: Status: ACTIVE | Noted: 2025-07-28

## 2025-08-06 ENCOUNTER — HOSPITAL ENCOUNTER (OUTPATIENT)
Dept: RADIOLOGY | Facility: OTHER | Age: 75
Discharge: HOME | End: 2025-08-06
Payer: MEDICARE

## 2025-08-06 VITALS — HEIGHT: 66 IN | WEIGHT: 253 LBS | BODY MASS INDEX: 40.66 KG/M2

## 2025-08-06 DIAGNOSIS — Z12.31 SCREENING MAMMOGRAM FOR BREAST CANCER: ICD-10-CM

## 2025-08-06 PROCEDURE — 77067 SCR MAMMO BI INCL CAD: CPT | Mod: TC

## 2025-08-11 ENCOUNTER — HOSPITAL ENCOUNTER (OUTPATIENT)
Dept: RADIOLOGY | Facility: OTHER | Age: 75
Discharge: HOME | End: 2025-08-11
Payer: MEDICARE

## 2025-08-11 DIAGNOSIS — M85.89 OSTEOPENIA OF MULTIPLE SITES: ICD-10-CM

## 2025-08-11 DIAGNOSIS — Z13.820 SCREENING FOR OSTEOPOROSIS: ICD-10-CM

## 2025-08-11 PROCEDURE — 77080 DXA BONE DENSITY AXIAL: CPT | Mod: TC

## 2025-08-11 PROCEDURE — 77080 DXA BONE DENSITY AXIAL: CPT | Mod: 26,,, | Performed by: RADIOLOGY

## 2025-08-23 DIAGNOSIS — I10 ESSENTIAL HYPERTENSION: ICD-10-CM

## 2025-08-24 RX ORDER — AMLODIPINE BESYLATE 5 MG/1
5 TABLET ORAL
Qty: 90 TABLET | Refills: 0 | Status: SHIPPED | OUTPATIENT
Start: 2025-08-24

## 2025-09-03 DIAGNOSIS — I10 PRIMARY HYPERTENSION: ICD-10-CM

## 2025-09-03 DIAGNOSIS — E78.5 DYSLIPIDEMIA: Chronic | ICD-10-CM

## 2025-09-04 RX ORDER — ATORVASTATIN CALCIUM 80 MG/1
80 TABLET, FILM COATED ORAL DAILY
Qty: 90 TABLET | Refills: 0 | Status: SHIPPED | OUTPATIENT
Start: 2025-09-04

## 2025-09-04 RX ORDER — CARVEDILOL 25 MG/1
25 TABLET ORAL 2 TIMES DAILY WITH MEALS
Qty: 180 TABLET | Refills: 0 | Status: SHIPPED | OUTPATIENT
Start: 2025-09-04